# Patient Record
Sex: MALE | Race: WHITE | Employment: OTHER | URBAN - METROPOLITAN AREA
[De-identification: names, ages, dates, MRNs, and addresses within clinical notes are randomized per-mention and may not be internally consistent; named-entity substitution may affect disease eponyms.]

---

## 2017-01-03 ENCOUNTER — TELEPHONE (OUTPATIENT)
Dept: INTERNAL MEDICINE CLINIC | Facility: CLINIC | Age: 62
End: 2017-01-03

## 2017-01-03 NOTE — TELEPHONE ENCOUNTER
To nursing, please tell Lab done at [facility] lab on 12/31/16---PSA result is normal (0.7). Thanks.

## 2017-01-05 ENCOUNTER — OFFICE VISIT (OUTPATIENT)
Dept: DERMATOLOGY CLINIC | Facility: CLINIC | Age: 62
End: 2017-01-05

## 2017-01-05 DIAGNOSIS — L82.1 VERRUCOUS KERATOSIS: Primary | ICD-10-CM

## 2017-01-05 DIAGNOSIS — D23.4 BENIGN NEOPLASM OF SCALP AND SKIN OF NECK: ICD-10-CM

## 2017-01-05 DIAGNOSIS — D23.70 BENIGN NEOPLASM OF SKIN OF LOWER LIMB, INCLUDING HIP, UNSPECIFIED LATERALITY: ICD-10-CM

## 2017-01-05 DIAGNOSIS — L82.1 SEBORRHEIC KERATOSES: ICD-10-CM

## 2017-01-05 DIAGNOSIS — L30.9 DERMATITIS: ICD-10-CM

## 2017-01-05 DIAGNOSIS — D23.30 BENIGN NEOPLASM OF SKIN OF FACE: ICD-10-CM

## 2017-01-05 DIAGNOSIS — D23.60 BENIGN NEOPLASM OF SKIN OF UPPER LIMB, INCLUDING SHOULDER, UNSPECIFIED LATERALITY: ICD-10-CM

## 2017-01-05 DIAGNOSIS — D23.5 BENIGN NEOPLASM OF SKIN OF TRUNK, EXCEPT SCROTUM: ICD-10-CM

## 2017-01-05 PROCEDURE — 99203 OFFICE O/P NEW LOW 30 MIN: CPT | Performed by: DERMATOLOGY

## 2017-01-05 PROCEDURE — 99212 OFFICE O/P EST SF 10 MIN: CPT | Performed by: DERMATOLOGY

## 2017-01-29 NOTE — PROGRESS NOTES
Sunita Neighbor is a 64year old male. HPI:     CC:  Patient presents with:  Lesion: New patient presents with lesion to right lateral lower leg and lesion to forehead x couple years. Leison to left hip x 30 years. Area is getting larger x 5 years.  Denies bl Obesity Father    • Heart Attack Father    • Stroke Father      TIA   • Colon Polyp[Other] [OTHER] Father    • Brain bleed[Other] [OTHER] Father 80      in his sleep-hx brain bleed   • Diabetes Mother    • Congestive Heart Failure[Other] Joaquim Barahona Mother Disp:  Rfl:    aspirin (ASPIR-81) 81 MG Oral Tab EC Take 1 tablet by mouth daily. Disp:  Rfl:    cholecalciferol ( VITAMIN D) 1000 UNITS Oral Cap Take 1 capsule by mouth daily. Disp:  Rfl:    Cetirizine HCl 10 MG Oral Cap Take  by mouth.  take 1 tablet (1 Pebbles Rushing    COLONOSCOPY  2010    Comment Dr. Mackenzie Pi HISTORY  11/1/2010    Comment Colon Resection       Social History   Marital Status:   Spouse Name: N/A    Years of Education: N/A  Number of Children: N/A     Occupational Hi oriented in no acute distress. Exam total-body performed, including scalp, head, neck, face,nails, hair, external eyes, including conjunctival mucosa, eyelids, lips external ears, back, chest,/ breasts, axillae,  abdomen, arms, legs, palms.      Multiple l benefits discussed. Pathophysiology discussed with patient. Therapeutic options reviewed. See  Medications in grid. Instructions reviewed at length. Benign nevi, seborrheic  keratoses, cherry angiomas:  Reassurance regarding other benign skin lesions.

## 2017-03-09 ENCOUNTER — PRIOR ORIGINAL RECORDS (OUTPATIENT)
Dept: OTHER | Age: 62
End: 2017-03-09

## 2017-03-22 RX ORDER — FLUTICASONE PROPIONATE 50 MCG
SPRAY, SUSPENSION (ML) NASAL
Qty: 1 BOTTLE | Refills: 11 | Status: SHIPPED | OUTPATIENT
Start: 2017-03-22 | End: 2018-08-16

## 2017-03-24 RX ORDER — HYDROCHLOROTHIAZIDE 25 MG/1
TABLET ORAL
Qty: 45 TABLET | Refills: 3 | Status: SHIPPED | OUTPATIENT
Start: 2017-03-24 | End: 2018-05-01

## 2017-04-18 ENCOUNTER — OFFICE VISIT (OUTPATIENT)
Dept: INTERNAL MEDICINE CLINIC | Facility: CLINIC | Age: 62
End: 2017-04-18

## 2017-04-18 VITALS
DIASTOLIC BLOOD PRESSURE: 70 MMHG | OXYGEN SATURATION: 98 % | BODY MASS INDEX: 39.66 KG/M2 | HEIGHT: 72 IN | TEMPERATURE: 99 F | SYSTOLIC BLOOD PRESSURE: 144 MMHG | WEIGHT: 292.81 LBS | HEART RATE: 78 BPM

## 2017-04-18 DIAGNOSIS — E66.9 OBESITY (BMI 30-39.9): ICD-10-CM

## 2017-04-18 DIAGNOSIS — I10 ESSENTIAL HYPERTENSION: Primary | ICD-10-CM

## 2017-04-18 DIAGNOSIS — N18.30 TYPE 2 DIABETES MELLITUS WITH STAGE 3 CHRONIC KIDNEY DISEASE, WITH LONG-TERM CURRENT USE OF INSULIN (HCC): ICD-10-CM

## 2017-04-18 DIAGNOSIS — Z79.4 TYPE 2 DIABETES MELLITUS WITH STAGE 3 CHRONIC KIDNEY DISEASE, WITH LONG-TERM CURRENT USE OF INSULIN (HCC): ICD-10-CM

## 2017-04-18 DIAGNOSIS — E11.22 TYPE 2 DIABETES MELLITUS WITH STAGE 3 CHRONIC KIDNEY DISEASE, WITH LONG-TERM CURRENT USE OF INSULIN (HCC): ICD-10-CM

## 2017-04-18 DIAGNOSIS — E78.1 HYPERTRIGLYCERIDEMIA: ICD-10-CM

## 2017-04-18 PROCEDURE — 99212 OFFICE O/P EST SF 10 MIN: CPT | Performed by: INTERNAL MEDICINE

## 2017-04-18 PROCEDURE — 99214 OFFICE O/P EST MOD 30 MIN: CPT | Performed by: INTERNAL MEDICINE

## 2017-04-18 RX ORDER — AMLODIPINE BESYLATE 5 MG/1
5 TABLET ORAL DAILY
Qty: 90 TABLET | Refills: 3 | Status: SHIPPED | OUTPATIENT
Start: 2017-04-18 | End: 2018-04-13

## 2017-04-18 NOTE — PROGRESS NOTES
Laureen Romano is a 58year old male who presents for     Check at 4 mo. Feels good. Twisted L knee 2 days ago -tripped on a floor mat. Is getting better.      Hyperlipidemia:    tolerating lopid 600 mg bid, niacin and fish oil.  diet and exercise not c Comment Dr. Carrillo [place]  11/1/2010    Comment Colon Resection      Family History   Problem Relation Age of Onset   • Hypertension Father    • Obesity Father    • Heart Attack Father    • Stroke Father      TIA   • Other[other] William Skaggs Diabetes-type 2 with CKD stage 3 and retinopathy. sees Dr. Zeeshan Garcia. On insulin pump- novalog and takes tradjenta.    Ophtho is Dr Chris Ramirez at [facility].  Dr. Hallie Godwin for retinopathy left eye.    A1c 8.1 on 12/5/16--was done per Dr. Zeeshan Garcia -- Erica Mcfarland Creat 1.94, GFR 36. [facility] on 12/31/16---PSA result is normal (0.7).    Lab -has orders for May (next mo) per Dr. Mohini Santos. Sees Dr. Mohini Santos next mo. Ret in 3 mo. Goal to lose wt. Has lab order from Dr. Mohini Santos for May.     Patient expresses understanding UNITS Oral Cap Take 1 capsule by mouth daily. Disp:  Rfl:    Cetirizine HCl 10 MG Oral Cap Take  by mouth.  take 1 tablet (10MG)  by oral route  every day Disp:  Rfl:    omega-3 fatty acids (FISH OIL) 1000 MG Oral Cap Take 4 capsules daily Disp:  Rfl:

## 2017-04-24 RX ORDER — SYRINGE WITH NEEDLE, 1 ML 25GX5/8"
SYRINGE, EMPTY DISPOSABLE MISCELLANEOUS
Qty: 12 EACH | Refills: 0 | Status: SHIPPED | OUTPATIENT
Start: 2017-04-24 | End: 2018-05-29

## 2017-04-24 RX ORDER — CYANOCOBALAMIN 1000 UG/ML
INJECTION INTRAMUSCULAR; SUBCUTANEOUS
Qty: 12 ML | Refills: 0 | Status: SHIPPED | OUTPATIENT
Start: 2017-04-24 | End: 2018-04-06

## 2017-07-20 RX ORDER — METOPROLOL TARTRATE 50 MG/1
TABLET, FILM COATED ORAL
Qty: 270 TABLET | Refills: 3 | Status: SHIPPED | OUTPATIENT
Start: 2017-07-20 | End: 2018-07-29

## 2017-08-22 ENCOUNTER — OFFICE VISIT (OUTPATIENT)
Dept: INTERNAL MEDICINE CLINIC | Facility: CLINIC | Age: 62
End: 2017-08-22

## 2017-08-22 VITALS
SYSTOLIC BLOOD PRESSURE: 130 MMHG | HEART RATE: 81 BPM | WEIGHT: 288 LBS | TEMPERATURE: 98 F | BODY MASS INDEX: 39.01 KG/M2 | OXYGEN SATURATION: 98 % | DIASTOLIC BLOOD PRESSURE: 70 MMHG | HEIGHT: 72 IN

## 2017-08-22 DIAGNOSIS — E11.22 TYPE 2 DIABETES MELLITUS WITH STAGE 4 CHRONIC KIDNEY DISEASE, WITH LONG-TERM CURRENT USE OF INSULIN (HCC): ICD-10-CM

## 2017-08-22 DIAGNOSIS — E78.1 HYPERTRIGLYCERIDEMIA: ICD-10-CM

## 2017-08-22 DIAGNOSIS — L98.9 SKIN LESION OF FACE: ICD-10-CM

## 2017-08-22 DIAGNOSIS — Z79.4 TYPE 2 DIABETES MELLITUS WITH STAGE 4 CHRONIC KIDNEY DISEASE, WITH LONG-TERM CURRENT USE OF INSULIN (HCC): ICD-10-CM

## 2017-08-22 DIAGNOSIS — N18.4 TYPE 2 DIABETES MELLITUS WITH STAGE 4 CHRONIC KIDNEY DISEASE, WITH LONG-TERM CURRENT USE OF INSULIN (HCC): ICD-10-CM

## 2017-08-22 DIAGNOSIS — I10 ESSENTIAL HYPERTENSION: Primary | ICD-10-CM

## 2017-08-22 PROCEDURE — 99212 OFFICE O/P EST SF 10 MIN: CPT | Performed by: INTERNAL MEDICINE

## 2017-08-22 PROCEDURE — 99214 OFFICE O/P EST MOD 30 MIN: CPT | Performed by: INTERNAL MEDICINE

## 2017-08-22 RX ORDER — ZOSTER VACCINE LIVE 19400 [PFU]/.65ML
INJECTION, POWDER, LYOPHILIZED, FOR SUSPENSION SUBCUTANEOUS
COMMUNITY
Start: 2017-06-25 | End: 2018-01-08 | Stop reason: ALTCHOICE

## 2017-08-22 NOTE — PROGRESS NOTES
Dean Dobson is a 58year old male who presents for     Check at 4 mo.      Feels good.      Hyperlipidemia: tolerating lopid 600 mg bid, niacin and fish oil.  not exercising. Gets 5K steps at work/d.     DM:  sugars averaging 100s.  has insulin pump with n HERNIA REPAIR      Comment: Dr. Nancy Park at time of R hemicolectomy   Family History   Problem Relation Age of Onset   • Hypertension Father    • Obesity Father    • Heart Attack Father    • Stroke Father      TIA   • Other [OTHER] Father    • Brain blee facial cheek. ASSESSMENT AND PLAN:     Diabetes-type 2 with CKD stage 3 and retinopathy--sees Dr. Traci Styles. On insulin pump- novalog and takes tradjenta.    Ophtho is Dr Caren Beltre at [facility]. Dr. Isa Mei for retinopathy eyes.   (was c-i-s of colon.    Podiatrist Dr Eitan Espitia him to trim toenails and for callus L 3rd toe.     Vaccines: Pmvx 11/12--PCV13 on 10/28/14 and PPSV23 in 5 yrs. Tdap 12/1/15.  Shingles shot avail at a pharmacy.  flu shot was in Oct--get flu shot this fall.   2.5 mg by mouth daily. Disp: 30 tablet Rfl: 0   Probiotic Oral Cap Take 1 capsule by mouth daily. Disp:  Rfl:    Coenzyme Q10 (COQ-10) 100 MG Oral Cap Take 1 capsule by mouth daily.  Disp:  Rfl:    Ascorbic Acid (VITAMIN C) 1000 MG Oral Tab Take 1 tablet

## 2017-08-22 NOTE — PATIENT INSTRUCTIONS
Flu shot this fall. See Dr. Zarina Hall as your kidney function tests in May show worsened function. GFR 27, creatinine 2.49. See Dr Judith Oliva to check the skin growth on your cheek.

## 2017-09-08 ENCOUNTER — PRIOR ORIGINAL RECORDS (OUTPATIENT)
Dept: OTHER | Age: 62
End: 2017-09-08

## 2017-09-19 ENCOUNTER — PRIOR ORIGINAL RECORDS (OUTPATIENT)
Dept: OTHER | Age: 62
End: 2017-09-19

## 2017-09-19 LAB
CHOLESTEROL, TOTAL: 180 MG/DL
HDL CHOLESTEROL: 27 MG/DL
HEMOGLOBIN A1C: 8.2 %
TRIGLYCERIDES: 619 MG/DL

## 2017-10-11 RX ORDER — GEMFIBROZIL 600 MG/1
TABLET, FILM COATED ORAL
Qty: 180 TABLET | Refills: 3 | Status: SHIPPED | OUTPATIENT
Start: 2017-10-11 | End: 2018-09-30

## 2017-12-19 ENCOUNTER — OFFICE VISIT (OUTPATIENT)
Dept: INTERNAL MEDICINE CLINIC | Facility: CLINIC | Age: 62
End: 2017-12-19

## 2017-12-19 VITALS
HEIGHT: 72 IN | TEMPERATURE: 99 F | WEIGHT: 281 LBS | BODY MASS INDEX: 38.06 KG/M2 | SYSTOLIC BLOOD PRESSURE: 134 MMHG | DIASTOLIC BLOOD PRESSURE: 70 MMHG | HEART RATE: 68 BPM

## 2017-12-19 DIAGNOSIS — E78.1 HYPERTRIGLYCERIDEMIA: ICD-10-CM

## 2017-12-19 DIAGNOSIS — N18.4 TYPE 2 DIABETES MELLITUS WITH STAGE 4 CHRONIC KIDNEY DISEASE, WITH LONG-TERM CURRENT USE OF INSULIN (HCC): ICD-10-CM

## 2017-12-19 DIAGNOSIS — I10 ESSENTIAL HYPERTENSION: Primary | ICD-10-CM

## 2017-12-19 DIAGNOSIS — Z12.5 SCREENING FOR PROSTATE CANCER: ICD-10-CM

## 2017-12-19 DIAGNOSIS — N40.0 ENLARGED PROSTATE: ICD-10-CM

## 2017-12-19 DIAGNOSIS — E11.22 TYPE 2 DIABETES MELLITUS WITH STAGE 4 CHRONIC KIDNEY DISEASE, WITH LONG-TERM CURRENT USE OF INSULIN (HCC): ICD-10-CM

## 2017-12-19 DIAGNOSIS — Z79.4 TYPE 2 DIABETES MELLITUS WITH STAGE 4 CHRONIC KIDNEY DISEASE, WITH LONG-TERM CURRENT USE OF INSULIN (HCC): ICD-10-CM

## 2017-12-19 PROCEDURE — 99213 OFFICE O/P EST LOW 20 MIN: CPT | Performed by: INTERNAL MEDICINE

## 2017-12-19 PROCEDURE — 99214 OFFICE O/P EST MOD 30 MIN: CPT | Performed by: INTERNAL MEDICINE

## 2017-12-19 RX ORDER — INSULIN HUMAN 500 [IU]/ML
INJECTION, SOLUTION SUBCUTANEOUS
COMMUNITY
Start: 2017-12-08

## 2017-12-19 NOTE — PROGRESS NOTES
Bret Kellogg is a 58year old male who presents for     Check at 4 mo.      Feels good.      Hyperlipidemia: tolerating lopid 600 mg bid, niacin and fish oil.       DM:  sugars 100s to 200s.  has insulin pump with humalog U500 (changed from Novalog in 9/201 R hemicolectomy   Family History   Problem Relation Age of Onset   • Hypertension Father    • Obesity Father    • Heart Attack Father    • Stroke Father      TIA   • Other [OTHER] Father    • Brain bleed [OTHER] Father 80      in his sleep-hx brain ble and oriented  Skin lesion slt scaley on R cheek and on R lower leg a raised growth pointed out by pt. See dermatologist.                   ASSESSMENT AND PLAN:     Diabetes-type 2 with CKD stage 3 and retinopathy--sees Dr. Elizabeth Odell.  On insulin pump- humalog U score 5/12/16-is 312 in circumflex.  echo 7/2/16-- mild LVH. Dr Wadas 9/2016--nuc stress test nl.      Healthcare maintenance:   C-scopy 10/17/14-Dr Shellie Larsen -polyp tubular adenoma and diverticulosis-- next at 5 yrs.  Had R hemicolectomy 11/10 for c-i-s of col EVERY OTHER DAY Disp: 45 tablet Rfl: 3   FLUTICASONE PROPIONATE 50 MCG/ACT Nasal Suspension SPRAY 2 SPRAYS IN EACH NOSTRIL BY INTRANASAL ROUTE EVERY DAY (Patient taking differently: SPRAY 2 SPRAYS IN EACH NOSTRIL BY INTRANASAL ROUTE EVERY DAY AS NEEDED.) D

## 2017-12-20 ENCOUNTER — PATIENT MESSAGE (OUTPATIENT)
Dept: INTERNAL MEDICINE CLINIC | Facility: CLINIC | Age: 62
End: 2017-12-20

## 2017-12-21 NOTE — TELEPHONE ENCOUNTER
[facility] lab orders were entered 12/19/17. Waiting on pt response to either have them mailed or picked up.

## 2017-12-21 NOTE — TELEPHONE ENCOUNTER
[facility]  To: Marylene Ode, MD  Sent: 12/20/2017 5:33 PM CST  Subject: Other    I was just there yesterday and Dr. Abby Mcgowan told me to get some blood work done at [facility] but I got no orders for them. Did I forget to  something.

## 2018-01-08 ENCOUNTER — TELEPHONE (OUTPATIENT)
Dept: INTERNAL MEDICINE CLINIC | Facility: CLINIC | Age: 63
End: 2018-01-08

## 2018-01-08 ENCOUNTER — HOSPITAL ENCOUNTER (OUTPATIENT)
Dept: GENERAL RADIOLOGY | Facility: HOSPITAL | Age: 63
Discharge: HOME | End: 2018-01-08
Attending: INTERNAL MEDICINE
Payer: COMMERCIAL

## 2018-01-08 ENCOUNTER — OFFICE VISIT (OUTPATIENT)
Dept: INTERNAL MEDICINE CLINIC | Facility: CLINIC | Age: 63
End: 2018-01-08

## 2018-01-08 VITALS
SYSTOLIC BLOOD PRESSURE: 140 MMHG | HEIGHT: 72 IN | OXYGEN SATURATION: 98 % | DIASTOLIC BLOOD PRESSURE: 78 MMHG | HEART RATE: 66 BPM | BODY MASS INDEX: 38.06 KG/M2 | WEIGHT: 281 LBS | TEMPERATURE: 98 F

## 2018-01-08 DIAGNOSIS — J06.9 URI, ACUTE: ICD-10-CM

## 2018-01-08 DIAGNOSIS — H66.91 RIGHT OTITIS MEDIA, UNSPECIFIED OTITIS MEDIA TYPE: ICD-10-CM

## 2018-01-08 DIAGNOSIS — J98.01 BRONCHOSPASM: ICD-10-CM

## 2018-01-08 DIAGNOSIS — J06.9 URI, ACUTE: Primary | ICD-10-CM

## 2018-01-08 PROCEDURE — 71046 X-RAY EXAM CHEST 2 VIEWS: CPT | Performed by: INTERNAL MEDICINE

## 2018-01-08 PROCEDURE — 99212 OFFICE O/P EST SF 10 MIN: CPT | Performed by: INTERNAL MEDICINE

## 2018-01-08 PROCEDURE — 99213 OFFICE O/P EST LOW 20 MIN: CPT | Performed by: INTERNAL MEDICINE

## 2018-01-08 RX ORDER — ALBUTEROL SULFATE 90 UG/1
AEROSOL, METERED RESPIRATORY (INHALATION)
Qty: 1 INHALER | Refills: 1 | Status: SHIPPED | OUTPATIENT
Start: 2018-01-08 | End: 2018-06-14 | Stop reason: ALTCHOICE

## 2018-01-08 RX ORDER — CODEINE PHOSPHATE AND GUAIFENESIN 10; 100 MG/5ML; MG/5ML
10 SOLUTION ORAL EVERY 6 HOURS PRN
Qty: 240 ML | Refills: 0 | Status: SHIPPED
Start: 2018-01-08 | End: 2018-03-08

## 2018-01-08 RX ORDER — AMOXICILLIN AND CLAVULANATE POTASSIUM 500; 125 MG/1; MG/1
1 TABLET, FILM COATED ORAL 2 TIMES DAILY
Qty: 20 TABLET | Refills: 0 | Status: SHIPPED | OUTPATIENT
Start: 2018-01-08 | End: 2018-03-08 | Stop reason: ALTCHOICE

## 2018-01-08 NOTE — PROGRESS NOTES
Desire Webster is a 58year old male who presents with     cold symptoms. Onset: 9 days ago  Started with: cough and \"a little bit of a runny nose. \"  No appetite. Then diarrhea 6 days ago that went away.   Not much nasal drainage, R ear is \"clogged up\" Dr. Anthony Saunders at time of R hemicolectomy   Family History   Problem Relation Age of Onset   • Hypertension Father    • Obesity Father    • Heart Attack Father    • Stroke Father      TIA   • Other [OTHER] Father    • Brain bleed [OTHER] Father 80      500-125 MG Oral Tab Take 1 tablet by mouth 2 (two) times daily. Disp: 20 tablet Rfl: 0   guaiFENesin-codeine (CHERATUSSIN AC) 100-10 MG/5ML Oral Solution Take 10 mL by mouth every 6 (six) hours as needed.  Disp: 240 mL Rfl: 0   Albuterol Sulfate  (90 capsule by mouth daily. Disp:  Rfl:    Cetirizine HCl 10 MG Oral Cap Take  by mouth.  take 1 tablet (10MG)  by oral route  every day Disp:  Rfl:    omega-3 fatty acids (FISH OIL) 1000 MG Oral Cap Take 4 capsules daily Disp:  Rfl:          MARIANO Flores

## 2018-01-09 NOTE — TELEPHONE ENCOUNTER
To nursing, please tell patient chest x-ray done today 1/8/18–results are okay–no pneumonia. Thanks.

## 2018-01-12 ENCOUNTER — PRIOR ORIGINAL RECORDS (OUTPATIENT)
Dept: OTHER | Age: 63
End: 2018-01-12

## 2018-01-13 LAB
ABSOLUTE BASOPHILS: 22 CELLS/UL (ref 0–200)
ABSOLUTE EOSINOPHILS: 28 CELLS/UL (ref 15–500)
ABSOLUTE LYMPHOCYTES: 482 CELLS/UL (ref 850–3900)
ABSOLUTE MONOCYTES: 498 CELLS/UL (ref 200–950)
ABSOLUTE NEUTROPHILS: 4570 CELLS/UL (ref 1500–7800)
ALBUMIN/GLOBULIN RATIO: 1.3 (CALC) (ref 1–2.5)
ALBUMIN: 3.5 G/DL (ref 3.6–5.1)
ALKALINE PHOSPHATASE: 59 U/L (ref 40–115)
ALT: 19 U/L (ref 9–46)
APPEARANCE: CLEAR
AST: 16 U/L (ref 10–35)
BASOPHILS: 0.4 %
BILIRUBIN, TOTAL: 0.2 MG/DL (ref 0.2–1.2)
BILIRUBIN: NEGATIVE
BUN/CREATININE RATIO: 13 (CALC) (ref 6–22)
BUN: 68 MG/DL (ref 7–25)
CALCIUM: 8.6 MG/DL (ref 8.6–10.3)
CARBON DIOXIDE: 20 MMOL/L (ref 20–31)
CHLORIDE: 111 MMOL/L (ref 98–110)
COLOR: YELLOW
CREATININE, RANDOM URINE: 86 MG/DL (ref 20–370)
CREATININE: 5.17 MG/DL (ref 0.7–1.25)
EGFR IF AFRICN AM: 13 ML/MIN/1.73M2
EGFR IF NONAFRICN AM: 11 ML/MIN/1.73M2
EOSINOPHILS: 0.5 %
GLOBULIN: 2.8 G/DL (CALC) (ref 1.9–3.7)
GLUCOSE: 76 MG/DL (ref 65–99)
GLUCOSE: NEGATIVE
HEMATOCRIT: 26.4 % (ref 38.5–50)
HEMOGLOBIN: 8.8 G/DL (ref 13.2–17.1)
KETONES: NEGATIVE
LEUKOCYTE ESTERASE: NEGATIVE
LYMPHOCYTES: 8.6 %
MCH: 30 PG (ref 27–33)
MCHC: 33.3 G/DL (ref 32–36)
MCV: 90.1 FL (ref 80–100)
MICROALBUMIN/CREATININE RATIO, RANDOM URINE: 2680 MCG/MG CREAT
MICROALBUMIN: 230.5 MG/DL
MONOCYTES: 8.9 %
MPV: 10.8 FL (ref 7.5–12.5)
NEUTROPHILS: 81.6 %
NITRITE: NEGATIVE
PLATELET COUNT: 197 THOUSAND/UL (ref 140–400)
PLATELET ESTIMATE: ADEQUATE
POTASSIUM: 4.9 MMOL/L (ref 3.5–5.3)
PROTEIN, TOTAL: 6.3 G/DL (ref 6.1–8.1)
PSA, TOTAL: 1.6 NG/ML
RDW: 13.2 % (ref 11–15)
RED BLOOD CELL COUNT: 2.93 MILLION/UL (ref 4.2–5.8)
SODIUM: 141 MMOL/L (ref 135–146)
SPECIFIC GRAVITY: 1.01 (ref 1–1.03)
TSH: 1 MIU/L (ref 0.4–4.5)
WHITE BLOOD CELL COUNT: 5.6 THOUSAND/UL (ref 3.8–10.8)

## 2018-01-14 ENCOUNTER — TELEPHONE (OUTPATIENT)
Dept: INTERNAL MEDICINE CLINIC | Facility: CLINIC | Age: 63
End: 2018-01-14

## 2018-01-14 DIAGNOSIS — D64.9 ANEMIA, UNSPECIFIED TYPE: ICD-10-CM

## 2018-01-14 DIAGNOSIS — N17.9 ACUTE RENAL FAILURE, UNSPECIFIED ACUTE RENAL FAILURE TYPE (HCC): Primary | ICD-10-CM

## 2018-01-15 NOTE — TELEPHONE ENCOUNTER
Called patient. He did not answer. Left voicemail relaying Dr Penny Mohr message and asked him to call back. Called Dr Maria D Sexton office #[phone]. Spoke to Anushka and notified her that lab results were being faxed. She verbalized understanding.   Lab re

## 2018-01-15 NOTE — TELEPHONE ENCOUNTER
To nursing,   please call pt -tell him lab done 1/12/18 at [facility] lab shows his kidney function has worsened. Also, he is more anemic--Hgb down to 8.8. Is he still giving himself the vitamin B12 shots? When is his next visit with Dr. Ellen Henao?   Needs to go f

## 2018-01-15 NOTE — TELEPHONE ENCOUNTER
I left pt message on voice mail to call back--in message I relayed kidney fcn is worse. Call office tomorrow Monday--if not feeling well, call and speak to the doctor on call.      Note to self--Lab of 1/12/18-[facility]--cbc cmp tsh ua/ma, psa--Hgb 8.8, cre

## 2018-01-15 NOTE — TELEPHONE ENCOUNTER
As FYI to DR. SIM ramos dpatient and relayed DR. MONTEMAYOR message - he verbalized understanding.  H ai giving himself B 12 injections monthly, he sees Erin CAMACHO today at 1:30 pm , order for labs mailed to patients home and number for scheduling kidney US given to velasquez

## 2018-01-17 ENCOUNTER — TELEPHONE (OUTPATIENT)
Dept: INTERNAL MEDICINE CLINIC | Facility: CLINIC | Age: 63
End: 2018-01-17

## 2018-01-17 NOTE — TELEPHONE ENCOUNTER
Call from Dr. Lin Sy saw pt 1/15/18--for worsening renal fcn.  1/12/18–creatinine 5.17, GFR 11, BUN 68. Dr. Franklin Wright notes his renal function worsening may be related to him being dehydrated from recent URI.     She recommended rehydration and will rech

## 2018-01-24 ENCOUNTER — PRIOR ORIGINAL RECORDS (OUTPATIENT)
Dept: OTHER | Age: 63
End: 2018-01-24

## 2018-01-25 ENCOUNTER — TELEPHONE (OUTPATIENT)
Dept: INTERNAL MEDICINE CLINIC | Facility: CLINIC | Age: 63
End: 2018-01-25

## 2018-01-25 LAB
% SATURATION: 20 % (CALC) (ref 15–60)
ABSOLUTE BASOPHILS: 17 CELLS/UL (ref 0–200)
ABSOLUTE EOSINOPHILS: 17 CELLS/UL (ref 15–500)
ABSOLUTE LYMPHOCYTES: 302 CELLS/UL (ref 850–3900)
ABSOLUTE MONOCYTES: 261 CELLS/UL (ref 200–950)
ABSOLUTE NEUTROPHILS: 5203 CELLS/UL (ref 1500–7800)
BASOPHILS: 0.3 %
BUN/CREATININE RATIO: 16 (CALC) (ref 6–22)
BUN: 86 MG/DL (ref 7–25)
CALCIUM: 8.2 MG/DL (ref 8.6–10.3)
CARBON DIOXIDE: 11 MMOL/L (ref 20–31)
CHLORIDE: 116 MMOL/L (ref 98–110)
CREATININE: 5.27 MG/DL (ref 0.7–1.25)
EGFR IF AFRICN AM: 12 ML/MIN/1.73M2
EGFR IF NONAFRICN AM: 11 ML/MIN/1.73M2
EOSINOPHILS: 0.3 %
FERRITIN: 237 NG/ML (ref 20–380)
FOLATE, SERUM: 11.1 NG/ML
GLUCOSE: 44 MG/DL (ref 65–99)
HEMATOCRIT: 25.8 % (ref 38.5–50)
HEMOGLOBIN: 8.5 G/DL (ref 13.2–17.1)
IRON BINDING CAPACITY: 285 MCG/DL (CALC) (ref 250–425)
IRON, TOTAL: 57 MCG/DL (ref 50–180)
LYMPHOCYTES: 5.2 %
MCH: 29.9 PG (ref 27–33)
MCHC: 32.9 G/DL (ref 32–36)
MCV: 90.8 FL (ref 80–100)
MONOCYTES: 4.5 %
MPV: 11.7 FL (ref 7.5–12.5)
NEUTROPHILS: 89.7 %
PLATELET COUNT: 143 THOUSAND/UL (ref 140–400)
POTASSIUM: 4.9 MMOL/L (ref 3.5–5.3)
RDW: 13.6 % (ref 11–15)
RED BLOOD CELL COUNT: 2.84 MILLION/UL (ref 4.2–5.8)
SODIUM: 141 MMOL/L (ref 135–146)
VITAMIN B12: 365 PG/ML (ref 200–1100)
WHITE BLOOD CELL COUNT: 5.8 THOUSAND/UL (ref 3.8–10.8)

## 2018-01-25 NOTE — TELEPHONE ENCOUNTER
Spoke with patient after reviewing abnormal lab results, including FBS of 44. Patient verbalized understanding and states that his BS's have been running low, in the 50s-60s, all month since he got the flu.  He has discussed with Dr. Gloria Perez recently and has

## 2018-01-26 NOTE — TELEPHONE ENCOUNTER
To nursing, please tell pt lab done on 1/24/18-cbc fe/tibc, ferritin, B12, folate, BMP-  Results are relatively stable to 1/12/18. His anemia and kidney function are the same.  There is not deficiency of iron, B12 or folate to explain the anemia--therefore

## 2018-01-26 NOTE — TELEPHONE ENCOUNTER
Relayed MD's message to patient---verbalized understanding  Pt states he saw Dr. Juanpablo Nascimento today, Dr. Juanpablo Nascimento lowered insulin on pump, he has f/u visit with him 2/8/18 and will have f/u blood tests at that time  Faxed lab results from 1/24/18 to Dr. Juanpablo Nascimento and no

## 2018-02-28 PROBLEM — I12.9 BENIGN HYPERTENSION WITH CHRONIC KIDNEY DISEASE, STAGE IV (HCC): Status: ACTIVE | Noted: 2018-02-28

## 2018-02-28 PROBLEM — N18.4 BENIGN HYPERTENSION WITH CHRONIC KIDNEY DISEASE, STAGE IV (HCC): Status: ACTIVE | Noted: 2018-02-28

## 2018-02-28 NOTE — TELEPHONE ENCOUNTER
Refill request received for hydrochlorothiazide 25mg every other day. To Dr. Hilary Martinez - please advise regarding refill request in light of recent lab results and US.

## 2018-02-28 NOTE — TELEPHONE ENCOUNTER
To nursing,  I note HCTZ requested but may have been stopped by Dr Yamilex Balderas. Not on med list when he saw Dr. Yamilex Balderas 2/8/18. If possible, can you please check w her nurse that she stopped the hctz? Then tell pt and pharmacy to cancel the refill. Thanks.

## 2018-03-02 RX ORDER — HYDROCHLOROTHIAZIDE 25 MG/1
TABLET ORAL
Qty: 45 TABLET | Refills: 2 | OUTPATIENT
Start: 2018-03-02

## 2018-03-05 LAB
BUN: 86 MG/DL
CALCIUM: 8.2 MG/DL
CHLORIDE: 116 MEQ/L
CREATININE, SERUM: 5.27 MG/DL
GLUCOSE: 44 MG/DL
HEMATOCRIT: 25.8 %
HEMOGLOBIN: 8.5 G/DL
PLATELETS: 143 K/UL
POTASSIUM, SERUM: 4.9 MEQ/L
RED BLOOD COUNT: 2.84 X 10-6/U
SODIUM: 141 MEQ/L
THYROID STIMULATING HORMONE: 1 MLU/L
WHITE BLOOD COUNT: 5.8 X 10-3/U

## 2018-03-06 ENCOUNTER — MYAURORA ACCOUNT LINK (OUTPATIENT)
Dept: OTHER | Age: 63
End: 2018-03-06

## 2018-03-06 ENCOUNTER — PRIOR ORIGINAL RECORDS (OUTPATIENT)
Dept: OTHER | Age: 63
End: 2018-03-06

## 2018-03-12 ENCOUNTER — PRIOR ORIGINAL RECORDS (OUTPATIENT)
Dept: OTHER | Age: 63
End: 2018-03-12

## 2018-03-16 ENCOUNTER — PRIOR ORIGINAL RECORDS (OUTPATIENT)
Dept: OTHER | Age: 63
End: 2018-03-16

## 2018-03-20 ENCOUNTER — PRIOR ORIGINAL RECORDS (OUTPATIENT)
Dept: OTHER | Age: 63
End: 2018-03-20

## 2018-04-06 RX ORDER — CYANOCOBALAMIN 1000 UG/ML
INJECTION INTRAMUSCULAR; SUBCUTANEOUS
Qty: 12 ML | Refills: 0 | Status: SHIPPED | OUTPATIENT
Start: 2018-04-06 | End: 2018-05-29

## 2018-04-17 ENCOUNTER — PRIOR ORIGINAL RECORDS (OUTPATIENT)
Dept: OTHER | Age: 63
End: 2018-04-17

## 2018-04-23 ENCOUNTER — TELEPHONE (OUTPATIENT)
Dept: INTERNAL MEDICINE CLINIC | Facility: CLINIC | Age: 63
End: 2018-04-23

## 2018-04-23 RX ORDER — AMLODIPINE BESYLATE 5 MG/1
5 TABLET ORAL DAILY
Qty: 90 TABLET | Refills: 3 | Status: SHIPPED | OUTPATIENT
Start: 2018-04-23 | End: 2019-04-28

## 2018-04-23 NOTE — TELEPHONE ENCOUNTER
[facility] faxed new Rx request for Amlodipine besylate 5 mg tab  Take 1 tablet by mouth every day  QTY 90  Patient requests new rx - was previously getting at [address]

## 2018-05-01 ENCOUNTER — OFFICE VISIT (OUTPATIENT)
Dept: INTERNAL MEDICINE CLINIC | Facility: CLINIC | Age: 63
End: 2018-05-01

## 2018-05-01 VITALS
HEART RATE: 72 BPM | TEMPERATURE: 99 F | DIASTOLIC BLOOD PRESSURE: 70 MMHG | BODY MASS INDEX: 37.79 KG/M2 | SYSTOLIC BLOOD PRESSURE: 138 MMHG | WEIGHT: 279 LBS | HEIGHT: 72 IN

## 2018-05-01 DIAGNOSIS — I10 ESSENTIAL HYPERTENSION: Primary | ICD-10-CM

## 2018-05-01 DIAGNOSIS — N18.4 TYPE 2 DIABETES MELLITUS WITH STAGE 4 CHRONIC KIDNEY DISEASE, WITH LONG-TERM CURRENT USE OF INSULIN (HCC): ICD-10-CM

## 2018-05-01 DIAGNOSIS — N18.4 STAGE 4 CHRONIC KIDNEY DISEASE (HCC): ICD-10-CM

## 2018-05-01 DIAGNOSIS — E11.22 TYPE 2 DIABETES MELLITUS WITH STAGE 4 CHRONIC KIDNEY DISEASE, WITH LONG-TERM CURRENT USE OF INSULIN (HCC): ICD-10-CM

## 2018-05-01 DIAGNOSIS — E78.1 HYPERTRIGLYCERIDEMIA: ICD-10-CM

## 2018-05-01 DIAGNOSIS — Z79.4 TYPE 2 DIABETES MELLITUS WITH STAGE 4 CHRONIC KIDNEY DISEASE, WITH LONG-TERM CURRENT USE OF INSULIN (HCC): ICD-10-CM

## 2018-05-01 DIAGNOSIS — D64.9 ANEMIA, UNSPECIFIED TYPE: ICD-10-CM

## 2018-05-01 PROCEDURE — 99212 OFFICE O/P EST SF 10 MIN: CPT | Performed by: INTERNAL MEDICINE

## 2018-05-01 PROCEDURE — 99214 OFFICE O/P EST MOD 30 MIN: CPT | Performed by: INTERNAL MEDICINE

## 2018-05-01 NOTE — PROGRESS NOTES
David Vega is a 61year old male who presents for     Check at 4 mo.      Feels good.    Went to [place] for a few wks for work and decided not move there.      Hyperlipidemia: ding ok w meds lopid 600 mg bid, niacin and fish oil.       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      Anemia--CKD/ chronic pancytopenia:  12/5/16-wbc 3.2, hgb 12.4, plt 107; 6/7/16--hgb 12.6, wbc 4.2, plt 112.  Saw Dr Pool Hawkins in past for pancytopenia.  Hx splenomegaly and fatty liver.    9/22/16- fe/tibc ferritin B12 folate--resul INTRAMUSCULAR ROUTE  EVERY MONTH Disp: 12 mL Rfl: 0   Albuterol Sulfate  (90 Base) MCG/ACT Inhalation Aero Soln 2 puffs every 4-6 hours as needed Disp: 1 Inhaler Rfl: 1   HUMULIN R U-500, CONCENTRATED, 500 UNIT/ML Subcutaneous Solution Insulin pump

## 2018-05-30 ENCOUNTER — PRIOR ORIGINAL RECORDS (OUTPATIENT)
Dept: OTHER | Age: 63
End: 2018-05-30

## 2018-05-31 RX ORDER — CYANOCOBALAMIN 1000 UG/ML
INJECTION INTRAMUSCULAR; SUBCUTANEOUS
Qty: 12 ML | Refills: 0 | Status: SHIPPED
Start: 2018-05-31 | End: 2019-08-02

## 2018-05-31 NOTE — TELEPHONE ENCOUNTER
From: [facility]  Sent: 5/29/2018 9:25 PM CDT  Subject: Medication Renewal Request    Danie Abigail.  Suits would like a refill of the following medications:     BD LUER-PILY SYRINGE 23G X 1\" 3 ML Does not apply Misc [Zahra Leo MD]     cyanocobalamin

## 2018-05-31 NOTE — TELEPHONE ENCOUNTER
Refill request is for a maintenance medication and has met the criteria specified in the Ambulatory Medication Refill Standing Order for eligibility, visits, laboratory, alerts and was sent to the requested pharmacy.     Previous script for Vitamin B12 sent

## 2018-06-01 ENCOUNTER — TELEPHONE (OUTPATIENT)
Dept: INTERNAL MEDICINE CLINIC | Facility: CLINIC | Age: 63
End: 2018-06-01

## 2018-06-04 LAB
ALBUMIN: 4 G/DL
ALT (SGPT): 15 U/L
AST (SGOT): 15 U/L
BUN: 62 MG/DL
CALCIUM: 8.7 MG/DL
CHLORIDE: 113 MEQ/L
CHOLESTEROL, TOTAL: 132 MG/DL
CREATININE, SERUM: 3.89 MG/DL
FREE T4: 0.7 MG/DL
GLUCOSE: 109 MG/DL
HDL CHOLESTEROL: 25 MG/DL
HEMOGLOBIN A1C: 5.9 %
LDL CHOLESTEROL: 69 MG/DL
NON-HDL CHOLESTEROL: 107 MG/DL
POTASSIUM, SERUM: 4.3 MEQ/L
SGOT (AST): 15 IU/L
SGPT (ALT): 15 IU/L
SODIUM: 139 MEQ/L
THYROID STIMULATING HORMONE: 1.58 MLU/L
TRIGLYCERIDES: 291 MG/DL

## 2018-06-05 ENCOUNTER — PRIOR ORIGINAL RECORDS (OUTPATIENT)
Dept: OTHER | Age: 63
End: 2018-06-05

## 2018-06-06 LAB
ALBUMIN: 4 G/DL
ALBUMIN: 4.1 G/DL
BUN: 62 MG/DL
BUN: 68 MG/DL
CALCIUM: 8.5 MG/DL
CALCIUM: 8.7 MG/DL
CHLORIDE: 113 MEQ/L
CHLORIDE: 115 MEQ/L
CHOLESTEROL, TOTAL: 132 MG/DL
CREATININE, SERUM: 3.89 MG/DL
CREATININE, SERUM: 3.96 MG/DL
FREE T4: 0.7 MG/DL
GLUCOSE: 109 MG/DL
GLUCOSE: 120 MG/DL
HDL CHOLESTEROL: 25 MG/DL
HEMATOCRIT: 25.5 %
HEMOGLOBIN A1C: 5.9 %
HEMOGLOBIN: 8.8 G/DL
LDL CHOLESTEROL: 69 MG/DL
NON-HDL CHOLESTEROL: 107 MG/DL
POTASSIUM, SERUM: 4.3 MEQ/L
POTASSIUM, SERUM: 4.6 MEQ/L
SGOT (AST): 15 IU/L
SGPT (ALT): 15 IU/L
SODIUM: 139 MEQ/L
SODIUM: 140 MEQ/L
THYROID STIMULATING HORMONE: 1.58 MLU/L
TRIGLYCERIDES: 291 MG/DL

## 2018-06-14 RX ORDER — SODIUM CHLORIDE, SODIUM LACTATE, POTASSIUM CHLORIDE, CALCIUM CHLORIDE 600; 310; 30; 20 MG/100ML; MG/100ML; MG/100ML; MG/100ML
INJECTION, SOLUTION INTRAVENOUS CONTINUOUS
Status: CANCELLED | OUTPATIENT
Start: 2018-06-14

## 2018-06-21 ENCOUNTER — SURGERY (OUTPATIENT)
Age: 63
End: 2018-06-21

## 2018-06-21 ENCOUNTER — ANESTHESIA EVENT (OUTPATIENT)
Dept: SURGERY | Facility: HOSPITAL | Age: 63
End: 2018-06-21
Payer: COMMERCIAL

## 2018-06-21 ENCOUNTER — HOSPITAL ENCOUNTER (OUTPATIENT)
Facility: HOSPITAL | Age: 63
Setting detail: HOSPITAL OUTPATIENT SURGERY
Discharge: HOME | End: 2018-06-21
Attending: SURGERY | Admitting: SURGERY
Payer: COMMERCIAL

## 2018-06-21 ENCOUNTER — ANESTHESIA (OUTPATIENT)
Dept: SURGERY | Facility: HOSPITAL | Age: 63
End: 2018-06-21
Payer: COMMERCIAL

## 2018-06-21 VITALS
WEIGHT: 271.81 LBS | DIASTOLIC BLOOD PRESSURE: 67 MMHG | TEMPERATURE: 97 F | RESPIRATION RATE: 16 BRPM | BODY MASS INDEX: 36.82 KG/M2 | HEIGHT: 72 IN | OXYGEN SATURATION: 94 % | SYSTOLIC BLOOD PRESSURE: 114 MMHG | HEART RATE: 59 BPM

## 2018-06-21 PROCEDURE — 82962 GLUCOSE BLOOD TEST: CPT

## 2018-06-21 PROCEDURE — 80048 BASIC METABOLIC PNL TOTAL CA: CPT | Performed by: SURGERY

## 2018-06-21 PROCEDURE — 36415 COLL VENOUS BLD VENIPUNCTURE: CPT | Performed by: SURGERY

## 2018-06-21 PROCEDURE — 85025 COMPLETE CBC W/AUTO DIFF WBC: CPT | Performed by: SURGERY

## 2018-06-21 PROCEDURE — 0WHG43Z INSERTION OF INFUSION DEVICE INTO PERITONEAL CAVITY, PERCUTANEOUS ENDOSCOPIC APPROACH: ICD-10-PCS | Performed by: SURGERY

## 2018-06-21 DEVICE — CATH DLYS 62.5CM 2 CUF: Type: IMPLANTABLE DEVICE | Status: FUNCTIONAL

## 2018-06-21 RX ORDER — HYDROMORPHONE HYDROCHLORIDE 1 MG/ML
0.4 INJECTION, SOLUTION INTRAMUSCULAR; INTRAVENOUS; SUBCUTANEOUS EVERY 2 HOUR PRN
Status: DISCONTINUED | OUTPATIENT
Start: 2018-06-21 | End: 2018-06-21

## 2018-06-21 RX ORDER — NEOSTIGMINE METHYLSULFATE 0.5 MG/ML
INJECTION INTRAVENOUS AS NEEDED
Status: DISCONTINUED | OUTPATIENT
Start: 2018-06-21 | End: 2018-06-21 | Stop reason: SURG

## 2018-06-21 RX ORDER — SODIUM CHLORIDE 9 MG/ML
INJECTION, SOLUTION INTRAVENOUS CONTINUOUS
Status: DISCONTINUED | OUTPATIENT
Start: 2018-06-21 | End: 2018-06-21

## 2018-06-21 RX ORDER — ONDANSETRON 2 MG/ML
INJECTION INTRAMUSCULAR; INTRAVENOUS AS NEEDED
Status: DISCONTINUED | OUTPATIENT
Start: 2018-06-21 | End: 2018-06-21 | Stop reason: SURG

## 2018-06-21 RX ORDER — ACETAMINOPHEN 500 MG
1000 TABLET ORAL ONCE
Status: COMPLETED | OUTPATIENT
Start: 2018-06-21 | End: 2018-06-21

## 2018-06-21 RX ORDER — FAMOTIDINE 20 MG/1
20 TABLET ORAL ONCE
Status: COMPLETED | OUTPATIENT
Start: 2018-06-21 | End: 2018-06-21

## 2018-06-21 RX ORDER — HYDROCODONE BITARTRATE AND ACETAMINOPHEN 7.5; 325 MG/1; MG/1
1-2 TABLET ORAL EVERY 6 HOURS PRN
Qty: 30 TABLET | Refills: 0 | Status: SHIPPED | OUTPATIENT
Start: 2018-06-21 | End: 2018-08-20

## 2018-06-21 RX ORDER — LIDOCAINE HYDROCHLORIDE 10 MG/ML
INJECTION, SOLUTION EPIDURAL; INFILTRATION; INTRACAUDAL; PERINEURAL AS NEEDED
Status: DISCONTINUED | OUTPATIENT
Start: 2018-06-21 | End: 2018-06-21 | Stop reason: SURG

## 2018-06-21 RX ORDER — HYDROMORPHONE HYDROCHLORIDE 1 MG/ML
0.6 INJECTION, SOLUTION INTRAMUSCULAR; INTRAVENOUS; SUBCUTANEOUS EVERY 5 MIN PRN
Status: DISCONTINUED | OUTPATIENT
Start: 2018-06-21 | End: 2018-06-21

## 2018-06-21 RX ORDER — DOCUSATE SODIUM 100 MG/1
100 CAPSULE, LIQUID FILLED ORAL 2 TIMES DAILY
Qty: 14 CAPSULE | Refills: 1 | Status: SHIPPED | OUTPATIENT
Start: 2018-06-21 | End: 2018-06-28

## 2018-06-21 RX ORDER — BUPIVACAINE HYDROCHLORIDE AND EPINEPHRINE 2.5; 5 MG/ML; UG/ML
INJECTION, SOLUTION INFILTRATION; PERINEURAL AS NEEDED
Status: DISCONTINUED | OUTPATIENT
Start: 2018-06-21 | End: 2018-06-21 | Stop reason: HOSPADM

## 2018-06-21 RX ORDER — HALOPERIDOL 5 MG/ML
0.25 INJECTION INTRAMUSCULAR ONCE AS NEEDED
Status: DISCONTINUED | OUTPATIENT
Start: 2018-06-21 | End: 2018-06-21

## 2018-06-21 RX ORDER — HYDROMORPHONE HYDROCHLORIDE 1 MG/ML
0.4 INJECTION, SOLUTION INTRAMUSCULAR; INTRAVENOUS; SUBCUTANEOUS EVERY 5 MIN PRN
Status: DISCONTINUED | OUTPATIENT
Start: 2018-06-21 | End: 2018-06-21

## 2018-06-21 RX ORDER — NALOXONE HYDROCHLORIDE 0.4 MG/ML
80 INJECTION, SOLUTION INTRAMUSCULAR; INTRAVENOUS; SUBCUTANEOUS AS NEEDED
Status: DISCONTINUED | OUTPATIENT
Start: 2018-06-21 | End: 2018-06-21

## 2018-06-21 RX ORDER — HYDROCODONE BITARTRATE AND ACETAMINOPHEN 5; 325 MG/1; MG/1
2 TABLET ORAL AS NEEDED
Status: DISCONTINUED | OUTPATIENT
Start: 2018-06-21 | End: 2018-06-21

## 2018-06-21 RX ORDER — HYDROCODONE BITARTRATE AND ACETAMINOPHEN 5; 325 MG/1; MG/1
1 TABLET ORAL AS NEEDED
Status: DISCONTINUED | OUTPATIENT
Start: 2018-06-21 | End: 2018-06-21

## 2018-06-21 RX ORDER — TRAMADOL HYDROCHLORIDE 50 MG/1
100 TABLET ORAL EVERY 6 HOURS PRN
Status: DISCONTINUED | OUTPATIENT
Start: 2018-06-21 | End: 2018-06-21

## 2018-06-21 RX ORDER — DEXTROSE MONOHYDRATE 25 G/50ML
50 INJECTION, SOLUTION INTRAVENOUS
Status: DISCONTINUED | OUTPATIENT
Start: 2018-06-21 | End: 2018-06-21

## 2018-06-21 RX ORDER — ONDANSETRON 2 MG/ML
4 INJECTION INTRAMUSCULAR; INTRAVENOUS EVERY 6 HOURS PRN
Status: DISCONTINUED | OUTPATIENT
Start: 2018-06-21 | End: 2018-06-21

## 2018-06-21 RX ORDER — METOCLOPRAMIDE 10 MG/1
10 TABLET ORAL ONCE
Status: COMPLETED | OUTPATIENT
Start: 2018-06-21 | End: 2018-06-21

## 2018-06-21 RX ORDER — ROCURONIUM BROMIDE 10 MG/ML
INJECTION, SOLUTION INTRAVENOUS AS NEEDED
Status: DISCONTINUED | OUTPATIENT
Start: 2018-06-21 | End: 2018-06-21 | Stop reason: SURG

## 2018-06-21 RX ORDER — HYDROMORPHONE HYDROCHLORIDE 1 MG/ML
1.2 INJECTION, SOLUTION INTRAMUSCULAR; INTRAVENOUS; SUBCUTANEOUS EVERY 2 HOUR PRN
Status: DISCONTINUED | OUTPATIENT
Start: 2018-06-21 | End: 2018-06-21

## 2018-06-21 RX ORDER — ONDANSETRON 2 MG/ML
4 INJECTION INTRAMUSCULAR; INTRAVENOUS ONCE AS NEEDED
Status: DISCONTINUED | OUTPATIENT
Start: 2018-06-21 | End: 2018-06-21

## 2018-06-21 RX ORDER — GLYCOPYRROLATE 0.2 MG/ML
INJECTION INTRAMUSCULAR; INTRAVENOUS AS NEEDED
Status: DISCONTINUED | OUTPATIENT
Start: 2018-06-21 | End: 2018-06-21 | Stop reason: SURG

## 2018-06-21 RX ORDER — HYDROMORPHONE HYDROCHLORIDE 1 MG/ML
0.2 INJECTION, SOLUTION INTRAMUSCULAR; INTRAVENOUS; SUBCUTANEOUS EVERY 5 MIN PRN
Status: DISCONTINUED | OUTPATIENT
Start: 2018-06-21 | End: 2018-06-21

## 2018-06-21 RX ORDER — METOCLOPRAMIDE HYDROCHLORIDE 5 MG/ML
10 INJECTION INTRAMUSCULAR; INTRAVENOUS EVERY 6 HOURS PRN
Status: DISCONTINUED | OUTPATIENT
Start: 2018-06-21 | End: 2018-06-21

## 2018-06-21 RX ORDER — HYDROMORPHONE HYDROCHLORIDE 1 MG/ML
0.8 INJECTION, SOLUTION INTRAMUSCULAR; INTRAVENOUS; SUBCUTANEOUS EVERY 2 HOUR PRN
Status: DISCONTINUED | OUTPATIENT
Start: 2018-06-21 | End: 2018-06-21

## 2018-06-21 RX ADMIN — SODIUM CHLORIDE: 9 INJECTION, SOLUTION INTRAVENOUS at 11:31:00

## 2018-06-21 RX ADMIN — GLYCOPYRROLATE 0.8 MG: 0.2 INJECTION INTRAMUSCULAR; INTRAVENOUS at 11:15:00

## 2018-06-21 RX ADMIN — ROCURONIUM BROMIDE 50 MG: 10 INJECTION, SOLUTION INTRAVENOUS at 09:54:00

## 2018-06-21 RX ADMIN — ROCURONIUM BROMIDE 10 MG: 10 INJECTION, SOLUTION INTRAVENOUS at 10:32:00

## 2018-06-21 RX ADMIN — NEOSTIGMINE METHYLSULFATE 5 MG: 0.5 INJECTION INTRAVENOUS at 11:15:00

## 2018-06-21 RX ADMIN — ONDANSETRON 4 MG: 2 INJECTION INTRAMUSCULAR; INTRAVENOUS at 11:10:00

## 2018-06-21 RX ADMIN — SODIUM CHLORIDE: 9 INJECTION, SOLUTION INTRAVENOUS at 09:44:00

## 2018-06-21 RX ADMIN — LIDOCAINE HYDROCHLORIDE 50 MG: 10 INJECTION, SOLUTION EPIDURAL; INFILTRATION; INTRACAUDAL; PERINEURAL at 09:54:00

## 2018-06-21 NOTE — BRIEF OP NOTE
Pre-Operative Diagnosis: chronic kidney disease, stage four     Post-Operative Diagnosis: chronic kidney disease, stage four      Procedure Performed:   Procedure(s):  laparoscopic insertion of peritoneal dialysis catheter    Surgeon(s) and Role:     * Mar

## 2018-06-21 NOTE — OPERATIVE REPORT
[facility]    PATIENT'S NAME: Brian Jimenezюлия   ATTENDING PHYSICIAN: Gianni Velasquez MD   OPERATING PHYSICIAN: Gianni Velasquez MD   PATIENT ACCOUNT#:   [de-identified]    LOCATION:  [place] [address]  MEDICAL RECORD #:   [id number] endotracheal tube by Anesthesia. The patient's somewhat decompressed orogastric tube was compressed with a Mcdonnell catheter. The abdomen was prepped and draped in sterile fashion.   A small incision was made in the left upper quadrant, and a Veress needle w placed. Pneumoperitoneum was removed, and the catheter was irrigated with dialysate solution with good aspiration and return back. There was no impingement present.   The catheter was tunneled in the subcutaneous tissue and brought out through a separate

## 2018-06-21 NOTE — CONSULTS
Luna Spence is a 61year old male. No chief complaint on file.     HPI:    The etiology of the patient's renal failure is dm.   They have no had prior hemodialysis in the past.  The patient now presents for insertion of permanent hemodialysis cathet NOSTRIL BY INTRANASAL ROUTE EVERY DAY AS NEEDED.) Disp: 1 Bottle Rfl: 11   PORFIRIO CONTOUR NEXT TEST In Vitro Strip   Disp:  Rfl:    niacin 500 MG Oral Tab Take 500 mg by mouth.  2 tab daily Disp:  Rfl:    Linagliptin (TRADJENTA) 5 MG Oral Tab Take 2.5 mg by 2009     mild splenomegaly on US liver 2009   • Transient paralysis 1964     Paralysis L side-transient-age 9 viral   • Type 2 diabetes mellitus (HCC)     • Viral disease       Hx spinal virus   • Visual impairment       glasses       Past Surgical History depression or anxiety  HEMATOLOGY: denies hx anemia; denies bruising or excessive bleeding  ENDOCRINE: denies excessive thirst or urination; denies unexpected wt gain or wt loss     PHYSICAL EXAM:   GENERAL: well developed, well nourished male, in no appar paraesthesia at the excision site,  non functional catheter, infected  catheter, need for revision of the  catheter, hemothorax, pneumothorax, subclavian or jugular vein thrombosis; the need for lifelong hemodialysis; as well as the risks with anesthesia i

## 2018-06-21 NOTE — H&P (VIEW-ONLY)
Donneta Osgood is a 61year old male. No chief complaint on file.     HPI:    The etiology of the patient's renal failure is dm.   They have no had prior hemodialysis in the past.  The patient now presents for insertion of permanent hemodialysis cathet NOSTRIL BY INTRANASAL ROUTE EVERY DAY AS NEEDED.) Disp: 1 Bottle Rfl: 11   PORFIRIO CONTOUR NEXT TEST In Vitro Strip   Disp:  Rfl:    niacin 500 MG Oral Tab Take 500 mg by mouth.  2 tab daily Disp:  Rfl:    Linagliptin (TRADJENTA) 5 MG Oral Tab Take 2.5 mg by 2009     mild splenomegaly on US liver 2009   • Transient paralysis 1964     Paralysis L side-transient-age 9 viral   • Type 2 diabetes mellitus (HCC)     • Viral disease       Hx spinal virus   • Visual impairment       glasses       Past Surgical History depression or anxiety  HEMATOLOGY: denies hx anemia; denies bruising or excessive bleeding  ENDOCRINE: denies excessive thirst or urination; denies unexpected wt gain or wt loss     PHYSICAL EXAM:   GENERAL: well developed, well nourished male, in no appar paraesthesia at the excision site,  non functional catheter, infected  catheter, need for revision of the  catheter, hemothorax, pneumothorax, subclavian or jugular vein thrombosis; the need for lifelong hemodialysis; as well as the risks with anesthesia i

## 2018-06-21 NOTE — ANESTHESIA POSTPROCEDURE EVALUATION
[facility]    Procedure Summary     Date:  06/21/18 Room / Location:  [address] [place] [address] MAIN OR 05 / [facility] MAIN OR    Anesthesia Start:  8465 Anesthesia Stop:  4518    Procedure:  LAPAROSCOPIC PERITONEAL DIALYSIS CATH INSERTION (N/A ) Diagnosis:  (chronic

## 2018-06-21 NOTE — INTERVAL H&P NOTE
Pre-op Diagnosis: chronic kidney disease, stage four    The above referenced H&P was reviewed by Elsie Mckeon MD on 6/21/2018, the patient was examined and no significant changes have occurred in the patient's condition since the H&P was performed.

## 2018-06-21 NOTE — ANESTHESIA PROCEDURE NOTES
Airway  Urgency: elective      General Information and Staff    Patient location during procedure: OR  Anesthesiologist: DANIAL JAEGER  Resident/CRNA: BENY NICHOLS  Performed: CRNA     Indications and Patient Condition  Indications for airway management: an

## 2018-06-21 NOTE — ANESTHESIA PREPROCEDURE EVALUATION
Anesthesia PreOp Note    HPI:     Og Garcia is a 61year old male who presents for preoperative consultation requested by: César Yarbrough MD    Date of Surgery: 6/21/2018    Procedure(s):  LAPAROSCOPIC PERITONEAL DIALYSIS CATH INSERTION  INSER • Platelets decreased ([facility] [address]    • Psoriasis-eczema overlap condition    • Renal disorder     CKD with proteinuria--Dr. Chhaya Jackson   • Retinopathy 2015    L eye--[facility] Aletha [facility]   • Sleep apnea 11/1987    uses bipap   • Splenomegaly 2009    mild s FLUTICASONE PROPIONATE 50 MCG/ACT Nasal Suspension SPRAY 2 SPRAYS IN EACH NOSTRIL BY INTRANASAL ROUTE EVERY DAY (Patient taking differently: SPRAY 2 SPRAYS IN EACH NOSTRIL BY INTRANASAL ROUTE EVERY DAY AS NEEDED.) Disp: 1 Bottle Rfl: 11 6/17/2018   PORFIRIO ESPINOSA • Stroke Father      TIA   • Cancer Father    • Heart Disorder Father    • Other Eryn Alves Father    • Brain bleed [OTHER] Father 80      in his sleep-hx brain bleed   • Diabetes Mother    • Obesity Mother    • Renal Disease Mother      ESRD   • Other [O Mallampati: III  TM distance: >3 FB  Neck ROM: full  Dental          Pulmonary    (+) sleep apnea on CPAP, rhonchi,   Cardiovascular   Exercise tolerance: good  (+) hypertension well controlled, CAD,     NYHA Classification: II  ECG reviewed  Rhythm: regul

## 2018-06-27 ENCOUNTER — HOSPITAL ENCOUNTER (OUTPATIENT)
Dept: GENERAL RADIOLOGY | Facility: HOSPITAL | Age: 63
Discharge: HOME | End: 2018-06-27
Attending: INTERNAL MEDICINE
Payer: COMMERCIAL

## 2018-06-27 DIAGNOSIS — N18.6 STAGE 5 CHRONIC KIDNEY DISEASE ON CHRONIC DIALYSIS (HCC): ICD-10-CM

## 2018-06-27 DIAGNOSIS — Z99.2 STAGE 5 CHRONIC KIDNEY DISEASE ON CHRONIC DIALYSIS (HCC): ICD-10-CM

## 2018-06-27 PROCEDURE — 74018 RADEX ABDOMEN 1 VIEW: CPT | Performed by: INTERNAL MEDICINE

## 2018-06-28 ENCOUNTER — HOSPITAL ENCOUNTER (OUTPATIENT)
Facility: HOSPITAL | Age: 63
Setting detail: HOSPITAL OUTPATIENT SURGERY
Discharge: HOME | End: 2018-06-28
Attending: SURGERY | Admitting: SURGERY
Payer: COMMERCIAL

## 2018-06-28 ENCOUNTER — ANESTHESIA (OUTPATIENT)
Dept: SURGERY | Facility: HOSPITAL | Age: 63
End: 2018-06-28
Payer: COMMERCIAL

## 2018-06-28 ENCOUNTER — SURGERY (OUTPATIENT)
Age: 63
End: 2018-06-28

## 2018-06-28 ENCOUNTER — ANESTHESIA EVENT (OUTPATIENT)
Dept: SURGERY | Facility: HOSPITAL | Age: 63
End: 2018-06-28
Payer: COMMERCIAL

## 2018-06-28 VITALS
WEIGHT: 275 LBS | HEART RATE: 55 BPM | RESPIRATION RATE: 18 BRPM | DIASTOLIC BLOOD PRESSURE: 62 MMHG | TEMPERATURE: 98 F | OXYGEN SATURATION: 95 % | BODY MASS INDEX: 37.25 KG/M2 | HEIGHT: 72 IN | SYSTOLIC BLOOD PRESSURE: 109 MMHG

## 2018-06-28 LAB
ANION GAP SERPL CALC-SCNC: 8 MMOL/L (ref 0–18)
BUN SERPL-MCNC: 50 MG/DL (ref 8–20)
BUN/CREAT SERPL: 14.5 (ref 10–20)
CALCIUM SERPL-MCNC: 8.7 MG/DL (ref 8.5–10.5)
CHLORIDE SERPL-SCNC: 114 MMOL/L (ref 95–110)
CO2 SERPL-SCNC: 18 MMOL/L (ref 22–32)
CREAT SERPL-MCNC: 3.44 MG/DL (ref 0.5–1.5)
GLUCOSE BLDC GLUCOMTR-MCNC: 162 MG/DL (ref 70–99)
GLUCOSE SERPL-MCNC: 181 MG/DL (ref 70–99)
OSMOLALITY UR CALC.SUM OF ELEC: 308 MOSM/KG (ref 275–295)
POTASSIUM SERPL-SCNC: 4.2 MMOL/L (ref 3.3–5.1)
SODIUM SERPL-SCNC: 140 MMOL/L (ref 136–144)

## 2018-06-28 PROCEDURE — 0JWT33Z REVISION OF INFUSION DEVICE IN TRUNK SUBCUTANEOUS TISSUE AND FASCIA, PERCUTANEOUS APPROACH: ICD-10-PCS | Performed by: SURGERY

## 2018-06-28 PROCEDURE — 82962 GLUCOSE BLOOD TEST: CPT

## 2018-06-28 PROCEDURE — 36415 COLL VENOUS BLD VENIPUNCTURE: CPT | Performed by: SURGERY

## 2018-06-28 PROCEDURE — 80048 BASIC METABOLIC PNL TOTAL CA: CPT | Performed by: SURGERY

## 2018-06-28 RX ORDER — ROCURONIUM BROMIDE 10 MG/ML
INJECTION, SOLUTION INTRAVENOUS AS NEEDED
Status: DISCONTINUED | OUTPATIENT
Start: 2018-06-28 | End: 2018-06-28 | Stop reason: SURG

## 2018-06-28 RX ORDER — LIDOCAINE HYDROCHLORIDE 10 MG/ML
INJECTION, SOLUTION EPIDURAL; INFILTRATION; INTRACAUDAL; PERINEURAL AS NEEDED
Status: DISCONTINUED | OUTPATIENT
Start: 2018-06-28 | End: 2018-06-28 | Stop reason: SURG

## 2018-06-28 RX ORDER — HYDROCODONE BITARTRATE AND ACETAMINOPHEN 5; 325 MG/1; MG/1
1 TABLET ORAL AS NEEDED
Status: DISCONTINUED | OUTPATIENT
Start: 2018-06-28 | End: 2018-06-28

## 2018-06-28 RX ORDER — ONDANSETRON 2 MG/ML
4 INJECTION INTRAMUSCULAR; INTRAVENOUS EVERY 6 HOURS PRN
Status: CANCELLED | OUTPATIENT
Start: 2018-06-28

## 2018-06-28 RX ORDER — BUPIVACAINE HYDROCHLORIDE AND EPINEPHRINE 2.5; 5 MG/ML; UG/ML
INJECTION, SOLUTION INFILTRATION; PERINEURAL AS NEEDED
Status: DISCONTINUED | OUTPATIENT
Start: 2018-06-28 | End: 2018-06-28 | Stop reason: HOSPADM

## 2018-06-28 RX ORDER — HYDROMORPHONE HYDROCHLORIDE 1 MG/ML
0.4 INJECTION, SOLUTION INTRAMUSCULAR; INTRAVENOUS; SUBCUTANEOUS EVERY 5 MIN PRN
Status: DISCONTINUED | OUTPATIENT
Start: 2018-06-28 | End: 2018-06-28

## 2018-06-28 RX ORDER — FAMOTIDINE 20 MG/1
20 TABLET ORAL ONCE
Status: COMPLETED | OUTPATIENT
Start: 2018-06-28 | End: 2018-06-28

## 2018-06-28 RX ORDER — HALOPERIDOL 5 MG/ML
0.25 INJECTION INTRAMUSCULAR ONCE AS NEEDED
Status: DISCONTINUED | OUTPATIENT
Start: 2018-06-28 | End: 2018-06-28

## 2018-06-28 RX ORDER — SODIUM CHLORIDE 9 MG/ML
INJECTION, SOLUTION INTRAVENOUS CONTINUOUS
Status: DISCONTINUED | OUTPATIENT
Start: 2018-06-28 | End: 2018-06-28

## 2018-06-28 RX ORDER — NALOXONE HYDROCHLORIDE 0.4 MG/ML
80 INJECTION, SOLUTION INTRAMUSCULAR; INTRAVENOUS; SUBCUTANEOUS AS NEEDED
Status: DISCONTINUED | OUTPATIENT
Start: 2018-06-28 | End: 2018-06-28

## 2018-06-28 RX ORDER — ACETAMINOPHEN 500 MG
1000 TABLET ORAL ONCE
Status: COMPLETED | OUTPATIENT
Start: 2018-06-28 | End: 2018-06-28

## 2018-06-28 RX ORDER — ONDANSETRON 2 MG/ML
INJECTION INTRAMUSCULAR; INTRAVENOUS AS NEEDED
Status: DISCONTINUED | OUTPATIENT
Start: 2018-06-28 | End: 2018-06-28 | Stop reason: SURG

## 2018-06-28 RX ORDER — HYDROMORPHONE HYDROCHLORIDE 1 MG/ML
0.6 INJECTION, SOLUTION INTRAMUSCULAR; INTRAVENOUS; SUBCUTANEOUS EVERY 5 MIN PRN
Status: DISCONTINUED | OUTPATIENT
Start: 2018-06-28 | End: 2018-06-28

## 2018-06-28 RX ORDER — HYDROCODONE BITARTRATE AND ACETAMINOPHEN 5; 325 MG/1; MG/1
2 TABLET ORAL AS NEEDED
Status: DISCONTINUED | OUTPATIENT
Start: 2018-06-28 | End: 2018-06-28

## 2018-06-28 RX ORDER — HYDROMORPHONE HYDROCHLORIDE 1 MG/ML
0.2 INJECTION, SOLUTION INTRAMUSCULAR; INTRAVENOUS; SUBCUTANEOUS EVERY 5 MIN PRN
Status: DISCONTINUED | OUTPATIENT
Start: 2018-06-28 | End: 2018-06-28

## 2018-06-28 RX ORDER — HYDROMORPHONE HYDROCHLORIDE 1 MG/ML
0.8 INJECTION, SOLUTION INTRAMUSCULAR; INTRAVENOUS; SUBCUTANEOUS EVERY 2 HOUR PRN
Status: CANCELLED | OUTPATIENT
Start: 2018-06-28

## 2018-06-28 RX ORDER — HYDROMORPHONE HYDROCHLORIDE 1 MG/ML
1.2 INJECTION, SOLUTION INTRAMUSCULAR; INTRAVENOUS; SUBCUTANEOUS EVERY 2 HOUR PRN
Status: CANCELLED | OUTPATIENT
Start: 2018-06-28

## 2018-06-28 RX ORDER — METOCLOPRAMIDE 10 MG/1
10 TABLET ORAL ONCE
Status: COMPLETED | OUTPATIENT
Start: 2018-06-28 | End: 2018-06-28

## 2018-06-28 RX ORDER — GLYCOPYRROLATE 0.2 MG/ML
INJECTION INTRAMUSCULAR; INTRAVENOUS AS NEEDED
Status: DISCONTINUED | OUTPATIENT
Start: 2018-06-28 | End: 2018-06-28 | Stop reason: SURG

## 2018-06-28 RX ORDER — DEXTROSE MONOHYDRATE 25 G/50ML
50 INJECTION, SOLUTION INTRAVENOUS
Status: DISCONTINUED | OUTPATIENT
Start: 2018-06-28 | End: 2018-06-28

## 2018-06-28 RX ORDER — SODIUM CHLORIDE, SODIUM LACTATE, POTASSIUM CHLORIDE, CALCIUM CHLORIDE 600; 310; 30; 20 MG/100ML; MG/100ML; MG/100ML; MG/100ML
INJECTION, SOLUTION INTRAVENOUS CONTINUOUS
Status: DISCONTINUED | OUTPATIENT
Start: 2018-06-28 | End: 2018-06-28

## 2018-06-28 RX ORDER — ONDANSETRON 2 MG/ML
4 INJECTION INTRAMUSCULAR; INTRAVENOUS ONCE AS NEEDED
Status: DISCONTINUED | OUTPATIENT
Start: 2018-06-28 | End: 2018-06-28

## 2018-06-28 RX ORDER — TRAMADOL HYDROCHLORIDE 50 MG/1
100 TABLET ORAL EVERY 6 HOURS PRN
Status: CANCELLED | OUTPATIENT
Start: 2018-06-28

## 2018-06-28 RX ORDER — METOPROLOL TARTRATE 5 MG/5ML
2.5 INJECTION INTRAVENOUS ONCE
Status: DISCONTINUED | OUTPATIENT
Start: 2018-06-28 | End: 2018-06-28

## 2018-06-28 RX ORDER — HYDROMORPHONE HYDROCHLORIDE 1 MG/ML
0.4 INJECTION, SOLUTION INTRAMUSCULAR; INTRAVENOUS; SUBCUTANEOUS EVERY 2 HOUR PRN
Status: CANCELLED | OUTPATIENT
Start: 2018-06-28

## 2018-06-28 RX ORDER — CEFAZOLIN SODIUM/WATER 2 G/20 ML
2 SYRINGE (ML) INTRAVENOUS ONCE
Status: COMPLETED | OUTPATIENT
Start: 2018-06-28 | End: 2018-06-28

## 2018-06-28 RX ORDER — NEOSTIGMINE METHYLSULFATE 0.5 MG/ML
INJECTION INTRAVENOUS AS NEEDED
Status: DISCONTINUED | OUTPATIENT
Start: 2018-06-28 | End: 2018-06-28 | Stop reason: SURG

## 2018-06-28 RX ORDER — METOCLOPRAMIDE HYDROCHLORIDE 5 MG/ML
10 INJECTION INTRAMUSCULAR; INTRAVENOUS EVERY 6 HOURS PRN
Status: CANCELLED | OUTPATIENT
Start: 2018-06-28

## 2018-06-28 RX ADMIN — GLYCOPYRROLATE 0.2 MG: 0.2 INJECTION INTRAMUSCULAR; INTRAVENOUS at 10:20:00

## 2018-06-28 RX ADMIN — SODIUM CHLORIDE: 9 INJECTION, SOLUTION INTRAVENOUS at 10:16:00

## 2018-06-28 RX ADMIN — ROCURONIUM BROMIDE 10 MG: 10 INJECTION, SOLUTION INTRAVENOUS at 10:42:00

## 2018-06-28 RX ADMIN — CEFAZOLIN SODIUM/WATER 3 G: 2 G/20 ML SYRINGE (ML) INTRAVENOUS at 10:35:00

## 2018-06-28 RX ADMIN — GLYCOPYRROLATE 0.8 MG: 0.2 INJECTION INTRAMUSCULAR; INTRAVENOUS at 11:35:00

## 2018-06-28 RX ADMIN — LIDOCAINE HYDROCHLORIDE 50 MG: 10 INJECTION, SOLUTION EPIDURAL; INFILTRATION; INTRACAUDAL; PERINEURAL at 10:20:00

## 2018-06-28 RX ADMIN — NEOSTIGMINE METHYLSULFATE 5 MG: 0.5 INJECTION INTRAVENOUS at 11:35:00

## 2018-06-28 RX ADMIN — ROCURONIUM BROMIDE 20 MG: 10 INJECTION, SOLUTION INTRAVENOUS at 10:45:00

## 2018-06-28 RX ADMIN — SODIUM CHLORIDE: 9 INJECTION, SOLUTION INTRAVENOUS at 11:20:00

## 2018-06-28 RX ADMIN — ROCURONIUM BROMIDE 10 MG: 10 INJECTION, SOLUTION INTRAVENOUS at 10:20:00

## 2018-06-28 RX ADMIN — ONDANSETRON 4 MG: 2 INJECTION INTRAMUSCULAR; INTRAVENOUS at 10:20:00

## 2018-06-28 NOTE — H&P (VIEW-ONLY)
Og Garcia is a 61year old male. No chief complaint on file.     HPI:    The etiology of the patient's renal failure is dm.   They have no had prior hemodialysis in the past.  The patient now presents for insertion of permanent hemodialysis cathet Disp:  Rfl:    Magnesium 500 MG Oral Tab Take 400 mg by mouth.  Disp:  Rfl:    Syringe/Needle, Disp, (BD LUER-PILY SYRINGE) 23G X 1\" 3 ML Does not apply Misc USE FOR B12 INJECTIONS AS DIRECTED Disp: 12 each Rfl: 0   cyanocobalamin 1000 MCG/ML Injection Solu 2010-misael.  polyp, divertic, int hemrds   • Allergic rhinitis     • Carcinoma in situ of colon 2010     cecum-R hemicolectomy Dr. Abbie Weiss   • Diabetes [facility]) 1/1980   • Diverticulosis     • Gallstone 2009     Gallstone seen on US liver 11/09   • Hepatic st use: Yes           0.6 oz/week     Glasses of wine: 1 per week         ROS:      GENERAL HEALTH: otherwise feels well; denies weight loss  SKIN: denies any unusual skin lesions or rashes  EYES: no visual complaints or deficits  HEENT: denies nasal congesti    ASSESSMENT/ PLAN:   This  is a 61year old male presents with chronic renal failure requiring hemodialysis. I had a length discussion with the patient as to the need for peritoneal dialysis catheter insertion. Patient's catheter is nonfunctioning.   [facility] on Thursday, June 28, 2018. I also discussed in detail with Dr. Melita Mora . Total time spent in direct patient contact and decision-making  And coordinating the patient's care including greater than 50% face-to-face was  25  minutes.    Russell Rodas

## 2018-06-28 NOTE — INTERVAL H&P NOTE
Pre-op Diagnosis: Mechanical complication of peritoneal dialysis catheter [facility]) [T14.891I]    The above referenced H&P was reviewed by Marybel Beatty MD on 6/28/2018, the patient was examined and no significant changes have occurred in the patient's co

## 2018-06-28 NOTE — OPERATIVE REPORT
[facility]    PATIENT'S NAME: Derian Foy   ATTENDING PHYSICIAN: Aaron Champion MD   OPERATING PHYSICIAN: Aaron Champion MD   PATIENT ACCOUNT#:   [id number]    LOCATION:  [address]  MEDICAL RECORD #:   [id number] anesthetic was administered via endotracheal tube anesthesia. The patient's abdomen was prepped and draped in the usual sterile fashion. Stomach was decompressed with orogastric tube. Bladder was decompressed with Mcdonnell catheter.   Pneumoperitoneum was a adherent to the anterior abdominal wall from multiple prior operations. The remaining pneumoperitoneum was removed. The 5 mm trocar sites were removed under direct visualization without impingement of bowel or bleeding present.   The wounds were irrigated

## 2018-06-28 NOTE — CONSULTS
Merary Fu is a 61year old male. No chief complaint on file.     HPI:    The etiology of the patient's renal failure is dm.   They have no had prior hemodialysis in the past.  The patient now presents for insertion of permanent hemodialysis cathet Disp:  Rfl:    Magnesium 500 MG Oral Tab Take 400 mg by mouth.  Disp:  Rfl:    Syringe/Needle, Disp, (BD LUER-PILY SYRINGE) 23G X 1\" 3 ML Does not apply Misc USE FOR B12 INJECTIONS AS DIRECTED Disp: 12 each Rfl: 0   cyanocobalamin 1000 MCG/ML Injection Solu 2010-misael.  polyp, divertic, int hemrds   • Allergic rhinitis     • Carcinoma in situ of colon 2010     cecum-R hemicolectomy Dr. Rafita Tinoco   • Diabetes [facility]) 1/1980   • Diverticulosis     • Gallstone 2009     Gallstone seen on US liver 11/09   • Hepatic st use: Yes           0.6 oz/week     Glasses of wine: 1 per week         ROS:      GENERAL HEALTH: otherwise feels well; denies weight loss  SKIN: denies any unusual skin lesions or rashes  EYES: no visual complaints or deficits  HEENT: denies nasal congesti    ASSESSMENT/ PLAN:   This  is a 61year old male presents with chronic renal failure requiring hemodialysis. I had a length discussion with the patient as to the need for peritoneal dialysis catheter insertion. Patient's catheter is nonfunctioning.   [facility] on Thursday, June 28, 2018. I also discussed in detail with Dr. Toro Licea . Total time spent in direct patient contact and decision-making  And coordinating the patient's care including greater than 50% face-to-face was  25  minutes.    Gardenia Almendarez

## 2018-06-28 NOTE — ANESTHESIA POSTPROCEDURE EVALUATION
Patient: Ana M Mckeon    Procedure Summary     Date:  06/28/18 Room / Location:  [facility] MAIN OR  / [facility] MAIN OR    Anesthesia Start:  1707 Anesthesia Stop:  8136    Procedure:  LAPAROSCOPIC PERITONEAL DIALYSIS CATH INSERTION (N/A ) Diagnosis:       Mechanica

## 2018-06-28 NOTE — BRIEF OP NOTE
Pre-Operative Diagnosis: Mechanical complication of peritoneal dialysis catheter (HCC) [Z39.970G]     Post-Operative Diagnosis: non-functioning peritoneal dialysis catheter      Procedure Performed:   Procedure(s):  LAPAROSCOPIC REVISION OF PERITONEAL DIAL

## 2018-06-28 NOTE — ANESTHESIA PREPROCEDURE EVALUATION
Anesthesia PreOp Note    HPI:     Griselda Stringer is a 61year old male who presents for preoperative consultation requested by: Ander Cortez MD    Date of Surgery: 6/28/2018    Procedure(s):  LAPAROSCOPIC PERITONEAL DIALYSIS CATH INSERTION  Indication Psoriasis-eczema overlap condition    • Renal disorder     CKD with proteinuria--Dr. Shad Jesus   • Retinopathy 2015    L eye--Dr Sindhu Loera at [facility]   • Sleep apnea 11/1987    uses bipap   • Splenomegaly 2009    mild splenomegaly on US liver 2009   • 270 tablet Rfl: 3 6/28/2018 at 0700   FLUTICASONE PROPIONATE 50 MCG/ACT Nasal Suspension SPRAY 2 SPRAYS IN EACH NOSTRIL BY INTRANASAL ROUTE EVERY DAY (Patient taking differently: SPRAY 2 SPRAYS IN EACH NOSTRIL BY INTRANASAL ROUTE EVERY DAY AS NEEDED.) Disp 500 mL IVPB 15 mg/kg (Adjusted) Intravenous Once Raleigh Westfall MD   ceFAZolin sodium (ANCEF/KEFZOL) 2 GM/20ML premix IV syringe 2 g 2 g Intravenous Once Raleigh Westfall MD     No current [facility]-ordered outpatient prescriptions on file.       Maria D Olivares 152 (H) 06/21/2018   PGLU 180 (H) 06/21/2018   CA 8.9 06/21/2018          Vital Signs: Body mass index is 37.3 kg/m². height is 1.829 m (6') and weight is 124.7 kg (275 lb). His oral temperature is 97.8 °F (36.6 °C).  His blood pressure is 151/75 and his

## 2018-07-29 RX ORDER — METOPROLOL TARTRATE 50 MG/1
TABLET, FILM COATED ORAL
Qty: 270 TABLET | Refills: 3 | Status: SHIPPED | OUTPATIENT
Start: 2018-07-29 | End: 2019-07-25

## 2018-07-29 NOTE — TELEPHONE ENCOUNTER
Dr Iain Grier as pt was just in the hospital I thought it best that I run this refill request by you, thank you

## 2018-07-30 NOTE — TELEPHONE ENCOUNTER
Refill for metoprolol 50 mg take 1 1/2 tabs bid #270 w 3 refills sent to [facility] pharmacy.

## 2018-08-01 ENCOUNTER — PRIOR ORIGINAL RECORDS (OUTPATIENT)
Dept: OTHER | Age: 63
End: 2018-08-01

## 2018-08-16 ENCOUNTER — TELEPHONE (OUTPATIENT)
Dept: INTERNAL MEDICINE CLINIC | Facility: CLINIC | Age: 63
End: 2018-08-16

## 2018-08-16 LAB
ALBUMIN: 3.8 G/DL
BUN: 51 MG/DL
CALCIUM: 21 MG/DL
CHLORIDE: 4.7 MEQ/L
CREATININE, SERUM: 3.12 MG/DL
GLOBULIN: 2.2 G/DL
GLUCOSE: 172 MG/DL
HEMATOCRIT: 34.4 %
HEMOGLOBIN: 11.4 G/DL
MCH: 31.7 PG
MCHC: 33.2 G/DL
MCV: 96 FL
PLATELETS: 115 K/UL
POTASSIUM, SERUM: 144 MEQ/L
PROTEIN, TOTAL: 6 G/DL
RED BLOOD COUNT: 3.6 X 10-6/U
SODIUM: 16.3 MEQ/L
WHITE BLOOD COUNT: 3.29 X 10-3/U

## 2018-08-16 RX ORDER — FLUTICASONE PROPIONATE 50 MCG
2 SPRAY, SUSPENSION (ML) NASAL DAILY
Qty: 1 BOTTLE | Refills: 11 | Status: SHIPPED
Start: 2018-08-16

## 2018-08-16 NOTE — TELEPHONE ENCOUNTER
From: Bret Kellogg  Sent: 8/16/2018 11:46 AM CDT  Subject: Medication Renewal Request    Jim Carbone.  Suits would like a refill of the following medications:     FLUTICASONE PROPIONATE 50 MCG/ACT Nasal Suspension Edward Alba MD]    Preferred pharmacy: [facility]

## 2018-08-17 NOTE — TELEPHONE ENCOUNTER
Refill sent to [facility] pharmacy in [place] for Fluticasone nasal spray 2 sprays each nostril daily #1 with 11 refills.

## 2018-08-17 NOTE — TELEPHONE ENCOUNTER
[facility] requesting alternative medication not covered   Suggested Flunisolide 0.025% spray, Mometasone Furoate 50 mcg spray   Tasked to rx

## 2018-08-20 ENCOUNTER — OFFICE VISIT (OUTPATIENT)
Dept: INTERNAL MEDICINE CLINIC | Facility: CLINIC | Age: 63
End: 2018-08-20
Payer: COMMERCIAL

## 2018-08-20 ENCOUNTER — LAB ENCOUNTER (OUTPATIENT)
Dept: LAB | Age: 63
End: 2018-08-20
Attending: INTERNAL MEDICINE
Payer: COMMERCIAL

## 2018-08-20 VITALS
TEMPERATURE: 98 F | HEIGHT: 72 IN | DIASTOLIC BLOOD PRESSURE: 70 MMHG | HEART RATE: 78 BPM | SYSTOLIC BLOOD PRESSURE: 122 MMHG | OXYGEN SATURATION: 99 % | WEIGHT: 271 LBS | BODY MASS INDEX: 36.7 KG/M2

## 2018-08-20 DIAGNOSIS — R35.0 URINARY FREQUENCY: Primary | ICD-10-CM

## 2018-08-20 DIAGNOSIS — N40.0 ENLARGED PROSTATE: ICD-10-CM

## 2018-08-20 DIAGNOSIS — Z99.2 STAGE 5 CHRONIC KIDNEY DISEASE ON DIALYSIS (HCC): ICD-10-CM

## 2018-08-20 DIAGNOSIS — N18.6 STAGE 5 CHRONIC KIDNEY DISEASE ON DIALYSIS (HCC): ICD-10-CM

## 2018-08-20 LAB
BILIRUB UR QL: NEGATIVE
COLOR UR: YELLOW
GLUCOSE UR-MCNC: >=500 MG/DL
KETONES UR-MCNC: NEGATIVE MG/DL
NITRITE UR QL STRIP.AUTO: NEGATIVE
PH UR: 5 [PH] (ref 5–8)
PROT UR-MCNC: >=500 MG/DL
RBC #/AREA URNS AUTO: 2 /HPF
SP GR UR STRIP: 1.02 (ref 1–1.03)
UROBILINOGEN UR STRIP-ACNC: <2
VIT C UR-MCNC: 40 MG/DL
WBC #/AREA URNS AUTO: 1135 /HPF

## 2018-08-20 PROCEDURE — 99212 OFFICE O/P EST SF 10 MIN: CPT | Performed by: INTERNAL MEDICINE

## 2018-08-20 PROCEDURE — 87086 URINE CULTURE/COLONY COUNT: CPT

## 2018-08-20 PROCEDURE — 87186 SC STD MICRODIL/AGAR DIL: CPT

## 2018-08-20 PROCEDURE — 81001 URINALYSIS AUTO W/SCOPE: CPT

## 2018-08-20 PROCEDURE — 99214 OFFICE O/P EST MOD 30 MIN: CPT | Performed by: INTERNAL MEDICINE

## 2018-08-20 PROCEDURE — 87088 URINE BACTERIA CULTURE: CPT

## 2018-08-20 RX ORDER — LEVOFLOXACIN 250 MG/1
250 TABLET ORAL
Qty: 7 TABLET | Refills: 0 | Status: SHIPPED | OUTPATIENT
Start: 2018-08-20 | End: 2018-09-05

## 2018-08-20 NOTE — PROGRESS NOTES
Roe Aj is a 61year old malewho presents with     urinary symptoms. Onset: urinary frequency started 3 days ago---was working on his car and began voiding every 15 min.    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  PD catheter and insulin Diet and ex discussed.      Anemia--CKD/ chronic pancytopenia-Saw Dr Guadalupe Marx in past for pancytopenia. Hx splenomegaly and fatty liver.    9/22/16- fe/tibc ferritin B12 folate--results normal. ([facility]). (takes B12 injections).    Dr Sergio Lang felt hgb 8.8 in [place] Rfl: 11   METOPROLOL TARTRATE 50 MG Oral Tab TAKE 1 AND A 1/2 TABLETS BY MOUTH TWICE DAILY Disp: 270 tablet Rfl: 3   Taurine 500 MG Oral Cap Take by mouth.  Disp:  Rfl:    Syringe/Needle, Disp, (BD LUER-PILY SYRINGE) 23G X 1\" 3 ML Does not apply Misc

## 2018-08-21 ENCOUNTER — PATIENT MESSAGE (OUTPATIENT)
Dept: INTERNAL MEDICINE CLINIC | Facility: CLINIC | Age: 63
End: 2018-08-21

## 2018-08-21 RX ORDER — SYRINGE WITH NEEDLE, 1 ML 25GX5/8"
SYRINGE, EMPTY DISPOSABLE MISCELLANEOUS
Refills: 0 | OUTPATIENT
Start: 2018-08-21

## 2018-08-22 ENCOUNTER — TELEPHONE (OUTPATIENT)
Dept: INTERNAL MEDICINE CLINIC | Facility: CLINIC | Age: 63
End: 2018-08-22

## 2018-08-22 NOTE — TELEPHONE ENCOUNTER
To nursing, please call [facility] pharmacy. We sent in refill for needles/syringes for a yr's supply on 5/31/18. #12 was sent in. Please tell them to verify they got the authorization --if not re-give the authorization for them to fill for a yr.  Then call pt a

## 2018-08-22 NOTE — TELEPHONE ENCOUNTER
Called pharmacy who stated they gave patient 12 syringes in May - patient got confused when pharmacy called him for refill. Patient states he is sure he has them at home .  If any further questions or problems he will call our office

## 2018-08-22 NOTE — TELEPHONE ENCOUNTER
From: Lorena Wilde Suits  To: Kathi Hernandez MD  Sent: 8/21/2018 4:28 PM CDT  Subject: Prescription Question    I think  wants me to continue using cyanocobalamin 1000 MCG/ML Soln but they have denied to refill the needles. How do I inject the B12?  Or did I

## 2018-08-22 NOTE — TELEPHONE ENCOUNTER
To nursing, please tell patient urinalysis and urine culture done 8/20/18 confirms a urinary tract infection. The bacteria is sensitive to the levofloxacin prescribed. If not getting better, please let me know.   In view of the UTI, I recommend he go ahea

## 2018-08-23 NOTE — TELEPHONE ENCOUNTER
Patient called back. Relayed Dr Kalie Pastor message to him. He verbalized understanding. He is starting to feel better. He will complete the full course of antibiotics he was prescribed.

## 2018-08-27 ENCOUNTER — TELEPHONE (OUTPATIENT)
Dept: INTERNAL MEDICINE CLINIC | Facility: CLINIC | Age: 63
End: 2018-08-27

## 2018-08-28 ENCOUNTER — MYAURORA ACCOUNT LINK (OUTPATIENT)
Dept: OTHER | Age: 63
End: 2018-08-28

## 2018-08-28 ENCOUNTER — PRIOR ORIGINAL RECORDS (OUTPATIENT)
Dept: OTHER | Age: 63
End: 2018-08-28

## 2018-09-04 ENCOUNTER — PRIOR ORIGINAL RECORDS (OUTPATIENT)
Dept: OTHER | Age: 63
End: 2018-09-04

## 2018-09-05 ENCOUNTER — OFFICE VISIT (OUTPATIENT)
Dept: SURGERY | Facility: CLINIC | Age: 63
End: 2018-09-05
Payer: COMMERCIAL

## 2018-09-05 ENCOUNTER — APPOINTMENT (OUTPATIENT)
Dept: LAB | Facility: HOSPITAL | Age: 63
End: 2018-09-05
Attending: NURSE PRACTITIONER
Payer: COMMERCIAL

## 2018-09-05 VITALS
HEART RATE: 69 BPM | WEIGHT: 217 LBS | DIASTOLIC BLOOD PRESSURE: 86 MMHG | TEMPERATURE: 98 F | SYSTOLIC BLOOD PRESSURE: 133 MMHG | HEIGHT: 72 IN | BODY MASS INDEX: 29.39 KG/M2

## 2018-09-05 DIAGNOSIS — R39.15 URGENCY OF URINATION: Primary | ICD-10-CM

## 2018-09-05 DIAGNOSIS — R30.0 DYSURIA: ICD-10-CM

## 2018-09-05 LAB
BILIRUB UR QL: NEGATIVE
CLARITY UR: CLEAR
COLOR UR: YELLOW
GLUCOSE UR-MCNC: 150 MG/DL
HGB UR QL STRIP.AUTO: NEGATIVE
KETONES UR-MCNC: NEGATIVE MG/DL
LEUKOCYTE ESTERASE UR QL STRIP.AUTO: NEGATIVE
NITRITE UR QL STRIP.AUTO: NEGATIVE
PH UR: 6 [PH] (ref 5–8)
PROT UR-MCNC: >=500 MG/DL
RBC #/AREA URNS AUTO: <1 /HPF
SP GR UR STRIP: 1.01 (ref 1–1.03)
UROBILINOGEN UR STRIP-ACNC: <2
VIT C UR-MCNC: 40 MG/DL
WBC #/AREA URNS AUTO: 13 /HPF

## 2018-09-05 PROCEDURE — 99212 OFFICE O/P EST SF 10 MIN: CPT | Performed by: NURSE PRACTITIONER

## 2018-09-05 PROCEDURE — 99204 OFFICE O/P NEW MOD 45 MIN: CPT | Performed by: NURSE PRACTITIONER

## 2018-09-05 PROCEDURE — 87086 URINE CULTURE/COLONY COUNT: CPT | Performed by: NURSE PRACTITIONER

## 2018-09-05 PROCEDURE — 81001 URINALYSIS AUTO W/SCOPE: CPT | Performed by: NURSE PRACTITIONER

## 2018-09-05 RX ORDER — LEVOFLOXACIN 250 MG/1
TABLET ORAL
Qty: 11 TABLET | Refills: 0 | Status: SHIPPED | OUTPATIENT
Start: 2018-09-05 | End: 2018-10-12

## 2018-09-05 NOTE — PATIENT INSTRUCTIONS
Obtain urinalysis and culture today from downstairs  Begin Levshi.   You will take 3 tablets (750 mg) on day 1, then take 2 tablets (500 mg) every other day (on days 3,5,7,9)  Call to make an appointment for an ultrasound of the kidney and bladder  Follow

## 2018-09-05 NOTE — PROGRESS NOTES
HPI:    Patient ID: Hguh Rosales is a 61year old male. Patient is a 61year old male who presents to the clinic at the request of Dr. Micky Stanley, a copy of this consult will be sent to her, with a chief complaint of dysuria.   Patient states a coup her 52's of renal failure, and his dad also  in his 52's from a heart attack. He has 1 brother and 1 sister. He denies a family history of urological cancer. He currently works as an .           Review of Systems   Constitutional: Negative 180 tablet Rfl: 3   PORFIRIO CONTOUR NEXT TEST In Vitro Strip  Disp:  Rfl:    niacin 500 MG Oral Tab Take 500 mg by mouth daily with breakfast. 2 tab daily  Disp:  Rfl:    Linagliptin (TRADJENTA) 5 MG Oral Tab Take 2.5 mg by mouth daily.  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 Genitourinary: Penis normal. No penile tenderness. Genitourinary Comments: Uncircumcised penis. No penile discharge or irritation. No scrotal swelling,  Bilateral testes palpable and non tender. Prostate 3+ and firm, non tender.    No CVA te

## 2018-09-06 LAB
ABSOLUTE BASOPHILS: 32 CELLS/UL (ref 0–200)
ABSOLUTE EOSINOPHILS: 97 CELLS/UL (ref 15–500)
ABSOLUTE LYMPHOCYTES: 697 CELLS/UL (ref 850–3900)
ABSOLUTE MONOCYTES: 589 CELLS/UL (ref 200–950)
ABSOLUTE NEUTROPHILS: 3985 CELLS/UL (ref 1500–7800)
BASOPHILS: 0.6 %
EOSINOPHILS: 1.8 %
HEMATOCRIT: 31 % (ref 38.5–50)
HEMOGLOBIN: 10.4 G/DL (ref 13.2–17.1)
LYMPHOCYTES: 12.9 %
MCH: 30.5 PG (ref 27–33)
MCHC: 33.5 G/DL (ref 32–36)
MCV: 90.9 FL (ref 80–100)
MONOCYTES: 10.9 %
MPV: 11.9 FL (ref 7.5–12.5)
NEUTROPHILS: 73.8 %
PLATELET COUNT: 109 THOUSAND/UL (ref 140–400)
RDW: 13.2 % (ref 11–15)
RED BLOOD CELL COUNT: 3.41 MILLION/UL (ref 4.2–5.8)
WHITE BLOOD CELL COUNT: 5.4 THOUSAND/UL (ref 3.8–10.8)

## 2018-09-07 ENCOUNTER — HOSPITAL ENCOUNTER (OUTPATIENT)
Dept: ULTRASOUND IMAGING | Facility: HOSPITAL | Age: 63
Discharge: HOME | End: 2018-09-07
Attending: NURSE PRACTITIONER
Payer: COMMERCIAL

## 2018-09-07 DIAGNOSIS — R30.0 DYSURIA: ICD-10-CM

## 2018-09-07 DIAGNOSIS — R39.15 URGENCY OF URINATION: ICD-10-CM

## 2018-09-07 PROCEDURE — 76770 US EXAM ABDO BACK WALL COMP: CPT | Performed by: NURSE PRACTITIONER

## 2018-09-11 ENCOUNTER — APPOINTMENT (OUTPATIENT)
Dept: LAB | Facility: HOSPITAL | Age: 63
End: 2018-09-11
Attending: INTERNAL MEDICINE
Payer: COMMERCIAL

## 2018-09-11 ENCOUNTER — HOSPITAL ENCOUNTER (OUTPATIENT)
Dept: GENERAL RADIOLOGY | Facility: HOSPITAL | Age: 63
Discharge: HOME | End: 2018-09-11
Attending: INTERNAL MEDICINE
Payer: COMMERCIAL

## 2018-09-11 ENCOUNTER — OFFICE VISIT (OUTPATIENT)
Dept: INTERNAL MEDICINE CLINIC | Facility: CLINIC | Age: 63
End: 2018-09-11
Payer: COMMERCIAL

## 2018-09-11 VITALS
OXYGEN SATURATION: 98 % | SYSTOLIC BLOOD PRESSURE: 130 MMHG | HEIGHT: 72 IN | BODY MASS INDEX: 36.7 KG/M2 | HEART RATE: 88 BPM | DIASTOLIC BLOOD PRESSURE: 80 MMHG | TEMPERATURE: 100 F | WEIGHT: 271 LBS

## 2018-09-11 DIAGNOSIS — N41.0 ACUTE PROSTATITIS: Primary | ICD-10-CM

## 2018-09-11 DIAGNOSIS — Z99.2 STAGE 5 CHRONIC KIDNEY DISEASE ON DIALYSIS (HCC): ICD-10-CM

## 2018-09-11 DIAGNOSIS — N18.4 TYPE 2 DIABETES MELLITUS WITH STAGE 4 CHRONIC KIDNEY DISEASE, WITH LONG-TERM CURRENT USE OF INSULIN (HCC): ICD-10-CM

## 2018-09-11 DIAGNOSIS — E11.22 TYPE 2 DIABETES MELLITUS WITH STAGE 4 CHRONIC KIDNEY DISEASE, WITH LONG-TERM CURRENT USE OF INSULIN (HCC): ICD-10-CM

## 2018-09-11 DIAGNOSIS — D64.9 ANEMIA, UNSPECIFIED TYPE: ICD-10-CM

## 2018-09-11 DIAGNOSIS — I10 ESSENTIAL HYPERTENSION: ICD-10-CM

## 2018-09-11 DIAGNOSIS — Z79.4 TYPE 2 DIABETES MELLITUS WITH STAGE 4 CHRONIC KIDNEY DISEASE, WITH LONG-TERM CURRENT USE OF INSULIN (HCC): ICD-10-CM

## 2018-09-11 DIAGNOSIS — R00.0 SINUS TACHYCARDIA: ICD-10-CM

## 2018-09-11 DIAGNOSIS — N18.6 STAGE 5 CHRONIC KIDNEY DISEASE ON DIALYSIS (HCC): ICD-10-CM

## 2018-09-11 LAB
ALBUMIN: 3.6 G/DL
BUN: 37 MG/DL
CALCIUM: 8.9 MG/DL
CHLORIDE: 108 MEQ/L
CREATININE, SERUM: 3.56 MG/DL
D DIMER PPP FEU-MCNC: 0.49 MCG/ML (ref ?–0.63)
GLOBULIN: 2.3 G/DL
GLUCOSE: 281 MG/DL
HEMATOCRIT: 34.1 %
HEMOGLOBIN: 10.9 G/DL
MCH: 30.4 PG
MCHC: 31.8 G/DL
MCV: 95 FL
PLATELETS: 108 K/UL
POTASSIUM, SERUM: 4.3 MEQ/L
PROTEIN, TOTAL: 5.9 G/DL
RED BLOOD COUNT: 3.58 X 10-6/U
SODIUM: 140 MEQ/L
TSH SERPL-ACNC: 1.51 UIU/ML (ref 0.45–5.33)
WHITE BLOOD COUNT: 6.18 X 10-3/U

## 2018-09-11 PROCEDURE — 84443 ASSAY THYROID STIM HORMONE: CPT | Performed by: INTERNAL MEDICINE

## 2018-09-11 PROCEDURE — 99214 OFFICE O/P EST MOD 30 MIN: CPT | Performed by: INTERNAL MEDICINE

## 2018-09-11 PROCEDURE — 36415 COLL VENOUS BLD VENIPUNCTURE: CPT | Performed by: INTERNAL MEDICINE

## 2018-09-11 PROCEDURE — 71046 X-RAY EXAM CHEST 2 VIEWS: CPT | Performed by: INTERNAL MEDICINE

## 2018-09-11 PROCEDURE — 99212 OFFICE O/P EST SF 10 MIN: CPT | Performed by: INTERNAL MEDICINE

## 2018-09-11 PROCEDURE — 85379 FIBRIN DEGRADATION QUANT: CPT | Performed by: INTERNAL MEDICINE

## 2018-09-11 NOTE — PROGRESS NOTES
Bret Kellogg is a 61year old male who presents for     4 mo check    Still urinary frequency up to every hr. Now seeing APN at Dr. Chacho Henry office. She rx more levaquin. ?prostatitis. E coli on urine cx. Started peritoneal dialysis in early July 2018. 7/2018 Dr Ellen Henao  No date: TONSILLECTOMY      Comment:  T & A   Family History   Problem Relation Age of Onset   • Hypertension Father    • Obesity Father    • Heart Attack Father    • Stroke Father         TIA   • Cancer Father    • Heart Disorder F edema, callus w blister on ball of r foot--no drg or redness (has appt this wk w Dr. Marcus Davis. ). Neurologic: alert and oriented                  ASSESSMENT AND PLAN:     Prostatitis--8/20/18-UA-1135, mod bacteria.  Urine ddqksag-27-22 K e coli sens to levaq Lorena Galan in past for pancytopenia. Hx splenomegaly and fatty liver.    9/22/16- fe/tibc ferritin B12 folate--results normal. ([facility]). (takes B12 injections). Dr Ashleigh Tam felt hgb 8.8 in Jan 2018 c/w CKD. 1/12/18- fe/tibc ferritin B12 folate-ok.  6/21/18 per  MCG/ACT Nasal Suspension 2 sprays by Nasal route daily.  Disp: 1 Bottle Rfl: 11   METOPROLOL TARTRATE 50 MG Oral Tab TAKE 1 AND A 1/2 TABLETS BY MOUTH TWICE DAILY Disp: 270 tablet Rfl: 3   Syringe/Needle, Disp, (BD LUER-PILY SYRINGE) 23G X 1\" 3 ML Does not

## 2018-09-12 ENCOUNTER — TELEPHONE (OUTPATIENT)
Dept: INTERNAL MEDICINE CLINIC | Facility: CLINIC | Age: 63
End: 2018-09-12

## 2018-09-12 ENCOUNTER — OFFICE VISIT (OUTPATIENT)
Dept: SURGERY | Facility: CLINIC | Age: 63
End: 2018-09-12
Payer: COMMERCIAL

## 2018-09-12 VITALS
SYSTOLIC BLOOD PRESSURE: 138 MMHG | HEART RATE: 105 BPM | DIASTOLIC BLOOD PRESSURE: 81 MMHG | BODY MASS INDEX: 37 KG/M2 | TEMPERATURE: 99 F | WEIGHT: 271 LBS

## 2018-09-12 DIAGNOSIS — Z87.440 HX: UTI (URINARY TRACT INFECTION): ICD-10-CM

## 2018-09-12 DIAGNOSIS — R35.0 FREQUENCY OF URINATION: Primary | ICD-10-CM

## 2018-09-12 DIAGNOSIS — R39.15 URGENCY OF URINATION: ICD-10-CM

## 2018-09-12 DIAGNOSIS — N20.0 KIDNEY STONE ON LEFT SIDE: ICD-10-CM

## 2018-09-12 DIAGNOSIS — N40.1 BPH ASSOCIATED WITH NOCTURIA: ICD-10-CM

## 2018-09-12 DIAGNOSIS — R35.1 BPH ASSOCIATED WITH NOCTURIA: ICD-10-CM

## 2018-09-12 PROCEDURE — 99213 OFFICE O/P EST LOW 20 MIN: CPT | Performed by: NURSE PRACTITIONER

## 2018-09-12 PROCEDURE — 99212 OFFICE O/P EST SF 10 MIN: CPT | Performed by: NURSE PRACTITIONER

## 2018-09-12 RX ORDER — TAMSULOSIN HYDROCHLORIDE 0.4 MG/1
0.4 CAPSULE ORAL DAILY
Qty: 90 CAPSULE | Refills: 3 | Status: SHIPPED | OUTPATIENT
Start: 2018-09-12 | End: 2018-10-12

## 2018-09-12 NOTE — PROGRESS NOTES
HPI:    Patient ID: Diogenes Mcconnell is a 61year old male. Patient is a 61year old male who presents for a follow up regarding dysuria. Patient has been taking levaquin for possible urinary tract infection.   Last culture was negative however patient was dizziness, light-headedness and headaches. Current Outpatient Medications:  tamsulosin HCl 0.4 MG Oral Cap Take 1 capsule (0.4 mg total) by mouth daily.  Take 1/2 hour following the same meal each day Disp: 90 capsule Rfl: 3   levofloxacin 250 MG fatty acids (FISH OIL) 1000 MG Oral Cap Take 4 capsules daily Disp:  Rfl:      Allergies:  Merbromin               RASH, SWELLING  Merthilate [Thimero*    RASH, SWELLING   PHYSICAL EXAM:   Physical Exam   Constitutional: He appears well-nourished.  No distr hour following the same meal each day       Imaging & Referrals:  None       VY#[id number]

## 2018-09-12 NOTE — TELEPHONE ENCOUNTER
Spoke with pt to inform him of MD message below. Pt voiced understanding and will f/u with Dr. Queta Abraham.

## 2018-09-12 NOTE — TELEPHONE ENCOUNTER
To nursing, please tell patient results from 9/11/18--screening test for blood clot called d-dimer result is normal, thyroid level (TSH) result is normal.  Chest x-ray results okay. Go ahead and follow-up with Dr. Coral Morales as we discussed. Thanks.

## 2018-09-13 ENCOUNTER — OFFICE VISIT (OUTPATIENT)
Dept: PODIATRY CLINIC | Facility: CLINIC | Age: 63
End: 2018-09-13
Payer: COMMERCIAL

## 2018-09-13 DIAGNOSIS — E11.9 DIABETES MELLITUS TYPE 2, INSULIN DEPENDENT (HCC): Primary | ICD-10-CM

## 2018-09-13 DIAGNOSIS — L08.9 PUNCTURE WOUND OF PLANTAR ASPECT OF RIGHT FOOT WITH INFECTION, INITIAL ENCOUNTER: ICD-10-CM

## 2018-09-13 DIAGNOSIS — Z79.4 DIABETES MELLITUS TYPE 2, INSULIN DEPENDENT (HCC): Primary | ICD-10-CM

## 2018-09-13 DIAGNOSIS — S91.331A PUNCTURE WOUND OF PLANTAR ASPECT OF RIGHT FOOT WITH INFECTION, INITIAL ENCOUNTER: ICD-10-CM

## 2018-09-13 PROCEDURE — 97597 DBRDMT OPN WND 1ST 20 CM/<: CPT | Performed by: PODIATRIST

## 2018-09-13 PROCEDURE — 99203 OFFICE O/P NEW LOW 30 MIN: CPT | Performed by: PODIATRIST

## 2018-09-13 PROCEDURE — 99212 OFFICE O/P EST SF 10 MIN: CPT | Performed by: PODIATRIST

## 2018-09-13 NOTE — PROGRESS NOTES
HPI:    Patient ID: Pilo Patino is a 61year old male. HPI  77-year-old insulin-dependent diabetic presents with a concern associated with the bottom of his right foot.   He is aware of having a callus in the region over an extended period of time and i TEST In Vitro Strip  Disp:  Rfl:    niacin 500 MG Oral Tab Take 500 mg by mouth daily with breakfast. 2 tab daily  Disp:  Rfl:    Linagliptin (TRADJENTA) 5 MG Oral Tab Take 2.5 mg by mouth daily.  Disp: 30 tablet Rfl: 0   Probiotic Oral Cap Take 1 capsule b ASSESSMENT/PLAN:   Diabetes mellitus type 2, insulin dependent (hcc)  (primary encounter diagnosis)  Puncture wound of plantar aspect of right foot with infection, initial encounter    No orders of the defined types were placed in this encounter.       Me

## 2018-09-14 ENCOUNTER — PRIOR ORIGINAL RECORDS (OUTPATIENT)
Dept: OTHER | Age: 63
End: 2018-09-14

## 2018-09-18 ENCOUNTER — PRIOR ORIGINAL RECORDS (OUTPATIENT)
Dept: OTHER | Age: 63
End: 2018-09-18

## 2018-09-20 ENCOUNTER — PRIOR ORIGINAL RECORDS (OUTPATIENT)
Dept: OTHER | Age: 63
End: 2018-09-20

## 2018-09-24 ENCOUNTER — OFFICE VISIT (OUTPATIENT)
Dept: PODIATRY CLINIC | Facility: CLINIC | Age: 63
End: 2018-09-24
Payer: COMMERCIAL

## 2018-09-24 DIAGNOSIS — E11.9 DIABETES MELLITUS TYPE 2, INSULIN DEPENDENT (HCC): Primary | ICD-10-CM

## 2018-09-24 DIAGNOSIS — S91.331A PUNCTURE WOUND OF PLANTAR ASPECT OF RIGHT FOOT WITH INFECTION, INITIAL ENCOUNTER: ICD-10-CM

## 2018-09-24 DIAGNOSIS — Z79.4 DIABETES MELLITUS TYPE 2, INSULIN DEPENDENT (HCC): Primary | ICD-10-CM

## 2018-09-24 DIAGNOSIS — L08.9 PUNCTURE WOUND OF PLANTAR ASPECT OF RIGHT FOOT WITH INFECTION, INITIAL ENCOUNTER: ICD-10-CM

## 2018-09-24 PROCEDURE — 97597 DBRDMT OPN WND 1ST 20 CM/<: CPT | Performed by: PODIATRIST

## 2018-09-24 NOTE — PROGRESS NOTES
HPI:    Patient ID: Hugh Rosales is a 61year old male. HPI  12-year-old diabetic presents for follow-up in reference to the wound sub-first metatarsal right foot. Patient states that he is not aware of any draining going on.   He has been consistent wi daily. Disp:  Rfl:    aspirin (ASPIR-81) 81 MG Oral Tab EC Take 1 tablet by mouth daily. Disp:  Rfl:    cholecalciferol ( VITAMIN D) 1000 UNITS Oral Cap Take 1 capsule by mouth daily. Disp:  Rfl:    Cetirizine HCl 10 MG Oral Cap Take  by mouth.  take 1 ta

## 2018-09-30 RX ORDER — GEMFIBROZIL 600 MG/1
TABLET, FILM COATED ORAL
Qty: 180 TABLET | Refills: 1 | Status: SHIPPED | OUTPATIENT
Start: 2018-09-30 | End: 2019-03-11

## 2018-10-03 ENCOUNTER — PRIOR ORIGINAL RECORDS (OUTPATIENT)
Dept: OTHER | Age: 63
End: 2018-10-03

## 2018-10-09 LAB
BUN: 47 MG/DL
CALCIUM: 8.7 MG/DL
CHLORIDE: 111 MEQ/L
CREATININE, SERUM: 3.55 MG/DL
HEMATOCRIT: 28.8 %
HEMOGLOBIN A1C: 8.1 %
HEMOGLOBIN: 9.6 G/DL
MCH: 29.8 PG
MCHC: 31 G/DL
MCV: 96 FL
POTASSIUM, SERUM: 4.6 MEQ/L
RED BLOOD COUNT: 3.21 X 10-6/U
SODIUM: 142 MEQ/L
WHITE BLOOD COUNT: 3.58 X 10-3/U

## 2018-10-12 ENCOUNTER — OFFICE VISIT (OUTPATIENT)
Dept: SURGERY | Facility: CLINIC | Age: 63
End: 2018-10-12
Payer: COMMERCIAL

## 2018-10-12 VITALS
RESPIRATION RATE: 16 BRPM | SYSTOLIC BLOOD PRESSURE: 126 MMHG | HEIGHT: 72 IN | WEIGHT: 260 LBS | DIASTOLIC BLOOD PRESSURE: 60 MMHG | BODY MASS INDEX: 35.21 KG/M2 | TEMPERATURE: 98 F | HEART RATE: 60 BPM

## 2018-10-12 DIAGNOSIS — R35.1 BPH ASSOCIATED WITH NOCTURIA: Primary | ICD-10-CM

## 2018-10-12 DIAGNOSIS — R35.0 FREQUENCY OF URINATION: ICD-10-CM

## 2018-10-12 DIAGNOSIS — N40.1 BPH ASSOCIATED WITH NOCTURIA: Primary | ICD-10-CM

## 2018-10-12 DIAGNOSIS — R39.15 URINARY URGENCY: ICD-10-CM

## 2018-10-12 PROCEDURE — 99213 OFFICE O/P EST LOW 20 MIN: CPT | Performed by: NURSE PRACTITIONER

## 2018-10-12 PROCEDURE — 99212 OFFICE O/P EST SF 10 MIN: CPT | Performed by: NURSE PRACTITIONER

## 2018-10-12 NOTE — PROGRESS NOTES
HPI:    Patient ID: Bret Kellogg is a 61year old male. Patient is a pleasant 61year old male who presents today for a follow up regarding urinary frequency. Patient was recently treated for suspected bacterial prostatitis.   At follow up he continued LUER-PILY SYRINGE) 23G X 1\" 3 ML Does not apply Misc USE FOR B12 INJECTIONS AS DIRECTED Disp: 12 each Rfl: 0   cyanocobalamin 1000 MCG/ML Injection Solution INJECT 1 MILLILITER   BY INTRAMUSCULAR ROUTE  EVERY MONTH Disp: 12 mL Rfl: 0   AmLODIPine Besylate warm and dry. No rash noted. Psychiatric: He has a normal mood and affect. COMPLEX UROFLOW TEST    Maximum flow 4.0 ml/s  Average flow 2.9 ml/s  Voiding time 47.5 mm:ss. S  Flow time 27.7 mm:ss. S  Time to max flow 10.1 mm:ss. S  Voided volume 82.8 ml

## 2018-10-16 ENCOUNTER — OFFICE VISIT (OUTPATIENT)
Dept: INTERNAL MEDICINE CLINIC | Facility: CLINIC | Age: 63
End: 2018-10-16
Payer: COMMERCIAL

## 2018-10-16 VITALS
TEMPERATURE: 98 F | DIASTOLIC BLOOD PRESSURE: 70 MMHG | OXYGEN SATURATION: 99 % | WEIGHT: 273 LBS | SYSTOLIC BLOOD PRESSURE: 120 MMHG | HEART RATE: 70 BPM | HEIGHT: 72 IN | BODY MASS INDEX: 36.98 KG/M2

## 2018-10-16 DIAGNOSIS — E11.22 TYPE 2 DIABETES MELLITUS WITH STAGE 4 CHRONIC KIDNEY DISEASE, WITH LONG-TERM CURRENT USE OF INSULIN (HCC): ICD-10-CM

## 2018-10-16 DIAGNOSIS — N18.4 TYPE 2 DIABETES MELLITUS WITH STAGE 4 CHRONIC KIDNEY DISEASE, WITH LONG-TERM CURRENT USE OF INSULIN (HCC): ICD-10-CM

## 2018-10-16 DIAGNOSIS — I10 ESSENTIAL HYPERTENSION: Primary | ICD-10-CM

## 2018-10-16 DIAGNOSIS — N18.6 STAGE 5 CHRONIC KIDNEY DISEASE ON DIALYSIS (HCC): ICD-10-CM

## 2018-10-16 DIAGNOSIS — Z79.4 TYPE 2 DIABETES MELLITUS WITH STAGE 4 CHRONIC KIDNEY DISEASE, WITH LONG-TERM CURRENT USE OF INSULIN (HCC): ICD-10-CM

## 2018-10-16 DIAGNOSIS — Z99.2 STAGE 5 CHRONIC KIDNEY DISEASE ON DIALYSIS (HCC): ICD-10-CM

## 2018-10-16 PROCEDURE — 99212 OFFICE O/P EST SF 10 MIN: CPT | Performed by: INTERNAL MEDICINE

## 2018-10-16 PROCEDURE — 99214 OFFICE O/P EST MOD 30 MIN: CPT | Performed by: INTERNAL MEDICINE

## 2018-10-16 RX ORDER — TAMSULOSIN HYDROCHLORIDE 0.4 MG/1
0.4 CAPSULE ORAL DAILY
COMMUNITY
End: 2019-09-12

## 2018-10-16 NOTE — PROGRESS NOTES
Griselda Stringer is a 61year old male who presents for     1 mo check  Feels good. \"I feel the best I have in a long time. \" more energy. Started peritoneal dialysis in early July 2018--doing well on it. Dr Juanpablo Nascimento.  Pt is possibly going on kidney pancreas tr History:   Procedure Laterality Date   • APPENDECTOMY     • COLECTOMY Right 2010    hemicolectomy-Dr Resendez   • COLONOSCOPY  2010    Dr Meredith Price   • [address] [facility]  2010    ventral hernia-at time of colon surgery   • LAPAROSCOPIC PERITONEAL DIALYSIS CATH m)   Wt 273 lb (123.8 kg)   SpO2 99%   BMI 37.03 kg/m²      Wt Readings from Last 6 Encounters:  10/16/18 : 273 lb (123.8 kg)  10/12/18 : 260 lb (117.9 kg)  09/12/18 : 271 lb (122.9 kg)  09/11/18 : 271 lb (122.9 kg)  09/05/18 : 217 lb (98.4 kg)  08/20/18 : visit.. (R cheek lesion resolved).      Obesity--Because of insurance, didn't go to [facility] Dr Arminda Bright for help for wt loss. Diet and ex discussed.      Anemia--CKD/ chronic pancytopenia-Saw Dr Riddhi Streeter in past for pancytopenia.  Hx splenomegaly and route daily.  Disp: 1 Bottle Rfl: 11   METOPROLOL TARTRATE 50 MG Oral Tab TAKE 1 AND A 1/2 TABLETS BY MOUTH TWICE DAILY Disp: 270 tablet Rfl: 3   Syringe/Needle, Disp, (BD LUER-PILY SYRINGE) 23G X 1\" 3 ML Does not apply Misc USE FOR B12 INJECTIONS AS DIRECT

## 2018-11-01 ENCOUNTER — PRIOR ORIGINAL RECORDS (OUTPATIENT)
Dept: OTHER | Age: 63
End: 2018-11-01

## 2018-11-19 LAB
ALBUMIN: 4 G/DL
BUN: 70 MG/DL
CALCIUM: 9.1 MG/DL
CHLORIDE: 109 MEQ/L
CREATININE, SERUM: 4.2 MG/DL
GLOBULIN: 2.1 G/DL
GLUCOSE: 272 MG/DL
HEMATOCRIT: 31.4 %
HEMOGLOBIN: 10.2 G/DL
IRON, TOTAL: 112 MCG/DL
PLATELETS: 108 K/UL
POTASSIUM, SERUM: 4.7 MEQ/L
PROTEIN, TOTAL: 6.1 G/DL
RED BLOOD COUNT: 3.28 X 10-6/U
SODIUM: 139 MEQ/L
WHITE BLOOD COUNT: 3.63 X 10-3/U

## 2018-11-21 ENCOUNTER — LAB ENCOUNTER (OUTPATIENT)
Dept: LAB | Facility: HOSPITAL | Age: 63
End: 2018-11-21
Attending: PODIATRIST
Payer: COMMERCIAL

## 2018-11-21 DIAGNOSIS — L97.512 FOOT ULCERATION, RIGHT, WITH FAT LAYER EXPOSED (HCC): ICD-10-CM

## 2018-11-21 PROCEDURE — 85025 COMPLETE CBC W/AUTO DIFF WBC: CPT

## 2018-11-21 PROCEDURE — 36415 COLL VENOUS BLD VENIPUNCTURE: CPT

## 2018-11-21 PROCEDURE — 86140 C-REACTIVE PROTEIN: CPT

## 2018-11-21 PROCEDURE — 85652 RBC SED RATE AUTOMATED: CPT

## 2018-12-03 ENCOUNTER — PRIOR ORIGINAL RECORDS (OUTPATIENT)
Dept: OTHER | Age: 63
End: 2018-12-03

## 2018-12-12 LAB
ALBUMIN: 4.1 G/DL
BUN: 63 MG/DL
CALCIUM: 9.1 MG/DL
CHLORIDE: 108 MEQ/L
CREATININE, SERUM: 3.72 MG/DL
GLOBULIN: 2 G/DL
GLUCOSE: 203 MG/DL
HEMATOCRIT: 33.1 %
HEMOGLOBIN: 32.7 G/DL
MCH: 31.4 PG
MCHC: 33 G/DL
MCV: 95 FL
PLATELETS: 130 K/UL
POTASSIUM, SERUM: 4.3 MEQ/L
PROTEIN, TOTAL: 6.1 G/DL
RED BLOOD COUNT: 3.48 X 10-6/U
SODIUM: 139 MEQ/L
WHITE BLOOD COUNT: 3.48 X 10-3/U

## 2018-12-14 ENCOUNTER — PRIOR ORIGINAL RECORDS (OUTPATIENT)
Dept: OTHER | Age: 63
End: 2018-12-14

## 2019-01-03 ENCOUNTER — PRIOR ORIGINAL RECORDS (OUTPATIENT)
Dept: OTHER | Age: 64
End: 2019-01-03

## 2019-01-15 LAB
ALBUMIN: 3.9 G/DL
BUN: 73 MG/DL
CALCIUM: 8.8 MG/DL
CHLORIDE: 105 MEQ/L
CREATININE, SERUM: 4.39 MG/DL
GLOBULIN: 2.4 G/DL
GLUCOSE: 250 MG/DL
GLUCOSE: 250 MG/DL
HEMATOCRIT: 10.4 %
HEMOGLOBIN A1C: 7.9 %
HEMOGLOBIN: 31.2 G/DL
MAGNESIUM: 340 MG/DL
MCH: 33.4 PG
MCHC: 15.1 G/DL
MCV: 31.2 FL
POTASSIUM, SERUM: 4.8 MEQ/L
PROTEIN, TOTAL: 6.3 G/DL
RED BLOOD COUNT: 3.34 X 10-6/U
SODIUM: 140 MEQ/L
WHITE BLOOD COUNT: 3.08 X 10-3/U

## 2019-01-16 ENCOUNTER — TELEPHONE (OUTPATIENT)
Dept: INTERNAL MEDICINE CLINIC | Facility: CLINIC | Age: 64
End: 2019-01-16

## 2019-01-17 NOTE — TELEPHONE ENCOUNTER
Completed form and 10/16/18 office note faxed to [facility]: [phone], fax confirmation received.

## 2019-01-17 NOTE — TELEPHONE ENCOUNTER
To nursing,   please fax back the form that is my out pile–statement of certifying physician for therapeutic shoes from [facility] prosthetic orthotic services. As they request, please include 10/16/18 visit note with the fax. Thanks.

## 2019-01-23 ENCOUNTER — HOSPITAL ENCOUNTER (OUTPATIENT)
Dept: GENERAL RADIOLOGY | Facility: HOSPITAL | Age: 64
Discharge: HOME | End: 2019-01-23
Attending: NURSE PRACTITIONER
Payer: COMMERCIAL

## 2019-01-23 ENCOUNTER — LAB ENCOUNTER (OUTPATIENT)
Dept: LAB | Facility: HOSPITAL | Age: 64
End: 2019-01-23
Attending: NURSE PRACTITIONER
Payer: COMMERCIAL

## 2019-01-23 ENCOUNTER — OFFICE VISIT (OUTPATIENT)
Dept: WOUND CARE | Facility: HOSPITAL | Age: 64
End: 2019-01-23
Attending: NURSE PRACTITIONER
Payer: COMMERCIAL

## 2019-01-23 DIAGNOSIS — L97.509 TYPE 2 DIABETES MELLITUS WITH FOOT ULCER (HCC): ICD-10-CM

## 2019-01-23 DIAGNOSIS — L97.512 RIGHT FOOT ULCER, WITH FAT LAYER EXPOSED (HCC): ICD-10-CM

## 2019-01-23 DIAGNOSIS — L97.512 RIGHT FOOT ULCER, WITH FAT LAYER EXPOSED (HCC): Primary | ICD-10-CM

## 2019-01-23 DIAGNOSIS — E11.621 TYPE 2 DIABETES MELLITUS WITH FOOT ULCER (HCC): ICD-10-CM

## 2019-01-23 LAB
ALBUMIN SERPL BCP-MCNC: 3.5 G/DL (ref 3.5–4.8)
ALBUMIN/GLOB SERPL: 1.2 {RATIO} (ref 1–2)
ALP SERPL-CCNC: 41 U/L (ref 32–100)
ALT SERPL-CCNC: 16 U/L (ref 17–63)
ANION GAP SERPL CALC-SCNC: 12 MMOL/L (ref 0–18)
AST SERPL-CCNC: 18 U/L (ref 15–41)
BASOPHILS # BLD: 0 K/UL (ref 0–0.2)
BASOPHILS NFR BLD: 1 %
BILIRUB SERPL-MCNC: 0.6 MG/DL (ref 0.3–1.2)
BUN SERPL-MCNC: 61 MG/DL (ref 8–20)
BUN/CREAT SERPL: 13.8 (ref 10–20)
CALCIUM SERPL-MCNC: 8.9 MG/DL (ref 8.5–10.5)
CHLORIDE SERPL-SCNC: 108 MMOL/L (ref 95–110)
CO2 SERPL-SCNC: 19 MMOL/L (ref 22–32)
CREAT SERPL-MCNC: 4.41 MG/DL (ref 0.5–1.5)
CRP SERPL-MCNC: 1.2 MG/DL (ref 0–0.9)
EOSINOPHIL # BLD: 0.1 K/UL (ref 0–0.7)
EOSINOPHIL NFR BLD: 3 %
ERYTHROCYTE [DISTWIDTH] IN BLOOD BY AUTOMATED COUNT: 14.3 % (ref 11–15)
ERYTHROCYTE [SEDIMENTATION RATE] IN BLOOD: 44 MM/HR (ref 0–20)
GLOBULIN PLAS-MCNC: 3 G/DL (ref 2.5–3.7)
GLUCOSE SERPL-MCNC: 170 MG/DL (ref 70–99)
HCT VFR BLD AUTO: 31.9 % (ref 41–52)
HGB BLD-MCNC: 10.8 G/DL (ref 13.5–17.5)
LYMPHOCYTES # BLD: 0.8 K/UL (ref 1–4)
LYMPHOCYTES NFR BLD: 18 %
MCH RBC QN AUTO: 31.3 PG (ref 27–32)
MCHC RBC AUTO-ENTMCNC: 34 G/DL (ref 32–37)
MCV RBC AUTO: 92.1 FL (ref 80–100)
MONOCYTES # BLD: 0.5 K/UL (ref 0–1)
MONOCYTES NFR BLD: 11 %
NEUTROPHILS # BLD AUTO: 3.2 K/UL (ref 1.8–7.7)
NEUTROPHILS NFR BLD: 69 %
OSMOLALITY UR CALC.SUM OF ELEC: 309 MOSM/KG (ref 275–295)
PATIENT FASTING: NO
PLATELET # BLD AUTO: 112 K/UL (ref 140–400)
PMV BLD AUTO: 9.3 FL (ref 7.4–10.3)
POTASSIUM SERPL-SCNC: 4.7 MMOL/L (ref 3.3–5.1)
PROT SERPL-MCNC: 6.5 G/DL (ref 5.9–8.4)
RBC # BLD AUTO: 3.47 M/UL (ref 4.5–5.9)
SODIUM SERPL-SCNC: 139 MMOL/L (ref 136–144)
WBC # BLD AUTO: 4.7 K/UL (ref 4–11)

## 2019-01-23 PROCEDURE — 86140 C-REACTIVE PROTEIN: CPT

## 2019-01-23 PROCEDURE — 85652 RBC SED RATE AUTOMATED: CPT

## 2019-01-23 PROCEDURE — 73630 X-RAY EXAM OF FOOT: CPT | Performed by: NURSE PRACTITIONER

## 2019-01-23 PROCEDURE — 80053 COMPREHEN METABOLIC PANEL: CPT

## 2019-01-23 PROCEDURE — 85025 COMPLETE CBC W/AUTO DIFF WBC: CPT

## 2019-01-23 PROCEDURE — 97162 PT EVAL MOD COMPLEX 30 MIN: CPT

## 2019-01-23 PROCEDURE — 36415 COLL VENOUS BLD VENIPUNCTURE: CPT

## 2019-01-23 NOTE — PROGRESS NOTES
Subjective    Chief Complaint  This information was obtained from the patient  The patient is new to the [address] [facility] here for an initial visit for the evaluation and management of non-healing wound(s).  R 1st MTP diabetic ulcer    Allergies  Brianna lyons This information was obtained from the patient  Patient has a surgical history of:  Peritoneal dialysis   Hernia surgery  Appendectomy  Tonsillectomy  Colectomy (Right)    Medications  cetirizine - oral 10 mg capsule once daily  Tradjenta - oral 5 mg table Lower Extremity Assessment  Edema Assessment:  Left Extremity: Edema is not present  Right Extremity: Edema is not present  Vascular Assessment:  Left Extremity Pulses:  Dorsalis Pedis: Palpable  Right Extremity Pulses:  Dorsalis Pedis: Palpable  Left Extr E11.621 - Type 2 diabetes mellitus with foot ulcer        Plan  See pt 1x/week for 6-8 weeks to progress towards goals above.   Treatment may include nonselective and selective debridement, total contact casting, offloading, advanced wound dressings, and pt Certification From:  1/23/2019  To: 4/23/2019  Kojo Collado Suits [id number]

## 2019-01-30 ENCOUNTER — APPOINTMENT (OUTPATIENT)
Dept: WOUND CARE | Facility: HOSPITAL | Age: 64
End: 2019-01-30
Attending: NURSE PRACTITIONER
Payer: COMMERCIAL

## 2019-01-31 ENCOUNTER — APPOINTMENT (OUTPATIENT)
Dept: WOUND CARE | Facility: HOSPITAL | Age: 64
End: 2019-01-31
Attending: NURSE PRACTITIONER
Payer: COMMERCIAL

## 2019-02-04 ENCOUNTER — OFFICE VISIT (OUTPATIENT)
Dept: WOUND CARE | Facility: HOSPITAL | Age: 64
End: 2019-02-04
Attending: NURSE PRACTITIONER
Payer: COMMERCIAL

## 2019-02-04 DIAGNOSIS — L97.509 TYPE 2 DIABETES MELLITUS WITH FOOT ULCER, UNSPECIFIED WHETHER LONG TERM INSULIN USE (HCC): ICD-10-CM

## 2019-02-04 DIAGNOSIS — L97.512 RIGHT FOOT ULCER, WITH FAT LAYER EXPOSED (HCC): Primary | ICD-10-CM

## 2019-02-04 DIAGNOSIS — E11.621 TYPE 2 DIABETES MELLITUS WITH FOOT ULCER, UNSPECIFIED WHETHER LONG TERM INSULIN USE (HCC): ICD-10-CM

## 2019-02-04 PROCEDURE — 97597 DBRDMT OPN WND 1ST 20 CM/<: CPT

## 2019-02-04 NOTE — PROGRESS NOTES
Subjective    Chief Complaint  This information was obtained from the patient  R 1st MTP diabetic ulcer  2/04/19: No new complaints.  \"The padding stayed in place until yesterday when it shifted a bit\"    Allergies  merbromin, Merthiolate (thimerosal) The periwound skin exhibited: Callus, Dry/Scaly.  The periwound skin did not exhibit: Brawny Induration, Edema, Excoriation, Induration, Crepitus, Fluctuance, Friable, Rash, Moist, Maceration, Atrophie Alanis, Cyanosis, Ecchymosis, Erythema, Hemosiderosis, Entered By: Felicia Toure on 02/04/2019 2:56:40 PM        Treatment Notes Summary  Wound #1 (Right, Plantar Metatarsal head first)  . Wound Treatment Note  Cleansed wound and periwound with non-cytotoxic agent.  using Wound Cleanser [place] (1)  Applied [facility] Post Procedural Pain: Insensate  Response to Treatment: Procedure was tolerated well    Electronic Signature(s)  Signed By: Date:  HU Mcknight DPT 02/04/2019 2:57:03 PM       Entered By: HU Mcknight DPT on 02/04/2019 2:22:15 PM

## 2019-02-05 ENCOUNTER — PRIOR ORIGINAL RECORDS (OUTPATIENT)
Dept: OTHER | Age: 64
End: 2019-02-05

## 2019-02-06 ENCOUNTER — APPOINTMENT (OUTPATIENT)
Dept: WOUND CARE | Facility: HOSPITAL | Age: 64
End: 2019-02-06
Attending: NURSE PRACTITIONER
Payer: COMMERCIAL

## 2019-02-07 ENCOUNTER — OFFICE VISIT (OUTPATIENT)
Dept: WOUND CARE | Facility: HOSPITAL | Age: 64
End: 2019-02-07
Attending: NURSE PRACTITIONER
Payer: COMMERCIAL

## 2019-02-07 DIAGNOSIS — E11.621 TYPE 2 DIABETES MELLITUS WITH FOOT ULCER, UNSPECIFIED WHETHER LONG TERM INSULIN USE (HCC): ICD-10-CM

## 2019-02-07 DIAGNOSIS — L97.509 TYPE 2 DIABETES MELLITUS WITH FOOT ULCER, UNSPECIFIED WHETHER LONG TERM INSULIN USE (HCC): ICD-10-CM

## 2019-02-07 DIAGNOSIS — L97.512 RIGHT FOOT ULCER, WITH FAT LAYER EXPOSED (HCC): Primary | ICD-10-CM

## 2019-02-07 PROCEDURE — 29445 APPL RIGID TOT CNTC LEG CAST: CPT

## 2019-02-07 NOTE — PROGRESS NOTES
Subjective    Chief Complaint  This information was obtained from the patient  R 1st MTP diabetic ulcer  2/07/19: No new complaints.      Allergies  merbromin, Merthiolate (thimerosal)    HPI  This information was obtained from the patient  HPI 1/23/19: Pt [facility] on 02/07/2019 11:22:03 AM   Treatment Notes Summary  Wound #1 (Right, Plantar Metatarsal head first)  . Wound Treatment Note  Cleansed wound and periwound with non-cytotoxic agent. using Wound Cleanser Spray (1)  Applied Primary Wound Dressing.  using

## 2019-02-11 ENCOUNTER — APPOINTMENT (OUTPATIENT)
Dept: WOUND CARE | Facility: HOSPITAL | Age: 64
End: 2019-02-11
Attending: NURSE PRACTITIONER
Payer: COMMERCIAL

## 2019-02-12 ENCOUNTER — OFFICE VISIT (OUTPATIENT)
Dept: INTERNAL MEDICINE CLINIC | Facility: CLINIC | Age: 64
End: 2019-02-12
Payer: COMMERCIAL

## 2019-02-12 VITALS
BODY MASS INDEX: 38.87 KG/M2 | HEART RATE: 70 BPM | OXYGEN SATURATION: 98 % | WEIGHT: 287 LBS | SYSTOLIC BLOOD PRESSURE: 130 MMHG | DIASTOLIC BLOOD PRESSURE: 72 MMHG | HEIGHT: 72 IN | TEMPERATURE: 98 F

## 2019-02-12 DIAGNOSIS — Z99.2 STAGE 5 CHRONIC KIDNEY DISEASE ON DIALYSIS (HCC): ICD-10-CM

## 2019-02-12 DIAGNOSIS — N18.4 TYPE 2 DIABETES MELLITUS WITH STAGE 4 CHRONIC KIDNEY DISEASE, WITH LONG-TERM CURRENT USE OF INSULIN (HCC): ICD-10-CM

## 2019-02-12 DIAGNOSIS — I10 ESSENTIAL HYPERTENSION: Primary | ICD-10-CM

## 2019-02-12 DIAGNOSIS — N18.6 STAGE 5 CHRONIC KIDNEY DISEASE ON DIALYSIS (HCC): ICD-10-CM

## 2019-02-12 DIAGNOSIS — Z79.4 TYPE 2 DIABETES MELLITUS WITH STAGE 4 CHRONIC KIDNEY DISEASE, WITH LONG-TERM CURRENT USE OF INSULIN (HCC): ICD-10-CM

## 2019-02-12 DIAGNOSIS — E11.22 TYPE 2 DIABETES MELLITUS WITH STAGE 4 CHRONIC KIDNEY DISEASE, WITH LONG-TERM CURRENT USE OF INSULIN (HCC): ICD-10-CM

## 2019-02-12 PROCEDURE — 99212 OFFICE O/P EST SF 10 MIN: CPT | Performed by: INTERNAL MEDICINE

## 2019-02-12 PROCEDURE — 99214 OFFICE O/P EST MOD 30 MIN: CPT | Performed by: INTERNAL MEDICINE

## 2019-02-12 RX ORDER — SUCROFERRIC OXYHYDROXIDE 500 MG/1
TABLET, CHEWABLE ORAL
COMMUNITY
Start: 2019-02-11 | End: 2020-04-07

## 2019-02-12 NOTE — PROGRESS NOTES
Sobeida Loaiza is a 59year old male who presents for     3 mo check    Feels good. Started peritoneal dialysis in early July 2018--doing well. Dr Sergio Lang.     DM:  sugars 80s to 100s.   has insulin pump with humalog U500  and takes Omer Hutch Dr. Delia Newberry Catalina Delarosa   • HERNIA SURGERY  2010    ventral hernia-at time of colon surgery   • LAPAROSCOPIC PERITONEAL DIALYSIS CATH INSERTION N/A 6/28/2018    Performed by Sallye Najjar, MD at [address] [place] [address]   • [facility] CATH INSERTION N/A 6/21/2 kg)  10/12/18 : 260 lb (117.9 kg)  09/12/18 : 271 lb (122.9 kg)  09/11/18 : 271 lb (122.9 kg)  09/05/18 : 217 lb (98.4 kg)      General: appears well nourished and in no acute distress  Lungs: clear to auscultation  Heart: regular rhythm, S1S2 normal witho administers monthly.    Elev calcium heart score: Calcium score 5/12/16-is 312 in circumflex.  echo 7/2/16-- mild LVH. Dr Wadas 9/2016--nuc stress test nl. 3/2018-small rev area in apex for which Dr. Dagoberto Davison recom avoid strenuous activity.  Dr Dagoberto Davison recom str FOR B12 INJECTIONS AS DIRECTED Disp: 12 each Rfl: 0   cyanocobalamin 1000 MCG/ML Injection Solution INJECT 1 MILLILITER   BY INTRAMUSCULAR ROUTE  EVERY MONTH Disp: 12 mL Rfl: 0   AmLODIPine Besylate 5 MG Oral Tab Take 1 tablet (5 mg total) by mouth daily.

## 2019-02-13 ENCOUNTER — APPOINTMENT (OUTPATIENT)
Dept: WOUND CARE | Facility: HOSPITAL | Age: 64
End: 2019-02-13
Attending: NURSE PRACTITIONER
Payer: COMMERCIAL

## 2019-02-14 ENCOUNTER — OFFICE VISIT (OUTPATIENT)
Dept: WOUND CARE | Facility: HOSPITAL | Age: 64
End: 2019-02-14
Attending: NURSE PRACTITIONER
Payer: COMMERCIAL

## 2019-02-14 DIAGNOSIS — L97.509 TYPE 2 DIABETES MELLITUS WITH FOOT ULCER, UNSPECIFIED WHETHER LONG TERM INSULIN USE (HCC): Primary | ICD-10-CM

## 2019-02-14 DIAGNOSIS — E11.621 TYPE 2 DIABETES MELLITUS WITH FOOT ULCER, UNSPECIFIED WHETHER LONG TERM INSULIN USE (HCC): Primary | ICD-10-CM

## 2019-02-14 PROCEDURE — 29445 APPL RIGID TOT CNTC LEG CAST: CPT

## 2019-02-14 NOTE — PROGRESS NOTES
Subjective    Chief Complaint  This information was obtained from the patient  R 1st MTP diabetic ulcer  2/14/19: \"I really don't like the cast\"    Allergies  merbromin, Merthiolate (thimerosal)    HPI  This information was obtained from the patient  HPI The periwound skin exhibited: Callus, Dry/Scaly.  The periwound skin did not exhibit: Brawny Induration, Edema, Excoriation, Induration, Crepitus, Fluctuance, Friable, Rash, Moist, Maceration, Atrophie Alanis, Cyanosis, Ecchymosis, Erythema, Hemosiderosis, TCC removed via cast saw without incident  Total contact cast type: using TCC EZ 3\" (1)  Adhesive felt padding applied to periwound: using 1 layer thick (1)  Cotton padding applied between toes  TCC EZ applied as per instructions without incident  TCC EZ

## 2019-02-20 ENCOUNTER — APPOINTMENT (OUTPATIENT)
Dept: WOUND CARE | Facility: HOSPITAL | Age: 64
End: 2019-02-20
Attending: NURSE PRACTITIONER
Payer: COMMERCIAL

## 2019-02-20 LAB
ALBUMIN: 4.2 G/DL
BUN: 70 MG/DL
CALCIUM: 9 MG/DL
CHLORIDE: 108 MEQ/L
CREATININE, SERUM: 4.04 MG/DL
GLOBULIN: 1.9 G/DL
GLUCOSE: 190 MG/DL
HEMATOCRIT: 31.5 %
HEMOGLOBIN: 10.7 G/DL
PLATELETS: 132 K/UL
POTASSIUM, SERUM: 5.1 MEQ/L
RED BLOOD COUNT: 3.52 X 10-6/U
SODIUM: 140 MEQ/L
WHITE BLOOD COUNT: 3.37 X 10-3/U

## 2019-02-21 ENCOUNTER — OFFICE VISIT (OUTPATIENT)
Dept: WOUND CARE | Facility: HOSPITAL | Age: 64
End: 2019-02-21
Attending: NURSE PRACTITIONER
Payer: COMMERCIAL

## 2019-02-21 DIAGNOSIS — E11.621 TYPE 2 DIABETES MELLITUS WITH FOOT ULCER, UNSPECIFIED WHETHER LONG TERM INSULIN USE (HCC): Primary | ICD-10-CM

## 2019-02-21 DIAGNOSIS — L97.509 TYPE 2 DIABETES MELLITUS WITH FOOT ULCER, UNSPECIFIED WHETHER LONG TERM INSULIN USE (HCC): Primary | ICD-10-CM

## 2019-02-21 PROCEDURE — 99212 OFFICE O/P EST SF 10 MIN: CPT

## 2019-02-22 ENCOUNTER — PRIOR ORIGINAL RECORDS (OUTPATIENT)
Dept: OTHER | Age: 64
End: 2019-02-22

## 2019-02-27 NOTE — PROGRESS NOTES
Subjective    Chief Complaint  This information was obtained from the patient  R 1st MTP diabetic ulcer  2/21/19: No new wound complaints.  Pt eager to transition off cast.    Allergies  merbromin, Merthiolate (thimerosal)    HPI  This information was Olena Fernández Hemosiderosis, Pallor, Rubor. Local Pulse is Normal.        Assessment  Pt's wound remains healed. Discontinued use of cast today and applied offloading padding to allow new skin to mature more before transitioning to shoe alone.   Will slowly wean padding

## 2019-02-28 ENCOUNTER — OFFICE VISIT (OUTPATIENT)
Dept: WOUND CARE | Facility: HOSPITAL | Age: 64
End: 2019-02-28
Attending: NURSE PRACTITIONER
Payer: COMMERCIAL

## 2019-02-28 VITALS
RESPIRATION RATE: 18 BRPM | WEIGHT: 273 LBS | HEIGHT: 72 IN | BODY MASS INDEX: 36.98 KG/M2 | HEART RATE: 62 BPM | SYSTOLIC BLOOD PRESSURE: 116 MMHG | DIASTOLIC BLOOD PRESSURE: 64 MMHG

## 2019-02-28 VITALS
HEART RATE: 66 BPM | HEIGHT: 72 IN | RESPIRATION RATE: 18 BRPM | DIASTOLIC BLOOD PRESSURE: 72 MMHG | SYSTOLIC BLOOD PRESSURE: 132 MMHG | BODY MASS INDEX: 39.01 KG/M2 | WEIGHT: 288 LBS

## 2019-02-28 VITALS
RESPIRATION RATE: 18 BRPM | BODY MASS INDEX: 36.84 KG/M2 | SYSTOLIC BLOOD PRESSURE: 120 MMHG | DIASTOLIC BLOOD PRESSURE: 64 MMHG | HEART RATE: 76 BPM | HEIGHT: 72 IN | WEIGHT: 272 LBS

## 2019-02-28 VITALS
WEIGHT: 275 LBS | SYSTOLIC BLOOD PRESSURE: 122 MMHG | BODY MASS INDEX: 37.25 KG/M2 | RESPIRATION RATE: 18 BRPM | HEART RATE: 67 BPM | HEIGHT: 72 IN | DIASTOLIC BLOOD PRESSURE: 59 MMHG

## 2019-02-28 VITALS
OXYGEN SATURATION: 97 % | HEIGHT: 72 IN | DIASTOLIC BLOOD PRESSURE: 74 MMHG | WEIGHT: 269 LBS | SYSTOLIC BLOOD PRESSURE: 136 MMHG | HEART RATE: 69 BPM | BODY MASS INDEX: 36.44 KG/M2

## 2019-02-28 DIAGNOSIS — E11.621 TYPE 2 DIABETES MELLITUS WITH FOOT ULCER, UNSPECIFIED WHETHER LONG TERM INSULIN USE (HCC): Primary | ICD-10-CM

## 2019-02-28 DIAGNOSIS — L97.509 TYPE 2 DIABETES MELLITUS WITH FOOT ULCER, UNSPECIFIED WHETHER LONG TERM INSULIN USE (HCC): Primary | ICD-10-CM

## 2019-02-28 PROCEDURE — 99212 OFFICE O/P EST SF 10 MIN: CPT

## 2019-02-28 NOTE — PROGRESS NOTES
Subjective    Chief Complaint  This information was obtained from the patient  R 1st MTP diabetic ulcer  2/28/19: \"I saw Dr. Jessy Dunlap yesterday and she was happy\"    Allergies  merbromin, Merthiolate (thimerosal)    HPI  This information was obtained from t Type 2 diabetes mellitus with foot ulcer        Plan  F/u in several weeks if needed. Recommend use of moleskin for offloading or silicone foot products for longterm use as needed to prevent reulceration in this prominent bony area.   Also recommend improv

## 2019-03-01 VITALS
WEIGHT: 293 LBS | BODY MASS INDEX: 39.68 KG/M2 | HEART RATE: 75 BPM | SYSTOLIC BLOOD PRESSURE: 130 MMHG | HEIGHT: 72 IN | DIASTOLIC BLOOD PRESSURE: 70 MMHG | RESPIRATION RATE: 18 BRPM

## 2019-03-07 ENCOUNTER — APPOINTMENT (OUTPATIENT)
Dept: WOUND CARE | Facility: HOSPITAL | Age: 64
End: 2019-03-07
Attending: NURSE PRACTITIONER
Payer: COMMERCIAL

## 2019-03-12 RX ORDER — GEMFIBROZIL 600 MG/1
TABLET, FILM COATED ORAL
Qty: 180 TABLET | Refills: 0 | Status: SHIPPED | OUTPATIENT
Start: 2019-03-12 | End: 2019-07-07

## 2019-03-27 ENCOUNTER — APPOINTMENT (OUTPATIENT)
Dept: WOUND CARE | Facility: HOSPITAL | Age: 64
End: 2019-03-27
Attending: NURSE PRACTITIONER
Payer: COMMERCIAL

## 2019-04-15 LAB
ABSOLUTE IMMATURE GRANULOCYTES (OFFPRE24): NORMAL
ALBUMIN SERPL-MCNC: 4 G/DL
ALBUMIN/GLOB SERPL: NORMAL {RATIO}
ALP SERPL-CCNC: 33 U/L
ALT SERPL-CCNC: NORMAL U/L
ANION GAP SERPL CALC-SCNC: NORMAL MMOL/L
ANION GAP SERPL CALC-SCNC: NORMAL MMOL/L
AST SERPL-CCNC: NORMAL U/L
BASO+EOS+MONOS # BLD: NORMAL 10*3/UL
BASO+EOS+MONOS NFR BLD: NORMAL %
BASOPHILS # BLD: NORMAL 10*3/UL
BASOPHILS NFR BLD: NORMAL %
BILIRUB SERPL-MCNC: NORMAL MG/DL
BUN SERPL-MCNC: 69 MG/DL
BUN SERPL-MCNC: NORMAL MG/DL
BUN/CREAT SERPL: NORMAL
BUN/CREAT SERPL: NORMAL
CALCIUM SERPL-MCNC: 9.3 MG/DL
CALCIUM SERPL-MCNC: NORMAL MG/DL
CHLORIDE SERPL-SCNC: 109 MMOL/L
CHLORIDE SERPL-SCNC: NORMAL MMOL/L
CO2 SERPL-SCNC: NORMAL MMOL/L
CO2 SERPL-SCNC: NORMAL MMOL/L
CREAT SERPL-MCNC: 4.02 MG/DL
CREAT SERPL-MCNC: NORMAL MG/DL
DIFFERENTIAL METHOD BLD: NORMAL
EOSINOPHIL # BLD: NORMAL 10*3/UL
EOSINOPHIL NFR BLD: NORMAL %
ERYTHROCYTE [DISTWIDTH] IN BLOOD: NORMAL %
GLOBULIN SER-MCNC: 2 G/DL
GLUCOSE SERPL-MCNC: 246 MG/DL
GLUCOSE SERPL-MCNC: NORMAL MG/DL
HCT VFR BLD CALC: 33 %
HEMOGLOBIN (HGB) A1C  (001453): 6.8
HGB BLD-MCNC: 11 G/DL
IMMATURE GRANULOCYTES (OFFPRE25): NORMAL
LENGTH OF FAST TIME PATIENT: NORMAL H
LENGTH OF FAST TIME PATIENT: NORMAL H
LYMPHOCYTES # BLD: NORMAL 10*3/UL
LYMPHOCYTES NFR BLD: NORMAL %
MAGNESIUM SERPL-MCNC: 2.2 MG/DL
MCH RBC QN AUTO: NORMAL PG
MCHC RBC AUTO-ENTMCNC: NORMAL G/DL
MCV RBC AUTO: NORMAL FL
MONOCYTES # BLD: NORMAL 10*3/UL
MONOCYTES NFR BLD: NORMAL %
MPV (OFFPRE2): NORMAL
NEUTROPHILS # BLD: NORMAL 10*3/UL
NEUTROPHILS NFR BLD: NORMAL %
NRBC BLD MANUAL-RTO: NORMAL %
PLAT MORPH BLD: NORMAL
PLATELET # BLD: 126 10*3/UL
POTASSIUM SERPL-SCNC: 4.8 MMOL/L
POTASSIUM SERPL-SCNC: NORMAL MMOL/L
PROT SERPL-MCNC: 6 G/DL
RBC # BLD: 356 10*6/UL
RBC MORPH BLD: NORMAL
SODIUM SERPL-SCNC: 141 MMOL/L
SODIUM SERPL-SCNC: NORMAL MMOL/L
WBC # BLD: 3.66 10*3/UL
WBC MORPH BLD: NORMAL

## 2019-04-17 ENCOUNTER — CLINICAL ABSTRACT (OUTPATIENT)
Dept: CARDIOLOGY | Age: 64
End: 2019-04-17

## 2019-04-18 ENCOUNTER — TELEPHONE (OUTPATIENT)
Dept: CARDIOLOGY | Age: 64
End: 2019-04-18

## 2019-04-28 RX ORDER — AMLODIPINE BESYLATE 5 MG/1
5 TABLET ORAL DAILY
Qty: 90 TABLET | Refills: 3 | Status: SHIPPED | OUTPATIENT
Start: 2019-04-28 | End: 2019-07-26 | Stop reason: DRUGHIGH

## 2019-05-23 ENCOUNTER — LAB ENCOUNTER (OUTPATIENT)
Dept: LAB | Facility: HOSPITAL | Age: 64
End: 2019-05-23
Attending: INTERNAL MEDICINE
Payer: COMMERCIAL

## 2019-05-23 DIAGNOSIS — I10 HTN (HYPERTENSION): Primary | ICD-10-CM

## 2019-05-23 PROCEDURE — 36415 COLL VENOUS BLD VENIPUNCTURE: CPT

## 2019-05-23 PROCEDURE — 80053 COMPREHEN METABOLIC PANEL: CPT

## 2019-05-23 PROCEDURE — 80061 LIPID PANEL: CPT

## 2019-05-23 PROCEDURE — 84443 ASSAY THYROID STIM HORMONE: CPT

## 2019-05-23 PROCEDURE — 84439 ASSAY OF FREE THYROXINE: CPT

## 2019-05-23 PROCEDURE — 83036 HEMOGLOBIN GLYCOSYLATED A1C: CPT

## 2019-06-05 LAB
ABSOLUTE IMMATURE GRANULOCYTES (OFFPRE24): NORMAL
ALBUMIN SERPL-MCNC: 3.8 G/DL
ALBUMIN/GLOB SERPL: 1.9 {RATIO}
ALP SERPL-CCNC: NORMAL U/L
ALT SERPL-CCNC: NORMAL U/L
ANION GAP SERPL CALC-SCNC: NORMAL MMOL/L
AST SERPL-CCNC: NORMAL U/L
BASO+EOS+MONOS # BLD: NORMAL 10*3/UL
BASO+EOS+MONOS NFR BLD: NORMAL %
BASOPHILS # BLD: NORMAL 10*3/UL
BASOPHILS NFR BLD: NORMAL %
BILIRUB SERPL-MCNC: NORMAL MG/DL
BUN SERPL-MCNC: 67 MG/DL
BUN/CREAT SERPL: 16.3
CALCIUM SERPL-MCNC: 9.2 MG/DL
CHLORIDE SERPL-SCNC: 112 MMOL/L
CO2 SERPL-SCNC: NORMAL MMOL/L
CREAT SERPL-MCNC: 4.12 MG/DL
DIFFERENTIAL METHOD BLD: NORMAL
EOSINOPHIL # BLD: NORMAL 10*3/UL
EOSINOPHIL NFR BLD: NORMAL %
ERYTHROCYTE [DISTWIDTH] IN BLOOD: NORMAL %
GLOBULIN SER-MCNC: 2 G/DL
GLUCOSE SERPL-MCNC: 194 MG/DL
HCT VFR BLD CALC: 32.7 %
HGB BLD-MCNC: 10.9 G/DL
IMMATURE GRANULOCYTES (OFFPRE25): NORMAL
LENGTH OF FAST TIME PATIENT: NORMAL H
LYMPHOCYTES # BLD: NORMAL 10*3/UL
LYMPHOCYTES NFR BLD: NORMAL %
MCH RBC QN AUTO: NORMAL PG
MCHC RBC AUTO-ENTMCNC: NORMAL G/DL
MCV RBC AUTO: 32.6 FL
MONOCYTES # BLD: NORMAL 10*3/UL
MONOCYTES NFR BLD: NORMAL %
MPV (OFFPRE2): NORMAL
NEUTROPHILS # BLD: NORMAL 10*3/UL
NEUTROPHILS NFR BLD: NORMAL %
NRBC BLD MANUAL-RTO: NORMAL %
PLAT MORPH BLD: NORMAL
PLATELET # BLD: 104 10*3/UL
POTASSIUM SERPL-SCNC: 4.5 MMOL/L
PROT SERPL-MCNC: 5.8 G/DL
RBC # BLD: 3.43 10*6/UL
RBC MORPH BLD: NORMAL
SODIUM SERPL-SCNC: 139 MMOL/L
WBC # BLD: 3.45 10*3/UL
WBC MORPH BLD: NORMAL

## 2019-06-07 RX ORDER — GEMFIBROZIL 600 MG/1
TABLET, FILM COATED ORAL
Qty: 180 TABLET | Refills: 0 | OUTPATIENT
Start: 2019-06-07

## 2019-06-07 NOTE — TELEPHONE ENCOUNTER
Per LOV 2/12/19, pt to return in 3 months for follow up. Please call patient to schedule f/u then back to clinical for refill.

## 2019-06-18 ENCOUNTER — CLINICAL ABSTRACT (OUTPATIENT)
Dept: CARDIOLOGY | Age: 64
End: 2019-06-18

## 2019-07-01 LAB
25(OH)D3+25(OH)D2 SERPL-MCNC: 38.6 NG/ML
ABSOLUTE IMMATURE GRANULOCYTES (OFFPRE24): NORMAL
ALBUMIN SERPL-MCNC: 3.8 G/DL
ALBUMIN/GLOB SERPL: NORMAL {RATIO}
ALP SERPL-CCNC: NORMAL U/L
ALT SERPL-CCNC: NORMAL U/L
ANION GAP SERPL CALC-SCNC: NORMAL MMOL/L
AST SERPL-CCNC: NORMAL U/L
BASO+EOS+MONOS # BLD: NORMAL 10*3/UL
BASO+EOS+MONOS NFR BLD: NORMAL %
BASOPHILS # BLD: NORMAL 10*3/UL
BASOPHILS NFR BLD: NORMAL %
BILIRUB SERPL-MCNC: NORMAL MG/DL
BUN SERPL-MCNC: 68 MG/DL
BUN/CREAT SERPL: NORMAL
CALCIUM SERPL-MCNC: 8.8 MG/DL
CHLORIDE SERPL-SCNC: 110 MMOL/L
CO2 SERPL-SCNC: NORMAL MMOL/L
CREAT SERPL-MCNC: 4.39 MG/DL
DIFFERENTIAL METHOD BLD: NORMAL
EOSINOPHIL # BLD: NORMAL 10*3/UL
EOSINOPHIL NFR BLD: NORMAL %
ERYTHROCYTE [DISTWIDTH] IN BLOOD: NORMAL %
FOLATE SERPL-MCNC: 3.9 NG/ML
GLOBULIN SER-MCNC: 2.8 G/DL
GLUCOSE SERPL-MCNC: NORMAL MG/DL
HCT VFR BLD CALC: 32.1 %
HEMOGLOBIN (HGB) A1C  (001453): 6.5
HGB BLD-MCNC: 10.7 G/DL
IMMATURE GRANULOCYTES (OFFPRE25): NORMAL
IRON SERPL-MCNC: 67 UG/DL
LENGTH OF FAST TIME PATIENT: NORMAL H
LYMPHOCYTES # BLD: NORMAL 10*3/UL
LYMPHOCYTES NFR BLD: NORMAL %
MAGNESIUM SERPL-MCNC: 2.2 MG/DL
MCH RBC QN AUTO: NORMAL PG
MCHC RBC AUTO-ENTMCNC: NORMAL G/DL
MCV RBC AUTO: NORMAL FL
MONOCYTES # BLD: NORMAL 10*3/UL
MONOCYTES NFR BLD: NORMAL %
MPV (OFFPRE2): NORMAL
NEUTROPHILS # BLD: NORMAL 10*3/UL
NEUTROPHILS NFR BLD: NORMAL %
NRBC BLD MANUAL-RTO: NORMAL %
PLAT MORPH BLD: NORMAL
PLATELET # BLD: 99 10*3/UL
POTASSIUM SERPL-SCNC: 4.8 MMOL/L
PROT SERPL-MCNC: 5.8 G/DL
RBC # BLD: 3.29 10*6/UL
RBC MORPH BLD: NORMAL
SODIUM SERPL-SCNC: 142 MMOL/L
TIBC SERPL-MCNC: 246 UG/DL
TRANSFERRIN: 21
VIT B12 SERPL-MCNC: 431 PG/ML
WBC # BLD: 4.28 10*3/UL
WBC MORPH BLD: NORMAL

## 2019-07-07 ENCOUNTER — TELEPHONE (OUTPATIENT)
Dept: INTERNAL MEDICINE CLINIC | Facility: CLINIC | Age: 64
End: 2019-07-07

## 2019-07-08 RX ORDER — GEMFIBROZIL 600 MG/1
600 TABLET, FILM COATED ORAL
Qty: 180 TABLET | Refills: 0 | Status: SHIPPED | OUTPATIENT
Start: 2019-07-08 | End: 2019-10-02

## 2019-07-08 NOTE — TELEPHONE ENCOUNTER
Per LOV 2/12/19 pt to return in 3 months for f/u     Please call patient to schedule f/u then back to clinical for refill.

## 2019-07-18 ENCOUNTER — CLINICAL ABSTRACT (OUTPATIENT)
Dept: CARDIOLOGY | Age: 64
End: 2019-07-18

## 2019-07-23 ENCOUNTER — MED REC SCAN ONLY (OUTPATIENT)
Dept: INTERNAL MEDICINE CLINIC | Facility: CLINIC | Age: 64
End: 2019-07-23

## 2019-07-25 RX ORDER — METOPROLOL TARTRATE 50 MG/1
TABLET, FILM COATED ORAL
Qty: 270 TABLET | Refills: 3 | Status: SHIPPED | OUTPATIENT
Start: 2019-07-25 | End: 2020-08-11

## 2019-07-26 ENCOUNTER — OFFICE VISIT (OUTPATIENT)
Dept: INTERNAL MEDICINE CLINIC | Facility: CLINIC | Age: 64
End: 2019-07-26
Payer: COMMERCIAL

## 2019-07-26 VITALS
TEMPERATURE: 98 F | HEIGHT: 72 IN | SYSTOLIC BLOOD PRESSURE: 120 MMHG | WEIGHT: 278 LBS | BODY MASS INDEX: 37.65 KG/M2 | DIASTOLIC BLOOD PRESSURE: 60 MMHG

## 2019-07-26 DIAGNOSIS — Z99.2 STAGE 5 CHRONIC KIDNEY DISEASE ON DIALYSIS (HCC): ICD-10-CM

## 2019-07-26 DIAGNOSIS — Z79.4 TYPE 2 DIABETES MELLITUS WITH CHRONIC KIDNEY DISEASE ON CHRONIC DIALYSIS, WITH LONG-TERM CURRENT USE OF INSULIN (HCC): ICD-10-CM

## 2019-07-26 DIAGNOSIS — N18.6 TYPE 2 DIABETES MELLITUS WITH CHRONIC KIDNEY DISEASE ON CHRONIC DIALYSIS, WITH LONG-TERM CURRENT USE OF INSULIN (HCC): ICD-10-CM

## 2019-07-26 DIAGNOSIS — E11.22 TYPE 2 DIABETES MELLITUS WITH CHRONIC KIDNEY DISEASE ON CHRONIC DIALYSIS, WITH LONG-TERM CURRENT USE OF INSULIN (HCC): ICD-10-CM

## 2019-07-26 DIAGNOSIS — Z99.2 TYPE 2 DIABETES MELLITUS WITH CHRONIC KIDNEY DISEASE ON CHRONIC DIALYSIS, WITH LONG-TERM CURRENT USE OF INSULIN (HCC): ICD-10-CM

## 2019-07-26 DIAGNOSIS — N18.6 STAGE 5 CHRONIC KIDNEY DISEASE ON DIALYSIS (HCC): ICD-10-CM

## 2019-07-26 DIAGNOSIS — J06.9 URI, ACUTE: Primary | ICD-10-CM

## 2019-07-26 DIAGNOSIS — I10 ESSENTIAL HYPERTENSION: ICD-10-CM

## 2019-07-26 PROCEDURE — 99214 OFFICE O/P EST MOD 30 MIN: CPT | Performed by: INTERNAL MEDICINE

## 2019-07-26 PROCEDURE — 99212 OFFICE O/P EST SF 10 MIN: CPT | Performed by: INTERNAL MEDICINE

## 2019-07-26 RX ORDER — AMLODIPINE BESYLATE 10 MG/1
10 TABLET ORAL DAILY
Qty: 1 TABLET | Refills: 0 | COMMUNITY
Start: 2019-07-26

## 2019-07-26 RX ORDER — AZITHROMYCIN 250 MG/1
TABLET, FILM COATED ORAL
Qty: 1 PACKAGE | Refills: 0 | Status: SHIPPED | OUTPATIENT
Start: 2019-07-26 | End: 2019-11-19

## 2019-07-26 NOTE — PROGRESS NOTES
Robert Kamara is a 59year old male who presents for     Check at 5 mo. Feels good exc had a cold. 3 wks of sinus congestion, nasal congestion, cough nonproductive. No fever--highest temp 97.6. His wife had a cold and took her 3 wks to get over.     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 Hx splenomegaly, fatty liver. takes B12 injections. 1/12/18- fe/tibc ferritin B12 folate-ok. 1/23/19 WBC 4.7 Hgb 10.8 platelet 767.     Pernicious anemia -Vitamin B12 shots pt self administ (two) times daily before meals. Disp: 180 tablet Rfl: 0   tamsulosin HCl 0.4 MG Oral Cap Take 0.4 mg by mouth daily. Disp:  Rfl:    Furosemide (LASIX OR) Take 80 mg by mouth daily.    Disp:  Rfl:    Fluticasone Propionate 50 MCG/ACT Nasal Suspension 2 spray

## 2019-08-02 RX ORDER — CYANOCOBALAMIN 1000 UG/ML
INJECTION INTRAMUSCULAR; SUBCUTANEOUS
Qty: 3 ML | Refills: 3 | Status: SHIPPED | OUTPATIENT
Start: 2019-08-02 | End: 2020-12-03

## 2019-08-07 LAB
ABSOLUTE IMMATURE GRANULOCYTES (OFFPRE24): NORMAL
ALBUMIN SERPL-MCNC: 4 G/DL
ALBUMIN/GLOB SERPL: NORMAL {RATIO}
ALP SERPL-CCNC: NORMAL U/L
ALT SERPL-CCNC: NORMAL U/L
ANION GAP SERPL CALC-SCNC: NORMAL MMOL/L
AST SERPL-CCNC: NORMAL U/L
BASO+EOS+MONOS # BLD: NORMAL 10*3/UL
BASO+EOS+MONOS NFR BLD: NORMAL %
BASOPHILS # BLD: NORMAL 10*3/UL
BASOPHILS NFR BLD: NORMAL %
BILIRUB SERPL-MCNC: NORMAL MG/DL
BUN SERPL-MCNC: 62 MG/DL
BUN/CREAT SERPL: NORMAL
CALCIUM SERPL-MCNC: 8.8 MG/DL
CHLORIDE SERPL-SCNC: 107 MMOL/L
CO2 SERPL-SCNC: NORMAL MMOL/L
CREAT SERPL-MCNC: 4.33 MG/DL
DIFFERENTIAL METHOD BLD: NORMAL
EOSINOPHIL # BLD: NORMAL 10*3/UL
EOSINOPHIL NFR BLD: NORMAL %
ERYTHROCYTE [DISTWIDTH] IN BLOOD: NORMAL %
GLOBULIN SER-MCNC: 2.1 G/DL
GLUCOSE SERPL-MCNC: NORMAL MG/DL
HCT VFR BLD CALC: 32.4 %
HGB BLD-MCNC: 10.8 G/DL
IMMATURE GRANULOCYTES (OFFPRE25): NORMAL
LENGTH OF FAST TIME PATIENT: NORMAL H
LYMPHOCYTES # BLD: NORMAL 10*3/UL
LYMPHOCYTES NFR BLD: NORMAL %
MCH RBC QN AUTO: NORMAL PG
MCHC RBC AUTO-ENTMCNC: NORMAL G/DL
MCV RBC AUTO: NORMAL FL
MONOCYTES # BLD: NORMAL 10*3/UL
MONOCYTES NFR BLD: NORMAL %
MPV (OFFPRE2): NORMAL
NEUTROPHILS # BLD: NORMAL 10*3/UL
NEUTROPHILS NFR BLD: NORMAL %
NRBC BLD MANUAL-RTO: NORMAL %
PLAT MORPH BLD: NORMAL
PLATELET # BLD: 121 10*3/UL
POTASSIUM SERPL-SCNC: 4.7 MMOL/L
PROT SERPL-MCNC: 6.1 G/DL
RBC # BLD: 3.37 10*6/UL
RBC MORPH BLD: NORMAL
SODIUM SERPL-SCNC: 140 MMOL/L
WBC # BLD: 3.56 10*3/UL
WBC MORPH BLD: NORMAL

## 2019-08-26 ENCOUNTER — CLINICAL ABSTRACT (OUTPATIENT)
Dept: CARDIOLOGY | Age: 64
End: 2019-08-26

## 2019-08-27 ENCOUNTER — TELEPHONE (OUTPATIENT)
Dept: CARDIOLOGY | Age: 64
End: 2019-08-27

## 2019-09-04 LAB
ABSOLUTE IMMATURE GRANULOCYTES (OFFPRE24): NORMAL
ALBUMIN SERPL-MCNC: 4 G/DL
ALBUMIN/GLOB SERPL: NORMAL {RATIO}
ALP SERPL-CCNC: NORMAL U/L
ALT SERPL-CCNC: NORMAL U/L
ANION GAP SERPL CALC-SCNC: NORMAL MMOL/L
AST SERPL-CCNC: NORMAL U/L
BASO+EOS+MONOS # BLD: NORMAL 10*3/UL
BASO+EOS+MONOS NFR BLD: NORMAL %
BASOPHILS # BLD: NORMAL 10*3/UL
BASOPHILS NFR BLD: NORMAL %
BILIRUB SERPL-MCNC: NORMAL MG/DL
BUN SERPL-MCNC: 68 MG/DL
BUN/CREAT SERPL: 16.5
CALCIUM SERPL-MCNC: 8.7 MG/DL
CHLORIDE SERPL-SCNC: 108 MMOL/L
CO2 SERPL-SCNC: NORMAL MMOL/L
CREAT SERPL-MCNC: 4.13 MG/DL
DIFFERENTIAL METHOD BLD: NORMAL
EOSINOPHIL # BLD: NORMAL 10*3/UL
EOSINOPHIL NFR BLD: NORMAL %
ERYTHROCYTE [DISTWIDTH] IN BLOOD: NORMAL %
GLOBULIN SER-MCNC: 1.8 G/DL
GLUCOSE SERPL-MCNC: NORMAL MG/DL
HCT VFR BLD CALC: 30.2 %
HGB BLD-MCNC: 10.1 G/DL
IMMATURE GRANULOCYTES (OFFPRE25): NORMAL
IRON SERPL-MCNC: 98 UG/DL
LENGTH OF FAST TIME PATIENT: NORMAL H
LYMPHOCYTES # BLD: NORMAL 10*3/UL
LYMPHOCYTES NFR BLD: NORMAL %
MCH RBC QN AUTO: 32.1 PG
MCHC RBC AUTO-ENTMCNC: NORMAL G/DL
MCV RBC AUTO: 96 FL
MONOCYTES # BLD: NORMAL 10*3/UL
MONOCYTES NFR BLD: NORMAL %
MPV (OFFPRE2): NORMAL
NEUTROPHILS # BLD: NORMAL 10*3/UL
NEUTROPHILS NFR BLD: NORMAL %
NRBC BLD MANUAL-RTO: NORMAL %
PLAT MORPH BLD: NORMAL
PLATELET # BLD: 107 10*3/UL
POTASSIUM SERPL-SCNC: 4.4 MMOL/L
PROT SERPL-MCNC: 5.8 G/DL
RBC # BLD: 3.14 10*6/UL
RBC MORPH BLD: NORMAL
SODIUM SERPL-SCNC: 140 MMOL/L
WBC # BLD: 3.54 10*3/UL
WBC MORPH BLD: NORMAL

## 2019-09-12 RX ORDER — TAMSULOSIN HYDROCHLORIDE 0.4 MG/1
0.4 CAPSULE ORAL DAILY
Qty: 30 CAPSULE | Refills: 6 | Status: SHIPPED | OUTPATIENT
Start: 2019-09-12 | End: 2020-04-16

## 2019-09-12 NOTE — TELEPHONE ENCOUNTER
Pt is calling for refill on tamsulosin pt LOV with Aure BLANCO 10/12/18. I copied and pasted part of Aure BLANCO note below. He has been started on Tamsulosin daily. Renal/bladder ultrasound normal with stable PVR.   His PSA level in January 2018 is norm

## 2019-09-17 ENCOUNTER — CLINICAL ABSTRACT (OUTPATIENT)
Dept: CARDIOLOGY | Age: 64
End: 2019-09-17

## 2019-09-20 ENCOUNTER — TELEPHONE (OUTPATIENT)
Dept: CARDIOLOGY | Age: 64
End: 2019-09-20

## 2019-09-28 ENCOUNTER — LAB ENCOUNTER (OUTPATIENT)
Dept: LAB | Facility: HOSPITAL | Age: 64
End: 2019-09-28
Attending: INTERNAL MEDICINE
Payer: COMMERCIAL

## 2019-09-28 DIAGNOSIS — E11.9 DM TYPE 2 (DIABETES MELLITUS, TYPE 2) (HCC): Primary | ICD-10-CM

## 2019-09-28 LAB
ALBUMIN SERPL-MCNC: 3.7 G/DL (ref 3.4–5)
ALBUMIN/GLOB SERPL: 1.3 {RATIO} (ref 1–2)
ALP LIVER SERPL-CCNC: 40 U/L (ref 45–117)
ALT SERPL-CCNC: 22 U/L (ref 16–61)
ANION GAP SERPL CALC-SCNC: 12 MMOL/L (ref 0–18)
AST SERPL-CCNC: 20 U/L (ref 15–37)
BILIRUB SERPL-MCNC: 0.3 MG/DL (ref 0.1–2)
BUN BLD-MCNC: 71 MG/DL (ref 7–18)
BUN/CREAT SERPL: 16 (ref 10–20)
CALCIUM BLD-MCNC: 9.1 MG/DL (ref 8.5–10.1)
CHLORIDE SERPL-SCNC: 107 MMOL/L (ref 98–112)
CHOLEST SMN-MCNC: 143 MG/DL (ref ?–200)
CO2 SERPL-SCNC: 20 MMOL/L (ref 21–32)
CREAT BLD-MCNC: 4.45 MG/DL (ref 0.7–1.3)
EST. AVERAGE GLUCOSE BLD GHB EST-MCNC: 157 MG/DL (ref 68–126)
GLOBULIN PLAS-MCNC: 2.9 G/DL (ref 2.8–4.4)
GLUCOSE BLD-MCNC: 166 MG/DL (ref 70–99)
HBA1C MFR BLD HPLC: 7.1 % (ref ?–5.7)
HDLC SERPL-MCNC: 29 MG/DL (ref 40–59)
LDLC SERPL CALC-MCNC: 57 MG/DL (ref ?–100)
M PROTEIN MFR SERPL ELPH: 6.6 G/DL (ref 6.4–8.2)
NONHDLC SERPL-MCNC: 114 MG/DL (ref ?–130)
OSMOLALITY SERPL CALC.SUM OF ELEC: 313 MOSM/KG (ref 275–295)
PATIENT FASTING: YES
PATIENT FASTING: YES
POTASSIUM SERPL-SCNC: 4.6 MMOL/L (ref 3.5–5.1)
SODIUM SERPL-SCNC: 139 MMOL/L (ref 136–145)
TRIGL SERPL-MCNC: 285 MG/DL (ref 30–149)
VLDLC SERPL CALC-MCNC: 57 MG/DL (ref 0–30)

## 2019-09-28 PROCEDURE — 83036 HEMOGLOBIN GLYCOSYLATED A1C: CPT

## 2019-09-28 PROCEDURE — 36415 COLL VENOUS BLD VENIPUNCTURE: CPT

## 2019-09-28 PROCEDURE — 80061 LIPID PANEL: CPT

## 2019-09-28 PROCEDURE — 80053 COMPREHEN METABOLIC PANEL: CPT

## 2019-09-28 RX ORDER — METOPROLOL TARTRATE 50 MG/1
TABLET, FILM COATED ORAL
Qty: 270 TABLET | Refills: 3 | Status: CANCELLED
Start: 2019-09-28

## 2019-10-02 RX ORDER — GEMFIBROZIL 600 MG/1
600 TABLET, FILM COATED ORAL
Qty: 180 TABLET | Refills: 3 | Status: SHIPPED | OUTPATIENT
Start: 2019-10-02 | End: 2021-01-10

## 2019-10-04 LAB
ABSOLUTE IMMATURE GRANULOCYTES (OFFPRE24): NORMAL
ALBUMIN SERPL-MCNC: 4 G/DL
ALBUMIN/GLOB SERPL: 1.8 {RATIO}
ALP SERPL-CCNC: 38 U/L
ALT SERPL-CCNC: NORMAL U/L
ANION GAP SERPL CALC-SCNC: NORMAL MMOL/L
AST SERPL-CCNC: NORMAL U/L
BASO+EOS+MONOS # BLD: NORMAL 10*3/UL
BASO+EOS+MONOS NFR BLD: NORMAL %
BASOPHILS # BLD: NORMAL 10*3/UL
BASOPHILS NFR BLD: NORMAL %
BILIRUB SERPL-MCNC: NORMAL MG/DL
BUN SERPL-MCNC: 72 MG/DL
BUN/CREAT SERPL: 15.4
CALCIUM SERPL-MCNC: 8.8 MG/DL
CHLORIDE SERPL-SCNC: 107 MMOL/L
CO2 SERPL-SCNC: NORMAL MMOL/L
CREAT SERPL-MCNC: 4.67 MG/DL
DIFFERENTIAL METHOD BLD: NORMAL
EOSINOPHIL # BLD: NORMAL 10*3/UL
EOSINOPHIL NFR BLD: NORMAL %
ERYTHROCYTE [DISTWIDTH] IN BLOOD: NORMAL %
GLOBULIN SER-MCNC: 2.2 G/DL
GLUCOSE SERPL-MCNC: NORMAL MG/DL
HBA1C MFR BLD: 6.7 %
HCT VFR BLD CALC: 31.9 %
HGB BLD-MCNC: 10.8 G/DL
IMMATURE GRANULOCYTES (OFFPRE25): NORMAL
LENGTH OF FAST TIME PATIENT: NORMAL H
LYMPHOCYTES # BLD: NORMAL 10*3/UL
LYMPHOCYTES NFR BLD: NORMAL %
MCH RBC QN AUTO: NORMAL PG
MCHC RBC AUTO-ENTMCNC: NORMAL G/DL
MCV RBC AUTO: NORMAL FL
MONOCYTES # BLD: NORMAL 10*3/UL
MONOCYTES NFR BLD: NORMAL %
MPV (OFFPRE2): NORMAL
NEUTROPHILS # BLD: NORMAL 10*3/UL
NEUTROPHILS NFR BLD: NORMAL %
NRBC BLD MANUAL-RTO: NORMAL %
PLAT MORPH BLD: NORMAL
PLATELET # BLD: NORMAL 10*3/UL
POTASSIUM SERPL-SCNC: 4.4 MMOL/L
PROT SERPL-MCNC: 6.2 G/DL
RBC # BLD: 3.45 10*6/UL
RBC MORPH BLD: NORMAL
SODIUM SERPL-SCNC: 141 MMOL/L
WBC # BLD: 3.63 10*3/UL
WBC MORPH BLD: NORMAL

## 2019-10-16 ENCOUNTER — TELEPHONE (OUTPATIENT)
Dept: CARDIOLOGY | Age: 64
End: 2019-10-16

## 2019-10-16 ENCOUNTER — CLINICAL ABSTRACT (OUTPATIENT)
Dept: CARDIOLOGY | Age: 64
End: 2019-10-16

## 2019-10-19 ENCOUNTER — LAB ENCOUNTER (OUTPATIENT)
Dept: LAB | Facility: HOSPITAL | Age: 64
End: 2019-10-19
Attending: INTERNAL MEDICINE
Payer: MEDICARE

## 2019-10-19 DIAGNOSIS — E11.9 TYPE II DIABETES MELLITUS (HCC): Primary | ICD-10-CM

## 2019-10-19 PROCEDURE — 84681 ASSAY OF C-PEPTIDE: CPT

## 2019-10-19 PROCEDURE — 36415 COLL VENOUS BLD VENIPUNCTURE: CPT

## 2019-10-19 PROCEDURE — 83516 IMMUNOASSAY NONANTIBODY: CPT

## 2019-10-19 PROCEDURE — 80048 BASIC METABOLIC PNL TOTAL CA: CPT

## 2019-11-04 LAB
ABSOLUTE IMMATURE GRANULOCYTES (OFFPRE24): NORMAL
ALBUMIN SERPL-MCNC: 4.1 G/DL
ALBUMIN/GLOB SERPL: 2 {RATIO}
ALP SERPL-CCNC: NORMAL U/L
ALT SERPL-CCNC: NORMAL U/L
ANION GAP SERPL CALC-SCNC: NORMAL MMOL/L
AST SERPL-CCNC: NORMAL U/L
BASO+EOS+MONOS # BLD: NORMAL 10*3/UL
BASO+EOS+MONOS NFR BLD: NORMAL %
BASOPHILS # BLD: NORMAL 10*3/UL
BASOPHILS NFR BLD: NORMAL %
BILIRUB SERPL-MCNC: NORMAL MG/DL
BUN SERPL-MCNC: 70 MG/DL
BUN/CREAT SERPL: 15
CALCIUM SERPL-MCNC: 8.4 MG/DL
CHLORIDE SERPL-SCNC: 108 MMOL/L
CO2 SERPL-SCNC: NORMAL MMOL/L
CREAT SERPL-MCNC: 4.67 MG/DL
DIFFERENTIAL METHOD BLD: NORMAL
EOSINOPHIL # BLD: NORMAL 10*3/UL
EOSINOPHIL NFR BLD: NORMAL %
ERYTHROCYTE [DISTWIDTH] IN BLOOD: 14.4 %
GLOBULIN SER-MCNC: 2.1 G/DL
GLUCOSE SERPL-MCNC: NORMAL MG/DL
HCT VFR BLD CALC: 31.6 %
HGB BLD-MCNC: 10.7 G/DL
IMMATURE GRANULOCYTES (OFFPRE25): NORMAL
IRON SERPL-MCNC: 102 UG/DL
LENGTH OF FAST TIME PATIENT: NORMAL H
LYMPHOCYTES # BLD: NORMAL 10*3/UL
LYMPHOCYTES NFR BLD: NORMAL %
MCH RBC QN AUTO: 32.3 PG
MCHC RBC AUTO-ENTMCNC: 33.8 G/DL
MCV RBC AUTO: 96 FL
MONOCYTES # BLD: NORMAL 10*3/UL
MONOCYTES NFR BLD: NORMAL %
MPV (OFFPRE2): NORMAL
NEUTROPHILS # BLD: NORMAL 10*3/UL
NEUTROPHILS NFR BLD: NORMAL %
NRBC BLD MANUAL-RTO: NORMAL %
PLAT MORPH BLD: NORMAL
PLATELET # BLD: 116 10*3/UL
POTASSIUM SERPL-SCNC: 142 MMOL/L
PROT SERPL-MCNC: 6.2 G/DL
RBC # BLD: 3.3 10*6/UL
RBC MORPH BLD: NORMAL
SODIUM SERPL-SCNC: 15 MMOL/L
WBC # BLD: 3.34 10*3/UL
WBC MORPH BLD: NORMAL

## 2019-11-18 ENCOUNTER — CLINICAL ABSTRACT (OUTPATIENT)
Dept: CARDIOLOGY | Age: 64
End: 2019-11-18

## 2019-11-19 ENCOUNTER — TELEPHONE (OUTPATIENT)
Dept: CARDIOLOGY | Age: 64
End: 2019-11-19

## 2019-11-19 ENCOUNTER — OFFICE VISIT (OUTPATIENT)
Dept: INTERNAL MEDICINE CLINIC | Facility: CLINIC | Age: 64
End: 2019-11-19
Payer: MEDICARE

## 2019-11-19 VITALS
HEART RATE: 68 BPM | WEIGHT: 282 LBS | SYSTOLIC BLOOD PRESSURE: 124 MMHG | BODY MASS INDEX: 38.19 KG/M2 | TEMPERATURE: 98 F | HEIGHT: 72 IN | DIASTOLIC BLOOD PRESSURE: 70 MMHG

## 2019-11-19 DIAGNOSIS — Z99.2 STAGE 5 CHRONIC KIDNEY DISEASE ON DIALYSIS (HCC): ICD-10-CM

## 2019-11-19 DIAGNOSIS — N18.6 TYPE 2 DIABETES MELLITUS WITH CHRONIC KIDNEY DISEASE ON CHRONIC DIALYSIS, WITH LONG-TERM CURRENT USE OF INSULIN (HCC): ICD-10-CM

## 2019-11-19 DIAGNOSIS — Z99.2 TYPE 2 DIABETES MELLITUS WITH CHRONIC KIDNEY DISEASE ON CHRONIC DIALYSIS, WITH LONG-TERM CURRENT USE OF INSULIN (HCC): ICD-10-CM

## 2019-11-19 DIAGNOSIS — E78.1 HYPERTRIGLYCERIDEMIA: ICD-10-CM

## 2019-11-19 DIAGNOSIS — E11.22 TYPE 2 DIABETES MELLITUS WITH CHRONIC KIDNEY DISEASE ON CHRONIC DIALYSIS, WITH LONG-TERM CURRENT USE OF INSULIN (HCC): ICD-10-CM

## 2019-11-19 DIAGNOSIS — I10 ESSENTIAL HYPERTENSION: Primary | ICD-10-CM

## 2019-11-19 DIAGNOSIS — Z79.4 TYPE 2 DIABETES MELLITUS WITH CHRONIC KIDNEY DISEASE ON CHRONIC DIALYSIS, WITH LONG-TERM CURRENT USE OF INSULIN (HCC): ICD-10-CM

## 2019-11-19 DIAGNOSIS — N18.6 STAGE 5 CHRONIC KIDNEY DISEASE ON DIALYSIS (HCC): ICD-10-CM

## 2019-11-19 PROCEDURE — G0463 HOSPITAL OUTPT CLINIC VISIT: HCPCS | Performed by: INTERNAL MEDICINE

## 2019-11-19 PROCEDURE — 99214 OFFICE O/P EST MOD 30 MIN: CPT | Performed by: INTERNAL MEDICINE

## 2019-11-19 NOTE — PROGRESS NOTES
Tatiana Kirk is a 59year old male who presents for     Check at 4 mo. Feels he's doing well. Peritoneal dialysis started July 2018--doing well. \"It's going smooth. \" Dr Zarina Hall. /72 at home yest.      DM:  sugars 100s--sometimes 200s.   has i impairment     glasses      Past Surgical History:   Procedure Laterality Date   • APPENDECTOMY     • COLECTOMY Right 2010    hemicolectomy-Dr Resendez   • COLONOSCOPY  2010    Dr Meredith Price   • [address] [facility]  2010    ventral hernia-at time of colon surgery 38.25 kg/m²      Wt Readings from Last 6 Encounters:  11/19/19 : 282 lb (127.9 kg)  11/07/19 : 272 lb (123.4 kg)  07/26/19 : 278 lb (126.1 kg)  02/12/19 : 287 lb (130.2 kg)  10/16/18 : 273 lb (123.8 kg)  10/12/18 : 260 lb (117.9 kg)    /70 on med ass 9/2016--nuc stress test nl. 3/2018-small rev area in apex - Dr. Beatrice Durbin recom avoid strenuous activity. Dr Beatrice parish stress test 2018--not yet done.   (Reminder with written instructions at 11/19/19 visit to f/u w cardiology)    BPH-Prostatitis 8/2018 resol mouth daily. • Fluticasone Propionate 50 MCG/ACT Nasal Suspension 2 sprays by Nasal route daily.  1 Bottle 11   • Syringe/Needle, Disp, (BD LUER-PILY SYRINGE) 23G X 1\" 3 ML Does not apply Misc USE FOR B12 INJECTIONS AS DIRECTED 12 each 0   • HUMULIN R

## 2019-11-25 ENCOUNTER — MED REC SCAN ONLY (OUTPATIENT)
Dept: INTERNAL MEDICINE CLINIC | Facility: CLINIC | Age: 64
End: 2019-11-25

## 2019-12-03 LAB
ABSOLUTE IMMATURE GRANULOCYTES (OFFPRE24): NORMAL
BASO+EOS+MONOS # BLD: NORMAL 10*3/UL
BASO+EOS+MONOS NFR BLD: NORMAL %
BASOPHILS # BLD: NORMAL 10*3/UL
BASOPHILS NFR BLD: NORMAL %
DIFFERENTIAL METHOD BLD: NORMAL
EOSINOPHIL # BLD: NORMAL 10*3/UL
EOSINOPHIL NFR BLD: NORMAL %
ERYTHROCYTE [DISTWIDTH] IN BLOOD: NORMAL %
HCT VFR BLD CALC: 31.8 %
HGB BLD-MCNC: 10.7 G/DL
IMMATURE GRANULOCYTES (OFFPRE25): NORMAL
LYMPHOCYTES # BLD: NORMAL 10*3/UL
LYMPHOCYTES NFR BLD: NORMAL %
MCH RBC QN AUTO: NORMAL PG
MCHC RBC AUTO-ENTMCNC: NORMAL G/DL
MCV RBC AUTO: NORMAL FL
MONOCYTES # BLD: NORMAL 10*3/UL
MONOCYTES NFR BLD: NORMAL %
MPV (OFFPRE2): NORMAL
NEUTROPHILS # BLD: NORMAL 10*3/UL
NEUTROPHILS NFR BLD: NORMAL %
NRBC BLD MANUAL-RTO: NORMAL %
PLAT MORPH BLD: NORMAL
PLATELET # BLD: 105 10*3/UL
RBC # BLD: 3.38 10*6/UL
RBC MORPH BLD: NORMAL
WBC # BLD: 3.2 10*3/UL
WBC MORPH BLD: NORMAL

## 2019-12-04 LAB
ALBUMIN SERPL-MCNC: 4.1 G/DL
ALBUMIN/GLOB SERPL: 2.3 {RATIO}
ALP SERPL-CCNC: NORMAL U/L
ALT SERPL-CCNC: NORMAL U/L
ANION GAP SERPL CALC-SCNC: NORMAL MMOL/L
AST SERPL-CCNC: NORMAL U/L
BILIRUB SERPL-MCNC: NORMAL MG/DL
BUN SERPL-MCNC: 52 MG/DL
BUN/CREAT SERPL: 12.6
CALCIUM SERPL-MCNC: 8.7 MG/DL
CHLORIDE SERPL-SCNC: 106 MMOL/L
CO2 SERPL-SCNC: NORMAL MMOL/L
CREAT SERPL-MCNC: 4.12 MG/DL
GLOBULIN SER-MCNC: 1.8 G/DL
GLUCOSE SERPL-MCNC: NORMAL MG/DL
LENGTH OF FAST TIME PATIENT: NORMAL H
POTASSIUM SERPL-SCNC: 4.2 MMOL/L
PROT SERPL-MCNC: 5.9 G/DL
SODIUM SERPL-SCNC: 140 MMOL/L

## 2019-12-09 ENCOUNTER — APPOINTMENT (OUTPATIENT)
Dept: LAB | Facility: HOSPITAL | Age: 64
End: 2019-12-09
Attending: NURSE PRACTITIONER
Payer: MEDICARE

## 2019-12-09 ENCOUNTER — OFFICE VISIT (OUTPATIENT)
Dept: SURGERY | Facility: CLINIC | Age: 64
End: 2019-12-09
Payer: MEDICARE

## 2019-12-09 VITALS
BODY MASS INDEX: 38.19 KG/M2 | WEIGHT: 282 LBS | RESPIRATION RATE: 18 BRPM | DIASTOLIC BLOOD PRESSURE: 75 MMHG | SYSTOLIC BLOOD PRESSURE: 125 MMHG | HEIGHT: 72 IN

## 2019-12-09 DIAGNOSIS — N40.1 BENIGN PROSTATIC HYPERPLASIA WITH LOWER URINARY TRACT SYMPTOMS, SYMPTOM DETAILS UNSPECIFIED: ICD-10-CM

## 2019-12-09 DIAGNOSIS — N40.1 BENIGN PROSTATIC HYPERPLASIA WITH LOWER URINARY TRACT SYMPTOMS, SYMPTOM DETAILS UNSPECIFIED: Primary | ICD-10-CM

## 2019-12-09 DIAGNOSIS — Z12.5 SCREENING PSA (PROSTATE SPECIFIC ANTIGEN): ICD-10-CM

## 2019-12-09 PROCEDURE — 99213 OFFICE O/P EST LOW 20 MIN: CPT | Performed by: NURSE PRACTITIONER

## 2019-12-09 PROCEDURE — 36415 COLL VENOUS BLD VENIPUNCTURE: CPT

## 2019-12-09 PROCEDURE — G0463 HOSPITAL OUTPT CLINIC VISIT: HCPCS | Performed by: NURSE PRACTITIONER

## 2019-12-09 RX ORDER — POLYETHYLENE GLYCOL 3350, SODIUM SULFATE ANHYDROUS, SODIUM BICARBONATE, SODIUM CHLORIDE, POTASSIUM CHLORIDE 236; 22.74; 6.74; 5.86; 2.97 G/4L; G/4L; G/4L; G/4L; G/4L
POWDER, FOR SOLUTION ORAL
Refills: 0 | COMMUNITY
Start: 2019-11-25 | End: 2020-04-07 | Stop reason: ALTCHOICE

## 2019-12-09 NOTE — PROGRESS NOTES
HPI:    Patient ID: Tatiana Kirk is a 59year old male. HPI     Patient is a 59year old male who presents to the clinic for a follow up. Last seen in the clinic 10/12/18.   He had complaints of urinary frequency, urgency, hesitancy, intermittent stream Oral Tab TAKE 1 AND A 1/2 TABLETS BY MOUTH TWICE DAILY 270 tablet 3   • VELPHORO 500 MG Oral Chew Tab      • Furosemide (LASIX OR) Take 80 mg by mouth daily. • Fluticasone Propionate 50 MCG/ACT Nasal Suspension 2 sprays by Nasal route daily.  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   Neck: Normal range of motion. Cardiovascular: Normal rate. Pulmonary/Chest: Effort normal. No respiratory distress. Abdominal: Soft.    Genitourinary:    Genitourinary Comments: Prostate smooth, symmetrical.  No palpable nod

## 2019-12-13 ENCOUNTER — CLINICAL ABSTRACT (OUTPATIENT)
Dept: CARDIOLOGY | Age: 64
End: 2019-12-13

## 2019-12-13 ENCOUNTER — TELEPHONE (OUTPATIENT)
Dept: CARDIOLOGY | Age: 64
End: 2019-12-13

## 2019-12-19 ENCOUNTER — OFFICE VISIT (OUTPATIENT)
Dept: PULMONOLOGY | Facility: CLINIC | Age: 64
End: 2019-12-19
Payer: COMMERCIAL

## 2019-12-19 VITALS
DIASTOLIC BLOOD PRESSURE: 71 MMHG | BODY MASS INDEX: 37.11 KG/M2 | WEIGHT: 274 LBS | HEIGHT: 72 IN | SYSTOLIC BLOOD PRESSURE: 124 MMHG | OXYGEN SATURATION: 98 % | HEART RATE: 58 BPM

## 2019-12-19 DIAGNOSIS — G47.33 OBSTRUCTIVE SLEEP APNEA: Primary | ICD-10-CM

## 2019-12-19 PROCEDURE — 99212 OFFICE O/P EST SF 10 MIN: CPT | Performed by: INTERNAL MEDICINE

## 2019-12-19 PROCEDURE — 99213 OFFICE O/P EST LOW 20 MIN: CPT | Performed by: INTERNAL MEDICINE

## 2019-12-19 NOTE — PROGRESS NOTES
The patient is a 27-year-old male who I know well from prior evaluation comes in now for follow-up. He notes that his BiPAP device is malfunctioning and that the heating apparatus component is not working.   He otherwise has been vigilant with his usage an

## 2019-12-24 ENCOUNTER — HOSPITAL ENCOUNTER (OUTPATIENT)
Facility: HOSPITAL | Age: 64
Setting detail: HOSPITAL OUTPATIENT SURGERY
Discharge: HOME | End: 2019-12-24
Attending: INTERNAL MEDICINE | Admitting: INTERNAL MEDICINE
Payer: COMMERCIAL

## 2019-12-24 VITALS
OXYGEN SATURATION: 95 % | RESPIRATION RATE: 14 BRPM | BODY MASS INDEX: 37.11 KG/M2 | DIASTOLIC BLOOD PRESSURE: 63 MMHG | HEART RATE: 53 BPM | HEIGHT: 72 IN | WEIGHT: 274 LBS | SYSTOLIC BLOOD PRESSURE: 135 MMHG

## 2019-12-24 DIAGNOSIS — D01.0 CARCINOMA IN SITU OF COLON: ICD-10-CM

## 2019-12-24 PROCEDURE — 84132 ASSAY OF SERUM POTASSIUM: CPT | Performed by: ANESTHESIOLOGY

## 2019-12-24 PROCEDURE — 99152 MOD SED SAME PHYS/QHP 5/>YRS: CPT | Performed by: INTERNAL MEDICINE

## 2019-12-24 PROCEDURE — 0DJD8ZZ INSPECTION OF LOWER INTESTINAL TRACT, VIA NATURAL OR ARTIFICIAL OPENING ENDOSCOPIC: ICD-10-PCS | Performed by: INTERNAL MEDICINE

## 2019-12-24 PROCEDURE — 82962 GLUCOSE BLOOD TEST: CPT

## 2019-12-24 RX ORDER — AMPICILLIN 1 G/1
1 INJECTION, POWDER, FOR SOLUTION INTRAMUSCULAR; INTRAVENOUS ONCE
Status: DISCONTINUED | OUTPATIENT
Start: 2019-12-24 | End: 2019-12-24

## 2019-12-24 RX ORDER — SODIUM CHLORIDE, SODIUM LACTATE, POTASSIUM CHLORIDE, CALCIUM CHLORIDE 600; 310; 30; 20 MG/100ML; MG/100ML; MG/100ML; MG/100ML
INJECTION, SOLUTION INTRAVENOUS CONTINUOUS
Status: DISCONTINUED | OUTPATIENT
Start: 2019-12-24 | End: 2019-12-24

## 2019-12-24 RX ORDER — MIDAZOLAM HYDROCHLORIDE 1 MG/ML
INJECTION INTRAMUSCULAR; INTRAVENOUS
Status: DISCONTINUED | OUTPATIENT
Start: 2019-12-24 | End: 2019-12-24

## 2019-12-24 RX ORDER — SODIUM CHLORIDE 0.9 % (FLUSH) 0.9 %
10 SYRINGE (ML) INJECTION AS NEEDED
Status: DISCONTINUED | OUTPATIENT
Start: 2019-12-24 | End: 2019-12-24

## 2019-12-24 RX ORDER — SODIUM CHLORIDE 9 MG/ML
INJECTION, SOLUTION INTRAVENOUS ONCE
Status: COMPLETED | OUTPATIENT
Start: 2019-12-24 | End: 2019-12-24

## 2019-12-24 RX ORDER — MIDAZOLAM HYDROCHLORIDE 1 MG/ML
1 INJECTION INTRAMUSCULAR; INTRAVENOUS EVERY 5 MIN PRN
Status: DISCONTINUED | OUTPATIENT
Start: 2019-12-24 | End: 2019-12-24

## 2019-12-24 RX ORDER — GENTAMICIN SULFATE 80 MG/50ML
5 INJECTION, SOLUTION INTRAVENOUS ONCE
Status: DISCONTINUED | OUTPATIENT
Start: 2019-12-24 | End: 2019-12-24

## 2019-12-24 NOTE — PROGRESS NOTES
[facility] Pharmacy Note: Antimicrobial Weight Based Dose Adjustment for: gentamicin (GARAMYCIN)    Dean Dobson is a 59year old male who has been prescribed gentamicin (GARAMYCIN) IVPB  x 1 pre-op.       Wt Readings from Last 6 Encounters:  12/24/19 : 124.3 kg (

## 2019-12-24 NOTE — OPERATIVE REPORT
COLONOSCOPY REPORT    Patient Name:  Jeffery Workman Record #: [id number]  YOB: 1955  Date of Procedure: 12/24/2019    Referring physician: Omid Berumen MD    Surgeon:  Yesenia Kennedy.  Jason Harden MD    Pre-op diagnosis: History of polyp wit mucosa seen)  1 Excellent (>95% of mucosa seen)

## 2019-12-24 NOTE — H&P
RE-PROCEDURE UPDATE    HPI: Pilo Patino is a 59year old male. 1/31/1955. Patient presents for a colonoscopy. ALLERGIES:   Merbromin               RASH, SWELLING  Merthilate [Thimero*    RASH, SWELLING    No current outpatient medications on file. DIALYSIS  07/2018    Dr Kirill Blum placed PD catheter 6/2018. started PD 7/2018 Dr Sergio Lang   • TONSILLECTOMY      T & A       PHYSICAL EXAM:  /74 (BP Location: Left arm)   Pulse 59   Resp 17   Ht 6' (1.829 m)   Wt 274 lb (124.3 kg)   SpO2 98%   BMI 37. 1

## 2019-12-30 ENCOUNTER — MED REC SCAN ONLY (OUTPATIENT)
Dept: INTERNAL MEDICINE CLINIC | Facility: CLINIC | Age: 64
End: 2019-12-30

## 2019-12-31 ENCOUNTER — LAB ENCOUNTER (OUTPATIENT)
Dept: LAB | Facility: HOSPITAL | Age: 64
End: 2019-12-31
Attending: INTERNAL MEDICINE
Payer: MEDICARE

## 2019-12-31 DIAGNOSIS — I10 HYPERTENSION: Primary | ICD-10-CM

## 2019-12-31 DIAGNOSIS — E11.9 DIABETES MELLITUS (HCC): ICD-10-CM

## 2019-12-31 LAB
ALBUMIN SERPL-MCNC: 3.3 G/DL (ref 3.4–5)
ALBUMIN/GLOB SERPL: 1 {RATIO} (ref 1–2)
ALP LIVER SERPL-CCNC: 49 U/L (ref 45–117)
ALT SERPL-CCNC: 23 U/L (ref 16–61)
ANION GAP SERPL CALC-SCNC: 9 MMOL/L (ref 0–18)
AST SERPL-CCNC: 21 U/L (ref 15–37)
BILIRUB SERPL-MCNC: 0.3 MG/DL (ref 0.1–2)
BUN BLD-MCNC: 50 MG/DL (ref 7–18)
BUN/CREAT SERPL: 11.5 (ref 10–20)
CALCIUM BLD-MCNC: 8.4 MG/DL (ref 8.5–10.1)
CHLORIDE SERPL-SCNC: 110 MMOL/L (ref 98–112)
CHOLEST SMN-MCNC: 133 MG/DL (ref ?–200)
CO2 SERPL-SCNC: 23 MMOL/L (ref 21–32)
CREAT BLD-MCNC: 4.35 MG/DL (ref 0.7–1.3)
EST. AVERAGE GLUCOSE BLD GHB EST-MCNC: 163 MG/DL (ref 68–126)
GLOBULIN PLAS-MCNC: 3.3 G/DL (ref 2.8–4.4)
GLUCOSE BLD-MCNC: 161 MG/DL (ref 70–99)
HBA1C MFR BLD HPLC: 7.3 % (ref ?–5.7)
HDLC SERPL-MCNC: 28 MG/DL (ref 40–59)
LDLC SERPL CALC-MCNC: 65 MG/DL (ref ?–100)
M PROTEIN MFR SERPL ELPH: 6.6 G/DL (ref 6.4–8.2)
NONHDLC SERPL-MCNC: 105 MG/DL (ref ?–130)
OSMOLALITY SERPL CALC.SUM OF ELEC: 311 MOSM/KG (ref 275–295)
PATIENT FASTING Y/N/NP: YES
PATIENT FASTING Y/N/NP: YES
POTASSIUM SERPL-SCNC: 4.1 MMOL/L (ref 3.5–5.1)
SODIUM SERPL-SCNC: 142 MMOL/L (ref 136–145)
T4 FREE SERPL-MCNC: 0.8 NG/DL (ref 0.8–1.7)
TRIGL SERPL-MCNC: 200 MG/DL (ref 30–149)
TSI SER-ACNC: 3.53 MIU/ML (ref 0.36–3.74)
VLDLC SERPL CALC-MCNC: 40 MG/DL (ref 0–30)

## 2019-12-31 PROCEDURE — 80061 LIPID PANEL: CPT

## 2019-12-31 PROCEDURE — 83036 HEMOGLOBIN GLYCOSYLATED A1C: CPT

## 2019-12-31 PROCEDURE — 84443 ASSAY THYROID STIM HORMONE: CPT

## 2019-12-31 PROCEDURE — 80053 COMPREHEN METABOLIC PANEL: CPT

## 2019-12-31 PROCEDURE — 36415 COLL VENOUS BLD VENIPUNCTURE: CPT

## 2019-12-31 PROCEDURE — 84439 ASSAY OF FREE THYROXINE: CPT

## 2020-01-01 ENCOUNTER — EXTERNAL RECORD (OUTPATIENT)
Dept: HEALTH INFORMATION MANAGEMENT | Facility: OTHER | Age: 65
End: 2020-01-01

## 2020-01-06 ENCOUNTER — TELEPHONE (OUTPATIENT)
Dept: PULMONOLOGY | Facility: CLINIC | Age: 65
End: 2020-01-06

## 2020-01-06 DIAGNOSIS — G47.33 OSA (OBSTRUCTIVE SLEEP APNEA): Primary | ICD-10-CM

## 2020-01-09 NOTE — TELEPHONE ENCOUNTER
Naomi/HONG calling for mutual patient, patient needs to redo titration study, must show that cpap was tried and failed. Any questions please call at:[phone],extention [phone],thanks.
Pt informed of E and Dr. Ru Dudley message/orders below and given [facility] [phone] to schedule. Pt transferred to  [facility] to update his insurance information.
Pt returned call. Please call.
RN, please facilitate CPAP titration with transition to BiPAP. Please stipulate that the patient has to have failed CPAP therefore initiating the BiPAP during the titration.
Detail Level: Zone

## 2020-01-15 ENCOUNTER — LAB ENCOUNTER (OUTPATIENT)
Dept: LAB | Facility: HOSPITAL | Age: 65
End: 2020-01-15
Attending: INTERNAL MEDICINE
Payer: MEDICARE

## 2020-01-15 DIAGNOSIS — E11.9 DIABETES MELLITUS (HCC): Primary | ICD-10-CM

## 2020-01-15 LAB
ALBUMIN SERPL-MCNC: 3.5 G/DL (ref 3.4–5)
ALBUMIN/GLOB SERPL: 1 {RATIO} (ref 1–2)
ALP LIVER SERPL-CCNC: 44 U/L (ref 45–117)
ALT SERPL-CCNC: 21 U/L (ref 16–61)
ANION GAP SERPL CALC-SCNC: 8 MMOL/L (ref 0–18)
AST SERPL-CCNC: 18 U/L (ref 15–37)
BILIRUB SERPL-MCNC: 0.4 MG/DL (ref 0.1–2)
BUN BLD-MCNC: 67 MG/DL (ref 7–18)
BUN/CREAT SERPL: 14.6 (ref 10–20)
CALCIUM BLD-MCNC: 9.2 MG/DL (ref 8.5–10.1)
CHLORIDE SERPL-SCNC: 111 MMOL/L (ref 98–112)
CO2 SERPL-SCNC: 22 MMOL/L (ref 21–32)
CREAT BLD-MCNC: 4.58 MG/DL (ref 0.7–1.3)
GLOBULIN PLAS-MCNC: 3.4 G/DL (ref 2.8–4.4)
GLUCOSE BLD-MCNC: 84 MG/DL (ref 70–99)
M PROTEIN MFR SERPL ELPH: 6.9 G/DL (ref 6.4–8.2)
OSMOLALITY SERPL CALC.SUM OF ELEC: 311 MOSM/KG (ref 275–295)
PATIENT FASTING Y/N/NP: YES
POTASSIUM SERPL-SCNC: 4.1 MMOL/L (ref 3.5–5.1)
SODIUM SERPL-SCNC: 141 MMOL/L (ref 136–145)

## 2020-01-15 PROCEDURE — 84681 ASSAY OF C-PEPTIDE: CPT

## 2020-01-15 PROCEDURE — 36415 COLL VENOUS BLD VENIPUNCTURE: CPT

## 2020-01-15 PROCEDURE — 80053 COMPREHEN METABOLIC PANEL: CPT

## 2020-01-17 LAB — C-PEPTIDE, SERUM OR PLASMA: 2.2 NG/ML

## 2020-02-05 ENCOUNTER — OFFICE VISIT (OUTPATIENT)
Dept: SLEEP CENTER | Age: 65
End: 2020-02-05
Attending: INTERNAL MEDICINE
Payer: MEDICARE

## 2020-02-05 DIAGNOSIS — Z76.89 SLEEP CONCERN: Primary | ICD-10-CM

## 2020-02-05 DIAGNOSIS — G47.33 OSA (OBSTRUCTIVE SLEEP APNEA): ICD-10-CM

## 2020-02-05 PROCEDURE — 95811 POLYSOM 6/>YRS CPAP 4/> PARM: CPT

## 2020-02-07 ENCOUNTER — TELEPHONE (OUTPATIENT)
Dept: PULMONOLOGY | Facility: CLINIC | Age: 65
End: 2020-02-07

## 2020-02-07 DIAGNOSIS — G47.33 OSA (OBSTRUCTIVE SLEEP APNEA): Primary | ICD-10-CM

## 2020-02-07 NOTE — TELEPHONE ENCOUNTER
Pt states he completed sleep study on 2/5/20 and inquiring next steps regarding new CPAP machine needed.  Please call [phone]

## 2020-02-08 NOTE — PROCEDURES
[address] [facility]  Accredited by the [facility] ([facility])    PATIENT'S NAME: Joshua Giovanny   ATTENDING PHYSICIAN: Shey Forbes MD   REFERRING PHYSICIAN: Shey Forbes MD   PATIENT ACCOUNT #: [de-identified] LOCATION: Sleep movements for a periodic limb movement index of 87 events per hour of which 6 per hour were associated with arousal.  The lowest desaturation was to 86%.   The average heart rate was 63 beats per minute, and there was no significant bradycardia, asystole, o

## 2020-02-09 NOTE — TELEPHONE ENCOUNTER
RN, I spoke with the patient. Please facilitate set up of CPAP 13 cm water pressure with appropriate follow-up.   He needs new equipment with machine and headgear etc.

## 2020-02-10 NOTE — TELEPHONE ENCOUNTER
DME rx, office visit enctr 12/19/19, titration 2/5/20, PSG 3/17/14, & pt reg facesheet faxed to [facility] at #[phone]. Conf rcvd.

## 2020-02-11 NOTE — TELEPHONE ENCOUNTER
Notified pt of Dr. Domenico Pitts orders. Explained he will need f/u appt w/in 31-90 days s/p start of tx, rx was sent to [facility], to contact [facility] if he doesn't hear from them shortly, & make sure there is nothing he has to do for data download.  Sched pt for 4/13/20 2

## 2020-02-12 LAB
ABSOLUTE IMMATURE GRANULOCYTES (OFFPRE24): NORMAL
ABSOLUTE NRBC (AUTO): NORMAL
ALBUMIN SERPL-MCNC: 3.9 G/DL
ALBUMIN/GLOB SERPL: 2.1 {RATIO}
ALP SERPL-CCNC: NORMAL U/L
ALT SERPL-CCNC: NORMAL U/L
ANION GAP SERPL CALC-SCNC: 2.1 MMOL/L
AST SERPL-CCNC: NORMAL U/L
BASO+EOS+MONOS # BLD: NORMAL 10*3/UL
BASO+EOS+MONOS NFR BLD: NORMAL %
BASOPHILS # BLD: NORMAL 10*3/UL
BASOPHILS NFR BLD: NORMAL %
BILIRUB SERPL-MCNC: NORMAL MG/DL
BUN SERPL-MCNC: 58 MG/DL
BUN/CREAT SERPL: 13.9
CALCIUM SERPL-MCNC: 8.7 MG/DL
CHLORIDE SERPL-SCNC: 106 MMOL/L
CO2 SERPL-SCNC: NORMAL MMOL/L
CREAT SERPL-MCNC: 4.17 MG/DL
DIFFERENTIAL METHOD BLD: NORMAL
EOSINOPHIL # BLD: NORMAL 10*3/UL
EOSINOPHIL NFR BLD: NORMAL %
ERYTHROCYTE [DISTWIDTH] IN BLOOD BY AUTOMATED COUNT: NORMAL %
ERYTHROCYTE [DISTWIDTH] IN BLOOD: NORMAL %
GLOBULIN SER-MCNC: 1.9 G/DL
GLUCOSE SERPL-MCNC: NORMAL MG/DL
HCT CALC (HGB X3) (OFFPRE23): 33
HCT VFR BLD CALC: 31.7 %
HGB BLD-MCNC: 11 G/DL
IMMATURE GRANULOCYTES (OFFPRE25): NORMAL
IRON BINDING, UNBOUND (OFFPRE1): 190
IRON SATN MFR SERPL: NORMAL %
IRON SERPL-MCNC: 111 UG/DL
LENGTH OF FAST TIME PATIENT: NORMAL H
LYMPHOCYTES # BLD: NORMAL 10*3/UL
LYMPHOCYTES NFR BLD: NORMAL %
MCH RBC QN AUTO: 31.9 PG
MCHC RBC AUTO-ENTMCNC: 34.5 G/DL
MCV RBC AUTO: 92 FL
MONOCYTES # BLD: NORMAL 10*3/UL
MONOCYTES NFR BLD: NORMAL %
MPV (OFFPRE2): NORMAL
NEUTROPHILS # BLD: NORMAL 10*3/UL
NEUTROPHILS NFR BLD: NORMAL %
NRBC BLD MANUAL-RTO: NORMAL %
PLAT MORPH BLD: NORMAL
PLATELET # BLD: 131 10*3/UL
POTASSIUM SERPL-SCNC: 4.2 MMOL/L
PROT SERPL-MCNC: 5.8 G/DL
RBC # BLD: 3.43 10*6/UL
RBC MORPH BLD: NORMAL
SODIUM SERPL-SCNC: 140 MMOL/L
TIBC SERPL-MCNC: 301 UG/DL
WBC # BLD: 3.73 10*3/UL
WBC MORPH BLD: NORMAL

## 2020-02-17 ENCOUNTER — TELEPHONE (OUTPATIENT)
Dept: PULMONOLOGY | Facility: CLINIC | Age: 65
End: 2020-02-17

## 2020-02-17 NOTE — TELEPHONE ENCOUNTER
Baseline sleep study done in 2014. Not done more than 10 years ago. Baseline study faxed to [facility] [phone].

## 2020-02-17 NOTE — TELEPHONE ENCOUNTER
Asking for baseline sleep study to be faxed over - if its older than 10 years - pls note that in face to face sheet - pls fax to [phone]

## 2020-02-18 ENCOUNTER — CLINICAL ABSTRACT (OUTPATIENT)
Dept: CARDIOLOGY | Age: 65
End: 2020-02-18

## 2020-02-18 ENCOUNTER — TELEPHONE (OUTPATIENT)
Dept: INTERNAL MEDICINE CLINIC | Facility: CLINIC | Age: 65
End: 2020-02-18

## 2020-02-18 NOTE — TELEPHONE ENCOUNTER
Faxed form for diabetic shoes to [employer] [phone]. Clled patient to let him k nw this was done. Form then sent to scanning.

## 2020-02-18 NOTE — TELEPHONE ENCOUNTER
To nursing, okay to fax back the form I completed  Diabetic shoe, custom insert, custom mold (Dr. Byron Mark.   Diagnosis --history of previous foot ulceration. Please include copy of 11/19/19 office visit.    Please tel

## 2020-02-18 NOTE — TELEPHONE ENCOUNTER
Naomi/HONG states per pt baseline sleep study was completed in 1988 or 1989.  Please call .[phone]      [phone]  Fax:[phone]

## 2020-02-18 NOTE — TELEPHONE ENCOUNTER
Pt. Is calling to check status of forms for his orthotics from [facility]. Ph. # [phone]   Routed to [facility]

## 2020-02-19 NOTE — TELEPHONE ENCOUNTER
Spoke with Naomi/HONG who states pt sleep study is greater than 8years old. Pt will not have to get a new sleep study done if  addends his note stating   \" Baseline done in 1988 or 1989 for AMELIE. Not able to retrieve copies of documents. \"

## 2020-02-25 ENCOUNTER — TELEPHONE (OUTPATIENT)
Dept: CARDIOLOGY | Age: 65
End: 2020-02-25

## 2020-04-02 LAB
ABSOLUTE IMMATURE GRANULOCYTES (OFFPRE24): NORMAL
ALBUMIN SERPL-MCNC: 4 G/DL
ALBUMIN/GLOB SERPL: 2 {RATIO}
ALP SERPL-CCNC: 37 U/L
ALT SERPL-CCNC: NORMAL U/L
ANION GAP SERPL CALC-SCNC: NORMAL MMOL/L
AST SERPL-CCNC: NORMAL U/L
BASO+EOS+MONOS # BLD: NORMAL 10*3/UL
BASO+EOS+MONOS NFR BLD: NORMAL %
BASOPHILS # BLD: NORMAL 10*3/UL
BASOPHILS NFR BLD: NORMAL %
BILIRUB SERPL-MCNC: NORMAL MG/DL
BUN SERPL-MCNC: 59 MG/DL
BUN/CREAT SERPL: 12.9
CALCIUM SERPL-MCNC: 8.5 MG/DL
CHLORIDE SERPL-SCNC: 110 MMOL/L
CO2 SERPL-SCNC: NORMAL MMOL/L
CREAT SERPL-MCNC: NORMAL MG/DL
DIFFERENTIAL METHOD BLD: NORMAL
EOSINOPHIL # BLD: NORMAL 10*3/UL
EOSINOPHIL NFR BLD: NORMAL %
ERYTHROCYTE [DISTWIDTH] IN BLOOD: NORMAL %
FERRITIN SERPL-MCNC: 435 NG/ML
GLOBULIN SER-MCNC: 2 G/DL
GLUCOSE SERPL-MCNC: NORMAL MG/DL
HBA1C MFR BLD: 6.1 %
HCT VFR BLD CALC: 32.4 %
HGB BLD-MCNC: 10.6 G/DL
IMMATURE GRANULOCYTES (OFFPRE25): NORMAL
IRON BINDING, UNBOUND (OFFPRE1): 196
IRON SATN MFR SERPL: 38 %
IRON SERPL-MCNC: 121 UG/DL
LENGTH OF FAST TIME PATIENT: NORMAL H
LYMPHOCYTES # BLD: NORMAL 10*3/UL
LYMPHOCYTES NFR BLD: NORMAL %
MAGNESIUM (90419039): 2.1
MCH RBC QN AUTO: 30.5 PG
MCHC RBC AUTO-ENTMCNC: 32.5 G/DL
MCV RBC AUTO: 94 FL
MONOCYTES # BLD: NORMAL 10*3/UL
MONOCYTES NFR BLD: NORMAL %
MPV (OFFPRE2): NORMAL
NEUTROPHILS # BLD: NORMAL 10*3/UL
NEUTROPHILS NFR BLD: NORMAL %
NRBC BLD MANUAL-RTO: NORMAL %
PHOSPHORUS: 4.8
PLAT MORPH BLD: NORMAL
PLATELET # BLD: 101 10*3/UL
POTASSIUM SERPL-SCNC: 4.6 MMOL/L
PROT SERPL-MCNC: 6 G/DL
RBC # BLD: 3.46 10*6/UL
RBC MORPH BLD: NORMAL
SODIUM SERPL-SCNC: 143 MMOL/L
TIBC SERPL-MCNC: 317 UG/DL
TRANSFERRIN SERPL-MCNC: 38 MG/DL
WBC # BLD: 3.7 10*3/UL
WBC MORPH BLD: NORMAL

## 2020-04-07 ENCOUNTER — OFFICE VISIT (OUTPATIENT)
Dept: INTERNAL MEDICINE CLINIC | Facility: CLINIC | Age: 65
End: 2020-04-07
Payer: MEDICARE

## 2020-04-07 VITALS
TEMPERATURE: 98 F | BODY MASS INDEX: 38.49 KG/M2 | HEIGHT: 72 IN | SYSTOLIC BLOOD PRESSURE: 136 MMHG | DIASTOLIC BLOOD PRESSURE: 72 MMHG | HEART RATE: 80 BPM | WEIGHT: 284.19 LBS | OXYGEN SATURATION: 96 %

## 2020-04-07 DIAGNOSIS — E78.1 HYPERTRIGLYCERIDEMIA: ICD-10-CM

## 2020-04-07 DIAGNOSIS — Z99.2 STAGE 5 CHRONIC KIDNEY DISEASE ON DIALYSIS (HCC): ICD-10-CM

## 2020-04-07 DIAGNOSIS — E11.22 TYPE 2 DIABETES MELLITUS WITH CHRONIC KIDNEY DISEASE ON CHRONIC DIALYSIS, WITH LONG-TERM CURRENT USE OF INSULIN (HCC): ICD-10-CM

## 2020-04-07 DIAGNOSIS — I10 ESSENTIAL HYPERTENSION: Primary | ICD-10-CM

## 2020-04-07 DIAGNOSIS — N18.6 STAGE 5 CHRONIC KIDNEY DISEASE ON DIALYSIS (HCC): ICD-10-CM

## 2020-04-07 DIAGNOSIS — Z79.4 TYPE 2 DIABETES MELLITUS WITH CHRONIC KIDNEY DISEASE ON CHRONIC DIALYSIS, WITH LONG-TERM CURRENT USE OF INSULIN (HCC): ICD-10-CM

## 2020-04-07 DIAGNOSIS — N18.6 TYPE 2 DIABETES MELLITUS WITH CHRONIC KIDNEY DISEASE ON CHRONIC DIALYSIS, WITH LONG-TERM CURRENT USE OF INSULIN (HCC): ICD-10-CM

## 2020-04-07 DIAGNOSIS — Z99.2 TYPE 2 DIABETES MELLITUS WITH CHRONIC KIDNEY DISEASE ON CHRONIC DIALYSIS, WITH LONG-TERM CURRENT USE OF INSULIN (HCC): ICD-10-CM

## 2020-04-07 PROCEDURE — G0463 HOSPITAL OUTPT CLINIC VISIT: HCPCS | Performed by: INTERNAL MEDICINE

## 2020-04-07 PROCEDURE — 90732 PPSV23 VACC 2 YRS+ SUBQ/IM: CPT | Performed by: INTERNAL MEDICINE

## 2020-04-07 PROCEDURE — G0009 ADMIN PNEUMOCOCCAL VACCINE: HCPCS | Performed by: INTERNAL MEDICINE

## 2020-04-07 PROCEDURE — 99214 OFFICE O/P EST MOD 30 MIN: CPT | Performed by: INTERNAL MEDICINE

## 2020-04-07 RX ORDER — FERRIC CITRATE 210 MG/1
2 TABLET, COATED ORAL 3 TIMES DAILY
COMMUNITY
Start: 2020-01-31

## 2020-04-07 NOTE — PROGRESS NOTES
Karon Weaver is a 72year old male who presents for     Check at 4 mo. Feels good. Peritoneal dialysis started July 2018--doing well. No problems. Dr Marco A Fontana. /70 at home.      DM:  sugars 70s to 190s.   has insulin pump with humalog U500 and carroll ([facility].) 07/2018    Dr Allayne Pallas   • Pernicious anemia 2008   • [facility]    • Psoriasis-eczema overlap condition    • Renal disorder     CKD with proteinuria--Dr. Allayne Pallas   • Retinopathy 2015    L eye--[facility] Kristen [facility]roncos[facility] at [facility]   • Sleep apne comment: smokes cigars    Alcohol use:  Yes      Alcohol/week: 1.0 standard drinks      Types: 1 Glasses of wine per week    Drug use: No         Allergies:    Merbromin               RASH, SWELLING  Merthilate [Thimero*    RASH, SWELLING      Review of sys -retinopathy-goes every 3 mo. R foot ulcer 2/2019--resolved per tx wound center. Sees Dr Cinthia Caldera q 3 mo. Has therapeutic shoes from [email or web address].     Hypertriglyceridemia -gemfibrozil 600 mg bid, fish oil 4000 u/day, niacin 500 mg bid. Diet.    Guerita--CBC–WBC 3.73 Hgb 11 platelet 591, CMP BUN 58 creat 4.17 transferin saturation normal.    Discussed importance of social distancing during current coronavirus outbreak.    Ret in 4 mo. welcome to medicare visit then.      Patient expresses understandi Hilary Martinez MD  4/7/20

## 2020-04-09 ENCOUNTER — PATIENT MESSAGE (OUTPATIENT)
Dept: PULMONOLOGY | Facility: CLINIC | Age: 65
End: 2020-04-09

## 2020-04-10 NOTE — TELEPHONE ENCOUNTER
Spoke with pt who is waiting for upcoming appt with  this Monday at 2:30. Informed pt that his download is printed and is ready to be checked by . Pt voiced understanding.

## 2020-04-13 ENCOUNTER — OFFICE VISIT (OUTPATIENT)
Dept: PULMONOLOGY | Facility: CLINIC | Age: 65
End: 2020-04-13
Payer: MEDICARE

## 2020-04-13 VITALS
WEIGHT: 282 LBS | HEART RATE: 66 BPM | HEIGHT: 72 IN | SYSTOLIC BLOOD PRESSURE: 136 MMHG | OXYGEN SATURATION: 99 % | DIASTOLIC BLOOD PRESSURE: 70 MMHG | RESPIRATION RATE: 18 BRPM | BODY MASS INDEX: 38.19 KG/M2

## 2020-04-13 DIAGNOSIS — G47.33 OBSTRUCTIVE SLEEP APNEA: Primary | ICD-10-CM

## 2020-04-13 PROCEDURE — G0463 HOSPITAL OUTPT CLINIC VISIT: HCPCS | Performed by: INTERNAL MEDICINE

## 2020-04-13 PROCEDURE — 99213 OFFICE O/P EST LOW 20 MIN: CPT | Performed by: INTERNAL MEDICINE

## 2020-04-13 NOTE — PROGRESS NOTES
The patient is a 42-year-old male who I know well from prior evaluation and comes in now for follow-up.   He notes he is doing well on his new CPAP device set at 13 cm water pressure and his downloaded data is excellent with average daily usage of 8 hours a

## 2020-04-14 ENCOUNTER — CLINICAL ABSTRACT (OUTPATIENT)
Dept: CARDIOLOGY | Age: 65
End: 2020-04-14

## 2020-04-15 ENCOUNTER — LAB ENCOUNTER (OUTPATIENT)
Dept: NEPHROLOGY | Facility: HOSPITAL | Age: 65
End: 2020-04-15

## 2020-04-15 DIAGNOSIS — D69.6 THROMBOCYTOPENIA (HCC): Primary | ICD-10-CM

## 2020-04-16 ENCOUNTER — APPOINTMENT (OUTPATIENT)
Dept: LAB | Facility: HOSPITAL | Age: 65
End: 2020-04-16
Attending: INTERNAL MEDICINE
Payer: MEDICARE

## 2020-04-16 DIAGNOSIS — D69.6 THROMBOCYTOPENIA (HCC): ICD-10-CM

## 2020-04-16 PROCEDURE — 86022 PLATELET ANTIBODIES: CPT

## 2020-04-16 PROCEDURE — 36415 COLL VENOUS BLD VENIPUNCTURE: CPT

## 2020-04-16 PROCEDURE — 85390 FIBRINOLYSINS SCREEN I&R: CPT

## 2020-04-16 RX ORDER — TAMSULOSIN HYDROCHLORIDE 0.4 MG/1
0.4 CAPSULE ORAL DAILY
Qty: 90 CAPSULE | Refills: 3 | Status: SHIPPED | OUTPATIENT
Start: 2020-04-16 | End: 2021-04-12

## 2020-04-17 ENCOUNTER — TELEPHONE (OUTPATIENT)
Dept: CARDIOLOGY | Age: 65
End: 2020-04-17

## 2020-06-01 ENCOUNTER — TELEPHONE (OUTPATIENT)
Dept: INTERNAL MEDICINE CLINIC | Facility: CLINIC | Age: 65
End: 2020-06-01

## 2020-08-11 RX ORDER — METOPROLOL TARTRATE 50 MG/1
TABLET, FILM COATED ORAL
Qty: 270 TABLET | Refills: 3 | Status: SHIPPED | OUTPATIENT
Start: 2020-08-11 | End: 2021-08-05

## 2020-08-17 ENCOUNTER — TELEPHONE (OUTPATIENT)
Dept: CARDIOLOGY | Age: 65
End: 2020-08-17

## 2020-08-18 ENCOUNTER — OFFICE VISIT (OUTPATIENT)
Dept: INTERNAL MEDICINE CLINIC | Facility: CLINIC | Age: 65
End: 2020-08-18
Payer: MEDICARE

## 2020-08-18 VITALS
DIASTOLIC BLOOD PRESSURE: 68 MMHG | BODY MASS INDEX: 37.11 KG/M2 | WEIGHT: 274 LBS | HEIGHT: 72 IN | TEMPERATURE: 98 F | OXYGEN SATURATION: 98 % | SYSTOLIC BLOOD PRESSURE: 130 MMHG | HEART RATE: 71 BPM

## 2020-08-18 DIAGNOSIS — Z99.2 TYPE 2 DIABETES MELLITUS WITH CHRONIC KIDNEY DISEASE ON CHRONIC DIALYSIS, WITH LONG-TERM CURRENT USE OF INSULIN (HCC): ICD-10-CM

## 2020-08-18 DIAGNOSIS — N18.6 TYPE 2 DIABETES MELLITUS WITH CHRONIC KIDNEY DISEASE ON CHRONIC DIALYSIS, WITH LONG-TERM CURRENT USE OF INSULIN (HCC): ICD-10-CM

## 2020-08-18 DIAGNOSIS — E66.9 OBESITY (BMI 30-39.9): ICD-10-CM

## 2020-08-18 DIAGNOSIS — Z00.00 MEDICARE ANNUAL WELLNESS VISIT, INITIAL: Primary | ICD-10-CM

## 2020-08-18 DIAGNOSIS — I10 ESSENTIAL HYPERTENSION: ICD-10-CM

## 2020-08-18 DIAGNOSIS — E53.8 VITAMIN B12 DEFICIENCY: ICD-10-CM

## 2020-08-18 DIAGNOSIS — E78.1 HYPERTRIGLYCERIDEMIA: ICD-10-CM

## 2020-08-18 DIAGNOSIS — N18.6 STAGE 5 CHRONIC KIDNEY DISEASE ON DIALYSIS (HCC): ICD-10-CM

## 2020-08-18 DIAGNOSIS — Z99.2 STAGE 5 CHRONIC KIDNEY DISEASE ON DIALYSIS (HCC): ICD-10-CM

## 2020-08-18 DIAGNOSIS — E11.22 TYPE 2 DIABETES MELLITUS WITH CHRONIC KIDNEY DISEASE ON CHRONIC DIALYSIS, WITH LONG-TERM CURRENT USE OF INSULIN (HCC): ICD-10-CM

## 2020-08-18 DIAGNOSIS — R93.1 ELEVATED CORONARY ARTERY CALCIUM SCORE: ICD-10-CM

## 2020-08-18 DIAGNOSIS — D61.818 PANCYTOPENIA (HCC): ICD-10-CM

## 2020-08-18 DIAGNOSIS — Z79.4 TYPE 2 DIABETES MELLITUS WITH CHRONIC KIDNEY DISEASE ON CHRONIC DIALYSIS, WITH LONG-TERM CURRENT USE OF INSULIN (HCC): ICD-10-CM

## 2020-08-18 PROCEDURE — G0438 PPPS, INITIAL VISIT: HCPCS | Performed by: INTERNAL MEDICINE

## 2020-08-18 NOTE — PROGRESS NOTES
Zahra Hart is a 72year old male who presents for     Check at 4 mo and Medicare annual    Feels good. Peritoneal dialysis since July 2018--doing well. Dr Darcy Lopez. He talked to [facility] 8/14/20 about poss transplant.  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 Has therapeutic shoes from [employer].     Hypertriglyceridemia -gemfibrozil 600 mg bid, fish oil 4000 u/day, niacin 500 mg bid. Diet.  LDL 65 on 12/31/19.      Obesity--didn't go to [facility] Dr Kristen Chou for help for wt loss --decl dialysis– CBC Chem magnesium ferritin iron sat A1c– WBC 3.6 Hgb 10.5 platelet 95 creatinine 4.26  (16–80) A1c 5.9. Discussed importance of social distancing during current coronavirus outbreak.    Ret in 4 mo. Dr Lainez does lab.      Patient expre have you lost more than 10 pounds without trying?: 2 - No    Has your appetite been poor?: No    Type of Diet: Diabetic    How does the patient maintain a good energy level?: Other    How would you describe your daily physical activity?: Light    How would of the week is this?: Correct    What month is it?: Correct    What year is it?: Correct    Recall \"Ball\": Correct    Recall \"Flag\": Correct    Recall \"Tree\": Correct

## 2020-08-27 ENCOUNTER — LAB ENCOUNTER (OUTPATIENT)
Dept: LAB | Facility: HOSPITAL | Age: 65
End: 2020-08-27
Attending: INTERNAL MEDICINE
Payer: MEDICARE

## 2020-08-27 DIAGNOSIS — E11.9 DIABETES MELLITUS (HCC): Primary | ICD-10-CM

## 2020-08-27 DIAGNOSIS — I10 HYPERTENSION: ICD-10-CM

## 2020-08-27 LAB
ALBUMIN SERPL-MCNC: 3.4 G/DL
ALBUMIN SERPL-MCNC: 3.4 G/DL (ref 3.4–5)
ALBUMIN/GLOB SERPL: 1 {RATIO}
ALBUMIN/GLOB SERPL: 1 {RATIO} (ref 1–2)
ALP LIVER SERPL-CCNC: 52 U/L (ref 45–117)
ALP SERPL-CCNC: 52 U/L
ALT SERPL-CCNC: 26 U/L (ref 16–61)
ALT SERPL-CCNC: 26 UNITS/L
ANION GAP SERPL CALC-SCNC: 8 MMOL/L
ANION GAP SERPL CALC-SCNC: 8 MMOL/L (ref 0–18)
AST SERPL-CCNC: 18 U/L (ref 15–37)
AST SERPL-CCNC: 18 UNITS/L
BILIRUB SERPL-MCNC: 0.4 MG/DL
BILIRUB SERPL-MCNC: 0.4 MG/DL (ref 0.1–2)
BUN BLD-MCNC: 60 MG/DL (ref 7–18)
BUN SERPL-MCNC: 60 MG/DL
BUN/CREAT SERPL: 14.6
BUN/CREAT SERPL: 14.6 (ref 10–20)
CALCIUM BLD-MCNC: 8.8 MG/DL (ref 8.5–10.1)
CALCIUM SERPL-MCNC: 8.8 MG/DL
CHLORIDE SERPL-SCNC: 112 MMOL/L
CHLORIDE SERPL-SCNC: 112 MMOL/L (ref 98–112)
CHOLEST SERPL-MCNC: 124 MG/DL
CHOLEST SMN-MCNC: 124 MG/DL (ref ?–200)
CO2 SERPL-SCNC: 23 MMOL/L
CO2 SERPL-SCNC: 23 MMOL/L (ref 21–32)
CREAT BLD-MCNC: 4.12 MG/DL (ref 0.7–1.3)
CREAT SERPL-MCNC: 4.12 MG/DL
EST. AVERAGE GLUCOSE BLD GHB EST-MCNC: 131 MG/DL
EST. AVERAGE GLUCOSE BLD GHB EST-MCNC: 131 MG/DL (ref 68–126)
GLOBULIN PLAS-MCNC: 3.3 G/DL (ref 2.8–4.4)
GLOBULIN SER-MCNC: 3.3 G/DL
GLUCOSE BLD-MCNC: 132 MG/DL (ref 70–99)
GLUCOSE SERPL-MCNC: 132 MG/DL
HBA1C MFR BLD HPLC: 6.2 % (ref ?–5.7)
HBA1C MFR BLD: 6.2 %
HDLC SERPL-MCNC: 30 MG/DL
HDLC SERPL-MCNC: 30 MG/DL (ref 40–59)
LDLC SERPL CALC-MCNC: 61 MG/DL
LDLC SERPL CALC-MCNC: 61 MG/DL (ref ?–100)
M PROTEIN MFR SERPL ELPH: 6.7 G/DL (ref 6.4–8.2)
NONHDLC SERPL-MCNC: 94 MG/DL
NONHDLC SERPL-MCNC: 94 MG/DL (ref ?–130)
OSMOLALITY SERPL CALC.SUM OF ELEC: 315 MOSM/KG (ref 275–295)
PATIENT FASTING Y/N/NP: YES
PATIENT FASTING Y/N/NP: YES
POTASSIUM SERPL-SCNC: 4.6 MMOL/L
POTASSIUM SERPL-SCNC: 4.6 MMOL/L (ref 3.5–5.1)
PROT SERPL-MCNC: 6.7 G/DL
SODIUM SERPL-SCNC: 143 MMOL/L
SODIUM SERPL-SCNC: 143 MMOL/L (ref 136–145)
T4 FREE SERPL-MCNC: 0.9 NG/DL (ref 0.8–1.7)
TRIGL SERPL-MCNC: 165 MG/DL
TRIGL SERPL-MCNC: 165 MG/DL (ref 30–149)
TSH SERPL-ACNC: 1.65 MCUNITS/ML
TSI SER-ACNC: 1.65 MIU/ML (ref 0.36–3.74)
VLDLC SERPL CALC-MCNC: 33 MG/DL
VLDLC SERPL CALC-MCNC: 33 MG/DL (ref 0–30)

## 2020-08-27 PROCEDURE — 36415 COLL VENOUS BLD VENIPUNCTURE: CPT

## 2020-08-27 PROCEDURE — 80053 COMPREHEN METABOLIC PANEL: CPT

## 2020-08-27 PROCEDURE — 80061 LIPID PANEL: CPT

## 2020-08-27 PROCEDURE — 84443 ASSAY THYROID STIM HORMONE: CPT

## 2020-08-27 PROCEDURE — 83036 HEMOGLOBIN GLYCOSYLATED A1C: CPT

## 2020-08-27 PROCEDURE — 84439 ASSAY OF FREE THYROXINE: CPT

## 2020-09-16 ENCOUNTER — TELEPHONE (OUTPATIENT)
Dept: INTERNAL MEDICINE CLINIC | Facility: CLINIC | Age: 65
End: 2020-09-16

## 2020-09-16 NOTE — TELEPHONE ENCOUNTER
To nursing, please tell patient Dr Fletcher Humphrey sent me a message about his low platelet count on the lab in the dialysis center. She asked about him seeing a hematologist.  In the past he saw Dr. Irma Szymanski who has moved to [place].   He can see hematologis

## 2020-10-08 RX ORDER — FUROSEMIDE 80 MG
TABLET ORAL DAILY
COMMUNITY
Start: 2020-07-31 | End: 2023-03-08 | Stop reason: ALTCHOICE

## 2020-10-08 RX ORDER — METOPROLOL TARTRATE 50 MG/1
TABLET, FILM COATED ORAL
COMMUNITY
Start: 2016-07-21

## 2020-10-08 RX ORDER — GEMFIBROZIL 600 MG/1
TABLET, FILM COATED ORAL 2 TIMES DAILY
COMMUNITY
Start: 2020-07-07 | End: 2023-03-08 | Stop reason: ALTCHOICE

## 2020-10-08 RX ORDER — NIACIN 500 MG
TABLET ORAL
COMMUNITY
End: 2023-03-08 | Stop reason: ALTCHOICE

## 2020-10-08 RX ORDER — TAMSULOSIN HYDROCHLORIDE 0.4 MG/1
0.4 CAPSULE ORAL DAILY
COMMUNITY
Start: 2020-04-16

## 2020-10-12 ENCOUNTER — OFFICE VISIT (OUTPATIENT)
Dept: CARDIOLOGY | Age: 65
End: 2020-10-12

## 2020-10-12 VITALS
OXYGEN SATURATION: 98 % | HEART RATE: 75 BPM | HEIGHT: 72 IN | BODY MASS INDEX: 37.11 KG/M2 | DIASTOLIC BLOOD PRESSURE: 70 MMHG | WEIGHT: 274 LBS | SYSTOLIC BLOOD PRESSURE: 118 MMHG

## 2020-10-12 DIAGNOSIS — R06.02 SOB (SHORTNESS OF BREATH): ICD-10-CM

## 2020-10-12 DIAGNOSIS — I10 ESSENTIAL HYPERTENSION: Primary | ICD-10-CM

## 2020-10-12 DIAGNOSIS — E78.5 HYPERLIPIDEMIA, UNSPECIFIED HYPERLIPIDEMIA TYPE: ICD-10-CM

## 2020-10-12 PROCEDURE — 99204 OFFICE O/P NEW MOD 45 MIN: CPT | Performed by: INTERNAL MEDICINE

## 2020-10-12 RX ORDER — MULTIVIT-MIN/IRON/FOLIC ACID/K 18-600-40
CAPSULE ORAL
COMMUNITY
End: 2023-03-08 | Stop reason: ALTCHOICE

## 2020-10-12 RX ORDER — FLUTICASONE PROPIONATE 50 MCG
SPRAY, SUSPENSION (ML) NASAL
COMMUNITY

## 2020-10-12 RX ORDER — AMLODIPINE BESYLATE 10 MG/1
10 TABLET ORAL DAILY
COMMUNITY

## 2020-10-12 ASSESSMENT — PATIENT HEALTH QUESTIONNAIRE - PHQ9
CLINICAL INTERPRETATION OF PHQ9 SCORE: NO FURTHER SCREENING NEEDED
2. FEELING DOWN, DEPRESSED OR HOPELESS: NOT AT ALL
1. LITTLE INTEREST OR PLEASURE IN DOING THINGS: NOT AT ALL
SUM OF ALL RESPONSES TO PHQ9 QUESTIONS 1 AND 2: 0
1. LITTLE INTEREST OR PLEASURE IN DOING THINGS: NOT AT ALL
CLINICAL INTERPRETATION OF PHQ2 SCORE: NO FURTHER SCREENING NEEDED
SUM OF ALL RESPONSES TO PHQ9 QUESTIONS 1 AND 2: 0
2. FEELING DOWN, DEPRESSED OR HOPELESS: NOT AT ALL
CLINICAL INTERPRETATION OF PHQ2 SCORE: NO FURTHER SCREENING NEEDED
SUM OF ALL RESPONSES TO PHQ9 QUESTIONS 1 AND 2: 0

## 2020-12-03 ENCOUNTER — OFFICE VISIT (OUTPATIENT)
Dept: SURGERY | Facility: CLINIC | Age: 65
End: 2020-12-03
Payer: MEDICARE

## 2020-12-03 ENCOUNTER — OFFICE VISIT (OUTPATIENT)
Dept: PULMONOLOGY | Facility: CLINIC | Age: 65
End: 2020-12-03
Payer: MEDICARE

## 2020-12-03 VITALS
BODY MASS INDEX: 37.38 KG/M2 | TEMPERATURE: 97 F | DIASTOLIC BLOOD PRESSURE: 71 MMHG | HEART RATE: 64 BPM | RESPIRATION RATE: 18 BRPM | HEIGHT: 72 IN | SYSTOLIC BLOOD PRESSURE: 126 MMHG | OXYGEN SATURATION: 98 % | WEIGHT: 276 LBS

## 2020-12-03 VITALS
SYSTOLIC BLOOD PRESSURE: 145 MMHG | HEART RATE: 61 BPM | BODY MASS INDEX: 37.38 KG/M2 | HEIGHT: 72 IN | WEIGHT: 276 LBS | DIASTOLIC BLOOD PRESSURE: 81 MMHG

## 2020-12-03 DIAGNOSIS — Z12.5 SCREENING PSA (PROSTATE SPECIFIC ANTIGEN): ICD-10-CM

## 2020-12-03 DIAGNOSIS — N40.1 BENIGN PROSTATIC HYPERPLASIA WITH LOWER URINARY TRACT SYMPTOMS, SYMPTOM DETAILS UNSPECIFIED: Primary | ICD-10-CM

## 2020-12-03 DIAGNOSIS — G47.33 OBSTRUCTIVE SLEEP APNEA: Primary | ICD-10-CM

## 2020-12-03 PROCEDURE — G0463 HOSPITAL OUTPT CLINIC VISIT: HCPCS | Performed by: INTERNAL MEDICINE

## 2020-12-03 PROCEDURE — 99213 OFFICE O/P EST LOW 20 MIN: CPT | Performed by: NURSE PRACTITIONER

## 2020-12-03 PROCEDURE — G0463 HOSPITAL OUTPT CLINIC VISIT: HCPCS | Performed by: NURSE PRACTITIONER

## 2020-12-03 PROCEDURE — 99213 OFFICE O/P EST LOW 20 MIN: CPT | Performed by: INTERNAL MEDICINE

## 2020-12-03 RX ORDER — CYANOCOBALAMIN 1000 UG/ML
INJECTION INTRAMUSCULAR; SUBCUTANEOUS
Qty: 3 ML | Refills: 3 | Status: SHIPPED | OUTPATIENT
Start: 2020-12-03 | End: 2022-02-01

## 2020-12-03 NOTE — PROGRESS NOTES
The patient is a 59-year-old male who I know well from prior evaluation comes in now for follow-up. He notes that he is doing really well with the current setting of CPAP at 15 cm water pressure with an excellent download.   His average daily usage is 8 ho

## 2020-12-03 NOTE — PROGRESS NOTES
HPI:    Patient ID: Sukhdev Rider is a 72year old male. HPI     Patient is a 72year old male who presents to the clinic for a follow up. Patient previously seen with severe voiding dysfunction and BPH related symptoms.   He had complaints of urinar tablet 3   • amLODIPine Besylate (NORVASC) 10 MG Oral Tab Take 1 tablet (10 mg total) by mouth daily. 1 tablet 0   • Furosemide (LASIX OR) Take 80 mg by mouth daily. • Fluticasone Propionate 50 MCG/ACT Nasal Suspension 2 sprays by Nasal route daily. 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   Neck: Normal range of motion. Cardiovascular: Normal rate. Pulmonary/Chest: Effort normal. No respiratory distress. Abdominal: Soft. Genitourinary:    Genitourinary Comments: Prostate smooth, symmetrical.  No palpable nodules.       Mu

## 2020-12-04 ENCOUNTER — LAB ENCOUNTER (OUTPATIENT)
Dept: LAB | Facility: HOSPITAL | Age: 65
End: 2020-12-04
Attending: INTERNAL MEDICINE
Payer: MEDICARE

## 2020-12-04 DIAGNOSIS — E11.9 DIABETES MELLITUS, TYPE II (HCC): Primary | ICD-10-CM

## 2020-12-04 DIAGNOSIS — I10 HYPERTENSION: ICD-10-CM

## 2020-12-04 LAB
ALT SERPL-CCNC: 24 UNITS/L
AST SERPL-CCNC: 18 UNITS/L
BUN SERPL-MCNC: 62 MG/DL
BUN/CREAT SERPL: 14.8
CALCIUM SERPL-MCNC: 8.4 MG/DL
CHLORIDE SERPL-SCNC: 114 MMOL/L
CHOLEST SERPL-MCNC: 105 MG/DL
CHOLEST/HDLC SERPL: 34 {RATIO}
CO2 SERPL-SCNC: 21 MMOL/L
CREAT SERPL-MCNC: 4.18 MG/DL
GLUCOSE SERPL-MCNC: 67 MG/DL
POTASSIUM SERPL-SCNC: 4.4 MMOL/L
SODIUM SERPL-SCNC: 145 MMOL/L
TRIGL SERPL-MCNC: 124 MG/DL
TSH SERPL-ACNC: 1.7 MCUNITS/ML
VLDLC SERPL CALC-MCNC: 25 MG/DL

## 2020-12-04 PROCEDURE — 84443 ASSAY THYROID STIM HORMONE: CPT

## 2020-12-04 PROCEDURE — 80061 LIPID PANEL: CPT

## 2020-12-04 PROCEDURE — 80053 COMPREHEN METABOLIC PANEL: CPT

## 2020-12-04 PROCEDURE — 84439 ASSAY OF FREE THYROXINE: CPT

## 2020-12-04 PROCEDURE — 82570 ASSAY OF URINE CREATININE: CPT

## 2020-12-04 PROCEDURE — 82043 UR ALBUMIN QUANTITATIVE: CPT

## 2020-12-04 PROCEDURE — 36415 COLL VENOUS BLD VENIPUNCTURE: CPT

## 2020-12-04 PROCEDURE — 83036 HEMOGLOBIN GLYCOSYLATED A1C: CPT

## 2020-12-09 ENCOUNTER — OFFICE VISIT (OUTPATIENT)
Dept: INTERNAL MEDICINE CLINIC | Facility: CLINIC | Age: 65
End: 2020-12-09
Payer: MEDICARE

## 2020-12-09 VITALS
TEMPERATURE: 98 F | DIASTOLIC BLOOD PRESSURE: 60 MMHG | WEIGHT: 276 LBS | HEART RATE: 68 BPM | HEIGHT: 72 IN | BODY MASS INDEX: 37.38 KG/M2 | SYSTOLIC BLOOD PRESSURE: 114 MMHG

## 2020-12-09 DIAGNOSIS — E66.9 OBESITY (BMI 30-39.9): ICD-10-CM

## 2020-12-09 DIAGNOSIS — N18.6 TYPE 2 DIABETES MELLITUS WITH CHRONIC KIDNEY DISEASE ON CHRONIC DIALYSIS, WITH LONG-TERM CURRENT USE OF INSULIN (HCC): ICD-10-CM

## 2020-12-09 DIAGNOSIS — E78.1 HYPERTRIGLYCERIDEMIA: ICD-10-CM

## 2020-12-09 DIAGNOSIS — N18.6 STAGE 5 CHRONIC KIDNEY DISEASE ON DIALYSIS (HCC): ICD-10-CM

## 2020-12-09 DIAGNOSIS — I10 ESSENTIAL HYPERTENSION: Primary | ICD-10-CM

## 2020-12-09 DIAGNOSIS — D61.818 PANCYTOPENIA (HCC): ICD-10-CM

## 2020-12-09 DIAGNOSIS — E11.22 TYPE 2 DIABETES MELLITUS WITH CHRONIC KIDNEY DISEASE ON CHRONIC DIALYSIS, WITH LONG-TERM CURRENT USE OF INSULIN (HCC): ICD-10-CM

## 2020-12-09 DIAGNOSIS — Z99.2 TYPE 2 DIABETES MELLITUS WITH CHRONIC KIDNEY DISEASE ON CHRONIC DIALYSIS, WITH LONG-TERM CURRENT USE OF INSULIN (HCC): ICD-10-CM

## 2020-12-09 DIAGNOSIS — E53.8 VITAMIN B12 DEFICIENCY: ICD-10-CM

## 2020-12-09 DIAGNOSIS — Z99.2 STAGE 5 CHRONIC KIDNEY DISEASE ON DIALYSIS (HCC): ICD-10-CM

## 2020-12-09 DIAGNOSIS — Z79.4 TYPE 2 DIABETES MELLITUS WITH CHRONIC KIDNEY DISEASE ON CHRONIC DIALYSIS, WITH LONG-TERM CURRENT USE OF INSULIN (HCC): ICD-10-CM

## 2020-12-09 DIAGNOSIS — R93.1 ELEVATED CORONARY ARTERY CALCIUM SCORE: ICD-10-CM

## 2020-12-09 PROCEDURE — G0463 HOSPITAL OUTPT CLINIC VISIT: HCPCS | Performed by: INTERNAL MEDICINE

## 2020-12-09 PROCEDURE — 99214 OFFICE O/P EST MOD 30 MIN: CPT | Performed by: INTERNAL MEDICINE

## 2020-12-09 NOTE — PROGRESS NOTES
Caroline Urena is a 72year old male who presents for     Check at 4 mo     Feels good. Peritoneal dialysis since July 2018--doing well. Dr Bertram Haynes. Is working with [facility] on transplant eval.  Home BPs 120s to 130s/60-82.     Message here form Dr Fletcher Humphrey 9/16 hands and feet   • Obesity 10/1984   • Pancytopenia [facility]) 2009    Dr. Margarita Vanegas   • Peritoneal dialysis status [facility]) 07/2018    Dr Erin Wilson   • Pernicious anemia 2008   • Platelets decreased ([facility]    • Psoriasis-eczema overlap condition    • Renal years: 4.5        Quit date: 1982        Years since quittin.8      Smokeless tobacco: Never Used      Tobacco comment: smokes cigars    Alcohol use:  Yes      Alcohol/week: 1.0 standard drinks      Types: 1 Glasses of wine per week    Drug use: N dialysis--started PD 7/2018.  is checking into transplant.      Diabetes-type 2 with chronic dialysis and retinopathy--Dr. Petar Huff rx insulin pump- humalog U500.  A1c 6.6 on 12/4/20 per Dr. Petar Huff. Matheus Cooper   [facility] Dr. Aletha Nova -retinopathy-goes ever colon.    Vaccines: Pmvx 11/12 & 4/7/20 visit. Prevnar 10/28/14. Tdap 12/1/15. zostavax 9/2017. shingrx x1 2019 so far. flu shot 9/2020 at [facility].      5/23/19 CMP lipid A1c TSH FTI–creat 4.29, GFR 14,. A1c 7.1, LDL 34, .  9/28/19--cmp lipid H4c--M3 Take 80 mg by mouth daily. • Fluticasone Propionate 50 MCG/ACT Nasal Suspension 2 sprays by Nasal route daily.  1 Bottle 11   • HUMULIN R U-500, CONCENTRATED, 500 UNIT/ML Subcutaneous Solution Insulin pump      • Trudev CONTOUR NEXT TEST In Vitro Strip

## 2021-01-01 ENCOUNTER — EXTERNAL RECORD (OUTPATIENT)
Dept: HEALTH INFORMATION MANAGEMENT | Facility: OTHER | Age: 66
End: 2021-01-01

## 2021-01-10 RX ORDER — GEMFIBROZIL 600 MG/1
600 TABLET, FILM COATED ORAL
Qty: 180 TABLET | Refills: 3 | Status: SHIPPED | OUTPATIENT
Start: 2021-01-10 | End: 2021-04-09 | Stop reason: ALTCHOICE

## 2021-01-14 ENCOUNTER — APPOINTMENT (OUTPATIENT)
Dept: CARDIOLOGY | Age: 66
End: 2021-01-14

## 2021-01-15 ENCOUNTER — PATIENT MESSAGE (OUTPATIENT)
Dept: INTERNAL MEDICINE CLINIC | Facility: CLINIC | Age: 66
End: 2021-01-15

## 2021-01-15 NOTE — TELEPHONE ENCOUNTER
From: [facility]  To: Danna Guy MD  Sent: 1/15/2021 12:38 PM CST  Subject: Referral Request    After talking to Dr Burley Cowden my platelets are still low and I will need to see a hematologist. I know you recommended one to me before but I forgot who it

## 2021-01-21 ENCOUNTER — OFFICE VISIT (OUTPATIENT)
Dept: HEMATOLOGY/ONCOLOGY | Facility: HOSPITAL | Age: 66
End: 2021-01-21
Attending: INTERNAL MEDICINE
Payer: MEDICARE

## 2021-01-21 VITALS
WEIGHT: 268 LBS | RESPIRATION RATE: 18 BRPM | HEIGHT: 72 IN | SYSTOLIC BLOOD PRESSURE: 135 MMHG | DIASTOLIC BLOOD PRESSURE: 61 MMHG | OXYGEN SATURATION: 100 % | TEMPERATURE: 98 F | HEART RATE: 62 BPM | BODY MASS INDEX: 36.3 KG/M2

## 2021-01-21 DIAGNOSIS — N18.5 ANEMIA OF CHRONIC KIDNEY FAILURE, STAGE 5 (HCC): ICD-10-CM

## 2021-01-21 DIAGNOSIS — D69.3 CHRONIC IDIOPATHIC THROMBOCYTOPENIA (HCC): Primary | ICD-10-CM

## 2021-01-21 DIAGNOSIS — D63.1 ANEMIA OF CHRONIC KIDNEY FAILURE, STAGE 5 (HCC): ICD-10-CM

## 2021-01-21 PROCEDURE — 99204 OFFICE O/P NEW MOD 45 MIN: CPT | Performed by: INTERNAL MEDICINE

## 2021-01-21 NOTE — CONSULTS
Cancer Center History and Physical    Patient Name: Griselda Stringer   YOB: 1955   Medical Record Number: [id number]   CSN: [id number]   Attending Physician:  Dominick Rodas MD       Date of Visit: 1/21/2021     Chief Complaint:  No chief complaint • High blood pressure    • Hyperlipemia    • Microalbuminuria 2005   • Neuropathy     diabetic, jorge hands and feet   • Obesity 10/1984   • Pancytopenia [facility]) 2009    Dr. Mary Shankar   • Peritoneal dialysis status [facility]) 07/2018    Dr Allayne Pallas   • Pe Social History:  Marital status: , kids-live far away  Smoking: quit 20 years ago  ETOH: a drink a month  Work: , currently unemployed    Current Medications:    Current Outpatient Medications:   •  gemfibrozil 600 MG Oral Tab, Take Blisters,itching,swelling  Merthilate [Thimero*    RASH, SWELLING     Review of Systems:  Comprehensive 12-point system reviewed and negative except as listed per HPI    Vital Signs:  /61 (BP Location: Left arm, Patient Position: Sitting, Cuff Size: reports prior EDTA reaction. Labs otherwise are completely benign looking. Would continue to monitor at this point.   He is asked to come back and see me if his platelet counts fall below 100,000, will get platelet eval with citrate with and without    Co

## 2021-01-25 DIAGNOSIS — Z23 NEED FOR VACCINATION: ICD-10-CM

## 2021-01-27 ENCOUNTER — IMMUNIZATION (OUTPATIENT)
Dept: LAB | Age: 66
End: 2021-01-27
Attending: HOSPITALIST
Payer: MEDICARE

## 2021-01-27 DIAGNOSIS — Z23 NEED FOR VACCINATION: Primary | ICD-10-CM

## 2021-01-27 PROCEDURE — 0001A SARSCOV2 VAC 30MCG/0.3ML IM: CPT

## 2021-01-28 RX ORDER — CETIRIZINE HYDROCHLORIDE 10 MG/1
10 TABLET ORAL
COMMUNITY
End: 2023-03-08 | Stop reason: ALTCHOICE

## 2021-01-28 RX ORDER — VIT B COMPLX C/FOLIC ACID/ZINC 0.8MG-50MG
1 TABLET ORAL DAILY
COMMUNITY
Start: 2020-12-02 | End: 2023-03-08 | Stop reason: ALTCHOICE

## 2021-02-08 ENCOUNTER — MED REC SCAN ONLY (OUTPATIENT)
Dept: INTERNAL MEDICINE CLINIC | Facility: CLINIC | Age: 66
End: 2021-02-08

## 2021-02-11 ENCOUNTER — OFFICE VISIT (OUTPATIENT)
Dept: CARDIOLOGY | Age: 66
End: 2021-02-11

## 2021-02-11 VITALS
HEART RATE: 72 BPM | DIASTOLIC BLOOD PRESSURE: 70 MMHG | SYSTOLIC BLOOD PRESSURE: 122 MMHG | OXYGEN SATURATION: 97 % | WEIGHT: 265 LBS | HEIGHT: 72 IN | BODY MASS INDEX: 35.89 KG/M2

## 2021-02-11 DIAGNOSIS — E78.5 HYPERLIPIDEMIA, UNSPECIFIED HYPERLIPIDEMIA TYPE: ICD-10-CM

## 2021-02-11 DIAGNOSIS — I10 ESSENTIAL HYPERTENSION: Primary | ICD-10-CM

## 2021-02-11 PROCEDURE — 99214 OFFICE O/P EST MOD 30 MIN: CPT | Performed by: INTERNAL MEDICINE

## 2021-02-11 ASSESSMENT — PATIENT HEALTH QUESTIONNAIRE - PHQ9
SUM OF ALL RESPONSES TO PHQ9 QUESTIONS 1 AND 2: 0
CLINICAL INTERPRETATION OF PHQ9 SCORE: NO FURTHER SCREENING NEEDED
CLINICAL INTERPRETATION OF PHQ2 SCORE: NO FURTHER SCREENING NEEDED
2. FEELING DOWN, DEPRESSED OR HOPELESS: NOT AT ALL
SUM OF ALL RESPONSES TO PHQ9 QUESTIONS 1 AND 2: 0
1. LITTLE INTEREST OR PLEASURE IN DOING THINGS: NOT AT ALL

## 2021-02-17 ENCOUNTER — IMMUNIZATION (OUTPATIENT)
Dept: LAB | Age: 66
End: 2021-02-17
Attending: HOSPITALIST
Payer: MEDICARE

## 2021-02-17 DIAGNOSIS — Z23 NEED FOR VACCINATION: Primary | ICD-10-CM

## 2021-02-17 PROCEDURE — 0002A SARSCOV2 VAC 30MCG/0.3ML IM: CPT

## 2021-03-01 ENCOUNTER — LAB ENCOUNTER (OUTPATIENT)
Dept: LAB | Facility: HOSPITAL | Age: 66
End: 2021-03-01
Attending: INTERNAL MEDICINE
Payer: MEDICARE

## 2021-03-01 DIAGNOSIS — E11.9 DIABETES MELLITUS (HCC): Primary | ICD-10-CM

## 2021-03-01 LAB
ALBUMIN SERPL-MCNC: 3.6 G/DL (ref 3.4–5)
ALBUMIN/GLOB SERPL: 1.2 {RATIO} (ref 1–2)
ALP LIVER SERPL-CCNC: 57 U/L
ALT SERPL-CCNC: 25 U/L
ANION GAP SERPL CALC-SCNC: 4 MMOL/L (ref 0–18)
AST SERPL-CCNC: 17 U/L (ref 15–37)
BILIRUB SERPL-MCNC: 0.3 MG/DL (ref 0.1–2)
BUN BLD-MCNC: 68 MG/DL (ref 7–18)
BUN/CREAT SERPL: 16.9 (ref 10–20)
CALCIUM BLD-MCNC: 9.4 MG/DL (ref 8.5–10.1)
CHLORIDE SERPL-SCNC: 114 MMOL/L (ref 98–112)
CHOLEST SMN-MCNC: 125 MG/DL (ref ?–200)
CO2 SERPL-SCNC: 24 MMOL/L (ref 21–32)
CREAT BLD-MCNC: 4.03 MG/DL
EST. AVERAGE GLUCOSE BLD GHB EST-MCNC: 123 MG/DL (ref 68–126)
GLOBULIN PLAS-MCNC: 2.9 G/DL (ref 2.8–4.4)
GLUCOSE BLD-MCNC: 129 MG/DL (ref 70–99)
HBA1C MFR BLD HPLC: 5.9 % (ref ?–5.7)
HDLC SERPL-MCNC: 34 MG/DL (ref 40–59)
LDLC SERPL CALC-MCNC: 67 MG/DL (ref ?–100)
M PROTEIN MFR SERPL ELPH: 6.5 G/DL (ref 6.4–8.2)
NONHDLC SERPL-MCNC: 91 MG/DL (ref ?–130)
OSMOLALITY SERPL CALC.SUM OF ELEC: 315 MOSM/KG (ref 275–295)
PATIENT FASTING Y/N/NP: YES
PATIENT FASTING Y/N/NP: YES
POTASSIUM SERPL-SCNC: 5.2 MMOL/L (ref 3.5–5.1)
SODIUM SERPL-SCNC: 142 MMOL/L (ref 136–145)
TRIGL SERPL-MCNC: 119 MG/DL (ref 30–149)
VLDLC SERPL CALC-MCNC: 24 MG/DL (ref 0–30)

## 2021-03-01 PROCEDURE — 83036 HEMOGLOBIN GLYCOSYLATED A1C: CPT

## 2021-03-01 PROCEDURE — 80053 COMPREHEN METABOLIC PANEL: CPT

## 2021-03-01 PROCEDURE — 80061 LIPID PANEL: CPT

## 2021-03-01 PROCEDURE — 36415 COLL VENOUS BLD VENIPUNCTURE: CPT

## 2021-03-29 ENCOUNTER — PATIENT MESSAGE (OUTPATIENT)
Dept: INTERNAL MEDICINE CLINIC | Facility: CLINIC | Age: 66
End: 2021-03-29

## 2021-03-30 ENCOUNTER — TELEPHONE (OUTPATIENT)
Dept: INTERNAL MEDICINE CLINIC | Facility: CLINIC | Age: 66
End: 2021-03-30

## 2021-03-30 NOTE — TELEPHONE ENCOUNTER
Copied Mychart into TE for Nursing to Triage     Non-Urgent Medical Question    Suits, Silas Cameron MD 15 hours ago (11:16 PM)     I have a spot on my leg that appears to be infected causes some pain and bleeds sometimes.  I been fighting it si

## 2021-03-30 NOTE — TELEPHONE ENCOUNTER
I spoke with patient. He has a lesion on the left leg in his groin. He says the area is not draining but he thinks it has bled a little in the past because there would be spots of blood in his underwear.  He says he has been washing the area and putting on

## 2021-03-30 NOTE — TELEPHONE ENCOUNTER
From: [facility]  To: Gonsalo Khan MD  Sent: 3/29/2021 11:16 PM CDT  Subject: Non-Urgent Medical Question    I have a spot on my leg that appears to be infected causes some pain and bleeds sometimes.  I been fighting it since February but has become m

## 2021-03-31 ENCOUNTER — OFFICE VISIT (OUTPATIENT)
Dept: INTERNAL MEDICINE CLINIC | Facility: CLINIC | Age: 66
End: 2021-03-31
Payer: MEDICARE

## 2021-03-31 VITALS
BODY MASS INDEX: 36.57 KG/M2 | TEMPERATURE: 99 F | HEART RATE: 72 BPM | SYSTOLIC BLOOD PRESSURE: 130 MMHG | WEIGHT: 270 LBS | HEIGHT: 72 IN | DIASTOLIC BLOOD PRESSURE: 68 MMHG

## 2021-03-31 DIAGNOSIS — L72.0 EPIDERMOID CYST OF SKIN: Primary | ICD-10-CM

## 2021-03-31 PROBLEM — L02.214 ABSCESS OF GROIN, LEFT: Status: ACTIVE | Noted: 2021-03-31

## 2021-03-31 PROCEDURE — 99212 OFFICE O/P EST SF 10 MIN: CPT | Performed by: INTERNAL MEDICINE

## 2021-03-31 RX ORDER — ATORVASTATIN CALCIUM 40 MG/1
40 TABLET, FILM COATED ORAL NIGHTLY
COMMUNITY
Start: 2021-03-09

## 2021-03-31 NOTE — PROGRESS NOTES
Belem Herrera is a 77year old male who presents for     L groin bump  \"It started back in February, I don't know exactly when. It was a bump under the skin. It may have been a hair. I tried neosporin and preparation H.\"  It went away.  Then \"it flared up Performed by Alberto Cano MD at [address] [place] [address] ENDOSCOPY   • HERNIA SURGERY  2010    ventral hernia-at time of colon surgery   • LAPAROSCOPIC PERITONEAL DIALYSIS CATH INSERTION N/A 6/28/2018    Performed by Robert Teran MD at [place] [address] see pt today at 4 pm.    Patient expresses understanding of above issues and plan. Spent 15 min reviewing chart, history, exam, reviewing assessment/ plan, discussing w surgeon and completing documentation.       Current Outpatient Medications   Medicatio

## 2021-04-09 ENCOUNTER — OFFICE VISIT (OUTPATIENT)
Dept: INTERNAL MEDICINE CLINIC | Facility: CLINIC | Age: 66
End: 2021-04-09
Payer: MEDICARE

## 2021-04-09 VITALS
DIASTOLIC BLOOD PRESSURE: 70 MMHG | SYSTOLIC BLOOD PRESSURE: 138 MMHG | HEART RATE: 72 BPM | HEIGHT: 72 IN | BODY MASS INDEX: 36.16 KG/M2 | WEIGHT: 267 LBS | TEMPERATURE: 98 F

## 2021-04-09 DIAGNOSIS — I10 ESSENTIAL HYPERTENSION: Primary | ICD-10-CM

## 2021-04-09 DIAGNOSIS — Z79.4 TYPE 2 DIABETES MELLITUS WITH CHRONIC KIDNEY DISEASE ON CHRONIC DIALYSIS, WITH LONG-TERM CURRENT USE OF INSULIN (HCC): ICD-10-CM

## 2021-04-09 DIAGNOSIS — E78.1 HYPERTRIGLYCERIDEMIA: ICD-10-CM

## 2021-04-09 DIAGNOSIS — N18.6 STAGE 5 CHRONIC KIDNEY DISEASE ON DIALYSIS (HCC): ICD-10-CM

## 2021-04-09 DIAGNOSIS — D61.818 PANCYTOPENIA (HCC): ICD-10-CM

## 2021-04-09 DIAGNOSIS — E66.9 OBESITY (BMI 30-39.9): ICD-10-CM

## 2021-04-09 DIAGNOSIS — Z99.2 TYPE 2 DIABETES MELLITUS WITH CHRONIC KIDNEY DISEASE ON CHRONIC DIALYSIS, WITH LONG-TERM CURRENT USE OF INSULIN (HCC): ICD-10-CM

## 2021-04-09 DIAGNOSIS — E53.8 VITAMIN B12 DEFICIENCY: ICD-10-CM

## 2021-04-09 DIAGNOSIS — Z99.2 STAGE 5 CHRONIC KIDNEY DISEASE ON DIALYSIS (HCC): ICD-10-CM

## 2021-04-09 DIAGNOSIS — E11.22 TYPE 2 DIABETES MELLITUS WITH CHRONIC KIDNEY DISEASE ON CHRONIC DIALYSIS, WITH LONG-TERM CURRENT USE OF INSULIN (HCC): ICD-10-CM

## 2021-04-09 DIAGNOSIS — N18.6 TYPE 2 DIABETES MELLITUS WITH CHRONIC KIDNEY DISEASE ON CHRONIC DIALYSIS, WITH LONG-TERM CURRENT USE OF INSULIN (HCC): ICD-10-CM

## 2021-04-09 PROCEDURE — 99214 OFFICE O/P EST MOD 30 MIN: CPT | Performed by: INTERNAL MEDICINE

## 2021-04-09 NOTE — PROGRESS NOTES
Robert Kamara is a 77year old male who presents for     Check at 4 mo     Feels good. Dr Daisy Ruiz did I & D of L groin abscess on 3/31/21. Peritoneal dialysis since July 2018--doing well. Dr Karla Bajwa.    Is working with [facility] on transplant eval.  Home B • Microalbuminuria 2005   • Neuropathy     diabetic, jorge hands and feet   • Obesity 10/1984   • Pancytopenia [facility]) 2009    Dr. Lexi Santos   • Peritoneal dialysis status [facility]) 07/2018    Dr Saleem Coles   • Pernicious anemia 2008   • Platelets decrease Smoker        Packs/day: 0.50        Years: 9.00        Pack years: 4.5        Quit date: 1982        Years since quittin.1      Smokeless tobacco: Never Used      Tobacco comment: smokes cigars    Vaping Use      Vaping Use: Never used    Alcoho Guerita monitors peritoneal dialysis--started PD 7/2018.  is checking into transplant.      Diabetes-type 2 with chronic dialysis and retinopathy--Dr. Bayron Smith rx insulin pump- humalog U500.  A1c 6.6 on 12/4/20 per Dr. Bayron Smith. Ross Stuart   [facility] Dr. Berenice Cowan 11/2010 for c-i-s of colon.    Vaccines: Pmvx 11/12 & 4/7/20 visit. Prevnar 10/28/14. Tdap 12/1/15. zostavax 9/2017. shingrx x1 2019 so far. flu shot 9/2020 at [facility].      5/23/19 CMP lipid A1c TSH FTI–creat 4.29, GFR 14,. A1c 7.1, LDL 34, .  9/28/ daily. 90 capsule 3   • AURYXIA 1  MG(Fe) Oral Tab Take 2 tablets by mouth 3 (three) times daily. With meals     • amLODIPine Besylate (NORVASC) 10 MG Oral Tab Take 1 tablet (10 mg total) by mouth daily.  1 tablet 0   • Furosemide (LASIX OR) Take 80 m

## 2021-04-09 NOTE — PATIENT INSTRUCTIONS
Fasting lab in the middle of May 2021 to check cholesterol and triglycerides lab. Stop gemfibrozil and niacin.

## 2021-04-09 NOTE — PROGRESS NOTES
Sunita Mckenzie is a 77year old male who presents for     Check at 4 mo     Feels good. Dr Ibrahima Tirado did I & D of L groin abscess on 3/31/21. Is doing better--pt is packing wound. Saw Dr Trang Nick 2 days ago for recheck. Will see him again 4/28/21.   cx-Proteus s 07/2018    Dr Erin Mehta   • Pernicious anemia 2008   • Platelets decreased ([address]    • Psoriasis-eczema overlap condition    • Renal disorder     CKD with proteinuria--Dr. Erin Mehta   • Retinopathy 2015    L eye--Dr Sheffield Ormond at [facility]   • S/P cardiac cath Quit date: 1982        Years since quittin.1      Smokeless tobacco: Never Used      Tobacco comment: smokes cigars    Vaping Use      Vaping Use: Never used    Alcohol use:  Yes      Alcohol/week: 1.0 standard drinks      Types: 1 Glasses of w Guerita monitors peritoneal dialysis--started PD 7/2018.  Is on renal transplant list at [facility] as of March 2021.      Diabetes-type 2 with chronic dialysis and retinopathy--Dr. Verma Shallow rx insulin pump- humalog U500.  A1c 5.7 on 4/6/21 per Dr. Allyn Sandifer. Ross Patel 8/2018---US kidneys bladder 9/7/18-no hydroneph, 7 mm kidney stone non obst.  ml    Waleska Forward APN last saw 12/3/20- she continued tamsulosin 0.4 mg daily. She ord PSA.     AMELIE-CPAP rx per Dr. Rhys Lange maintenance:   colonoscopy Tab Take 2 tablets by mouth 3 (three) times daily. With meals     • amLODIPine Besylate (NORVASC) 10 MG Oral Tab Take 1 tablet (10 mg total) by mouth daily. 1 tablet 0   • Furosemide (LASIX OR) Take 80 mg by mouth daily.        • Fluticasone Propionate 50 M

## 2021-04-12 RX ORDER — TAMSULOSIN HYDROCHLORIDE 0.4 MG/1
CAPSULE ORAL
Qty: 90 CAPSULE | Refills: 3 | Status: SHIPPED | OUTPATIENT
Start: 2021-04-12

## 2021-04-12 NOTE — TELEPHONE ENCOUNTER
Received refill request for tamsulosin 0.4 mg capsules  Patient's LOV within 12 months  Last PSA WNL  Medication meets refill criteria protocol, med approved    Benign Prostatic Hypertrophy Medications      Protocol Criteria:  • Appointment scheduled in th

## 2021-05-05 ENCOUNTER — PATIENT MESSAGE (OUTPATIENT)
Dept: INTERNAL MEDICINE CLINIC | Facility: CLINIC | Age: 66
End: 2021-05-05

## 2021-05-05 NOTE — TELEPHONE ENCOUNTER
From: [facility]  To: [employer] MD  Sent: 5/5/2021 11:54 AM CDT  Subject: Non-Urgent Medical Question    [facility] about [facility] medical condition and questions related to my disa

## 2021-05-06 NOTE — TELEPHONE ENCOUNTER
From: [facility]  To: Christiane Lozano MD  Sent: 5/5/2021 9:48 PM CDT  Subject: Non-Urgent Medical Question    Thanks for your help, medical records has sent the info a day or two before the date of the letter.    [employer]

## 2021-06-07 ENCOUNTER — LAB ENCOUNTER (OUTPATIENT)
Dept: LAB | Facility: HOSPITAL | Age: 66
End: 2021-06-07
Attending: INTERNAL MEDICINE
Payer: MEDICARE

## 2021-06-07 DIAGNOSIS — E11.9 DM TYPE 2 (DIABETES MELLITUS, TYPE 2) (HCC): Primary | ICD-10-CM

## 2021-06-07 DIAGNOSIS — I10 ESSENTIAL HYPERTENSION, MALIGNANT: ICD-10-CM

## 2021-06-07 DIAGNOSIS — E78.1 HYPERTRIGLYCERIDEMIA: ICD-10-CM

## 2021-06-07 PROCEDURE — 83036 HEMOGLOBIN GLYCOSYLATED A1C: CPT

## 2021-06-07 PROCEDURE — 80061 LIPID PANEL: CPT

## 2021-06-07 PROCEDURE — 84443 ASSAY THYROID STIM HORMONE: CPT

## 2021-06-07 PROCEDURE — 36415 COLL VENOUS BLD VENIPUNCTURE: CPT

## 2021-06-07 PROCEDURE — 80053 COMPREHEN METABOLIC PANEL: CPT

## 2021-06-07 PROCEDURE — 84439 ASSAY OF FREE THYROXINE: CPT

## 2021-06-11 ENCOUNTER — TELEPHONE (OUTPATIENT)
Dept: INTERNAL MEDICINE CLINIC | Facility: CLINIC | Age: 66
End: 2021-06-11

## 2021-06-11 NOTE — TELEPHONE ENCOUNTER
I sent patient results note Reina Giles, your LDL cholesterol (16) and triglyceride levels (82) are normal on 6/7/21.  Therefore you can continue to be off gemfibrozil and niacin and just continue the a atorvastatin 40 mg daily prescribed by the cardiol

## 2021-07-12 ENCOUNTER — TELEPHONE (OUTPATIENT)
Dept: INTERNAL MEDICINE CLINIC | Facility: CLINIC | Age: 66
End: 2021-07-12

## 2021-07-12 NOTE — TELEPHONE ENCOUNTER
Dylan Mcneil requesting Prior Authorization for:  Cyanocobalamin  Please call [phone], pt #[phone]  Fax placed in purple folder  Tasked to RX

## 2021-07-15 ENCOUNTER — MED REC SCAN ONLY (OUTPATIENT)
Dept: INTERNAL MEDICINE CLINIC | Facility: CLINIC | Age: 66
End: 2021-07-15

## 2021-07-21 ENCOUNTER — PATIENT MESSAGE (OUTPATIENT)
Dept: INTERNAL MEDICINE CLINIC | Facility: CLINIC | Age: 66
End: 2021-07-21

## 2021-07-21 NOTE — TELEPHONE ENCOUNTER
Called patient who states he gets test results that are not his - he called [employer] help line twice they have no record of sending anything to him.  Instructed patient to call [facility] SERVICES line again and speak with their supervisor - he will do that     [email or web address] aleksey

## 2021-07-21 NOTE — TELEPHONE ENCOUNTER
From: [facility]  To: Micky Stanley MD  Sent: 7/21/2021 12:08 PM CDT  Subject: Other    Not my test, no record of this in the system per customer service at my chart

## 2021-07-22 NOTE — TELEPHONE ENCOUNTER
Reveiwed chart. I see that the patient is on peritoneal dialysis and has monthly blood draws at home. There are interfaced lab result documents from [address] [place] [address] Dialysis in the labs section in [employer] and the results are for him.  I called the patient and spok

## 2021-08-05 RX ORDER — METOPROLOL TARTRATE 50 MG/1
TABLET, FILM COATED ORAL
Qty: 270 TABLET | Refills: 3 | Status: SHIPPED | OUTPATIENT
Start: 2021-08-05

## 2021-08-09 ENCOUNTER — OFFICE VISIT (OUTPATIENT)
Dept: INTERNAL MEDICINE CLINIC | Facility: CLINIC | Age: 66
End: 2021-08-09
Payer: MEDICARE

## 2021-08-09 VITALS
TEMPERATURE: 98 F | SYSTOLIC BLOOD PRESSURE: 134 MMHG | HEIGHT: 72 IN | BODY MASS INDEX: 37.88 KG/M2 | DIASTOLIC BLOOD PRESSURE: 64 MMHG | HEART RATE: 66 BPM | WEIGHT: 279.63 LBS | OXYGEN SATURATION: 98 %

## 2021-08-09 DIAGNOSIS — I10 ESSENTIAL HYPERTENSION: ICD-10-CM

## 2021-08-09 DIAGNOSIS — E66.9 OBESITY (BMI 30-39.9): ICD-10-CM

## 2021-08-09 DIAGNOSIS — D61.818 PANCYTOPENIA (HCC): ICD-10-CM

## 2021-08-09 DIAGNOSIS — Z99.2 STAGE 5 CHRONIC KIDNEY DISEASE ON DIALYSIS (HCC): ICD-10-CM

## 2021-08-09 DIAGNOSIS — E53.8 VITAMIN B12 DEFICIENCY: ICD-10-CM

## 2021-08-09 DIAGNOSIS — N18.6 STAGE 5 CHRONIC KIDNEY DISEASE ON DIALYSIS (HCC): ICD-10-CM

## 2021-08-09 DIAGNOSIS — L72.0 EPIDERMOID CYST OF SKIN: Primary | ICD-10-CM

## 2021-08-09 DIAGNOSIS — E11.22 TYPE 2 DIABETES MELLITUS WITH CHRONIC KIDNEY DISEASE ON CHRONIC DIALYSIS, WITH LONG-TERM CURRENT USE OF INSULIN (HCC): ICD-10-CM

## 2021-08-09 DIAGNOSIS — Z79.4 TYPE 2 DIABETES MELLITUS WITH CHRONIC KIDNEY DISEASE ON CHRONIC DIALYSIS, WITH LONG-TERM CURRENT USE OF INSULIN (HCC): ICD-10-CM

## 2021-08-09 DIAGNOSIS — E78.1 HYPERTRIGLYCERIDEMIA: ICD-10-CM

## 2021-08-09 DIAGNOSIS — Z99.2 TYPE 2 DIABETES MELLITUS WITH CHRONIC KIDNEY DISEASE ON CHRONIC DIALYSIS, WITH LONG-TERM CURRENT USE OF INSULIN (HCC): ICD-10-CM

## 2021-08-09 DIAGNOSIS — N18.6 TYPE 2 DIABETES MELLITUS WITH CHRONIC KIDNEY DISEASE ON CHRONIC DIALYSIS, WITH LONG-TERM CURRENT USE OF INSULIN (HCC): ICD-10-CM

## 2021-08-09 PROCEDURE — 99214 OFFICE O/P EST MOD 30 MIN: CPT | Performed by: INTERNAL MEDICINE

## 2021-08-09 RX ORDER — SULFAMETHOXAZOLE AND TRIMETHOPRIM 400; 80 MG/1; MG/1
1 TABLET ORAL 2 TIMES DAILY
Qty: 14 TABLET | Refills: 0 | Status: SHIPPED | OUTPATIENT
Start: 2021-08-09 | End: 2021-10-11

## 2021-08-09 NOTE — PATIENT INSTRUCTIONS
Bactrim (antibiotic) for groin cyst.   Warm compresses. Follow up with Dr Claudine Matos for the groin cyst.    Do the PSA that [address] ordered (urologist).

## 2021-08-09 NOTE — PROGRESS NOTES
Sobeida Loaiza is a 77year old male who presents for     Check at 4 mo     Feels good. Pt feels the \"cyst may have come back. \" Looked crusted 2 days ago and now \"seeping\". Had some bloody drge and is tender.    Dr Kirill Blum did I & D of L groin absce Hyperlipemia    • Microalbuminuria 2005   • Neuropathy     diabetic, jorge hands and feet   • Obesity 10/1984   • Pancytopenia [facility]) 2009    Dr. Tremayne Contreras   • Peritoneal dialysis status [facility]) 07/2018    Dr Asael Conde   • Pernicious anemia 2008   • Plat date: 1982        Years since quittin.4      Smokeless tobacco: Never Used      Tobacco comment: smokes cigars    Vaping Use      Vaping Use: Never used    Alcohol use:  Yes      Alcohol/week: 1.0 standard drinks      Types: 1 Glasses of wine per (Dr Karla Bajwa stopped on hctz 25 mg qod in 1/2018).      Chronic kidney disease--stage 5-Dr. Dexter monitors peritoneal dialysis--started PD 7/2018.  Is on renal transplant list at [facility] as of March 2021.      Diabetes-type 2 with chronic dialysis and retinopathy- Sea Ever BLANCO last saw 12/3/20- she continued tamsulosin 0.4 mg daily. She ord PSA--reminder at 8/9/21 visit.     AMELIE-CPAP rx per Dr. Brissa Loo maintenance:   colonoscopy 12/24/19 Dr. Berenice Cordova, unremarkable ileocolonic an MCG/ACT Nasal Suspension 2 sprays by Nasal route daily. 1 Bottle 11   • HUMULIN R U-500, CONCENTRATED, 500 UNIT/ML Subcutaneous Solution Insulin pump      • PORFIRIO CONTOUR NEXT TEST In Vitro Strip      • Probiotic Oral Cap Take 1 capsule by mouth daily.

## 2021-09-11 ENCOUNTER — LAB ENCOUNTER (OUTPATIENT)
Dept: LAB | Facility: HOSPITAL | Age: 66
End: 2021-09-11
Attending: INTERNAL MEDICINE
Payer: MEDICARE

## 2021-09-11 DIAGNOSIS — Z12.5 SCREENING PSA (PROSTATE SPECIFIC ANTIGEN): ICD-10-CM

## 2021-09-11 DIAGNOSIS — E11.9 DIABETES MELLITUS (HCC): Primary | ICD-10-CM

## 2021-09-11 DIAGNOSIS — N40.1 BENIGN PROSTATIC HYPERPLASIA WITH LOWER URINARY TRACT SYMPTOMS, SYMPTOM DETAILS UNSPECIFIED: ICD-10-CM

## 2021-09-11 LAB
ALBUMIN SERPL-MCNC: 3.3 G/DL (ref 3.4–5)
ALBUMIN/GLOB SERPL: 1.1 {RATIO} (ref 1–2)
ALP LIVER SERPL-CCNC: 76 U/L
ALT SERPL-CCNC: 53 U/L
ANION GAP SERPL CALC-SCNC: 8 MMOL/L (ref 0–18)
AST SERPL-CCNC: 24 U/L (ref 15–37)
BILIRUB SERPL-MCNC: 0.4 MG/DL (ref 0.1–2)
BUN BLD-MCNC: 69 MG/DL (ref 7–18)
BUN/CREAT SERPL: 16.3 (ref 10–20)
CALCIUM BLD-MCNC: 8.5 MG/DL (ref 8.5–10.1)
CHLORIDE SERPL-SCNC: 115 MMOL/L (ref 98–112)
CHOLEST SMN-MCNC: 96 MG/DL (ref ?–200)
CO2 SERPL-SCNC: 21 MMOL/L (ref 21–32)
COMPLEXED PSA SERPL-MCNC: 1.8 NG/ML (ref ?–4)
CREAT BLD-MCNC: 4.23 MG/DL
EST. AVERAGE GLUCOSE BLD GHB EST-MCNC: 137 MG/DL (ref 68–126)
GLOBULIN PLAS-MCNC: 3.1 G/DL (ref 2.8–4.4)
GLUCOSE BLD-MCNC: 91 MG/DL (ref 70–99)
HBA1C MFR BLD HPLC: 6.4 % (ref ?–5.7)
HDLC SERPL-MCNC: 31 MG/DL (ref 40–59)
LDLC SERPL CALC-MCNC: 38 MG/DL (ref ?–100)
M PROTEIN MFR SERPL ELPH: 6.4 G/DL (ref 6.4–8.2)
NONHDLC SERPL-MCNC: 65 MG/DL (ref ?–130)
OSMOLALITY SERPL CALC.SUM OF ELEC: 318 MOSM/KG (ref 275–295)
PATIENT FASTING Y/N/NP: YES
PATIENT FASTING Y/N/NP: YES
POTASSIUM SERPL-SCNC: 4.6 MMOL/L (ref 3.5–5.1)
SODIUM SERPL-SCNC: 144 MMOL/L (ref 136–145)
TRIGL SERPL-MCNC: 160 MG/DL (ref 30–149)
VLDLC SERPL CALC-MCNC: 22 MG/DL (ref 0–30)

## 2021-09-11 PROCEDURE — 80053 COMPREHEN METABOLIC PANEL: CPT

## 2021-09-11 PROCEDURE — 83036 HEMOGLOBIN GLYCOSYLATED A1C: CPT

## 2021-09-11 PROCEDURE — 80061 LIPID PANEL: CPT

## 2021-09-11 PROCEDURE — 36415 COLL VENOUS BLD VENIPUNCTURE: CPT

## 2021-10-11 RX ORDER — SYRINGE WITH NEEDLE, 1 ML 25GX5/8"
SYRINGE, EMPTY DISPOSABLE MISCELLANEOUS
Qty: 12 EACH | Refills: 0 | Status: SHIPPED | OUTPATIENT
Start: 2021-10-11

## 2021-10-13 ENCOUNTER — APPOINTMENT (OUTPATIENT)
Dept: GENERAL RADIOLOGY | Facility: HOSPITAL | Age: 66
DRG: 682 | End: 2021-10-13
Attending: NURSE PRACTITIONER
Payer: MEDICARE

## 2021-10-13 ENCOUNTER — HOSPITAL ENCOUNTER (INPATIENT)
Facility: HOSPITAL | Age: 66
LOS: 2 days | Discharge: HOME | DRG: 682 | End: 2021-10-15
Attending: HOSPITALIST | Admitting: HOSPITALIST
Payer: MEDICARE

## 2021-10-13 ENCOUNTER — PATIENT MESSAGE (OUTPATIENT)
Dept: INTERNAL MEDICINE CLINIC | Facility: CLINIC | Age: 66
End: 2021-10-13

## 2021-10-13 ENCOUNTER — TELEPHONE (OUTPATIENT)
Dept: INTERNAL MEDICINE CLINIC | Facility: CLINIC | Age: 66
End: 2021-10-13

## 2021-10-13 DIAGNOSIS — E87.70 HYPERVOLEMIA, UNSPECIFIED HYPERVOLEMIA TYPE: ICD-10-CM

## 2021-10-13 DIAGNOSIS — N19 UREMIA: Primary | ICD-10-CM

## 2021-10-13 DIAGNOSIS — Z99.2 STAGE 5 CHRONIC KIDNEY DISEASE ON DIALYSIS (HCC): ICD-10-CM

## 2021-10-13 DIAGNOSIS — N18.6 STAGE 5 CHRONIC KIDNEY DISEASE ON DIALYSIS (HCC): ICD-10-CM

## 2021-10-13 PROCEDURE — 99223 1ST HOSP IP/OBS HIGH 75: CPT | Performed by: HOSPITALIST

## 2021-10-13 PROCEDURE — 3E1M39Z IRRIGATION OF PERITONEAL CAVITY USING DIALYSATE, PERCUTANEOUS APPROACH: ICD-10-PCS | Performed by: HOSPITALIST

## 2021-10-13 PROCEDURE — 71045 X-RAY EXAM CHEST 1 VIEW: CPT | Performed by: NURSE PRACTITIONER

## 2021-10-13 RX ORDER — HEPARIN SODIUM 5000 [USP'U]/ML
5000 INJECTION, SOLUTION INTRAVENOUS; SUBCUTANEOUS EVERY 12 HOURS SCHEDULED
Status: DISCONTINUED | OUTPATIENT
Start: 2021-10-14 | End: 2021-10-15

## 2021-10-13 RX ORDER — VANCOMYCIN HYDROCHLORIDE
1500 ONCE
Status: DISCONTINUED | OUTPATIENT
Start: 2021-10-13 | End: 2021-10-13 | Stop reason: DRUGHIGH

## 2021-10-13 RX ORDER — DEXTROSE MONOHYDRATE, SODIUM CHLORIDE, SODIUM LACTATE, CALCIUM CHLORIDE, MAGNESIUM CHLORIDE 2.5; 538; 448; 18.4; 5.08 G/100ML; MG/100ML; MG/100ML; MG/100ML; MG/100ML
5000 SOLUTION INTRAPERITONEAL
Status: DISCONTINUED | OUTPATIENT
Start: 2021-10-14 | End: 2021-10-15

## 2021-10-13 RX ORDER — DEXTROSE MONOHYDRATE 25 G/50ML
50 INJECTION, SOLUTION INTRAVENOUS
Status: DISCONTINUED | OUTPATIENT
Start: 2021-10-13 | End: 2021-10-15

## 2021-10-13 RX ORDER — VANCOMYCIN 2 GRAM/500 ML IN 0.9 % SODIUM CHLORIDE INTRAVENOUS
25 ONCE
Status: COMPLETED | OUTPATIENT
Start: 2021-10-13 | End: 2021-10-14

## 2021-10-13 RX ORDER — ONDANSETRON 2 MG/ML
4 INJECTION INTRAMUSCULAR; INTRAVENOUS EVERY 6 HOURS PRN
Status: DISCONTINUED | OUTPATIENT
Start: 2021-10-13 | End: 2021-10-15

## 2021-10-13 RX ORDER — FUROSEMIDE 10 MG/ML
40 INJECTION INTRAMUSCULAR; INTRAVENOUS ONCE
Status: COMPLETED | OUTPATIENT
Start: 2021-10-13 | End: 2021-10-13

## 2021-10-13 NOTE — TELEPHONE ENCOUNTER
I spoke with Cristian Segovia and relayed Dr. Oxana Wayne message. He verbalized understanding. He is agreeable to go to the ER this evening. Nursing, follow up tomorrow.

## 2021-10-13 NOTE — TELEPHONE ENCOUNTER
COVID triage:     Start of symptoms: 3 weeks      Fever:  []  No fever  []  Temperature  [x]  Chills   []  Night sweats     Cough:  [] Productive cough  [] Cough with exertion  [x] Dry cough     Breathing:  [x] Wheezing  [] Pain with deep breathing  [] SOB

## 2021-10-13 NOTE — TELEPHONE ENCOUNTER
[facility] requesting PA Cyanocobalamin 1000 mcg/ml  ID [id number], [phone]  Placed in purple folder

## 2021-10-13 NOTE — ED QUICK NOTES
ASSUMED CARE TO THIS PT WHO CAME IN AMBULATORY WITH STEADY GAIT TO ROOM 51 FROM TRIAGE FOR COUGH, CONGESTION, CHILLS GOING ON FOR COUPLE OF DAYS, PER PT HE WAS SENT HERE BY HIS DOCTOR FOR COVID TEST AND POSSIBLE ANTIBODY INFUSION.   PT IS A/O X 4, BREATHING

## 2021-10-13 NOTE — TELEPHONE ENCOUNTER
----- Message from Yelitza Howard. Suits sent at 10/13/2021  4:22 PM CDT -----  Regarding: should I get a Covid test  For about 3 weeks I have had stomach problems that include constipation to intense diarrhea.  Last Saturday things seemed to improve but started to

## 2021-10-13 NOTE — TELEPHONE ENCOUNTER
To nursing, I recommend he go to the emergency room ASAP. He is a Covid suspect until proven otherwise. If he has Covid, I would favor monoclonal antibody infusion which they could give in the emergency room. Thanks.

## 2021-10-13 NOTE — ED INITIAL ASSESSMENT (HPI)
Patient with cough, nasal congestion and chills that began a few days ago. Sent by MD Raina Alejandro for Covid testing and possible antibody treatment.  Patient is vaccinated against Covid-19. + Peritoneal dialysis patient

## 2021-10-14 ENCOUNTER — APPOINTMENT (OUTPATIENT)
Dept: CV DIAGNOSTICS | Facility: HOSPITAL | Age: 66
DRG: 682 | End: 2021-10-14
Attending: HOSPITALIST
Payer: MEDICARE

## 2021-10-14 PROCEDURE — 93306 TTE W/DOPPLER COMPLETE: CPT | Performed by: HOSPITALIST

## 2021-10-14 PROCEDURE — 99233 SBSQ HOSP IP/OBS HIGH 50: CPT | Performed by: HOSPITALIST

## 2021-10-14 RX ORDER — ATORVASTATIN CALCIUM 40 MG/1
40 TABLET, FILM COATED ORAL NIGHTLY
Status: DISCONTINUED | OUTPATIENT
Start: 2021-10-14 | End: 2021-10-15

## 2021-10-14 RX ORDER — FLUTICASONE PROPIONATE 50 MCG
2 SPRAY, SUSPENSION (ML) NASAL NIGHTLY
Status: DISCONTINUED | OUTPATIENT
Start: 2021-10-14 | End: 2021-10-15

## 2021-10-14 RX ORDER — FUROSEMIDE 40 MG/1
80 TABLET ORAL DAILY
Status: DISCONTINUED | OUTPATIENT
Start: 2021-10-14 | End: 2021-10-15

## 2021-10-14 RX ORDER — MELATONIN
2000 NIGHTLY
Status: DISCONTINUED | OUTPATIENT
Start: 2021-10-14 | End: 2021-10-15

## 2021-10-14 RX ORDER — DEXTROSE MONOHYDRATE 25 G/50ML
50 INJECTION, SOLUTION INTRAVENOUS
Status: DISCONTINUED | OUTPATIENT
Start: 2021-10-14 | End: 2021-10-14

## 2021-10-14 RX ORDER — DEXTROSE MONOHYDRATE, SODIUM CHLORIDE, SODIUM LACTATE, CALCIUM CHLORIDE, MAGNESIUM CHLORIDE 2.5; 538; 448; 18.4; 5.08 G/100ML; MG/100ML; MG/100ML; MG/100ML; MG/100ML
5000 SOLUTION INTRAPERITONEAL
Status: COMPLETED | OUTPATIENT
Start: 2021-10-14 | End: 2021-10-14

## 2021-10-14 RX ORDER — GARLIC EXTRACT 500 MG
1 CAPSULE ORAL NIGHTLY
Status: DISCONTINUED | OUTPATIENT
Start: 2021-10-14 | End: 2021-10-15

## 2021-10-14 RX ORDER — ASPIRIN 81 MG/1
81 TABLET ORAL DAILY
Status: DISCONTINUED | OUTPATIENT
Start: 2021-10-14 | End: 2021-10-15

## 2021-10-14 RX ORDER — TAMSULOSIN HYDROCHLORIDE 0.4 MG/1
0.4 CAPSULE ORAL NIGHTLY
Status: DISCONTINUED | OUTPATIENT
Start: 2021-10-14 | End: 2021-10-15

## 2021-10-14 RX ORDER — POLYETHYLENE GLYCOL 3350 17 G/17G
17 POWDER, FOR SOLUTION ORAL NIGHTLY
COMMUNITY

## 2021-10-14 RX ORDER — POLYETHYLENE GLYCOL 3350 17 G/17G
17 POWDER, FOR SOLUTION ORAL NIGHTLY PRN
Status: DISCONTINUED | OUTPATIENT
Start: 2021-10-14 | End: 2021-10-15

## 2021-10-14 RX ORDER — HYDROCORTISONE 25 MG/G
CREAM TOPICAL 2 TIMES DAILY
Status: DISCONTINUED | OUTPATIENT
Start: 2021-10-14 | End: 2021-10-15

## 2021-10-14 NOTE — CONSULTS
[facility]    Report of Consultation    Dean Dobson Patient Status:  Inpatient    1955 MRN [id number]   Location 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   • Viral disease     Hx spinal virus   • Visual impairment     glasses       Past Surgical History  Past Surgical History:   Procedure Laterality Date   • APPENDECTOMY     • COLECTOMY Trang 3350 (MIRALAX) powder packet 17 g, 17 g, Oral, Nightly PRN  tamsulosin (FLOMAX) cap 0.4 mg, 0.4 mg, Oral, Nightly  hydrocortisone (ANUSOL-HC) 2.5 % rectal cream, , Rectal, BID  dextrose 2.5%, calcium 2.5meq/L peritoneal, 5,000 mL, Intraperitoneal, Once in daily  Syringe/Needle, Disp, (BD LUER-PILY SYRINGE) 23G X 1\" 3 ML Does not apply Misc, USE FOR B12 INJECTIONS AS DIRECTED  Fluticasone Propionate 50 MCG/ACT Nasal Suspension, 2 sprays by Nasal route daily.  (Patient taking differently: 2 sprays by Nasal rou Imaging:  XR CHEST AP PORTABLE  (CPT=71045)    Result Date: 10/13/2021  CONCLUSION: Pulmonary vascular congestion and cardiomegaly. Correlate for CHF.    Dictated by (CST): Marc Peterson MD on 10/13/2021 at 7:43 PM     Finalized by (CST): Marc Peterson

## 2021-10-14 NOTE — CONSULTS
[facility]    Report of Endocrinology Consultation  [facility]    Arnav Barlow Patient Status:  Inpatient    1955 MRN [id number]   Location [facility] 5SW/SE Attending Jozef Denise MD   Hosp Day # 1 PCP with proteinuria--Dr. Sugey Luong   • Retinopathy 2015    L eye-- [address] at [facility]   • S/P cardiac cath 03/10/2021    Had card cath at 3/10/21 at [facility] (transplant eval)--Dr Cueto-nonobstructive CAD   • Sleep apnea 11/1987    uses bipap   • [facility]-Administered Medications:   •  aspirin EC tab 81 mg, 81 mg, Oral, Daily  •  atorvastatin (LIPITOR) tab 40 mg, 40 mg, Oral, Nightly  •  Vitamin D3 (Cholecalciferol) (VITAMIN D3) tab 2,000 Units, 2,000 Units, Oral, Nightly  •  fluticasone propionate negative    Physical Exam:  Blood pressure 148/72, pulse 74, temperature 98.2 °F (36.8 °C), temperature source Oral, resp. rate 19, height 6' 0.01\" (1.829 m), weight 276 lb 8 oz (125.4 kg), SpO2 98 %. General: Alert, orientated x3. Cooperative.   No ap

## 2021-10-14 NOTE — PROGRESS NOTES
ADMISSION NOTE    77year old male with DM on insulin pump, ESRD on PD presents with \"URI\" symptoms and worsening renal function with signs of fluid overload. Admitted for PD to remove more fluid. . Available medical records partially reviewed.  Dictatio

## 2021-10-14 NOTE — CM/SW NOTE
MANUEL CALLED [facility] BACK TO INQUIRE ABOUT THE ETA FOR THE RN TO  HOSPITAL FOR THE DIALYSIS. MANUEL TALKED WITH FAUSTO AND SHE STATED  WOULD CONTACT THE RN TO HAVE THEM CALL AND GIVE US AN ETA. Lara Beatty

## 2021-10-14 NOTE — H&P
[facility]    PATIENT'S NAME: Rick Haywooda   ATTENDING PHYSICIAN: Channing Contreras MD   PATIENT ACCOUNT#:   [id number]    LOCATION:  [address] [place] [address] RECORD #:   [id number]       YOB: 1955  ADMISSION DATE:       10/13/2021 7.8 with anion gap of 8. White count was 4.5, with a hemoglobin of 9, and a platelet count of 265,304, there were 78% neutrophils. The patient's previous hemoglobin in January 2019 was 10.8 and platelet count was 285,979. Urinalysis is pending.   The pat nightly, atorvastatin 40 mg nightly, vitamin B12 at 1000 mcg subcutaneous every month, Auryxia 1 g 2 tablets 3 times a day with meals, amlodipine 10 mg daily, Lasix 80 mg daily, insulin pump with U500 insulin, probiotic once a day, aspirin 81 mg daily, vit tenderness. NEUROLOGIC:  The patient was awake, alert, oriented x3 with no focal motor or sensory deficit. PSYCHIATRIC:  His mood and affect were pleasant and cooperative. LABORATORY DATA:  Previously mentioned. ASSESSMENT AND PLAN:    1.    Congest he agreed with the plan of care as outlined above.      Dictated By Dee Thorpe MD  d: 10/14/2021 08:39:03  t: 10/14/2021 09:32:25  [facility] [id number]  FAT/    cc: Pretty Trimble MD

## 2021-10-14 NOTE — CM/SW NOTE
MANUEL IS STILL AWAITING A CALL FROM [facility] DIALYSIS NURSE ABOUT AN ETA   FOR THIS PATIENT

## 2021-10-14 NOTE — PLAN OF CARE
Problem: Patient Centered Care  Goal: Patient preferences are identified and integrated in the patient's plan of care  Description: Interventions:  - What would you like us to know as we care for you?  \"I live at home with my wife and I have had PD for t bleeding and/or hematoma  - Assess quality of pulses, skin color and temperature  - Assess for signs of decreased coronary artery perfusion - ex.  Angina  - Evaluate fluid balance, assess for edema, trend weights  Outcome: Progressing  Goal: Absence of card Monitor Blood Glucose as ordered  - Assess for signs and symptoms of hyperglycemia and hypoglycemia  - Administer ordered medications to maintain glucose within target range  - Assess barriers to adequate nutritional intake and initiate nutrition consult a Administer supportive blood products/factors, fluids and medications as ordered and appropriate  - Administer supportive blood products/factors as ordered and appropriate  Outcome: Progressing     Patient presents from home with wife with cough and congest

## 2021-10-14 NOTE — DIABETES ED
[facility]  Inpatient Diabetes Clinician Note    Sunita Mckenzie Patient Status:  Inpatient   1955 MRN [id number]  Location [facility] 5SW/SE Attending Nick Nolasco MD  Hosp Day # 1 PCP Danelle Fonseca MD      Patient ad

## 2021-10-14 NOTE — PROGRESS NOTES
[facility]    Progress Note    Alecia Lora Patient Status:  Inpatient    1955 MRN [id number]   Location [facility] 5SW/SE Attending Guillermo Brooks MD   Hosp Day # 1 PCP Mahin Luna MD       Subjective:   Abigail Morgan Nightly  •  hydrocortisone (ANUSOL-HC) 2.5 % rectal cream, , Rectal, BID  •  influenza vaccine (PF) (FLUZONE HD) high dose for 65 yrs & older inj 0.7ml, 0.7 mL, Intramuscular, Prior to discharge  •  INSULIN VIA INSULIN PUMP, , Subcutaneous, TID AC and HS 13 cm of water pressure. 9.       Diabetic retinopathy. Follows with Dr. Agnes Zhao at the [facility]. 10.     Non-anion gap metabolic acidosis secondary to his renal disease. Recheck in the morning.   Improved with PD    DVT PPx: Heparin sub

## 2021-10-14 NOTE — TELEPHONE ENCOUNTER
BETTY to Dr. George Troncoso----    Pt admitted to [address] [place] [address] for uremia/stage 5 chronic kidney disease.

## 2021-10-14 NOTE — ED PROVIDER NOTES
Patient Seen in: [facility] AND [facility] Emergency Department      History   Patient presents with:  Testing  Weakness  Cough/URI    Stated Complaint: covid testing    Subjective:   HPI    14-year-old male, with a history of allergic rhinitis, colon cancer wit • S/P cardiac cath 03/10/2021    Had card cath at 3/10/21 at [facility] (transplant eval)--Dr Cueto-nonobstructive CAD   • Sleep apnea 11/1987    uses bipap   • Splenomegaly 2009    mild splenomegaly on US liver 2009   • Transient paralysis 1964    Paralysis note reviewed. Constitutional:       Appearance: Normal appearance. He is well-developed. HENT:      Head: Normocephalic. Nose: Congestion present. Mouth/Throat:      Pharynx: Posterior oropharyngeal erythema present.    Eyes:      Conjunctiva With Differential With Platelet.   Procedure                               Abnormality         Status                     ---------                               -----------         ------                     CBC W/ DIFFERENTIAL[[id number]]          Abnormal List

## 2021-10-14 NOTE — TELEPHONE ENCOUNTER
Noted–  Pt admitted with CHF,  creatinine 7.65, BNP 4773. Does home peritoneal dialysis. Started on empiric antibiotics while awaiting peritoneal fluid cultures.

## 2021-10-14 NOTE — ED QUICK NOTES
Orders for admission, patient is aware of plan and ready to go upstairs. Any questions, please call ED DONNELL OKEEFE  at extension [phone].    Type of COVID test sent:  COVID Suspicion level: NOT DETECTED Low/High    Titratable drug(s) infusing:NONE  Rate:    LO

## 2021-10-14 NOTE — PLAN OF CARE
Problem: Patient Centered Care  Goal: Patient preferences are identified and integrated in the patient's plan of care  Description: Interventions:  - What would you like us to know as we care for you?  \"I live at home with my wife and I have had PD for t bleeding and/or hematoma  - Assess quality of pulses, skin color and temperature  - Assess for signs of decreased coronary artery perfusion - ex.  Angina  - Evaluate fluid balance, assess for edema, trend weights  Outcome: Progressing  Goal: Absence of card Monitor Blood Glucose as ordered  - Assess for signs and symptoms of hyperglycemia and hypoglycemia  - Administer ordered medications to maintain glucose within target range  - Assess barriers to adequate nutritional intake and initiate nutrition consult a Administer supportive blood products/factors, fluids and medications as ordered and appropriate  - Administer supportive blood products/factors as ordered and appropriate  Outcome: Progressing   Patient had PD today, per HD RN patient educated on fluid ove

## 2021-10-14 NOTE — CM/SW NOTE
MANUEL CALLED [facility] TO COME AND DO A STAT DIALYSIS ON THIS PATIENT. RN WILL CALL BACK WITH AN ETA. Problem: Venous Thromboembolic Disease, DVT and PE (Infant)  Goal: Signs and Symptoms of Listed Potential Problems Will be Absent, Minimized or Managed (Venous Thromboembolic Disease, DVT and PE)  Signs and symptoms of listed potential problems will be absent, minimized or managed by discharge/transition of care (reference Venous Thromboembolic Disease, DVT and PE (Infant) CPG).   Outcome: Ongoing (interventions implemented as appropriate)  Remains on EKOS through tomorrow per Dr. Garrison.  Pulses +. Legs warm, toes cool qing. Site wnls.

## 2021-10-15 VITALS
TEMPERATURE: 98 F | DIASTOLIC BLOOD PRESSURE: 68 MMHG | HEIGHT: 72.01 IN | RESPIRATION RATE: 21 BRPM | WEIGHT: 273.13 LBS | HEART RATE: 69 BPM | BODY MASS INDEX: 36.99 KG/M2 | SYSTOLIC BLOOD PRESSURE: 149 MMHG | OXYGEN SATURATION: 98 %

## 2021-10-15 PROCEDURE — 99239 HOSP IP/OBS DSCHRG MGMT >30: CPT | Performed by: HOSPITALIST

## 2021-10-15 NOTE — PROGRESS NOTES
[facility]    Endocrine Progress Note  [facility]    Dean Dobson Patient Status:  Inpatient    1955 MRN [id number]   Location [facility] 5SW/SE Attending Alyssa Ellis MD   Hosp Day # 2 PCP Elayne Thompson **OR** glucose-vitamin C (DEX-4) chewable tab 8 tablet, 8 tablet, Oral, Q15 Min PRN  •  Heparin Sodium (Porcine) 5000 UNIT/ML injection 5,000 Units, 5,000 Units, Subcutaneous, 2 times per day  •  ondansetron (ZOFRAN) injection 4 mg, 4 mg, Intravenous, Q6H ABNORMAL No previous ECGs available Electronically signed on 10/15/2021 at 07:03 by Mark Unger MD  10/15/2021

## 2021-10-15 NOTE — PLAN OF CARE
Problem: Patient Centered Care  Goal: Patient preferences are identified and integrated in the patient's plan of care  Description: Interventions:  - What would you like us to know as we care for you?  \"I live at home with my wife and I have had PD for t and/or hematoma  - Assess quality of pulses, skin color and temperature  - Assess for signs of decreased coronary artery perfusion - ex.  Angina  - Evaluate fluid balance, assess for edema, trend weights  Outcome: Completed  Goal: Absence of cardiac arrhyth Glucose as ordered  - Assess for signs and symptoms of hyperglycemia and hypoglycemia  - Administer ordered medications to maintain glucose within target range  - Assess barriers to adequate nutritional intake and initiate nutrition consult as needed  - In blood products/factors, fluids and medications as ordered and appropriate  - Administer supportive blood products/factors as ordered and appropriate  Outcome: Completed   Patient had PD today, per HD RN patient educated on fluid overload and electrolyte le

## 2021-10-15 NOTE — DISCHARGE SUMMARY
[facility]    Discharge Summary    Amber Henson Patient Status:  Inpatient    1955 MRN [id number]   Location [facility] 5SW/SE Attending Melvin Martell MD   Hosp Day # 2 PCP Vicky Brasher MD     Date of Admission: 1 b/l  NEUROLOGIC: Cranial nerves II-XII intact, no focal defecits      History of Present Illness:   Per Dr. Liumdila Sloan  This is a very pleasant 55-year-old gentleman with past medical history of end-stage renal disease, on peritoneal dialysis; diabetes, on i Dr. Adan Goldman was consulted, and he was admitted for urgent peritoneal dialysis.   A dose of vancomycin and ceftazidime was ordered by Dr. Adan Goldman for the possibility of acute bacterial peritonitis, and the patient was admitted for further evaluation and treatm TABLET BY MOUTH TWICE DAILY   Quantity: 270 tablet  Refills: 3     tamsulosin 0.4 MG Caps  Commonly known as: FLOMAX  What changed: when to take this      TAKE 1 CAPSULE BY MOUTH DAILY   Quantity: 90 capsule  Refills: 3        CONTINUE taking these medicat Greater than 35 minutes spent, >50% spent counseling re: treatment plan and workup    Johanna Noonan.  Melissa Durán MD  10/15/2021

## 2021-10-15 NOTE — PLAN OF CARE
Problem: Patient Centered Care  Goal: Patient preferences are identified and integrated in the patient's plan of care  Description: Interventions:  - What would you like us to know as we care for you?  \"I live at home with my wife and I have had PD for t bleeding and/or hematoma  - Assess quality of pulses, skin color and temperature  - Assess for signs of decreased coronary artery perfusion - ex.  Angina  - Evaluate fluid balance, assess for edema, trend weights  Outcome: Progressing  Goal: Absence of card Monitor Blood Glucose as ordered  - Assess for signs and symptoms of hyperglycemia and hypoglycemia  - Administer ordered medications to maintain glucose within target range  - Assess barriers to adequate nutritional intake and initiate nutrition consult a Administer supportive blood products/factors, fluids and medications as ordered and appropriate  - Administer supportive blood products/factors as ordered and appropriate  Outcome: Progressing     Patient denies chest pain and shortness of breath.  Currentl

## 2021-10-15 NOTE — PROGRESS NOTES
[facility]    Progress Note    Sobeida Loaiza Patient Status:  Inpatient    1955 MRN [id number]   Location [facility] 5SW/SE Attending Aníbal Moore MD   Hosp Day # 2 PCP Omid Berumen MD       Subjective:   Asha Beck 6.92 (H) 10/15/2021    BUN 90 (H) 10/15/2021     10/15/2021    K 4.6 10/15/2021     (H) 10/15/2021    CO2 21.0 10/15/2021     (H) 10/15/2021    CA 7.9 (L) 10/15/2021    ALB 2.4 (L) 10/15/2021    ALKPHO 76 09/11/2021    BILT 0.4 09/11/202

## 2021-10-18 ENCOUNTER — PATIENT OUTREACH (OUTPATIENT)
Dept: CASE MANAGEMENT | Age: 66
End: 2021-10-18

## 2021-10-18 NOTE — PROGRESS NOTES
NCM placed call to patient for TCM, LM requesting a call to [phone] back with a condition update.

## 2021-10-19 NOTE — PROGRESS NOTES
Attempted to reach the patient to complete a [facility]Hospital FU call. Left a message for the pt to call the NCM back at, [phone].

## 2021-11-16 ENCOUNTER — TELEPHONE (OUTPATIENT)
Dept: INTERNAL MEDICINE CLINIC | Facility: CLINIC | Age: 66
End: 2021-11-16

## 2021-11-16 NOTE — TELEPHONE ENCOUNTER
Received results via fax from [facility]. [facility]

## 2021-11-17 NOTE — TELEPHONE ENCOUNTER
Fax rec'd of [facility] (Dialysis) 11/2/21 per Dr Asa Wisdom results. Pt is on peritoneal dialysis. Cbc -wbc 5.81  Hgb 9.4  plt 140. (chronic) Chem--creat 7.34, BUN 86, K 5.4 glu 169. Results sent to scan in.

## 2021-11-23 ENCOUNTER — PATIENT MESSAGE (OUTPATIENT)
Dept: INTERNAL MEDICINE CLINIC | Facility: CLINIC | Age: 66
End: 2021-11-23

## 2021-11-23 NOTE — TELEPHONE ENCOUNTER
From: Josephine Sanchez  To: Jane Wade MD  Sent: 11/23/2021 5:04 PM CST  Subject: Shingrex and Covid booster    Tried to get my chart to update that I got my first Shingrex shot and Covid booster shot at [facility] on 11/8/2021.  They were unable to adjus

## 2021-12-06 ENCOUNTER — TELEPHONE (OUTPATIENT)
Dept: INTERNAL MEDICINE CLINIC | Facility: CLINIC | Age: 66
End: 2021-12-06

## 2021-12-06 ENCOUNTER — OFFICE VISIT (OUTPATIENT)
Dept: SURGERY | Facility: CLINIC | Age: 66
End: 2021-12-06
Payer: MEDICARE

## 2021-12-06 DIAGNOSIS — N40.1 BENIGN PROSTATIC HYPERPLASIA WITH LOWER URINARY TRACT SYMPTOMS, SYMPTOM DETAILS UNSPECIFIED: Primary | ICD-10-CM

## 2021-12-06 DIAGNOSIS — R31.29 MICROHEMATURIA: ICD-10-CM

## 2021-12-06 PROCEDURE — 99213 OFFICE O/P EST LOW 20 MIN: CPT | Performed by: NURSE PRACTITIONER

## 2021-12-06 NOTE — PROGRESS NOTES
HPI:    Patient ID: Hugh Rosales is a 77year old male. HPI     Patient is a 77year old male who presents to the clinic for a follow up. Patient previously seen with severe voiding dysfunction and BPH related symptoms.   He had complaints of urinar Tab Take 1 tablet (10 mg total) by mouth daily. 1 tablet 0   • Furosemide (LASIX OR) Take 80 mg by mouth daily. • Fluticasone Propionate 50 MCG/ACT Nasal Suspension 2 sprays by Nasal route daily.  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 Pulmonary:      Effort: Pulmonary effort is normal. No respiratory distress. Abdominal:      Palpations: Abdomen is soft. Genitourinary:     Comments: Prostate smooth, symmetrical.  No palpable nodules.    Musculoske

## 2021-12-06 NOTE — TELEPHONE ENCOUNTER
Received fax from [facility] [place] with 12/1/2021 lab results. Report placed in Dr. Leidy Fields.

## 2021-12-07 ENCOUNTER — NURSE ONLY (OUTPATIENT)
Dept: LAB | Age: 66
End: 2021-12-07
Attending: INTERNAL MEDICINE
Payer: MEDICARE

## 2021-12-07 ENCOUNTER — VIRTUAL PHONE E/M (OUTPATIENT)
Dept: INTERNAL MEDICINE CLINIC | Facility: CLINIC | Age: 66
End: 2021-12-07
Payer: MEDICARE

## 2021-12-07 VITALS
OXYGEN SATURATION: 99 % | DIASTOLIC BLOOD PRESSURE: 70 MMHG | HEIGHT: 72 IN | WEIGHT: 259.19 LBS | SYSTOLIC BLOOD PRESSURE: 118 MMHG | BODY MASS INDEX: 35.11 KG/M2 | HEART RATE: 72 BPM

## 2021-12-07 DIAGNOSIS — E66.9 OBESITY (BMI 30-39.9): ICD-10-CM

## 2021-12-07 DIAGNOSIS — E78.1 HYPERTRIGLYCERIDEMIA: ICD-10-CM

## 2021-12-07 DIAGNOSIS — Z79.4 TYPE 2 DIABETES MELLITUS WITH CHRONIC KIDNEY DISEASE ON CHRONIC DIALYSIS, WITH LONG-TERM CURRENT USE OF INSULIN (HCC): ICD-10-CM

## 2021-12-07 DIAGNOSIS — I10 ESSENTIAL HYPERTENSION: ICD-10-CM

## 2021-12-07 DIAGNOSIS — N18.6 TYPE 2 DIABETES MELLITUS WITH CHRONIC KIDNEY DISEASE ON CHRONIC DIALYSIS, WITH LONG-TERM CURRENT USE OF INSULIN (HCC): ICD-10-CM

## 2021-12-07 DIAGNOSIS — R05.9 COUGH: ICD-10-CM

## 2021-12-07 DIAGNOSIS — E11.22 TYPE 2 DIABETES MELLITUS WITH CHRONIC KIDNEY DISEASE ON CHRONIC DIALYSIS, WITH LONG-TERM CURRENT USE OF INSULIN (HCC): ICD-10-CM

## 2021-12-07 DIAGNOSIS — L72.0 EPIDERMOID CYST OF SKIN: Primary | ICD-10-CM

## 2021-12-07 DIAGNOSIS — N18.6 STAGE 5 CHRONIC KIDNEY DISEASE ON DIALYSIS (HCC): ICD-10-CM

## 2021-12-07 DIAGNOSIS — Z99.2 TYPE 2 DIABETES MELLITUS WITH CHRONIC KIDNEY DISEASE ON CHRONIC DIALYSIS, WITH LONG-TERM CURRENT USE OF INSULIN (HCC): ICD-10-CM

## 2021-12-07 DIAGNOSIS — Z99.2 STAGE 5 CHRONIC KIDNEY DISEASE ON DIALYSIS (HCC): ICD-10-CM

## 2021-12-07 PROCEDURE — 99443 PHONE E/M BY PHYS 21-30 MIN: CPT | Performed by: INTERNAL MEDICINE

## 2021-12-07 RX ORDER — CHLORAL HYDRATE 500 MG
2000 CAPSULE ORAL DAILY
Refills: 0 | COMMUNITY
Start: 2021-12-07

## 2021-12-07 RX ORDER — AZITHROMYCIN 250 MG/1
TABLET, FILM COATED ORAL
Qty: 6 TABLET | Refills: 0 | Status: SHIPPED | OUTPATIENT
Start: 2021-12-07 | End: 2021-12-12

## 2021-12-07 NOTE — PROGRESS NOTES
Sobeida Loaiza is a 77year old male who presents for     Virtual Telephone Check-In    Sobeida Loaiza verbally consents to a Virtual/Telephone Check-In visit on 12/07/21.   Patient understands and accepts financial responsibility for any deductible, co-insuran 12/6/21 urology for f/u BPH and to continue tamsulosin 0.4 mg daily. She ordered PSA 1.8 on 9/11/21. Remains off work since last lay off in July 2019.          Past Medical History:   Diagnosis Date   • Adenomatous polyp 2010    cscopy Dr. Catherine Hayes Ryali   • TONSILLECTOMY      T & A      Family History   Problem Relation Age of Onset   • Hypertension Father    • Obesity Father    • Heart Attack Father    • Stroke Father         TIA   • Cancer Father    • Heart Disorder Father    • Other (Other) Olvin Martines 8/9/21 visit. : exam per urology--prostate exam no nodules per [facility]. 12/6/21. Extremities: no edema. 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    Neurologic: alert and oriented                  ASSESSMENT AND PLAN:     phone visit    Cough dialysis clinic-->Saw Dr Sophia Cruz 1/21/21--mild splenomegaly. She recom monitor-done at dialysis.     Vitamin B12 deficiency--Vitamin B12 shots pt self administers monthly.       Elev calcium heart score 312 in circumflex 5/12/16. echo 7/2/16-[facility].  Dr Jose Padilla 9/2 total) daily for 4 days. 6 tablet 0   • polyethylene glycol, PEG 3350, 17 g Oral Powd Pack Take 17 g by mouth nightly.      • Syringe/Needle, Disp, (BD LUER-PILY SYRINGE) 23G X 1\" 3 ML Does not apply Misc USE FOR B12 INJECTIONS AS DIRECTED 12 each 0   • met

## 2021-12-07 NOTE — TELEPHONE ENCOUNTER
Dialysis labs of 12/1/21 sent by Dr. Darcy Lopez noted. CBC chemistry TIBC transferrin–results: WBC 4.24 Hgb 10.6 platelet 93 (chronic pancytopenia) creat 4.61 BUN 59 (on peritoneal dialysis). Patient has appointment to see me tomorrow 12/8/21.

## 2021-12-08 ENCOUNTER — HOSPITAL ENCOUNTER (OUTPATIENT)
Dept: GENERAL RADIOLOGY | Facility: HOSPITAL | Age: 66
Discharge: HOME | End: 2021-12-08
Attending: INTERNAL MEDICINE
Payer: MEDICARE

## 2021-12-08 ENCOUNTER — TELEPHONE (OUTPATIENT)
Dept: PULMONOLOGY | Facility: CLINIC | Age: 66
End: 2021-12-08

## 2021-12-08 ENCOUNTER — TELEPHONE (OUTPATIENT)
Dept: INTERNAL MEDICINE CLINIC | Facility: CLINIC | Age: 66
End: 2021-12-08

## 2021-12-08 DIAGNOSIS — R05.9 COUGH: ICD-10-CM

## 2021-12-08 PROCEDURE — 71046 X-RAY EXAM CHEST 2 VIEWS: CPT | Performed by: INTERNAL MEDICINE

## 2021-12-08 NOTE — TELEPHONE ENCOUNTER
Patient is scheduled with Dr. Lili Colby for a yearly follow-up. He has a pending covid-19 test from yesterday due to a cough. Should we reschedule for video or phone visit? Please follow-up. Thank you.

## 2021-12-08 NOTE — TELEPHONE ENCOUNTER
Spoke with patient. Appointment for tomorrow changed to phone visit due to pending COVID results. Verified date and time. Patient verbalized understanding. Download data placed in upcoming appointment folder.

## 2021-12-09 ENCOUNTER — VIRTUAL PHONE E/M (OUTPATIENT)
Dept: PULMONOLOGY | Facility: CLINIC | Age: 66
End: 2021-12-09
Payer: MEDICARE

## 2021-12-09 DIAGNOSIS — G47.33 OBSTRUCTIVE SLEEP APNEA: Primary | ICD-10-CM

## 2021-12-09 PROCEDURE — 99441 PHONE E/M BY PHYS 5-10 MIN: CPT | Performed by: INTERNAL MEDICINE

## 2021-12-09 NOTE — PROGRESS NOTES
Virtual Telephone Check-In    Desire Webster verbally consents to a Virtual/Telephone Check-In visit on 12/09/21. Patient has been referred to the [facility] website at [email or web address] to review the yearly Consent to Treat document.     Patient understand

## 2021-12-10 ENCOUNTER — TELEPHONE (OUTPATIENT)
Dept: INTERNAL MEDICINE CLINIC | Facility: CLINIC | Age: 66
End: 2021-12-10

## 2021-12-13 ENCOUNTER — LAB ENCOUNTER (OUTPATIENT)
Dept: LAB | Facility: HOSPITAL | Age: 66
End: 2021-12-13
Attending: INTERNAL MEDICINE
Payer: MEDICARE

## 2021-12-13 DIAGNOSIS — I10 HTN (HYPERTENSION): ICD-10-CM

## 2021-12-13 DIAGNOSIS — E11.9 TYPE 2 DIABETES MELLITUS (HCC): Primary | ICD-10-CM

## 2021-12-13 PROCEDURE — 84443 ASSAY THYROID STIM HORMONE: CPT

## 2021-12-13 PROCEDURE — 84481 FREE ASSAY (FT-3): CPT

## 2021-12-13 PROCEDURE — 80061 LIPID PANEL: CPT

## 2021-12-13 PROCEDURE — 84439 ASSAY OF FREE THYROXINE: CPT

## 2021-12-13 PROCEDURE — 83036 HEMOGLOBIN GLYCOSYLATED A1C: CPT

## 2021-12-13 PROCEDURE — 36415 COLL VENOUS BLD VENIPUNCTURE: CPT

## 2021-12-13 PROCEDURE — 80053 COMPREHEN METABOLIC PANEL: CPT

## 2021-12-21 ENCOUNTER — TELEPHONE (OUTPATIENT)
Dept: SURGERY | Facility: CLINIC | Age: 66
End: 2021-12-21

## 2021-12-21 DIAGNOSIS — R31.29 MICROHEMATURIA: Primary | ICD-10-CM

## 2021-12-21 NOTE — TELEPHONE ENCOUNTER
Called and discussed results with patient. UA showing 6-10 RBC/HPF, 11-20 WBC/HPF. Urine culture no growth. Given microhematuria would recommend evaluation.   Discussed possible etiolgoeis of microhematuria including BPH, infection, inflammation, renal s

## 2021-12-21 NOTE — TELEPHONE ENCOUNTER
Dr. Bertram Haynes, patient with microhematuria, he follows with you, on peritoneal dialysis 6 times weekly. Would it be feasible to complete a CT urogram (IV contrast) and how should we coordinate this with his dialysis? Thank you for your help.

## 2021-12-23 NOTE — TELEPHONE ENCOUNTER
Received message from owen Gomez for CT urogram, patient should have done when he will have dialysis the next day. Attempted to call patient. LMTCB.   Will place order for CT urogram.  If calls back please instruct him to have done on a day when he w

## 2021-12-23 NOTE — TELEPHONE ENCOUNTER
Called and spoke with patient. He verbalized understanding. My chart message sent at his request with number for central scheduling. He will call back to schedule cystoscopy.     Thank you,  Molina BLANCO  [facility]-Urology

## 2021-12-30 ENCOUNTER — LAB ENCOUNTER (OUTPATIENT)
Dept: LAB | Facility: HOSPITAL | Age: 66
End: 2021-12-30
Attending: NURSE PRACTITIONER
Payer: MEDICARE

## 2021-12-30 DIAGNOSIS — R31.29 MICROHEMATURIA: ICD-10-CM

## 2021-12-30 PROCEDURE — 88108 CYTOPATH CONCENTRATE TECH: CPT

## 2022-01-04 ENCOUNTER — TELEPHONE (OUTPATIENT)
Dept: SURGERY | Facility: CLINIC | Age: 67
End: 2022-01-04

## 2022-01-09 ENCOUNTER — PATIENT MESSAGE (OUTPATIENT)
Dept: SURGERY | Facility: CLINIC | Age: 67
End: 2022-01-09

## 2022-01-17 ENCOUNTER — HOSPITAL ENCOUNTER (OUTPATIENT)
Dept: CT IMAGING | Facility: HOSPITAL | Age: 67
Discharge: HOME | End: 2022-01-17
Attending: NURSE PRACTITIONER
Payer: MEDICARE

## 2022-01-17 DIAGNOSIS — R31.29 MICROHEMATURIA: ICD-10-CM

## 2022-01-17 PROCEDURE — 74178 CT ABD&PLV WO CNTR FLWD CNTR: CPT | Performed by: NURSE PRACTITIONER

## 2022-01-17 PROCEDURE — 76377 3D RENDER W/INTRP POSTPROCES: CPT | Performed by: NURSE PRACTITIONER

## 2022-01-20 ENCOUNTER — PATIENT MESSAGE (OUTPATIENT)
Dept: SURGERY | Facility: CLINIC | Age: 67
End: 2022-01-20

## 2022-01-21 NOTE — TELEPHONE ENCOUNTER
From: Mone Wallace Suits  To: Elia Alexander, SHOSHANA  Sent: 1/20/2022 1:18 PM CST  Subject: [facility] transplant coordinator    Please send info on my test to [facility] transplant coordinator Leonila Alvarez at fax #[phone] or call her at $[phone] thanks for your h

## 2022-01-27 ENCOUNTER — LAB ENCOUNTER (OUTPATIENT)
Dept: LAB | Facility: HOSPITAL | Age: 67
End: 2022-01-27
Attending: UROLOGY
Payer: MEDICARE

## 2022-01-27 ENCOUNTER — OFFICE VISIT (OUTPATIENT)
Dept: SURGERY | Facility: CLINIC | Age: 67
End: 2022-01-27
Payer: COMMERCIAL

## 2022-01-27 DIAGNOSIS — Z79.01 MONITORING FOR ANTICOAGULANT USE: ICD-10-CM

## 2022-01-27 DIAGNOSIS — R31.29 MICROSCOPIC HEMATURIA: ICD-10-CM

## 2022-01-27 DIAGNOSIS — R93.429 ABNORMAL CT SCAN, KIDNEY: Primary | ICD-10-CM

## 2022-01-27 DIAGNOSIS — Z01.818 PREOP EXAMINATION: ICD-10-CM

## 2022-01-27 DIAGNOSIS — Z51.81 MONITORING FOR ANTICOAGULANT USE: ICD-10-CM

## 2022-01-27 LAB
ANION GAP SERPL CALC-SCNC: 5 MMOL/L (ref 0–18)
APTT PPP: 28.8 SECONDS (ref 23.3–35.6)
BASOPHILS # BLD AUTO: 0.03 X10(3) UL (ref 0–0.2)
BASOPHILS NFR BLD AUTO: 0.5 %
BILIRUB UR QL: NEGATIVE
BUN BLD-MCNC: 63 MG/DL (ref 7–18)
BUN/CREAT SERPL: 15.8 (ref 10–20)
CALCIUM BLD-MCNC: 8.3 MG/DL (ref 8.5–10.1)
CHLORIDE SERPL-SCNC: 114 MMOL/L (ref 98–112)
CLARITY UR: CLEAR
CO2 SERPL-SCNC: 23 MMOL/L (ref 21–32)
COLOR UR: YELLOW
CREAT BLD-MCNC: 3.98 MG/DL
DEPRECATED RDW RBC AUTO: 47.7 FL (ref 35.1–46.3)
EOSINOPHIL # BLD AUTO: 0.17 X10(3) UL (ref 0–0.7)
EOSINOPHIL NFR BLD AUTO: 2.8 %
ERYTHROCYTE [DISTWIDTH] IN BLOOD BY AUTOMATED COUNT: 14.4 % (ref 11–15)
FASTING STATUS PATIENT QL REPORTED: NO
GLUCOSE BLD-MCNC: 242 MG/DL (ref 70–99)
GLUCOSE UR-MCNC: >=500 MG/DL
HCT VFR BLD AUTO: 37.1 %
HGB BLD-MCNC: 11.6 G/DL
IMM GRANULOCYTES # BLD AUTO: 0.06 X10(3) UL (ref 0–1)
IMM GRANULOCYTES NFR BLD: 1 %
INR BLD: 1.03 (ref 0.8–1.2)
KETONES UR-MCNC: NEGATIVE MG/DL
LEUKOCYTE ESTERASE UR QL STRIP.AUTO: NEGATIVE
LYMPHOCYTES # BLD AUTO: 0.9 X10(3) UL (ref 1–4)
LYMPHOCYTES NFR BLD AUTO: 14.8 %
MCH RBC QN AUTO: 29.4 PG (ref 26–34)
MCHC RBC AUTO-ENTMCNC: 31.3 G/DL (ref 31–37)
MCV RBC AUTO: 93.9 FL
MONOCYTES # BLD AUTO: 0.46 X10(3) UL (ref 0.1–1)
MONOCYTES NFR BLD AUTO: 7.6 %
NEUTROPHILS # BLD AUTO: 4.46 X10 (3) UL (ref 1.5–7.7)
NEUTROPHILS # BLD AUTO: 4.46 X10(3) UL (ref 1.5–7.7)
NEUTROPHILS NFR BLD AUTO: 73.3 %
NITRITE UR QL STRIP.AUTO: NEGATIVE
OSMOLALITY SERPL CALC.SUM OF ELEC: 320 MOSM/KG (ref 275–295)
PH UR: 6 [PH] (ref 5–8)
PLATELET # BLD AUTO: 105 10(3)UL (ref 150–450)
POTASSIUM SERPL-SCNC: 4.8 MMOL/L (ref 3.5–5.1)
PROT UR-MCNC: >=500 MG/DL
PROTHROMBIN TIME: 13.6 SECONDS (ref 11.6–14.8)
RBC # BLD AUTO: 3.95 X10(6)UL
SODIUM SERPL-SCNC: 142 MMOL/L (ref 136–145)
SP GR UR STRIP: 1.01 (ref 1–1.03)
UROBILINOGEN UR STRIP-ACNC: <2
WBC # BLD AUTO: 6.1 X10(3) UL (ref 4–11)

## 2022-01-27 PROCEDURE — 85730 THROMBOPLASTIN TIME PARTIAL: CPT

## 2022-01-27 PROCEDURE — 99205 OFFICE O/P NEW HI 60 MIN: CPT | Performed by: UROLOGY

## 2022-01-27 PROCEDURE — 80048 BASIC METABOLIC PNL TOTAL CA: CPT

## 2022-01-27 PROCEDURE — 87086 URINE CULTURE/COLONY COUNT: CPT | Performed by: UROLOGY

## 2022-01-27 PROCEDURE — 85025 COMPLETE CBC W/AUTO DIFF WBC: CPT

## 2022-01-27 PROCEDURE — 36415 COLL VENOUS BLD VENIPUNCTURE: CPT

## 2022-01-27 PROCEDURE — 81001 URINALYSIS AUTO W/SCOPE: CPT

## 2022-01-27 PROCEDURE — 85610 PROTHROMBIN TIME: CPT

## 2022-01-27 NOTE — H&P
[facility], [facility] Urology  Initial Office Consultation    HPI:   Will Cheryle is a 77year old male here today for consultation at the request of, and a copy of this note will be sent to, Mina Maxwell MD and FRANCIS Rojo.     1. Microscopic Thaddeus alcohol. Retired  however currently works as an [employer] .      Family History   Problem Relation Age of Onset   • Hypertension Father    • Obesity Father    • Heart Attack Father    • Stroke Father         TIA   • Cancer Father    • Heart Disor details. IMAGING:    CT-UROGRAM (1/17/2022):  1. Filling defects in the right renal pelvis, left upper pole and interpolar renal collecting system may represent blood clots or urothelial neoplasms. Urology consultation recommended.  2. Prostate enlarg ureteroscopy with possible biopsy, bilateral stent insertion. Patient needs to stop aspirin for 5 days prior to the procedure. Routine preop testing.  He was instructed to seek preoperative medical assessment, optimization, and clearance for surgery thro

## 2022-01-28 ENCOUNTER — TELEPHONE (OUTPATIENT)
Dept: SURGERY | Facility: CLINIC | Age: 67
End: 2022-01-28

## 2022-01-28 ENCOUNTER — PATIENT MESSAGE (OUTPATIENT)
Dept: INTERNAL MEDICINE CLINIC | Facility: CLINIC | Age: 67
End: 2022-01-28

## 2022-01-28 NOTE — TELEPHONE ENCOUNTER
To , please call patient tell him the urologist wants a preop clearance-we will need to see me in the office for this. Needs appointment. Thanks.

## 2022-01-28 NOTE — TELEPHONE ENCOUNTER
Dear Jerri Dawson and Mireille Vela, this is to inform that patient is scheduled for surgery, Swetha Em' requesting a medical clearance prior to scheduled surgery.

## 2022-01-28 NOTE — TELEPHONE ENCOUNTER
Patient seen in office, scheduled CYSTOSCOPY, POSSIBLE BLADDER, BILATERAL  RETROGRADE PYELOGRAM, BILATERAL URETEROSCOPY WITH POSSIBLE BIOPSY, BILATERAL URETERAL STENT INSERTION, Friday 02/11/2022, [facility]/outpatient, went over pre-op instructions, all labs done, patient verbalized understanding.

## 2022-01-28 NOTE — TELEPHONE ENCOUNTER
From: Evens Sanchez  To: Tashi Corbett MD  Sent: 1/28/2022 4:23 PM CST  Subject: 2/11 Cystoscopy    Dr Jensen Hameed has recommended a cystoscopy to find the source of the blood in my urine. The out patient procedure involves a general anesthesia.  Dr. Ricky Martino

## 2022-01-31 ENCOUNTER — TELEPHONE (OUTPATIENT)
Dept: INTERNAL MEDICINE CLINIC | Facility: CLINIC | Age: 67
End: 2022-01-31

## 2022-01-31 ENCOUNTER — OFFICE VISIT (OUTPATIENT)
Dept: INTERNAL MEDICINE CLINIC | Facility: CLINIC | Age: 67
End: 2022-01-31
Payer: COMMERCIAL

## 2022-01-31 VITALS
TEMPERATURE: 98 F | BODY MASS INDEX: 37.52 KG/M2 | HEIGHT: 72 IN | SYSTOLIC BLOOD PRESSURE: 124 MMHG | WEIGHT: 277 LBS | HEART RATE: 64 BPM | OXYGEN SATURATION: 98 % | DIASTOLIC BLOOD PRESSURE: 66 MMHG

## 2022-01-31 DIAGNOSIS — R93.429 ABNORMAL CT SCAN, KIDNEY: ICD-10-CM

## 2022-01-31 DIAGNOSIS — Z79.4 TYPE 2 DIABETES MELLITUS WITH CHRONIC KIDNEY DISEASE ON CHRONIC DIALYSIS, WITH LONG-TERM CURRENT USE OF INSULIN (HCC): ICD-10-CM

## 2022-01-31 DIAGNOSIS — N18.6 TYPE 2 DIABETES MELLITUS WITH CHRONIC KIDNEY DISEASE ON CHRONIC DIALYSIS, WITH LONG-TERM CURRENT USE OF INSULIN (HCC): ICD-10-CM

## 2022-01-31 DIAGNOSIS — Z99.2 STAGE 5 CHRONIC KIDNEY DISEASE ON DIALYSIS (HCC): ICD-10-CM

## 2022-01-31 DIAGNOSIS — E11.22 TYPE 2 DIABETES MELLITUS WITH CHRONIC KIDNEY DISEASE ON CHRONIC DIALYSIS, WITH LONG-TERM CURRENT USE OF INSULIN (HCC): ICD-10-CM

## 2022-01-31 DIAGNOSIS — Z01.818 PRE-OP EXAM: Primary | ICD-10-CM

## 2022-01-31 DIAGNOSIS — N18.6 STAGE 5 CHRONIC KIDNEY DISEASE ON DIALYSIS (HCC): ICD-10-CM

## 2022-01-31 DIAGNOSIS — I10 ESSENTIAL HYPERTENSION: ICD-10-CM

## 2022-01-31 DIAGNOSIS — Z99.2 TYPE 2 DIABETES MELLITUS WITH CHRONIC KIDNEY DISEASE ON CHRONIC DIALYSIS, WITH LONG-TERM CURRENT USE OF INSULIN (HCC): ICD-10-CM

## 2022-01-31 PROCEDURE — 3074F SYST BP LT 130 MM HG: CPT | Performed by: INTERNAL MEDICINE

## 2022-01-31 PROCEDURE — 93000 ELECTROCARDIOGRAM COMPLETE: CPT | Performed by: INTERNAL MEDICINE

## 2022-01-31 PROCEDURE — 3078F DIAST BP <80 MM HG: CPT | Performed by: INTERNAL MEDICINE

## 2022-01-31 PROCEDURE — 99214 OFFICE O/P EST MOD 30 MIN: CPT | Performed by: INTERNAL MEDICINE

## 2022-01-31 PROCEDURE — 3008F BODY MASS INDEX DOCD: CPT | Performed by: INTERNAL MEDICINE

## 2022-01-31 NOTE — TELEPHONE ENCOUNTER
to Dr Janine Alaniz --phil HERNÁNDEZ--I saw patient today for pre-op evaluation  His EKG is normal.  He appears stable for cystoscopy, retrogrades, ureteroscopy procedure you plan on 2/11/22. Visit note is in Epic  Thanks for taking care of JESSICA. .  Charley Fleming,

## 2022-01-31 NOTE — PROGRESS NOTES
Lizzy Joiner is a 79year old male who presents for     Pre op exam   To have cystoscopy with jorge retrogrades/ureteroscopy 2/11/22 per Dr Cara Gomes under anesthesia.    Was asked to get pre-op eval.   CT urogram 1/17/22--Maty Alexander APRN- to eval micro • Platelets decreased ([facility] [address]    • Psoriasis-eczema overlap condition    • Renal disorder     CKD with proteinuria--Dr. Carlos Page   • Retinopathy 2015    L eye--[facility] Chuck Villalobos at [facility]   • S/P cardiac cath 03/10/2021    Had card cath at 3/10/21 at [facility] wine per week      Comment: occasional    Drug use: No         Allergies:    Merbromin               RASH, SWELLING  M[place]                 SWELLING, OTHER (SEE COMMENTS)    Comment:Blisters, itching             Blisters,itching,swelling  Merthilate [Thim risks for surgery due to mult med problems,    - ELECTROCARDIOGRAM, COMPLETE    Hypertension -metoprolol 50 mg 1 1/2 tabs bid and [id number] mg daily, Lasix 80 mg. (Dr Georgie Handley stopped on hctz 25 mg qod in 1/2018).      Chronic kidney disease--stage 5-Dr. Brian Galan cath 3/10/21 UI (transplant eval)--Dr Cueto-nonobstructive CAD. Follows w Dr Dax Montalvo. Echo 10/14/21--EF 65%.     BPH-hxProstatitis 8/2018---US kidneys bladder 9/7/18-no hydroneph, 7 mm kidney stone non obst.  ml  [employer] APRN rx tamsulos CAPSULE BY MOUTH DAILY (Patient taking differently: Take 0.4 mg by mouth nightly.) 90 capsule 3   • atorvastatin 40 MG Oral Tab Take 40 mg by mouth nightly.      • cyanocobalamin 1000 MCG/ML Injection Solution INJECT IML IN THE MUSCLE EVERY MONTH 3 mL 3   •

## 2022-02-01 RX ORDER — CYANOCOBALAMIN 1000 UG/ML
INJECTION INTRAMUSCULAR; SUBCUTANEOUS
Qty: 3 ML | Refills: 3 | Status: SHIPPED | OUTPATIENT
Start: 2022-02-01

## 2022-02-08 ENCOUNTER — LAB ENCOUNTER (OUTPATIENT)
Dept: LAB | Facility: HOSPITAL | Age: 67
End: 2022-02-08
Attending: UROLOGY
Payer: MEDICARE

## 2022-02-08 DIAGNOSIS — Z01.818 PREOP TESTING: ICD-10-CM

## 2022-02-08 LAB — SARS-COV-2 RNA RESP QL NAA+PROBE: NOT DETECTED

## 2022-02-11 ENCOUNTER — ANESTHESIA (OUTPATIENT)
Dept: PREOP | Facility: HOSPITAL | Age: 67
End: 2022-02-11
Payer: MEDICARE

## 2022-02-11 ENCOUNTER — TELEPHONE (OUTPATIENT)
Dept: SURGERY | Facility: CLINIC | Age: 67
End: 2022-02-11

## 2022-02-11 ENCOUNTER — ANESTHESIA EVENT (OUTPATIENT)
Dept: PREOP | Facility: HOSPITAL | Age: 67
End: 2022-02-11
Payer: MEDICARE

## 2022-02-11 ENCOUNTER — HOSPITAL ENCOUNTER (OUTPATIENT)
Facility: HOSPITAL | Age: 67
Setting detail: HOSPITAL OUTPATIENT SURGERY
Discharge: HOME | End: 2022-02-11
Attending: UROLOGY | Admitting: UROLOGY
Payer: MEDICARE

## 2022-02-11 ENCOUNTER — APPOINTMENT (OUTPATIENT)
Dept: GENERAL RADIOLOGY | Facility: HOSPITAL | Age: 67
End: 2022-02-11
Attending: UROLOGY
Payer: MEDICARE

## 2022-02-11 VITALS
HEIGHT: 72 IN | TEMPERATURE: 99 F | WEIGHT: 268 LBS | SYSTOLIC BLOOD PRESSURE: 150 MMHG | DIASTOLIC BLOOD PRESSURE: 65 MMHG | OXYGEN SATURATION: 97 % | RESPIRATION RATE: 18 BRPM | HEART RATE: 66 BPM | BODY MASS INDEX: 36.3 KG/M2

## 2022-02-11 DIAGNOSIS — R31.29 MICROHEMATURIA: ICD-10-CM

## 2022-02-11 DIAGNOSIS — Z01.818 PREOP TESTING: Primary | ICD-10-CM

## 2022-02-11 DIAGNOSIS — C65.1 RENAL PELVIS TRANSITIONAL CELL MALIGNANT NEOPLASM, RIGHT (HCC): ICD-10-CM

## 2022-02-11 DIAGNOSIS — N28.89 RENAL MASS: ICD-10-CM

## 2022-02-11 LAB
GLUCOSE BLDC GLUCOMTR-MCNC: 152 MG/DL (ref 70–99)
POTASSIUM SERPL-SCNC: 5.1 MMOL/L (ref 3.5–5.1)
SARS-COV-2 RNA RESP QL NAA+PROBE: NOT DETECTED

## 2022-02-11 PROCEDURE — 84132 ASSAY OF SERUM POTASSIUM: CPT | Performed by: UROLOGY

## 2022-02-11 PROCEDURE — 82962 GLUCOSE BLOOD TEST: CPT

## 2022-02-11 PROCEDURE — 36415 COLL VENOUS BLD VENIPUNCTURE: CPT | Performed by: UROLOGY

## 2022-02-11 RX ORDER — FAMOTIDINE 20 MG/1
20 TABLET, FILM COATED ORAL ONCE
Status: COMPLETED | OUTPATIENT
Start: 2022-02-11 | End: 2022-02-11

## 2022-02-11 RX ORDER — SODIUM CHLORIDE 9 MG/ML
INJECTION, SOLUTION INTRAVENOUS CONTINUOUS
Status: DISCONTINUED | OUTPATIENT
Start: 2022-02-11 | End: 2022-02-11

## 2022-02-11 RX ORDER — ACETAMINOPHEN 500 MG
1000 TABLET ORAL ONCE
Status: COMPLETED | OUTPATIENT
Start: 2022-02-11 | End: 2022-02-11

## 2022-02-11 RX ORDER — METOCLOPRAMIDE 10 MG/1
10 TABLET ORAL ONCE
Status: COMPLETED | OUTPATIENT
Start: 2022-02-11 | End: 2022-02-11

## 2022-02-11 NOTE — TELEPHONE ENCOUNTER
Yolie: Patient's procedure which was scheduled for today 2/11/2022 was canceled because he was having upper respiratory infection symptoms. His repeat Covid test was negative. Please contact patient and reschedule the same procedure for Friday, 3/4/2022. No need for any updated labs from my standpoint.     Thank you,    Brandon Seaman MD  2/11/2022

## 2022-02-11 NOTE — PROGRESS NOTES
Patient seen in the preoperative holding area. He is scheduled today for cystoscopy with possible bladder biopsy, bilateral retrograde pyelogram, bilateral ureteroscopy with possible biopsy, fulguration, and stent insertion for further evaluation of filling defects in the bilateral renal collecting systems noted upon evaluation of microscopic hematuria. Patient has been complaining of sinus congestion, postnasal drip, and cough for the past 2 days. His preop COVID test from 2/8/2022 was negative. He was seen by anesthesia service. Recommendation was NOT to proceed with surgery as scheduled under GA today given his symptoms and elective nature of the procedure and to reschedule for when patient has recovered from his acute illness. A rapid Covid test was sent with results being negative. This was discussed with patient and he agrees with the plan of care. We will reschedule his surgery in 2 to 3 weeks. All questions answered.     Darien Grullon MD  2/11/2022 9:24 AM

## 2022-03-01 ENCOUNTER — LAB ENCOUNTER (OUTPATIENT)
Dept: LAB | Facility: HOSPITAL | Age: 67
End: 2022-03-01
Attending: UROLOGY
Payer: MEDICARE

## 2022-03-01 DIAGNOSIS — Z01.818 PRE-OP TESTING: ICD-10-CM

## 2022-03-02 LAB — SARS-COV-2 RNA RESP QL NAA+PROBE: NOT DETECTED

## 2022-03-02 RX ORDER — ASCORBIC ACID, THIAMINE, RIBOFLAVIN, NIACINAMIDE, PYRIDOXINE, FOLIC ACID, COBALAMIN, BIOTIN, PANTOTHENIC ACID 100; 1.5; 1.7; 20; 10; 1; 6; 300; 1 MG/1; MG/1; MG/1; MG/1; MG/1; MG/1; UG/1; UG/1; MG/1
1 TABLET, COATED ORAL DAILY
COMMUNITY

## 2022-03-03 ENCOUNTER — ANESTHESIA EVENT (OUTPATIENT)
Dept: SURGERY | Facility: HOSPITAL | Age: 67
End: 2022-03-03
Payer: MEDICARE

## 2022-03-04 ENCOUNTER — APPOINTMENT (OUTPATIENT)
Dept: GENERAL RADIOLOGY | Facility: HOSPITAL | Age: 67
End: 2022-03-04
Attending: UROLOGY
Payer: MEDICARE

## 2022-03-04 ENCOUNTER — ANESTHESIA (OUTPATIENT)
Dept: SURGERY | Facility: HOSPITAL | Age: 67
End: 2022-03-04
Payer: MEDICARE

## 2022-03-04 ENCOUNTER — TELEPHONE (OUTPATIENT)
Dept: SURGERY | Facility: CLINIC | Age: 67
End: 2022-03-04

## 2022-03-04 ENCOUNTER — HOSPITAL ENCOUNTER (OUTPATIENT)
Facility: HOSPITAL | Age: 67
Setting detail: HOSPITAL OUTPATIENT SURGERY
Discharge: HOME | End: 2022-03-04
Attending: UROLOGY | Admitting: UROLOGY
Payer: MEDICARE

## 2022-03-04 VITALS
RESPIRATION RATE: 16 BRPM | OXYGEN SATURATION: 98 % | SYSTOLIC BLOOD PRESSURE: 129 MMHG | WEIGHT: 268 LBS | HEIGHT: 72 IN | DIASTOLIC BLOOD PRESSURE: 53 MMHG | BODY MASS INDEX: 36.3 KG/M2 | HEART RATE: 66 BPM | TEMPERATURE: 97 F

## 2022-03-04 DIAGNOSIS — C65.9 MALIGNANT NEOPLASM OF RENAL PELVIS, UNSPECIFIED LATERALITY (HCC): ICD-10-CM

## 2022-03-04 DIAGNOSIS — Z01.818 PRE-OP TESTING: Primary | ICD-10-CM

## 2022-03-04 DIAGNOSIS — N28.89 RENAL MASS: ICD-10-CM

## 2022-03-04 DIAGNOSIS — R31.29 MICROHEMATURIA: ICD-10-CM

## 2022-03-04 LAB
GLUCOSE BLDC GLUCOMTR-MCNC: 152 MG/DL (ref 70–99)
GLUCOSE BLDC GLUCOMTR-MCNC: 157 MG/DL (ref 70–99)
POTASSIUM SERPL-SCNC: 5 MMOL/L (ref 3.5–5.1)

## 2022-03-04 PROCEDURE — 0TB48ZX EXCISION OF LEFT KIDNEY PELVIS, VIA NATURAL OR ARTIFICIAL OPENING ENDOSCOPIC, DIAGNOSTIC: ICD-10-PCS | Performed by: UROLOGY

## 2022-03-04 PROCEDURE — 0T9880Z DRAINAGE OF BILATERAL URETERS WITH DRAINAGE DEVICE, VIA NATURAL OR ARTIFICIAL OPENING ENDOSCOPIC: ICD-10-PCS | Performed by: UROLOGY

## 2022-03-04 PROCEDURE — 52354 CYSTOURETERO W/BIOPSY: CPT | Performed by: UROLOGY

## 2022-03-04 PROCEDURE — 0T768DZ DILATION OF RIGHT URETER WITH INTRALUMINAL DEVICE, VIA NATURAL OR ARTIFICIAL OPENING ENDOSCOPIC: ICD-10-PCS | Performed by: UROLOGY

## 2022-03-04 PROCEDURE — 0T938ZX DRAINAGE OF RIGHT KIDNEY PELVIS, VIA NATURAL OR ARTIFICIAL OPENING ENDOSCOPIC, DIAGNOSTIC: ICD-10-PCS | Performed by: UROLOGY

## 2022-03-04 PROCEDURE — 52332 CYSTOSCOPY AND TREATMENT: CPT | Performed by: UROLOGY

## 2022-03-04 PROCEDURE — 0TB18ZX EXCISION OF LEFT KIDNEY, VIA NATURAL OR ARTIFICIAL OPENING ENDOSCOPIC, DIAGNOSTIC: ICD-10-PCS | Performed by: UROLOGY

## 2022-03-04 PROCEDURE — BT141ZZ FLUOROSCOPY OF KIDNEYS, URETERS AND BLADDER USING LOW OSMOLAR CONTRAST: ICD-10-PCS | Performed by: UROLOGY

## 2022-03-04 DEVICE — URETERAL STENT
Type: IMPLANTABLE DEVICE | Site: URETER | Status: FUNCTIONAL
Brand: ASCERTA™

## 2022-03-04 RX ORDER — SODIUM CHLORIDE, SODIUM LACTATE, POTASSIUM CHLORIDE, CALCIUM CHLORIDE 600; 310; 30; 20 MG/100ML; MG/100ML; MG/100ML; MG/100ML
INJECTION, SOLUTION INTRAVENOUS CONTINUOUS
Status: DISCONTINUED | OUTPATIENT
Start: 2022-03-04 | End: 2022-03-04

## 2022-03-04 RX ORDER — METOPROLOL TARTRATE 5 MG/5ML
2.5 INJECTION INTRAVENOUS ONCE
Status: DISCONTINUED | OUTPATIENT
Start: 2022-03-04 | End: 2022-03-04

## 2022-03-04 RX ORDER — FAMOTIDINE 20 MG/1
20 TABLET, FILM COATED ORAL ONCE
Status: COMPLETED | OUTPATIENT
Start: 2022-03-04 | End: 2022-03-04

## 2022-03-04 RX ORDER — PHENAZOPYRIDINE HYDROCHLORIDE 200 MG/1
200 TABLET, FILM COATED ORAL ONCE
Status: COMPLETED | OUTPATIENT
Start: 2022-03-04 | End: 2022-03-04

## 2022-03-04 RX ORDER — METOCLOPRAMIDE 10 MG/1
10 TABLET ORAL ONCE
Status: COMPLETED | OUTPATIENT
Start: 2022-03-04 | End: 2022-03-04

## 2022-03-04 RX ORDER — HYDROCODONE BITARTRATE AND ACETAMINOPHEN 5; 325 MG/1; MG/1
1 TABLET ORAL AS NEEDED
Status: COMPLETED | OUTPATIENT
Start: 2022-03-04 | End: 2022-03-04

## 2022-03-04 RX ORDER — PROCHLORPERAZINE EDISYLATE 5 MG/ML
5 INJECTION INTRAMUSCULAR; INTRAVENOUS ONCE AS NEEDED
Status: DISCONTINUED | OUTPATIENT
Start: 2022-03-04 | End: 2022-03-04

## 2022-03-04 RX ORDER — HYDROMORPHONE HYDROCHLORIDE 1 MG/ML
0.4 INJECTION, SOLUTION INTRAMUSCULAR; INTRAVENOUS; SUBCUTANEOUS EVERY 5 MIN PRN
Status: DISCONTINUED | OUTPATIENT
Start: 2022-03-04 | End: 2022-03-04

## 2022-03-04 RX ORDER — HYDROMORPHONE HYDROCHLORIDE 1 MG/ML
0.2 INJECTION, SOLUTION INTRAMUSCULAR; INTRAVENOUS; SUBCUTANEOUS EVERY 5 MIN PRN
Status: DISCONTINUED | OUTPATIENT
Start: 2022-03-04 | End: 2022-03-04

## 2022-03-04 RX ORDER — HALOPERIDOL 5 MG/ML
0.25 INJECTION INTRAMUSCULAR ONCE AS NEEDED
Status: DISCONTINUED | OUTPATIENT
Start: 2022-03-04 | End: 2022-03-04

## 2022-03-04 RX ORDER — SULFAMETHOXAZOLE AND TRIMETHOPRIM 800; 160 MG/1; MG/1
1 TABLET ORAL DAILY
Qty: 5 TABLET | Refills: 0 | Status: SHIPPED | OUTPATIENT
Start: 2022-03-04

## 2022-03-04 RX ORDER — PHENAZOPYRIDINE HYDROCHLORIDE 100 MG/1
100 TABLET, FILM COATED ORAL 2 TIMES DAILY PRN
Qty: 6 TABLET | Refills: 0 | Status: SHIPPED | OUTPATIENT
Start: 2022-03-04 | End: 2022-03-07

## 2022-03-04 RX ORDER — EPHEDRINE SULFATE 50 MG/ML
INJECTION, SOLUTION INTRAVENOUS AS NEEDED
Status: DISCONTINUED | OUTPATIENT
Start: 2022-03-04 | End: 2022-03-04 | Stop reason: SURG

## 2022-03-04 RX ORDER — ONDANSETRON 2 MG/ML
4 INJECTION INTRAMUSCULAR; INTRAVENOUS ONCE AS NEEDED
Status: COMPLETED | OUTPATIENT
Start: 2022-03-04 | End: 2022-03-04

## 2022-03-04 RX ORDER — ACETAMINOPHEN 500 MG
1000 TABLET ORAL ONCE
Status: COMPLETED | OUTPATIENT
Start: 2022-03-04 | End: 2022-03-04

## 2022-03-04 RX ORDER — LIDOCAINE HYDROCHLORIDE 10 MG/ML
INJECTION, SOLUTION EPIDURAL; INFILTRATION; INTRACAUDAL; PERINEURAL AS NEEDED
Status: DISCONTINUED | OUTPATIENT
Start: 2022-03-04 | End: 2022-03-04 | Stop reason: SURG

## 2022-03-04 RX ORDER — SODIUM CHLORIDE 9 MG/ML
INJECTION, SOLUTION INTRAVENOUS CONTINUOUS
Status: DISCONTINUED | OUTPATIENT
Start: 2022-03-04 | End: 2022-03-04

## 2022-03-04 RX ORDER — MORPHINE SULFATE 10 MG/ML
6 INJECTION, SOLUTION INTRAMUSCULAR; INTRAVENOUS EVERY 10 MIN PRN
Status: DISCONTINUED | OUTPATIENT
Start: 2022-03-04 | End: 2022-03-04

## 2022-03-04 RX ORDER — DEXTROSE MONOHYDRATE 25 G/50ML
50 INJECTION, SOLUTION INTRAVENOUS
Status: DISCONTINUED | OUTPATIENT
Start: 2022-03-04 | End: 2022-03-04

## 2022-03-04 RX ORDER — NICOTINE POLACRILEX 4 MG
15 LOZENGE BUCCAL
Status: DISCONTINUED | OUTPATIENT
Start: 2022-03-04 | End: 2022-03-04

## 2022-03-04 RX ORDER — ONDANSETRON 2 MG/ML
INJECTION INTRAMUSCULAR; INTRAVENOUS AS NEEDED
Status: DISCONTINUED | OUTPATIENT
Start: 2022-03-04 | End: 2022-03-04 | Stop reason: SURG

## 2022-03-04 RX ORDER — MORPHINE SULFATE 4 MG/ML
4 INJECTION, SOLUTION INTRAMUSCULAR; INTRAVENOUS EVERY 10 MIN PRN
Status: DISCONTINUED | OUTPATIENT
Start: 2022-03-04 | End: 2022-03-04

## 2022-03-04 RX ORDER — HYDROCODONE BITARTRATE AND ACETAMINOPHEN 5; 325 MG/1; MG/1
2 TABLET ORAL AS NEEDED
Status: COMPLETED | OUTPATIENT
Start: 2022-03-04 | End: 2022-03-04

## 2022-03-04 RX ORDER — NALOXONE HYDROCHLORIDE 0.4 MG/ML
80 INJECTION, SOLUTION INTRAMUSCULAR; INTRAVENOUS; SUBCUTANEOUS AS NEEDED
Status: DISCONTINUED | OUTPATIENT
Start: 2022-03-04 | End: 2022-03-04

## 2022-03-04 RX ORDER — HYDROMORPHONE HYDROCHLORIDE 1 MG/ML
0.6 INJECTION, SOLUTION INTRAMUSCULAR; INTRAVENOUS; SUBCUTANEOUS EVERY 5 MIN PRN
Status: DISCONTINUED | OUTPATIENT
Start: 2022-03-04 | End: 2022-03-04

## 2022-03-04 RX ORDER — PHENYLEPHRINE HCL 10 MG/ML
VIAL (ML) INJECTION AS NEEDED
Status: DISCONTINUED | OUTPATIENT
Start: 2022-03-04 | End: 2022-03-04 | Stop reason: SURG

## 2022-03-04 RX ORDER — MORPHINE SULFATE 4 MG/ML
2 INJECTION, SOLUTION INTRAMUSCULAR; INTRAVENOUS EVERY 10 MIN PRN
Status: DISCONTINUED | OUTPATIENT
Start: 2022-03-04 | End: 2022-03-04

## 2022-03-04 RX ORDER — NICOTINE POLACRILEX 4 MG
30 LOZENGE BUCCAL
Status: DISCONTINUED | OUTPATIENT
Start: 2022-03-04 | End: 2022-03-04

## 2022-03-04 RX ORDER — ROCURONIUM BROMIDE 10 MG/ML
INJECTION, SOLUTION INTRAVENOUS AS NEEDED
Status: DISCONTINUED | OUTPATIENT
Start: 2022-03-04 | End: 2022-03-04 | Stop reason: SURG

## 2022-03-04 RX ORDER — LIDOCAINE HYDROCHLORIDE 40 MG/ML
SOLUTION TOPICAL AS NEEDED
Status: DISCONTINUED | OUTPATIENT
Start: 2022-03-04 | End: 2022-03-04 | Stop reason: SURG

## 2022-03-04 RX ORDER — GLYCOPYRROLATE 0.2 MG/ML
INJECTION, SOLUTION INTRAMUSCULAR; INTRAVENOUS AS NEEDED
Status: DISCONTINUED | OUTPATIENT
Start: 2022-03-04 | End: 2022-03-04 | Stop reason: SURG

## 2022-03-04 RX ADMIN — SODIUM CHLORIDE: 9 INJECTION, SOLUTION INTRAVENOUS at 11:10:00

## 2022-03-04 RX ADMIN — PHENYLEPHRINE HCL 100 MCG: 10 MG/ML VIAL (ML) INJECTION at 09:49:00

## 2022-03-04 RX ADMIN — ROCURONIUM BROMIDE 5 MG: 10 INJECTION, SOLUTION INTRAVENOUS at 10:00:00

## 2022-03-04 RX ADMIN — GLYCOPYRROLATE 0.1 MG: 0.2 INJECTION, SOLUTION INTRAMUSCULAR; INTRAVENOUS at 09:04:00

## 2022-03-04 RX ADMIN — GLYCOPYRROLATE 0.1 MG: 0.2 INJECTION, SOLUTION INTRAMUSCULAR; INTRAVENOUS at 09:40:00

## 2022-03-04 RX ADMIN — LIDOCAINE HYDROCHLORIDE 30 MG: 10 INJECTION, SOLUTION EPIDURAL; INFILTRATION; INTRACAUDAL; PERINEURAL at 09:13:00

## 2022-03-04 RX ADMIN — EPHEDRINE SULFATE 5 MG: 50 INJECTION, SOLUTION INTRAVENOUS at 09:49:00

## 2022-03-04 RX ADMIN — ROCURONIUM BROMIDE 5 MG: 10 INJECTION, SOLUTION INTRAVENOUS at 09:57:00

## 2022-03-04 RX ADMIN — ONDANSETRON 4 MG: 2 INJECTION INTRAMUSCULAR; INTRAVENOUS at 10:57:00

## 2022-03-04 RX ADMIN — PHENYLEPHRINE HCL 100 MCG: 10 MG/ML VIAL (ML) INJECTION at 09:40:00

## 2022-03-04 RX ADMIN — LIDOCAINE HYDROCHLORIDE 4 ML: 40 SOLUTION TOPICAL at 09:16:00

## 2022-03-04 RX ADMIN — EPHEDRINE SULFATE 5 MG: 50 INJECTION, SOLUTION INTRAVENOUS at 10:43:00

## 2022-03-04 RX ADMIN — SODIUM CHLORIDE: 9 INJECTION, SOLUTION INTRAVENOUS at 09:04:00

## 2022-03-04 RX ADMIN — ROCURONIUM BROMIDE 35 MG: 10 INJECTION, SOLUTION INTRAVENOUS at 09:13:00

## 2022-03-04 NOTE — OPERATIVE REPORT
[facility]   Urology Operative Note     [place] Location: OR   CSN [id number] MRN [id number]   Admission Date 3/4/2022 Operation Date 3/4/2022   Service Urology Surgeon Bakari Treviño MD      Primary Surgeon: Bakari Treviño MD      Assistant(s): None. Indication for Procedure: Patient is a very pleasant 70-year-old male who was found to have microscopic hematuria. Evaluation included CT urogram which revealed a filling defects of the bilateral renal collecting systems, concerning for blood clots or urothelial neoplasms. Findings as well as management options were discussed with the patient and he elected to proceed with cystoscopy, possible bladder biopsy, bilateral retrograde pyelograms, bilateral ureteroscopy with possible biopsy and bilateral ureteral stent insertion for further evaluation. We discussed the rationale, approach, benefits, risks, possible complications, and reasonable alternatives of treatment. We discussed risks including but not limited to anesthetic complications, bleeding, infection, injury to surrounding organs or structures, and need for additional procedures. He verbalized understanding of the risks and wishes to proceed. Preoperative urine culture was negative. Preoperative Diagnosis:   1. Microhematuria [R31.29]  2. Abnormal CT-Urogram of bilateral kidneys. Postoperative Diagnosis:   1. Microhematuria [R31.29]  2. Abnormal CT-Urogram of bilateral kidneys. Procedure Performed: CYSTOSCOPY, BILATERAL  RETROGRADE PYELOGRAM, LEFT NEPHROURETEROSCOPY, MANIPULATION / BIOPSY OF BLOOD CLOT / FUNGUS BALL. BILATERAL URETERAL STENT INSERTION. Anesthesia: General endotracheal.      Surgical Findings:   1. Enlarged prostate with a prominent intravesical median lobe. 2.  Right retrograde pyelogram revealing a filling defect at the level of the renal pelvis. Selective barbotage cytology obtained.   Unable to perform right nephro ureteroscopy due to tight, nondilated right ureter. 3.  Left retrograde pyelogram revealing a larger filling defect within the upper pole calyx. Flexible left nephro ureteroscopy performed. The filling defect appeared to correspond to a organized blood clot or less likely a fungus ball. No grossly visible tumor or mass seen. A sample/biopsy was obtained and fluid was sent for fungus stain and culture. 4.  Successful insertion of bilateral 6 Kyrgyz by 24 cm double-J ureteral stents. Description of Procedure: The patient was brought to the operating room and identified by their wristband including their name, medical record number, and date of birth. They were transferred to the operating room table. Appropriate monitoring devices were connected to the patient. SCD's were applied to the bilateral lower extremities for DVT prophylaxis. Successful induction of general level endotracheal anesthesia was achieved. IV antibiotics were administered for surgical prophylaxis. The patient was then positioned in dorsal lithotomy with their legs in [address] [place] [address] and all pressure areas and bony prominences padded appropriately. The surgical site was prepped and draped in standard sterile fashion. A full surgical timeout was performed with agreement upon all of its components. A 22 Kyrgyz cystoscope with a 30 degree lens was inserted per urethra advanced to the bladder. The bladder was entered atraumatically. The prostate was noted to be moderately enlarged with a prominent median lobe with intravesical extension. The bladder neck was a little oozy. Pan cystoscopy revealed no evidence of bladder tumors, masses, stones, or lesions. The bilateral ureteral orifices were identified in their orthotopic locations. A 5 Kyrgyz open-ended catheter was introduced and the right ureteral orifice was cannulated. The catheter was advanced to the level of the right kidney under visual and fluoroscopic guidance.   Barbotage was performed at the level of the right renal pelvis and fluid was obtained and sent for cytology. Right retrograde pyelography was then performed revealing a filling defect at the level of the right renal pelvis consistent with CT urogram findings. Next, a 0.035 sensor wire was introduced through the open-ended catheter and advanced to the level of the right kidney under fluoroscopic guidance. The cystoscope was removed while maintaining the wire in place. Next, I inserted a 10 Trinidadian dual-lumen catheter over the wire and advanced under fluoroscopic guidance. It barely made it past the right ureteral orifice and resistance was met at that point. A second sensor wire was introduced through the dual-lumen catheter and advanced to the level of the right kidney. I then attempted to perform flexible right nephro ureteroscopy by inserting the Stortz digital flexible over one of the wires however it would not advance past the right ureteral orifice. I attempted inserting the ureteroscope in between both wires and was again unsuccessful at negotiating the right ureteral orifice. I then attempted dilation using an 8/10 Western Layne dilator set. The 8 Western Layne dilator was able to be advanced however the 10 Western Layne sheath would not advance over the 8 Western Layne dilator. At that point I decided to turn my attention to the left side and eventually insert the right ureteral stent. The guidewire on the right side was secured to the drapes as a safety wire. Next, a 5 Western Layne open-ended catheter was introduced and the left ureteral orifice was cannulated. This was then advanced under fluoroscopic guidance to the level of the left kidney. Barbotage was obtained from the left renal pelvis and sent for cytology. Next a left retrograde pyelogram was performed by introducing contrast into the collecting system. This revealed a larger filling defect within the upper pole calyx of the left kidney, again corresponding to CT urogram findings.     A 0.035 sensor wire was introduced through the open-ended catheter and advanced to the level of the left kidney under fluoroscopic guidance. Next, an 8/10 Western Layne dilator set was sequentially introduced over the wire and advanced under fluoroscopic guidance. No resistance was met on this side. A second Glidewire was introduced through the 10 Western Layne sheath and advanced to the level of the left kidney. The 10 Finnish sheath was then removed while maintaining both wires in place. The Glidewire was secured as a safety wire. Next, the [facility] digital flexible ureteroscope was introduced over the wire and advanced under fluoroscopic and visual guidance up to the left kidney without any resistance being met. Flexible nephro ureteroscopy was performed. Within the upper pole calyx of the left kidney I was able to identify what appeared to be an organized blood clot or may be less likely a fungus ball. It appeared dark brown/tan in color. It was soft in consistency and certainly did not have the appearance of a tumor or papillary lesion. It was not attached to the urothelium. I introduced a 1.9 Finnish 0 tip nitinol basket and this tissue was disintegrating when we attempted to close the basket on it. I was able to obtain fluid with some of the tissue in it and send it for fungal stains and culture. I was also able to obtain a piece of the tissue using the Nitinol basket and extract and send it as well for fungal stain. Again, I did not visualize any grossly visible tumors within the left collecting system. Exit ureteroscopy was performed revealing no evidence of ureteral masses, stones, or lesions. Next, the right guidewire was backloaded onto the cystoscope. A 6 Finnish by 24 cm double-J ureteral stent was inserted over the wire and advanced under fluoroscopic guidance to the level of the right kidney. The wire was removed. Good coils were noted. Good drainage was noted through and around the stent.     The cystoscope was removed and the left guidewire was backloaded onto the cystoscope. A 6 Gabonese by 24 cm double-J ureteral stent was inserted over the wire and advanced under fluoroscopic guidance to the level of the left kidney. The wire was removed. Good coils were noted. Good drainage was noted through and around the stent. The bladder was drained. Again there were blood clots at the level of the bladder neck from the prostate. These were irrigated using a Augustine syringe. The cystoscope was removed and a 20 Western Layne three-way Mcdonnell catheter was inserted. About 15 mL of sterile water were used to inflate the balloon. The catheter irrigated and the drainage was light pink. The inflow port was plugged and the outflow port was connected to a drainage bag. StatLock was applied. This concluded our procedure. Patient was repositioned supine, general anesthesia was reversed and he was successfully extubated and transported to the recovery room in a stable condition. He tolerated the procedure well without any immediate complications. Estimated Blood Loss: 5 mL. Complications: None. Specimens Removed:   1. Barbotage from right renal pelvis for cytology. 2.  Barbotage from left renal pelvis for cytology. 3.  Piece of tissue from suspected blood clot/fungus ball for fungal stain and culture. 4.  Washing from left upper pole calyx for fungal stain and culture. Implants:   Implant Name Type Inv. Item Serial No.  Lot No. LRB No. Used Action   Morenita Ortiz [id number] - [id number]  [facility]  [facility] [id number] Bilateral 2 Implanted      Drains: Bilateral 6 Gabonese by 24 cm double-J ureteral stents. 20 Gabonese 3-way urethral Mcdonnell catheter, inflow plug. Patient's Condition at the End of the Procedure: Extubated and stable to PACU. Disposition: Monitor in PACU until patient meets discharge criteria. Discharge home with Mcdonnell catheter. Office follow-up next week for catheter removal.  Follow-up microbiology and cytology results. Bactrim double strength once daily for 5 days. Pyridium for dysuria. I was present, scrubbed and performed the procedure in its entirety.     Evita James MD  3/4/2022

## 2022-03-04 NOTE — TELEPHONE ENCOUNTER
S/W pt and informed him of 's orders as stated below and we arranged an appt for Tues 3/8 at 8:20 am for voiding trial decath.

## 2022-03-04 NOTE — TELEPHONE ENCOUNTER
Patient needs RN visit this coming week between Monday and Wednesday for tam catheter removal. Theres about 12 mL in balloon. Thanks.

## 2022-03-04 NOTE — ANESTHESIA PROCEDURE NOTES
Airway  Date/Time: 3/4/2022 9:16 AM  Urgency: Elective      General Information and Staff    Patient location during procedure: OR  Anesthesiologist: Veronica Nam MD  Resident/CRNA: Rhys Pierre CRNA  Performed: CRNA     Indications and Patient Condition  Indications for airway management: anesthesia  Sedation level: deep  Preoxygenated: yes  Patient position: sniffing  Mask difficulty assessment: 3 - difficult mask (inadequate, unstable or two providers) +/- NMBA    Final Airway Details  Final airway type: endotracheal airway      Successful airway: ETT  Cuffed: yes   Successful intubation technique: direct laryngoscopy  Endotracheal tube insertion site: oral  Blade: GlideScope  Blade size: #4  ETT size (mm): 8.0    Cormack-Lehane Classification: grade IIA - partial view of glottis  Placement verified by: chest auscultation and capnometry   Measured from: lips  ETT to lips (cm): 23  Number of attempts at approach: 1

## 2022-03-04 NOTE — ANESTHESIA POSTPROCEDURE EVALUATION
Patient: Keegan Lee    Procedure Summary     Date: 03/04/22 Room / Location: [facility] MAIN OR 14 / [facility] MAIN OR    Anesthesia Start: 8271 Anesthesia Stop: 3208    Procedures:       CYSTOSCOPY, POSSIBLE BLADDER BIOPSY, BILATERAL  RETROGRADE PYELOGRAM, BILATERAL URETEROSCOPY WITH POSSIBLE BIOPSY, BILATERAL URETERAL STENT INSERTION (Bilateral )      CYSTOSCOPY URETEROSCOPY (Bilateral )      CYSTOSCOPY STENT INSERTION (Bilateral )      URINARY BIOPSY (N/A ) Diagnosis:       Microhematuria      Renal mass      Malignant neoplasm of renal pelvis, unspecified laterality ([address]      (Microhematuria [R31.29]Renal mass [N28.89]Malignant neoplasm of renal pelvis, unspecified laterality ([address] [C65.9])    Surgeons: Erma Colindres MD Anesthesiologist: Digna Weaver MD    Anesthesia Type: general ASA Status: 3          Anesthesia Type: general    Vitals Value Taken Time   /83 03/04/22 1138   Temp 97.6 03/04/22 1139   Pulse 66 03/04/22 1138   Resp 14 03/04/22 1138   SpO2 96 % 03/04/22 1138       [facility] AN Post Evaluation:   Patient Evaluated in PACU  Patient Participation: complete - patient participated  Level of Consciousness: awake  Pain Score: 1  Pain Management: adequate  Airway Patency:patent  Dental exam unchanged from preop  Yes    Cardiovascular Status: acceptable  Respiratory Status: acceptable  Postoperative Hydration acceptable      Mitzi Flores CRNA  3/4/2022 11:39 AM

## 2022-03-04 NOTE — TELEPHONE ENCOUNTER
-LMTCB on VM with pt's greeting. I explained we needed him to call back to schedule a NV on 3/7, 8, or 9/22 to remove his tam catheter.  -Outcome pending.

## 2022-03-07 LAB — NON GYNE INTERPRETATION: NEGATIVE

## 2022-03-08 ENCOUNTER — NURSE ONLY (OUTPATIENT)
Dept: SURGERY | Facility: CLINIC | Age: 67
End: 2022-03-08
Payer: MEDICARE

## 2022-03-08 VITALS
HEART RATE: 91 BPM | BODY MASS INDEX: 35.08 KG/M2 | HEIGHT: 72 IN | WEIGHT: 259 LBS | DIASTOLIC BLOOD PRESSURE: 78 MMHG | SYSTOLIC BLOOD PRESSURE: 128 MMHG

## 2022-03-08 NOTE — PROGRESS NOTES
-Pt presents for NV to remove tam post 3/4/22 hospital Cysto with [facility]; identity verified with name & ; procedure explained to pt. -Pt positioned self on exam table; draped for privacy to lower sweats to mid-thigh; remove stat-lock from left upper thigh & legbag from left calf; aspirate 12ml from balloon prime & slowly remove catheter (CBI); penis tip cleaned with prep pad; pt redressed self. -I messaged [facility] to confirm scheduling of post-op OV & appt for Cysto/ stent removal. Pt aware I will contact him to confirm.  -Pt is on peritoneal dialysis since 2018. With restricted fluid intake, we reviewed how to remain hydrated post surgery.  -Encounter complete.

## 2022-03-09 LAB — CALCULI MASS: 16 MG

## 2022-03-14 ENCOUNTER — TELEPHONE (OUTPATIENT)
Dept: SURGERY | Facility: CLINIC | Age: 67
End: 2022-03-14

## 2022-03-14 NOTE — TELEPHONE ENCOUNTER
Please advise, does patient need to be scheduled for a stent removal?    Do not see message sent to staff. Patient had surgery 3/4/22 and nurse visit 3/8/22.

## 2022-03-16 ENCOUNTER — PATIENT MESSAGE (OUTPATIENT)
Dept: INTERNAL MEDICINE CLINIC | Facility: CLINIC | Age: 67
End: 2022-03-16

## 2022-03-16 NOTE — TELEPHONE ENCOUNTER
From: Evens Washington Suits  To: Tashi Corbett MD  Sent: 3/16/2022 5:52 PM CDT  Subject: Pneumonia vaccine    Today (3/16/22) at the dialysis clinic I got Pneumovax 23 shot and would like to up date my chart. From Lot # [id number] that expires 10/12/2022.   Thanks  [employer]

## 2022-04-04 NOTE — TELEPHONE ENCOUNTER
I called Mr. Symone Epperson and spoke to him over the phone. Discussed further management of his bilateral ureteral stents. Surgical findings from his recent procedure on 3/4/2022 were discussed. We were able to perform left nephro ureteroscopy with findings of a free-floating blood clot within the upper pole calyx. No gross visible tumor. We were not able to perform right ureteroscopy due to tight nondilated right ureter. Bilateral stents were inserted. Management options at this point were discussed with patient. The process going on in the right kidney is likely similar to what is going on in the left kidney however the only way to confirm that would be by performing right ureteroscopy. Management options include bilateral stent removal with follow-up imaging. The other option that we discussed includes performing cystoscopy under general anesthesia with right ureteroscopy to directly visualize the right kidney collecting system and correlate with CT urogram findings in addition to performing a second look in the left kidney and ureter and likely remove the bilateral stents after that procedure. Patient verbalized understanding. He will consider management options as discussed and call us back with his final decision. It was clearly explained to the patient that the ureteral stents will need to be removed/exchanged no later than 6 months from the time of insertion because of the risk of encrustation and UTIs. Patient verbalized understanding. All questions answered.     Bill Hernandez MD  4/4/2022

## 2022-04-05 ENCOUNTER — TELEPHONE (OUTPATIENT)
Dept: SURGERY | Facility: CLINIC | Age: 67
End: 2022-04-05

## 2022-04-05 NOTE — TELEPHONE ENCOUNTER
Pt calling to schedule cysto, pt also had question blood clots and what to know what needs to be done regarding the clots please advise

## 2022-04-06 RX ORDER — TAMSULOSIN HYDROCHLORIDE 0.4 MG/1
CAPSULE ORAL
Qty: 90 CAPSULE | Refills: 3 | Status: SHIPPED | OUTPATIENT
Start: 2022-04-06

## 2022-04-06 NOTE — TELEPHONE ENCOUNTER
Spoke with pt. States he just had a question in regards to the blood clot noted in the kidney and whether he needed to do any follow up in regards to it. I let pt know that this blood clot was disintegrated and removed. No active bleeding with urination. Verbalized understanding. Otherwise, pt states he would like to proceed cysto with gen anesthesia with right uteroscopy. I let pt know I will let [facility] know about this and that office will call back to schedule this procedure.

## 2022-04-07 NOTE — TELEPHONE ENCOUNTER
I called Mrs. Sanchez and spoke to him over the phone. I informed him that the blood clot in his left kidney was disintegrated using a basket but was not entirely removed. He verbalized understanding. He wishes to proceed with cystoscopy, bilateral retrograde pyelograms, right ureteroscopy with possible biopsy, possible left ureteroscopy, possible stent removal or exchange. He was instructed to stop baby aspirin for 7 days prior to the procedure. All his questions were answered. Tommi Meckel, please call Mr. Mell Henderson and schedule as follows:      UROLOGY SURGERY SCHEDULING REQUEST    Location: [facility]    Surgeon: Angel Arnold MD    Asst. Surgeon: N/A    Diagnosis: Microhematuria, abnormal CT urogram of bilateral kidneys. Procedure: Cystoscopy, bilateral retrograde pyelography, right ureteroscopy with possible biopsy, possible left ureteroscopy, possible stent removal, possible stent exchange. Procedure CPT Code (if known): 83534, [id number], 49834    Anesthesia: General     Time Frame: Friday 4/15/22. Time needed: 1.5 hours    Special Equipment: OR 14. On Call to OR: Ceftriaxone (Rocephin) 1 gram IV. Admission: Day Surgery    Pre-op Testing: PT/INR, PTT, Urinalysis and Urine Culture. Patient has other blood work drawn every first week of the month at an outside lab. Need Pre-op Clearance: N/A    Estimated Post Op/Follow Up Appt: YOHANNES.     Darien Grullon MD  4/7/2022

## 2022-04-08 ENCOUNTER — LAB ENCOUNTER (OUTPATIENT)
Dept: LAB | Facility: HOSPITAL | Age: 67
End: 2022-04-08
Attending: UROLOGY
Payer: MEDICARE

## 2022-04-08 DIAGNOSIS — Z79.01 MONITORING FOR ANTICOAGULANT USE: ICD-10-CM

## 2022-04-08 DIAGNOSIS — R31.29 MICROHEMATURIA: ICD-10-CM

## 2022-04-08 DIAGNOSIS — Z51.81 MONITORING FOR ANTICOAGULANT USE: ICD-10-CM

## 2022-04-08 LAB
APTT PPP: 27.8 SECONDS (ref 23.3–35.6)
BILIRUB UR QL: NEGATIVE
COLOR UR: YELLOW
GLUCOSE UR-MCNC: 50 MG/DL
INR BLD: 1.03 (ref 0.8–1.2)
KETONES UR-MCNC: NEGATIVE MG/DL
NITRITE UR QL STRIP.AUTO: NEGATIVE
PH UR: 5 [PH] (ref 5–8)
PROT UR-MCNC: 100 MG/DL
PROTHROMBIN TIME: 13.6 SECONDS (ref 11.6–14.8)
RBC #/AREA URNS AUTO: >10 /HPF
SP GR UR STRIP: 1.01 (ref 1–1.03)
UROBILINOGEN UR STRIP-ACNC: <2
VIT C UR-MCNC: NEGATIVE MG/DL

## 2022-04-08 PROCEDURE — 85730 THROMBOPLASTIN TIME PARTIAL: CPT

## 2022-04-08 PROCEDURE — 87086 URINE CULTURE/COLONY COUNT: CPT | Performed by: UROLOGY

## 2022-04-08 PROCEDURE — 81001 URINALYSIS AUTO W/SCOPE: CPT

## 2022-04-08 PROCEDURE — 36415 COLL VENOUS BLD VENIPUNCTURE: CPT

## 2022-04-08 PROCEDURE — 85610 PROTHROMBIN TIME: CPT

## 2022-04-08 NOTE — TELEPHONE ENCOUNTER
Spoke with patient, scheduled Cystoscopy, bilateral retrograde pyelography, right ureteroscopy with possible biopsy, possible left ureteroscopy, possible stent removal, possible stent exchange, Friday 04/22/2022, [facility]/outpatient, patient will have labs done today, will stop by office to  pre-op instructions.

## 2022-04-15 ENCOUNTER — PATIENT MESSAGE (OUTPATIENT)
Dept: INTERNAL MEDICINE CLINIC | Facility: CLINIC | Age: 67
End: 2022-04-15

## 2022-04-17 NOTE — TELEPHONE ENCOUNTER
From: Ward Vanegas Suits  To: Dhaval Messer MD  Sent: 4/15/2022 10:43 PM CDT  Subject: Amlodipine refill    It appears that [facility] is having trouble getting my amlodipine refilled. If there anything you can do to help them out would be appreciated.    Thanks  [employer]

## 2022-04-18 RX ORDER — AMLODIPINE BESYLATE 10 MG/1
10 TABLET ORAL DAILY
Qty: 90 TABLET | Refills: 3 | Status: SHIPPED | OUTPATIENT
Start: 2022-04-18

## 2022-04-19 ENCOUNTER — LAB ENCOUNTER (OUTPATIENT)
Dept: LAB | Facility: HOSPITAL | Age: 67
End: 2022-04-19
Attending: UROLOGY
Payer: MEDICARE

## 2022-04-19 DIAGNOSIS — Z01.818 PRE-OP TESTING: ICD-10-CM

## 2022-04-19 LAB — SARS-COV-2 RNA RESP QL NAA+PROBE: NOT DETECTED

## 2022-04-20 RX ORDER — ASPIRIN 81 MG/1
81 TABLET ORAL DAILY
COMMUNITY

## 2022-04-20 RX ORDER — SODIUM CHLORIDE, SODIUM LACTATE, POTASSIUM CHLORIDE, CALCIUM CHLORIDE 600; 310; 30; 20 MG/100ML; MG/100ML; MG/100ML; MG/100ML
INJECTION, SOLUTION INTRAVENOUS CONTINUOUS
Status: DISCONTINUED | OUTPATIENT
Start: 2022-04-20 | End: 2022-04-20

## 2022-04-22 ENCOUNTER — APPOINTMENT (OUTPATIENT)
Dept: GENERAL RADIOLOGY | Facility: HOSPITAL | Age: 67
End: 2022-04-22
Attending: UROLOGY
Payer: MEDICARE

## 2022-04-22 ENCOUNTER — HOSPITAL ENCOUNTER (OUTPATIENT)
Facility: HOSPITAL | Age: 67
Setting detail: HOSPITAL OUTPATIENT SURGERY
Discharge: HOME | End: 2022-04-22
Attending: UROLOGY | Admitting: UROLOGY
Payer: MEDICARE

## 2022-04-22 ENCOUNTER — ANESTHESIA (OUTPATIENT)
Dept: SURGERY | Facility: HOSPITAL | Age: 67
End: 2022-04-22
Payer: MEDICARE

## 2022-04-22 ENCOUNTER — ANESTHESIA EVENT (OUTPATIENT)
Dept: SURGERY | Facility: HOSPITAL | Age: 67
End: 2022-04-22
Payer: MEDICARE

## 2022-04-22 VITALS
HEART RATE: 59 BPM | WEIGHT: 266 LBS | TEMPERATURE: 98 F | SYSTOLIC BLOOD PRESSURE: 109 MMHG | DIASTOLIC BLOOD PRESSURE: 57 MMHG | HEIGHT: 72 IN | RESPIRATION RATE: 16 BRPM | BODY MASS INDEX: 36.03 KG/M2 | OXYGEN SATURATION: 96 %

## 2022-04-22 DIAGNOSIS — Z01.818 PRE-OP TESTING: Primary | ICD-10-CM

## 2022-04-22 DIAGNOSIS — R93.429 ABNORMAL CT SCAN, KIDNEY: ICD-10-CM

## 2022-04-22 LAB
GLUCOSE BLDC GLUCOMTR-MCNC: 101 MG/DL (ref 70–99)
GLUCOSE BLDC GLUCOMTR-MCNC: 124 MG/DL (ref 70–99)
POTASSIUM SERPL-SCNC: 4.4 MMOL/L (ref 3.5–5.1)

## 2022-04-22 PROCEDURE — 0TC48ZZ EXTIRPATION OF MATTER FROM LEFT KIDNEY PELVIS, VIA NATURAL OR ARTIFICIAL OPENING ENDOSCOPIC: ICD-10-PCS | Performed by: UROLOGY

## 2022-04-22 PROCEDURE — 0TC38ZZ EXTIRPATION OF MATTER FROM RIGHT KIDNEY PELVIS, VIA NATURAL OR ARTIFICIAL OPENING ENDOSCOPIC: ICD-10-PCS | Performed by: UROLOGY

## 2022-04-22 PROCEDURE — 52352 CYSTOURETERO W/STONE REMOVE: CPT | Performed by: UROLOGY

## 2022-04-22 RX ORDER — MORPHINE SULFATE 4 MG/ML
2 INJECTION, SOLUTION INTRAMUSCULAR; INTRAVENOUS EVERY 10 MIN PRN
Status: DISCONTINUED | OUTPATIENT
Start: 2022-04-22 | End: 2022-04-22

## 2022-04-22 RX ORDER — NALOXONE HYDROCHLORIDE 0.4 MG/ML
80 INJECTION, SOLUTION INTRAMUSCULAR; INTRAVENOUS; SUBCUTANEOUS AS NEEDED
Status: DISCONTINUED | OUTPATIENT
Start: 2022-04-22 | End: 2022-04-22

## 2022-04-22 RX ORDER — METOCLOPRAMIDE 10 MG/1
10 TABLET ORAL ONCE
Status: COMPLETED | OUTPATIENT
Start: 2022-04-22 | End: 2022-04-22

## 2022-04-22 RX ORDER — CEFTRIAXONE 1 G/1
INJECTION, POWDER, FOR SOLUTION INTRAMUSCULAR; INTRAVENOUS AS NEEDED
Status: DISCONTINUED | OUTPATIENT
Start: 2022-04-22 | End: 2022-04-22 | Stop reason: SURG

## 2022-04-22 RX ORDER — LIDOCAINE HYDROCHLORIDE 10 MG/ML
INJECTION, SOLUTION EPIDURAL; INFILTRATION; INTRACAUDAL; PERINEURAL AS NEEDED
Status: DISCONTINUED | OUTPATIENT
Start: 2022-04-22 | End: 2022-04-22 | Stop reason: SURG

## 2022-04-22 RX ORDER — HYDROMORPHONE HYDROCHLORIDE 1 MG/ML
0.2 INJECTION, SOLUTION INTRAMUSCULAR; INTRAVENOUS; SUBCUTANEOUS EVERY 5 MIN PRN
Status: DISCONTINUED | OUTPATIENT
Start: 2022-04-22 | End: 2022-04-22

## 2022-04-22 RX ORDER — HYDROCODONE BITARTRATE AND ACETAMINOPHEN 5; 325 MG/1; MG/1
2 TABLET ORAL AS NEEDED
Status: DISCONTINUED | OUTPATIENT
Start: 2022-04-22 | End: 2022-04-22

## 2022-04-22 RX ORDER — PROCHLORPERAZINE EDISYLATE 5 MG/ML
5 INJECTION INTRAMUSCULAR; INTRAVENOUS ONCE AS NEEDED
Status: DISCONTINUED | OUTPATIENT
Start: 2022-04-22 | End: 2022-04-22

## 2022-04-22 RX ORDER — METOPROLOL TARTRATE 5 MG/5ML
2.5 INJECTION INTRAVENOUS ONCE
Status: DISCONTINUED | OUTPATIENT
Start: 2022-04-22 | End: 2022-04-22

## 2022-04-22 RX ORDER — NICOTINE POLACRILEX 4 MG
30 LOZENGE BUCCAL
Status: DISCONTINUED | OUTPATIENT
Start: 2022-04-22 | End: 2022-04-22

## 2022-04-22 RX ORDER — ONDANSETRON 2 MG/ML
4 INJECTION INTRAMUSCULAR; INTRAVENOUS ONCE AS NEEDED
Status: DISCONTINUED | OUTPATIENT
Start: 2022-04-22 | End: 2022-04-22

## 2022-04-22 RX ORDER — NICOTINE POLACRILEX 4 MG
15 LOZENGE BUCCAL
Status: DISCONTINUED | OUTPATIENT
Start: 2022-04-22 | End: 2022-04-22

## 2022-04-22 RX ORDER — PHENAZOPYRIDINE HYDROCHLORIDE 100 MG/1
100 TABLET, FILM COATED ORAL 3 TIMES DAILY PRN
Qty: 9 TABLET | Refills: 0 | Status: SHIPPED | OUTPATIENT
Start: 2022-04-22 | End: 2022-04-25

## 2022-04-22 RX ORDER — SODIUM CHLORIDE 9 MG/ML
INJECTION, SOLUTION INTRAVENOUS CONTINUOUS
Status: DISCONTINUED | OUTPATIENT
Start: 2022-04-22 | End: 2022-04-22

## 2022-04-22 RX ORDER — LIDOCAINE HYDROCHLORIDE 20 MG/ML
JELLY TOPICAL AS NEEDED
Status: DISCONTINUED | OUTPATIENT
Start: 2022-04-22 | End: 2022-04-22 | Stop reason: HOSPADM

## 2022-04-22 RX ORDER — ONDANSETRON 2 MG/ML
INJECTION INTRAMUSCULAR; INTRAVENOUS AS NEEDED
Status: DISCONTINUED | OUTPATIENT
Start: 2022-04-22 | End: 2022-04-22 | Stop reason: SURG

## 2022-04-22 RX ORDER — HYDROCODONE BITARTRATE AND ACETAMINOPHEN 5; 325 MG/1; MG/1
1 TABLET ORAL AS NEEDED
Status: DISCONTINUED | OUTPATIENT
Start: 2022-04-22 | End: 2022-04-22

## 2022-04-22 RX ORDER — HYDROMORPHONE HYDROCHLORIDE 1 MG/ML
0.4 INJECTION, SOLUTION INTRAMUSCULAR; INTRAVENOUS; SUBCUTANEOUS EVERY 5 MIN PRN
Status: DISCONTINUED | OUTPATIENT
Start: 2022-04-22 | End: 2022-04-22

## 2022-04-22 RX ORDER — HALOPERIDOL 5 MG/ML
0.25 INJECTION INTRAMUSCULAR ONCE AS NEEDED
Status: DISCONTINUED | OUTPATIENT
Start: 2022-04-22 | End: 2022-04-22

## 2022-04-22 RX ORDER — SULFAMETHOXAZOLE AND TRIMETHOPRIM 800; 160 MG/1; MG/1
1 TABLET ORAL DAILY
Qty: 5 TABLET | Refills: 0 | Status: SHIPPED | OUTPATIENT
Start: 2022-04-22 | End: 2022-04-27

## 2022-04-22 RX ORDER — ACETAMINOPHEN 500 MG
1000 TABLET ORAL ONCE
Status: COMPLETED | OUTPATIENT
Start: 2022-04-22 | End: 2022-04-22

## 2022-04-22 RX ORDER — PHENAZOPYRIDINE HYDROCHLORIDE 200 MG/1
200 TABLET, FILM COATED ORAL ONCE AS NEEDED
Status: CANCELLED | OUTPATIENT
Start: 2022-04-22 | End: 2022-04-22

## 2022-04-22 RX ORDER — MORPHINE SULFATE 4 MG/ML
4 INJECTION, SOLUTION INTRAMUSCULAR; INTRAVENOUS EVERY 10 MIN PRN
Status: DISCONTINUED | OUTPATIENT
Start: 2022-04-22 | End: 2022-04-22

## 2022-04-22 RX ORDER — HYDROMORPHONE HYDROCHLORIDE 1 MG/ML
0.6 INJECTION, SOLUTION INTRAMUSCULAR; INTRAVENOUS; SUBCUTANEOUS EVERY 5 MIN PRN
Status: DISCONTINUED | OUTPATIENT
Start: 2022-04-22 | End: 2022-04-22

## 2022-04-22 RX ORDER — EPHEDRINE SULFATE 50 MG/ML
INJECTION, SOLUTION INTRAVENOUS AS NEEDED
Status: DISCONTINUED | OUTPATIENT
Start: 2022-04-22 | End: 2022-04-22 | Stop reason: SURG

## 2022-04-22 RX ORDER — DEXTROSE MONOHYDRATE 25 G/50ML
50 INJECTION, SOLUTION INTRAVENOUS
Status: DISCONTINUED | OUTPATIENT
Start: 2022-04-22 | End: 2022-04-22

## 2022-04-22 RX ORDER — ROCURONIUM BROMIDE 10 MG/ML
INJECTION, SOLUTION INTRAVENOUS AS NEEDED
Status: DISCONTINUED | OUTPATIENT
Start: 2022-04-22 | End: 2022-04-22 | Stop reason: SURG

## 2022-04-22 RX ORDER — SODIUM CHLORIDE, SODIUM LACTATE, POTASSIUM CHLORIDE, CALCIUM CHLORIDE 600; 310; 30; 20 MG/100ML; MG/100ML; MG/100ML; MG/100ML
INJECTION, SOLUTION INTRAVENOUS CONTINUOUS
Status: DISCONTINUED | OUTPATIENT
Start: 2022-04-22 | End: 2022-04-22

## 2022-04-22 RX ORDER — MORPHINE SULFATE 10 MG/ML
6 INJECTION, SOLUTION INTRAMUSCULAR; INTRAVENOUS EVERY 10 MIN PRN
Status: DISCONTINUED | OUTPATIENT
Start: 2022-04-22 | End: 2022-04-22

## 2022-04-22 RX ORDER — FAMOTIDINE 20 MG/1
20 TABLET, FILM COATED ORAL ONCE
Status: COMPLETED | OUTPATIENT
Start: 2022-04-22 | End: 2022-04-22

## 2022-04-22 RX ADMIN — CEFTRIAXONE 1 G: 1 INJECTION, POWDER, FOR SOLUTION INTRAMUSCULAR; INTRAVENOUS at 09:27:00

## 2022-04-22 RX ADMIN — EPHEDRINE SULFATE 5 MG: 50 INJECTION, SOLUTION INTRAVENOUS at 09:38:00

## 2022-04-22 RX ADMIN — ONDANSETRON 4 MG: 2 INJECTION INTRAMUSCULAR; INTRAVENOUS at 09:27:00

## 2022-04-22 RX ADMIN — SODIUM CHLORIDE: 9 INJECTION, SOLUTION INTRAVENOUS at 10:14:00

## 2022-04-22 RX ADMIN — ROCURONIUM BROMIDE 50 MG: 10 INJECTION, SOLUTION INTRAVENOUS at 09:26:00

## 2022-04-22 RX ADMIN — LIDOCAINE HYDROCHLORIDE 50 MG: 10 INJECTION, SOLUTION EPIDURAL; INFILTRATION; INTRACAUDAL; PERINEURAL at 09:23:00

## 2022-04-22 RX ADMIN — EPHEDRINE SULFATE 5 MG: 50 INJECTION, SOLUTION INTRAVENOUS at 10:02:00

## 2022-04-22 RX ADMIN — EPHEDRINE SULFATE 5 MG: 50 INJECTION, SOLUTION INTRAVENOUS at 09:41:00

## 2022-04-22 NOTE — ANESTHESIA PROCEDURE NOTES
Airway  Date/Time: 4/22/2022 9:25 AM  Urgency: Elective      General Information and Staff    Patient location during procedure: OR  Anesthesiologist: Marcio Mitchell MD  Resident/CRNA: Rodolfo Michel CRNA  Performed: CRNA     Indications and Patient Condition  Indications for airway management: anesthesia  Sedation level: deep  Preoxygenated: yes  Patient position: sniffing  Mask difficulty assessment: 3 - difficult mask (inadequate, unstable or two providers) +/- NMBA    Final Airway Details  Final airway type: endotracheal airway      Successful airway: ETT  Cuffed: yes   Successful intubation technique: direct laryngoscopy  Facilitating devices/methods: intubating stylet  Endotracheal tube insertion site: oral  Blade: GlideScope  Blade size: #3  ETT size (mm): 7.5    Cormack-Lehane Classification: grade I - full view of glottis  Placement verified by: chest auscultation and capnometry   Measured from: teeth  Number of attempts at approach: 1  Number of other approaches attempted: 0    Additional Comments  Atraumatic. Lips, tongue and all teeth in preop condition.

## 2022-04-25 LAB
CALCULI MASS: 20 MG
CALCULI MASS: 5 MG

## 2022-05-02 ENCOUNTER — MED REC SCAN ONLY (OUTPATIENT)
Dept: INTERNAL MEDICINE CLINIC | Facility: CLINIC | Age: 67
End: 2022-05-02

## 2022-05-23 ENCOUNTER — OFFICE VISIT (OUTPATIENT)
Dept: SURGERY | Facility: CLINIC | Age: 67
End: 2022-05-23
Payer: MEDICARE

## 2022-05-23 DIAGNOSIS — R93.429 ABNORMAL CT SCAN, KIDNEY: ICD-10-CM

## 2022-05-23 DIAGNOSIS — R31.29 MICROHEMATURIA: Primary | ICD-10-CM

## 2022-05-23 PROCEDURE — 99213 OFFICE O/P EST LOW 20 MIN: CPT | Performed by: UROLOGY

## 2022-05-31 ENCOUNTER — OFFICE VISIT (OUTPATIENT)
Dept: INTERNAL MEDICINE CLINIC | Facility: CLINIC | Age: 67
End: 2022-05-31
Payer: MEDICARE

## 2022-05-31 VITALS
SYSTOLIC BLOOD PRESSURE: 114 MMHG | BODY MASS INDEX: 36.68 KG/M2 | HEIGHT: 72 IN | WEIGHT: 270.81 LBS | HEART RATE: 63 BPM | DIASTOLIC BLOOD PRESSURE: 60 MMHG | OXYGEN SATURATION: 98 % | TEMPERATURE: 98 F

## 2022-05-31 DIAGNOSIS — Z00.00 MEDICARE ANNUAL WELLNESS VISIT, SUBSEQUENT: Primary | ICD-10-CM

## 2022-05-31 DIAGNOSIS — N18.6 STAGE 5 CHRONIC KIDNEY DISEASE ON DIALYSIS (HCC): ICD-10-CM

## 2022-05-31 DIAGNOSIS — Z99.2 STAGE 5 CHRONIC KIDNEY DISEASE ON DIALYSIS (HCC): ICD-10-CM

## 2022-05-31 DIAGNOSIS — N18.6 TYPE 2 DIABETES MELLITUS WITH CHRONIC KIDNEY DISEASE ON CHRONIC DIALYSIS, WITH LONG-TERM CURRENT USE OF INSULIN (HCC): ICD-10-CM

## 2022-05-31 DIAGNOSIS — Z98.890 HISTORY OF URETEROSCOPY: ICD-10-CM

## 2022-05-31 DIAGNOSIS — I10 ESSENTIAL HYPERTENSION: ICD-10-CM

## 2022-05-31 DIAGNOSIS — E66.9 OBESITY (BMI 30-39.9): ICD-10-CM

## 2022-05-31 DIAGNOSIS — E11.22 TYPE 2 DIABETES MELLITUS WITH CHRONIC KIDNEY DISEASE ON CHRONIC DIALYSIS, WITH LONG-TERM CURRENT USE OF INSULIN (HCC): ICD-10-CM

## 2022-05-31 DIAGNOSIS — Z79.4 TYPE 2 DIABETES MELLITUS WITH CHRONIC KIDNEY DISEASE ON CHRONIC DIALYSIS, WITH LONG-TERM CURRENT USE OF INSULIN (HCC): ICD-10-CM

## 2022-05-31 DIAGNOSIS — Z99.2 TYPE 2 DIABETES MELLITUS WITH CHRONIC KIDNEY DISEASE ON CHRONIC DIALYSIS, WITH LONG-TERM CURRENT USE OF INSULIN (HCC): ICD-10-CM

## 2022-05-31 DIAGNOSIS — E78.1 HYPERTRIGLYCERIDEMIA: ICD-10-CM

## 2022-05-31 PROCEDURE — G0439 PPPS, SUBSEQ VISIT: HCPCS | Performed by: INTERNAL MEDICINE

## 2022-06-08 ENCOUNTER — LAB ENCOUNTER (OUTPATIENT)
Dept: LAB | Facility: HOSPITAL | Age: 67
End: 2022-06-08
Attending: INTERNAL MEDICINE
Payer: MEDICARE

## 2022-06-08 DIAGNOSIS — E11.9 DIABETES MELLITUS (HCC): Primary | ICD-10-CM

## 2022-06-08 LAB
ALBUMIN SERPL-MCNC: 3.1 G/DL (ref 3.4–5)
ALBUMIN/GLOB SERPL: 1 {RATIO} (ref 1–2)
ALP LIVER SERPL-CCNC: 76 U/L
ALT SERPL-CCNC: 62 U/L
ANION GAP SERPL CALC-SCNC: 10 MMOL/L (ref 0–18)
AST SERPL-CCNC: 32 U/L (ref 15–37)
BILIRUB SERPL-MCNC: 0.4 MG/DL (ref 0.1–2)
BUN BLD-MCNC: 76 MG/DL (ref 7–18)
BUN/CREAT SERPL: 16.4 (ref 10–20)
CALCIUM BLD-MCNC: 8.3 MG/DL (ref 8.5–10.1)
CHLORIDE SERPL-SCNC: 111 MMOL/L (ref 98–112)
CHOLEST SERPL-MCNC: 108 MG/DL (ref ?–200)
CO2 SERPL-SCNC: 20 MMOL/L (ref 21–32)
CREAT BLD-MCNC: 4.63 MG/DL
EST. AVERAGE GLUCOSE BLD GHB EST-MCNC: 169 MG/DL (ref 68–126)
FASTING PATIENT LIPID ANSWER: YES
FASTING STATUS PATIENT QL REPORTED: YES
GLOBULIN PLAS-MCNC: 3 G/DL (ref 2.8–4.4)
GLUCOSE BLD-MCNC: 120 MG/DL (ref 70–99)
HBA1C MFR BLD: 7.5 % (ref ?–5.7)
HDLC SERPL-MCNC: 32 MG/DL (ref 40–59)
LDLC SERPL CALC-MCNC: 41 MG/DL (ref ?–100)
NONHDLC SERPL-MCNC: 76 MG/DL (ref ?–130)
OSMOLALITY SERPL CALC.SUM OF ELEC: 316 MOSM/KG (ref 275–295)
POTASSIUM SERPL-SCNC: 4.5 MMOL/L (ref 3.5–5.1)
PROT SERPL-MCNC: 6.1 G/DL (ref 6.4–8.2)
SODIUM SERPL-SCNC: 141 MMOL/L (ref 136–145)
T4 FREE SERPL-MCNC: 0.9 NG/DL (ref 0.8–1.7)
TRIGL SERPL-MCNC: 224 MG/DL (ref 30–149)
TSI SER-ACNC: 1.67 MIU/ML (ref 0.36–3.74)
VLDLC SERPL CALC-MCNC: 31 MG/DL (ref 0–30)

## 2022-06-08 PROCEDURE — 36415 COLL VENOUS BLD VENIPUNCTURE: CPT

## 2022-06-08 PROCEDURE — 83036 HEMOGLOBIN GLYCOSYLATED A1C: CPT

## 2022-06-08 PROCEDURE — 80053 COMPREHEN METABOLIC PANEL: CPT

## 2022-06-08 PROCEDURE — 84439 ASSAY OF FREE THYROXINE: CPT

## 2022-06-08 PROCEDURE — 84443 ASSAY THYROID STIM HORMONE: CPT

## 2022-06-08 PROCEDURE — 80061 LIPID PANEL: CPT

## 2022-06-21 ENCOUNTER — TELEPHONE (OUTPATIENT)
Dept: INTERNAL MEDICINE CLINIC | Facility: CLINIC | Age: 67
End: 2022-06-21

## 2022-06-21 NOTE — TELEPHONE ENCOUNTER
[facility] calling to ask if this patient has End Stage Retinol Disease. Please karen [facility] at [phone].

## 2022-07-28 ENCOUNTER — HOSPITAL ENCOUNTER (EMERGENCY)
Facility: HOSPITAL | Age: 67
Discharge: HOME | End: 2022-07-29
Attending: EMERGENCY MEDICINE
Payer: MEDICARE

## 2022-07-28 DIAGNOSIS — U07.1 COVID-19: Primary | ICD-10-CM

## 2022-07-28 LAB
AMB EXT COVID-19 RESULT: DETECTED
AMB EXT COVID-19 RESULT: DETECTED

## 2022-07-28 PROCEDURE — 99283 EMERGENCY DEPT VISIT LOW MDM: CPT

## 2022-07-28 RX ORDER — METOPROLOL TARTRATE 50 MG/1
TABLET, FILM COATED ORAL
Qty: 270 TABLET | Refills: 3 | Status: SHIPPED | OUTPATIENT
Start: 2022-07-28

## 2022-07-29 VITALS
OXYGEN SATURATION: 96 % | TEMPERATURE: 99 F | DIASTOLIC BLOOD PRESSURE: 70 MMHG | HEART RATE: 81 BPM | RESPIRATION RATE: 20 BRPM | HEIGHT: 72 IN | BODY MASS INDEX: 36.16 KG/M2 | SYSTOLIC BLOOD PRESSURE: 138 MMHG | WEIGHT: 267 LBS

## 2022-07-29 NOTE — ED INITIAL ASSESSMENT (HPI)
Patient stated he has not been feeling well since yesterday, took two at home covid tests and tested positive. Requesting to have antibodies. Patient is on peritoneal dialysis 6 days a week.

## 2022-09-07 ENCOUNTER — TELEPHONE (OUTPATIENT)
Dept: PULMONOLOGY | Facility: CLINIC | Age: 67
End: 2022-09-07

## 2022-09-07 NOTE — TELEPHONE ENCOUNTER
Received physician's order for CPAP supplies from [facility]. Order placed in Dr. Mary Carmen Walters folder for signature.

## 2022-09-08 NOTE — TELEPHONE ENCOUNTER
Order signed by Dr. Loree Wooten and faxed back to [facility] with confirmation. Sent out for scanning.

## 2022-09-20 NOTE — TELEPHONE ENCOUNTER
Received a fax from [facility] requesting a refill on Amlodipine. Looks like the medication was last refilled in April for a full year supply. Last medicare annual with Dr Amos Aragon was on 5/31/2022.

## 2022-09-21 RX ORDER — AMLODIPINE BESYLATE 10 MG/1
10 TABLET ORAL DAILY
Qty: 90 TABLET | Refills: 3 | Status: SHIPPED | OUTPATIENT
Start: 2022-09-21

## 2022-09-27 ENCOUNTER — LAB ENCOUNTER (OUTPATIENT)
Dept: LAB | Facility: HOSPITAL | Age: 67
End: 2022-09-27
Attending: INTERNAL MEDICINE
Payer: MEDICARE

## 2022-09-27 DIAGNOSIS — I10 ESSENTIAL HYPERTENSION, MALIGNANT: Primary | ICD-10-CM

## 2022-09-27 LAB
ANION GAP SERPL CALC-SCNC: 11 MMOL/L (ref 0–18)
BUN BLD-MCNC: 82 MG/DL (ref 7–18)
BUN/CREAT SERPL: 14.4 (ref 10–20)
CALCIUM BLD-MCNC: 8.3 MG/DL (ref 8.5–10.1)
CHLORIDE SERPL-SCNC: 112 MMOL/L (ref 98–112)
CO2 SERPL-SCNC: 21 MMOL/L (ref 21–32)
CREAT BLD-MCNC: 5.71 MG/DL
FASTING STATUS PATIENT QL REPORTED: YES
GFR SERPLBLD BASED ON 1.73 SQ M-ARVRAT: 10 ML/MIN/1.73M2 (ref 60–?)
GLUCOSE BLD-MCNC: 63 MG/DL (ref 70–99)
OSMOLALITY SERPL CALC.SUM OF ELEC: 321 MOSM/KG (ref 275–295)
POTASSIUM SERPL-SCNC: 4.7 MMOL/L (ref 3.5–5.1)
SODIUM SERPL-SCNC: 144 MMOL/L (ref 136–145)

## 2022-09-27 PROCEDURE — 80048 BASIC METABOLIC PNL TOTAL CA: CPT

## 2022-09-27 PROCEDURE — 36415 COLL VENOUS BLD VENIPUNCTURE: CPT

## 2022-10-04 ENCOUNTER — HOSPITAL ENCOUNTER (OUTPATIENT)
Dept: GENERAL RADIOLOGY | Facility: HOSPITAL | Age: 67
Discharge: HOME | End: 2022-10-04
Attending: INTERNAL MEDICINE
Payer: MEDICARE

## 2022-10-04 ENCOUNTER — OFFICE VISIT (OUTPATIENT)
Dept: INTERNAL MEDICINE CLINIC | Facility: CLINIC | Age: 67
End: 2022-10-04
Payer: MEDICARE

## 2022-10-04 ENCOUNTER — TELEPHONE (OUTPATIENT)
Dept: SURGERY | Facility: CLINIC | Age: 67
End: 2022-10-04

## 2022-10-04 VITALS
HEIGHT: 72 IN | BODY MASS INDEX: 37.52 KG/M2 | HEART RATE: 64 BPM | DIASTOLIC BLOOD PRESSURE: 68 MMHG | SYSTOLIC BLOOD PRESSURE: 130 MMHG | WEIGHT: 277 LBS | TEMPERATURE: 98 F

## 2022-10-04 DIAGNOSIS — N18.6 TYPE 2 DIABETES MELLITUS WITH CHRONIC KIDNEY DISEASE ON CHRONIC DIALYSIS, WITH LONG-TERM CURRENT USE OF INSULIN (HCC): ICD-10-CM

## 2022-10-04 DIAGNOSIS — E66.9 OBESITY (BMI 30-39.9): ICD-10-CM

## 2022-10-04 DIAGNOSIS — M25.552 LEFT HIP PAIN: ICD-10-CM

## 2022-10-04 DIAGNOSIS — M54.50 ACUTE LEFT-SIDED LOW BACK PAIN, UNSPECIFIED WHETHER SCIATICA PRESENT: ICD-10-CM

## 2022-10-04 DIAGNOSIS — E78.1 HYPERTRIGLYCERIDEMIA: ICD-10-CM

## 2022-10-04 DIAGNOSIS — I10 ESSENTIAL HYPERTENSION: ICD-10-CM

## 2022-10-04 DIAGNOSIS — N40.1 BENIGN PROSTATIC HYPERPLASIA WITH LOWER URINARY TRACT SYMPTOMS, SYMPTOM DETAILS UNSPECIFIED: ICD-10-CM

## 2022-10-04 DIAGNOSIS — Z99.2 STAGE 5 CHRONIC KIDNEY DISEASE ON DIALYSIS (HCC): ICD-10-CM

## 2022-10-04 DIAGNOSIS — N18.6 STAGE 5 CHRONIC KIDNEY DISEASE ON DIALYSIS (HCC): ICD-10-CM

## 2022-10-04 DIAGNOSIS — Z79.4 TYPE 2 DIABETES MELLITUS WITH CHRONIC KIDNEY DISEASE ON CHRONIC DIALYSIS, WITH LONG-TERM CURRENT USE OF INSULIN (HCC): ICD-10-CM

## 2022-10-04 DIAGNOSIS — M25.562 ACUTE PAIN OF LEFT KNEE: ICD-10-CM

## 2022-10-04 DIAGNOSIS — M25.562 ACUTE PAIN OF LEFT KNEE: Primary | ICD-10-CM

## 2022-10-04 DIAGNOSIS — E11.22 TYPE 2 DIABETES MELLITUS WITH CHRONIC KIDNEY DISEASE ON CHRONIC DIALYSIS, WITH LONG-TERM CURRENT USE OF INSULIN (HCC): ICD-10-CM

## 2022-10-04 DIAGNOSIS — Z99.2 TYPE 2 DIABETES MELLITUS WITH CHRONIC KIDNEY DISEASE ON CHRONIC DIALYSIS, WITH LONG-TERM CURRENT USE OF INSULIN (HCC): ICD-10-CM

## 2022-10-04 PROCEDURE — 99214 OFFICE O/P EST MOD 30 MIN: CPT | Performed by: INTERNAL MEDICINE

## 2022-10-04 PROCEDURE — 73502 X-RAY EXAM HIP UNI 2-3 VIEWS: CPT | Performed by: INTERNAL MEDICINE

## 2022-10-04 PROCEDURE — 72110 X-RAY EXAM L-2 SPINE 4/>VWS: CPT | Performed by: INTERNAL MEDICINE

## 2022-10-04 PROCEDURE — 73564 X-RAY EXAM KNEE 4 OR MORE: CPT | Performed by: INTERNAL MEDICINE

## 2022-10-04 RX ORDER — TAMSULOSIN HYDROCHLORIDE 0.4 MG/1
0.4 CAPSULE ORAL DAILY
Qty: 90 CAPSULE | Refills: 3 | Status: SHIPPED | OUTPATIENT
Start: 2022-10-04

## 2022-10-07 ENCOUNTER — LAB ENCOUNTER (OUTPATIENT)
Dept: LAB | Facility: HOSPITAL | Age: 67
End: 2022-10-07
Attending: NURSE PRACTITIONER
Payer: MEDICARE

## 2022-10-07 ENCOUNTER — TELEPHONE (OUTPATIENT)
Dept: INTERNAL MEDICINE CLINIC | Facility: CLINIC | Age: 67
End: 2022-10-07

## 2022-10-07 DIAGNOSIS — I10 HTN (HYPERTENSION): Primary | ICD-10-CM

## 2022-10-07 LAB
ANION GAP SERPL CALC-SCNC: 8 MMOL/L (ref 0–18)
BUN BLD-MCNC: 89 MG/DL (ref 7–18)
BUN/CREAT SERPL: 16.4 (ref 10–20)
CALCIUM BLD-MCNC: 8.8 MG/DL (ref 8.5–10.1)
CHLORIDE SERPL-SCNC: 109 MMOL/L (ref 98–112)
CO2 SERPL-SCNC: 25 MMOL/L (ref 21–32)
CREAT BLD-MCNC: 5.42 MG/DL
FASTING STATUS PATIENT QL REPORTED: YES
GFR SERPLBLD BASED ON 1.73 SQ M-ARVRAT: 11 ML/MIN/1.73M2 (ref 60–?)
GLUCOSE BLD-MCNC: 132 MG/DL (ref 70–99)
OSMOLALITY SERPL CALC.SUM OF ELEC: 323 MOSM/KG (ref 275–295)
POTASSIUM SERPL-SCNC: 4.6 MMOL/L (ref 3.5–5.1)
SODIUM SERPL-SCNC: 142 MMOL/L (ref 136–145)

## 2022-10-07 PROCEDURE — 80048 BASIC METABOLIC PNL TOTAL CA: CPT

## 2022-10-07 PROCEDURE — 36415 COLL VENOUS BLD VENIPUNCTURE: CPT

## 2022-10-21 ENCOUNTER — TELEPHONE (OUTPATIENT)
Dept: INTERNAL MEDICINE CLINIC | Facility: CLINIC | Age: 67
End: 2022-10-21

## 2022-10-21 NOTE — TELEPHONE ENCOUNTER
[facility] faxed over a request for a refill for    METOPROLOL TART TAB    Current script with [facility] in [facility]

## 2022-10-26 RX ORDER — METOPROLOL TARTRATE 50 MG/1
75 TABLET, FILM COATED ORAL 2 TIMES DAILY
Qty: 270 TABLET | Refills: 3 | Status: SHIPPED | OUTPATIENT
Start: 2022-10-26

## 2022-11-15 ENCOUNTER — OFFICE VISIT (OUTPATIENT)
Dept: PULMONOLOGY | Facility: CLINIC | Age: 67
End: 2022-11-15
Payer: MEDICARE

## 2022-11-15 ENCOUNTER — TELEPHONE (OUTPATIENT)
Dept: FAMILY MEDICINE | Age: 67
End: 2022-11-15

## 2022-11-15 VITALS
BODY MASS INDEX: 38.33 KG/M2 | HEIGHT: 72 IN | WEIGHT: 283 LBS | DIASTOLIC BLOOD PRESSURE: 72 MMHG | SYSTOLIC BLOOD PRESSURE: 133 MMHG | RESPIRATION RATE: 14 BRPM | HEART RATE: 69 BPM | OXYGEN SATURATION: 99 %

## 2022-11-15 DIAGNOSIS — G47.33 OBSTRUCTIVE SLEEP APNEA: Primary | ICD-10-CM

## 2022-11-15 PROCEDURE — 99213 OFFICE O/P EST LOW 20 MIN: CPT | Performed by: INTERNAL MEDICINE

## 2022-11-15 NOTE — PROGRESS NOTES
The patient is a 71-year-old male who I know well from prior evaluation who comes in now for follow-up. In general, he is doing well. He has obstructive sleep apnea and his average daily usage of CPAP is 8 hours and 11 minutes and residual events are 1.9/h. He is benefiting from ongoing usage. Review of Systems:  Vision normal. Ear nose and throat normal. Bowel normal. Bladder function normal. No depression. No thyroid disease. No lymphatic system concerns. No rash. Muscles and joints unremarkable. No weight loss no weight gain. Physical Examination:  Vital signs normal. HEENT examination is unremarkable with pupils equal round and reactive to light and accommodation. Neck without adenopathy, thyromegaly, JVD nor bruit. Lungs subtle basilar rattle to auscultation and percussion. Cardiac regular rate and rhythm no murmur. Abdomen nontender, without hepatosplenomegaly and no mass appreciable. Extremities and Musculoskeletal without clubbing cyanosis nor edema, and mobility acceptable. Neurologic grossly intact with symmetric tone and strength and reflex. Assessment and plan:  1. Obstructive sleep apnea-excellent control. Recommendations: Vigilance with CPAP every night all night, weight loss, avoid alcohol, avoid sedating drug, never drive if sleepy, see me in the office at the 1 year interval again with download of data and contact me promptly if new troubles. 2.  Bronchitic syndrome-patient is already on amoxicillin.   Likely postviral.

## 2022-12-02 ENCOUNTER — IMMUNIZATION (OUTPATIENT)
Dept: LAB | Age: 67
End: 2022-12-02
Attending: EMERGENCY MEDICINE
Payer: MEDICARE

## 2022-12-02 DIAGNOSIS — Z23 NEED FOR VACCINATION: Primary | ICD-10-CM

## 2022-12-02 PROCEDURE — 0124A SARSCOV2 VAC BVL 30MCG/0.3ML: CPT

## 2022-12-13 ENCOUNTER — LAB ENCOUNTER (OUTPATIENT)
Dept: LAB | Facility: HOSPITAL | Age: 67
End: 2022-12-13
Attending: INTERNAL MEDICINE
Payer: MEDICARE

## 2022-12-13 DIAGNOSIS — E11.9 DIABETES MELLITUS (HCC): Primary | ICD-10-CM

## 2022-12-13 LAB
ALBUMIN SERPL-MCNC: 3.2 G/DL (ref 3.4–5)
ALBUMIN/GLOB SERPL: 1 {RATIO} (ref 1–2)
ALP LIVER SERPL-CCNC: 91 U/L
ALT SERPL-CCNC: 55 U/L
ANION GAP SERPL CALC-SCNC: 7 MMOL/L (ref 0–18)
AST SERPL-CCNC: 32 U/L (ref 15–37)
BILIRUB SERPL-MCNC: 0.3 MG/DL (ref 0.1–2)
BUN BLD-MCNC: 80 MG/DL (ref 7–18)
BUN/CREAT SERPL: 16.2 (ref 10–20)
CALCIUM BLD-MCNC: 8.4 MG/DL (ref 8.5–10.1)
CHLORIDE SERPL-SCNC: 113 MMOL/L (ref 98–112)
CHOLEST SERPL-MCNC: 103 MG/DL (ref ?–200)
CO2 SERPL-SCNC: 23 MMOL/L (ref 21–32)
CREAT BLD-MCNC: 4.94 MG/DL
EST. AVERAGE GLUCOSE BLD GHB EST-MCNC: 128 MG/DL (ref 68–126)
FASTING PATIENT LIPID ANSWER: YES
FASTING STATUS PATIENT QL REPORTED: YES
GFR SERPLBLD BASED ON 1.73 SQ M-ARVRAT: 12 ML/MIN/1.73M2 (ref 60–?)
GLOBULIN PLAS-MCNC: 3.2 G/DL (ref 2.8–4.4)
GLUCOSE BLD-MCNC: 115 MG/DL (ref 70–99)
HBA1C MFR BLD: 6.1 % (ref ?–5.7)
HDLC SERPL-MCNC: 33 MG/DL (ref 40–59)
LDLC SERPL CALC-MCNC: 39 MG/DL (ref ?–100)
NONHDLC SERPL-MCNC: 70 MG/DL (ref ?–130)
OSMOLALITY SERPL CALC.SUM OF ELEC: 321 MOSM/KG (ref 275–295)
POTASSIUM SERPL-SCNC: 4.8 MMOL/L (ref 3.5–5.1)
PROT SERPL-MCNC: 6.4 G/DL (ref 6.4–8.2)
SODIUM SERPL-SCNC: 143 MMOL/L (ref 136–145)
TRIGL SERPL-MCNC: 189 MG/DL (ref 30–149)
VLDLC SERPL CALC-MCNC: 26 MG/DL (ref 0–30)

## 2022-12-13 PROCEDURE — 36415 COLL VENOUS BLD VENIPUNCTURE: CPT

## 2022-12-13 PROCEDURE — 80053 COMPREHEN METABOLIC PANEL: CPT

## 2022-12-13 PROCEDURE — 83036 HEMOGLOBIN GLYCOSYLATED A1C: CPT

## 2022-12-13 PROCEDURE — 80061 LIPID PANEL: CPT

## 2023-01-11 ENCOUNTER — TELEPHONE (OUTPATIENT)
Dept: SURGERY | Facility: CLINIC | Age: 68
End: 2023-01-11

## 2023-01-11 NOTE — TELEPHONE ENCOUNTER
Per pt called to schedule prostate exam, offered next available and pt denied. Asking to be seen prior to 2/2/23.  States will be having a stent removed from when he had kidney transplant and they advised him to see his urologist prior to stent removal. Please advise

## 2023-01-30 ENCOUNTER — OFFICE VISIT (OUTPATIENT)
Dept: SURGERY | Facility: CLINIC | Age: 68
End: 2023-01-30

## 2023-01-30 DIAGNOSIS — N40.1 BENIGN PROSTATIC HYPERPLASIA WITH LOWER URINARY TRACT SYMPTOMS, SYMPTOM DETAILS UNSPECIFIED: Primary | ICD-10-CM

## 2023-01-30 PROCEDURE — 99213 OFFICE O/P EST LOW 20 MIN: CPT | Performed by: UROLOGY

## 2023-01-30 RX ORDER — ERGOCALCIFEROL 1.25 MG/1
50000 CAPSULE ORAL
COMMUNITY
Start: 2022-12-15 | End: 2023-02-15

## 2023-01-30 RX ORDER — SULFAMETHOXAZOLE AND TRIMETHOPRIM 800; 160 MG/1; MG/1
1 TABLET ORAL NIGHTLY
COMMUNITY
Start: 2022-12-15

## 2023-01-30 RX ORDER — VALGANCICLOVIR 450 MG/1
900 TABLET, FILM COATED ORAL DAILY
COMMUNITY
Start: 2023-01-20

## 2023-01-30 RX ORDER — MYCOPHENOLIC ACID 360 MG/1
360 TABLET, DELAYED RELEASE ORAL 4 TIMES DAILY
COMMUNITY
Start: 2022-12-27

## 2023-02-07 ENCOUNTER — OFFICE VISIT (OUTPATIENT)
Dept: INTERNAL MEDICINE CLINIC | Facility: CLINIC | Age: 68
End: 2023-02-07

## 2023-02-07 VITALS
WEIGHT: 271 LBS | OXYGEN SATURATION: 97 % | TEMPERATURE: 98 F | BODY MASS INDEX: 36.7 KG/M2 | HEIGHT: 72 IN | SYSTOLIC BLOOD PRESSURE: 130 MMHG | DIASTOLIC BLOOD PRESSURE: 84 MMHG | HEART RATE: 68 BPM

## 2023-02-07 DIAGNOSIS — E11.22 TYPE 2 DIABETES MELLITUS WITH CHRONIC KIDNEY DISEASE ON CHRONIC DIALYSIS, WITH LONG-TERM CURRENT USE OF INSULIN (HCC): ICD-10-CM

## 2023-02-07 DIAGNOSIS — E66.9 OBESITY (BMI 30-39.9): ICD-10-CM

## 2023-02-07 DIAGNOSIS — N18.6 TYPE 2 DIABETES MELLITUS WITH CHRONIC KIDNEY DISEASE ON CHRONIC DIALYSIS, WITH LONG-TERM CURRENT USE OF INSULIN (HCC): ICD-10-CM

## 2023-02-07 DIAGNOSIS — Z99.2 TYPE 2 DIABETES MELLITUS WITH CHRONIC KIDNEY DISEASE ON CHRONIC DIALYSIS, WITH LONG-TERM CURRENT USE OF INSULIN (HCC): ICD-10-CM

## 2023-02-07 DIAGNOSIS — Z94.0 KIDNEY TRANSPLANT RECIPIENT: ICD-10-CM

## 2023-02-07 DIAGNOSIS — I10 ESSENTIAL HYPERTENSION: Primary | ICD-10-CM

## 2023-02-07 DIAGNOSIS — E78.1 HYPERTRIGLYCERIDEMIA: ICD-10-CM

## 2023-02-07 DIAGNOSIS — Z79.4 TYPE 2 DIABETES MELLITUS WITH CHRONIC KIDNEY DISEASE ON CHRONIC DIALYSIS, WITH LONG-TERM CURRENT USE OF INSULIN (HCC): ICD-10-CM

## 2023-02-07 PROCEDURE — 99215 OFFICE O/P EST HI 40 MIN: CPT | Performed by: INTERNAL MEDICINE

## 2023-02-07 RX ORDER — LACTULOSE 10 G/15ML
SOLUTION ORAL
COMMUNITY
Start: 2023-01-13

## 2023-02-07 RX ORDER — MAGNESIUM OXIDE/MAG AA CHELATE 133 MG
399 TABLET ORAL 4 TIMES DAILY
COMMUNITY
Start: 2023-01-26 | End: 2024-01-26

## 2023-02-07 RX ORDER — VALGANCICLOVIR 450 MG/1
450 TABLET, FILM COATED ORAL DAILY
Refills: 0 | COMMUNITY
Start: 2023-02-07

## 2023-02-07 RX ORDER — CEPHALEXIN 250 MG/1
250 CAPSULE ORAL DAILY
COMMUNITY
Start: 2023-02-02 | End: 2023-02-07

## 2023-02-07 RX ORDER — METOPROLOL TARTRATE 100 MG/1
TABLET ORAL
COMMUNITY
Start: 2023-01-26 | End: 2023-02-07

## 2023-02-07 RX ORDER — DOXYCYCLINE 100 MG/1
100 CAPSULE ORAL 2 TIMES DAILY
COMMUNITY
Start: 2023-02-02 | End: 2023-02-07

## 2023-02-07 RX ORDER — SODIUM BICARBONATE 650 MG/1
1300 TABLET ORAL 4 TIMES DAILY
COMMUNITY
Start: 2023-01-26 | End: 2024-01-26

## 2023-02-07 RX ORDER — PSYLLIUM SEED (WITH DEXTROSE)
1 POWDER (GRAM) ORAL
COMMUNITY
Start: 2023-01-09

## 2023-02-07 RX ORDER — LEVOFLOXACIN 250 MG/1
250 TABLET ORAL 2 TIMES DAILY
COMMUNITY
Start: 2023-01-26

## 2023-02-07 RX ORDER — SULFAMETHOXAZOLE AND TRIMETHOPRIM 800; 160 MG/1; MG/1
TABLET ORAL
Refills: 0 | COMMUNITY
Start: 2023-02-07

## 2023-02-07 RX ORDER — AMOXICILLIN AND CLAVULANATE POTASSIUM 500; 125 MG/1; MG/1
1 TABLET, FILM COATED ORAL 2 TIMES DAILY WITH MEALS
COMMUNITY
Start: 2022-11-10 | End: 2023-02-07 | Stop reason: ALTCHOICE

## 2023-02-09 NOTE — TELEPHONE ENCOUNTER
Pt called stating pt has not received a call back to schedule to have stent removed.   Please call pt Cosentyx Pregnancy And Lactation Text: This medication is Pregnancy Category B and is considered safe during pregnancy. It is unknown if this medication is excreted in breast milk.

## 2023-03-16 ENCOUNTER — LAB ENCOUNTER (OUTPATIENT)
Dept: LAB | Facility: HOSPITAL | Age: 68
End: 2023-03-16
Attending: INTERNAL MEDICINE
Payer: MEDICARE

## 2023-03-16 DIAGNOSIS — E83.42 HYPOMAGNESEMIA: ICD-10-CM

## 2023-03-16 DIAGNOSIS — B34.8 BK VIREMIA: ICD-10-CM

## 2023-03-16 DIAGNOSIS — N39.0 URINARY TRACT INFECTION, SITE NOT SPECIFIED: ICD-10-CM

## 2023-03-16 DIAGNOSIS — R78.81 BACTEREMIA: ICD-10-CM

## 2023-03-16 DIAGNOSIS — Z51.81 THERAPEUTIC DRUG MONITORING: ICD-10-CM

## 2023-03-16 DIAGNOSIS — E78.5 HYPERLIPIDEMIA: Primary | ICD-10-CM

## 2023-03-16 DIAGNOSIS — E55.9 VITAMIN D DEFICIENCY: ICD-10-CM

## 2023-03-16 DIAGNOSIS — R73.9 HYPERGLYCEMIA: ICD-10-CM

## 2023-03-16 DIAGNOSIS — B25.9 CMV INFECTION (HCC): ICD-10-CM

## 2023-03-16 DIAGNOSIS — R80.9 PROTEINURIA: ICD-10-CM

## 2023-03-16 DIAGNOSIS — D70.9 NEUTROPENIA, UNSPECIFIED TYPE (HCC): ICD-10-CM

## 2023-03-16 DIAGNOSIS — Z94.0 KIDNEY REPLACED BY TRANSPLANT: ICD-10-CM

## 2023-03-16 LAB
ANION GAP SERPL CALC-SCNC: 7 MMOL/L (ref 0–18)
BASOPHILS # BLD AUTO: 0.03 X10(3) UL (ref 0–0.2)
BASOPHILS NFR BLD AUTO: 0.7 %
BILIRUB UR QL: NEGATIVE
BUN BLD-MCNC: 25 MG/DL (ref 7–18)
BUN/CREAT SERPL: 19.4 (ref 10–20)
CALCIUM BLD-MCNC: 8.9 MG/DL (ref 8.5–10.1)
CHLORIDE SERPL-SCNC: 111 MMOL/L (ref 98–112)
CHOLEST SERPL-MCNC: 64 MG/DL (ref ?–200)
CLARITY UR: CLEAR
CO2 SERPL-SCNC: 24 MMOL/L (ref 21–32)
COLOR UR: YELLOW
CREAT BLD-MCNC: 1.29 MG/DL
CREAT UR-SCNC: 104 MG/DL
CREAT UR-SCNC: 104 MG/DL
DEPRECATED RDW RBC AUTO: 45.8 FL (ref 35.1–46.3)
EOSINOPHIL # BLD AUTO: 0.05 X10(3) UL (ref 0–0.7)
EOSINOPHIL NFR BLD AUTO: 1.1 %
ERYTHROCYTE [DISTWIDTH] IN BLOOD BY AUTOMATED COUNT: 14.1 % (ref 11–15)
EST. AVERAGE GLUCOSE BLD GHB EST-MCNC: 126 MG/DL (ref 68–126)
FASTING PATIENT LIPID ANSWER: NO
FASTING STATUS PATIENT QL REPORTED: NO
GFR SERPLBLD BASED ON 1.73 SQ M-ARVRAT: 60 ML/MIN/1.73M2 (ref 60–?)
GLUCOSE BLD-MCNC: 142 MG/DL (ref 70–99)
GLUCOSE UR-MCNC: NEGATIVE MG/DL
HBA1C MFR BLD: 6 % (ref ?–5.7)
HCT VFR BLD AUTO: 32.1 %
HDLC SERPL-MCNC: 39 MG/DL (ref 40–59)
HGB BLD-MCNC: 10.6 G/DL
HYALINE CASTS #/AREA URNS AUTO: PRESENT /LPF
IMM GRANULOCYTES # BLD AUTO: 0.03 X10(3) UL (ref 0–1)
IMM GRANULOCYTES NFR BLD: 0.7 %
KETONES UR-MCNC: NEGATIVE MG/DL
LDLC SERPL CALC-MCNC: 8 MG/DL (ref ?–100)
LEUKOCYTE ESTERASE UR QL STRIP.AUTO: NEGATIVE
LYMPHOCYTES # BLD AUTO: 0.33 X10(3) UL (ref 1–4)
LYMPHOCYTES NFR BLD AUTO: 7.5 %
MAGNESIUM SERPL-MCNC: 1.7 MG/DL (ref 1.6–2.6)
MCH RBC QN AUTO: 29.4 PG (ref 26–34)
MCHC RBC AUTO-ENTMCNC: 33 G/DL (ref 31–37)
MCV RBC AUTO: 89.2 FL
MICROALBUMIN UR-MCNC: 50.9 MG/DL
MICROALBUMIN/CREAT 24H UR-RTO: 489.4 UG/MG (ref ?–30)
MONOCYTES # BLD AUTO: 0.28 X10(3) UL (ref 0.1–1)
MONOCYTES NFR BLD AUTO: 6.3 %
NEUTROPHILS # BLD AUTO: 3.7 X10 (3) UL (ref 1.5–7.7)
NEUTROPHILS # BLD AUTO: 3.7 X10(3) UL (ref 1.5–7.7)
NEUTROPHILS NFR BLD AUTO: 83.7 %
NITRITE UR QL STRIP.AUTO: NEGATIVE
NONHDLC SERPL-MCNC: 25 MG/DL (ref ?–130)
OSMOLALITY SERPL CALC.SUM OF ELEC: 301 MOSM/KG (ref 275–295)
PH UR: 6 [PH] (ref 5–8)
PHOSPHATE SERPL-MCNC: 3.5 MG/DL (ref 2.5–4.9)
PLATELET # BLD AUTO: 145 10(3)UL (ref 150–450)
POTASSIUM SERPL-SCNC: 4.7 MMOL/L (ref 3.5–5.1)
PROT UR-MCNC: 100 MG/DL
PROT UR-MCNC: 77.1 MG/DL
PTH-INTACT SERPL-MCNC: 52.8 PG/ML (ref 18.5–88)
RBC # BLD AUTO: 3.6 X10(6)UL
SODIUM SERPL-SCNC: 142 MMOL/L (ref 136–145)
SP GR UR STRIP: 1.01 (ref 1–1.03)
TRIGL SERPL-MCNC: 79 MG/DL (ref 30–149)
UROBILINOGEN UR STRIP-ACNC: <2
VIT C UR-MCNC: NEGATIVE MG/DL
VIT D+METAB SERPL-MCNC: 46.1 NG/ML (ref 30–100)
VLDLC SERPL CALC-MCNC: 10 MG/DL (ref 0–30)
WBC # BLD AUTO: 4.4 X10(3) UL (ref 4–11)

## 2023-03-16 PROCEDURE — 82043 UR ALBUMIN QUANTITATIVE: CPT

## 2023-03-16 PROCEDURE — 36415 COLL VENOUS BLD VENIPUNCTURE: CPT

## 2023-03-16 PROCEDURE — 87799 DETECT AGENT NOS DNA QUANT: CPT

## 2023-03-16 PROCEDURE — 80197 ASSAY OF TACROLIMUS: CPT

## 2023-03-16 PROCEDURE — 83036 HEMOGLOBIN GLYCOSYLATED A1C: CPT

## 2023-03-16 PROCEDURE — 84100 ASSAY OF PHOSPHORUS: CPT

## 2023-03-16 PROCEDURE — 82306 VITAMIN D 25 HYDROXY: CPT

## 2023-03-16 PROCEDURE — 81001 URINALYSIS AUTO W/SCOPE: CPT

## 2023-03-16 PROCEDURE — 80048 BASIC METABOLIC PNL TOTAL CA: CPT

## 2023-03-16 PROCEDURE — 84156 ASSAY OF PROTEIN URINE: CPT

## 2023-03-16 PROCEDURE — 83735 ASSAY OF MAGNESIUM: CPT

## 2023-03-16 PROCEDURE — 80061 LIPID PANEL: CPT

## 2023-03-16 PROCEDURE — 87086 URINE CULTURE/COLONY COUNT: CPT

## 2023-03-16 PROCEDURE — 83970 ASSAY OF PARATHORMONE: CPT

## 2023-03-16 PROCEDURE — 85025 COMPLETE CBC W/AUTO DIFF WBC: CPT

## 2023-03-16 PROCEDURE — 82570 ASSAY OF URINE CREATININE: CPT

## 2023-03-17 LAB — TACROLIMUS: 8.6 NG/ML

## 2023-03-18 LAB
BK VIRUS DNA, QUANT COPY/ML: NOT DETECTED
BK VIRUS DNA, QUANT INTERP: NOT DETECTED
BK VIRUS DNA, QUANTITATION: NOT DETECTED
CMV QUANT BY PCR, INTERP: DETECTED
CMV QUANT BY PCR, IU/ML: 2070 IU/ML
CMV QUANT BY PCR, LOG IU/ML: 3.32 LOG IU/ML

## 2023-03-27 ENCOUNTER — OFFICE VISIT (OUTPATIENT)
Dept: SURGERY | Facility: CLINIC | Age: 68
End: 2023-03-27

## 2023-03-27 DIAGNOSIS — N40.1 BENIGN PROSTATIC HYPERPLASIA WITH LOWER URINARY TRACT SYMPTOMS, SYMPTOM DETAILS UNSPECIFIED: Primary | ICD-10-CM

## 2023-03-27 PROCEDURE — 99214 OFFICE O/P EST MOD 30 MIN: CPT | Performed by: UROLOGY

## 2023-03-27 RX ORDER — FINASTERIDE 5 MG/1
5 TABLET, FILM COATED ORAL DAILY
Qty: 30 TABLET | Refills: 3 | Status: SHIPPED | OUTPATIENT
Start: 2023-03-27

## 2023-03-28 ENCOUNTER — LAB ENCOUNTER (OUTPATIENT)
Dept: LAB | Facility: HOSPITAL | Age: 68
End: 2023-03-28
Attending: INTERNAL MEDICINE
Payer: MEDICARE

## 2023-03-28 DIAGNOSIS — R73.9 HYPERGLYCEMIA: ICD-10-CM

## 2023-03-28 DIAGNOSIS — B34.8 BK VIREMIA: ICD-10-CM

## 2023-03-28 DIAGNOSIS — R80.9 PROTEINURIA: ICD-10-CM

## 2023-03-28 DIAGNOSIS — E78.5 HYPERLIPIDEMIA: Primary | ICD-10-CM

## 2023-03-28 DIAGNOSIS — N39.0 URINARY TRACT INFECTION: ICD-10-CM

## 2023-03-28 DIAGNOSIS — R78.81 BACTEREMIA: ICD-10-CM

## 2023-03-28 DIAGNOSIS — D70.8: ICD-10-CM

## 2023-03-28 DIAGNOSIS — E83.42 HYPOMAGNESEMIA: ICD-10-CM

## 2023-03-28 DIAGNOSIS — Z94.0 KIDNEY REPLACED BY TRANSPLANT: ICD-10-CM

## 2023-03-28 DIAGNOSIS — B25.9 CYTOMEGALOVIRAL DISEASE (HCC): ICD-10-CM

## 2023-03-28 DIAGNOSIS — Z51.81 THERAPEUTIC DRUG MONITORING: ICD-10-CM

## 2023-03-28 DIAGNOSIS — E55.9 VITAMIN D DEFICIENCY: ICD-10-CM

## 2023-03-28 LAB
ANION GAP SERPL CALC-SCNC: 7 MMOL/L (ref 0–18)
BASOPHILS # BLD AUTO: 0.02 X10(3) UL (ref 0–0.2)
BASOPHILS NFR BLD AUTO: 0.5 %
BILIRUB UR QL: NEGATIVE
BILIRUB UR QL: NEGATIVE
BUN BLD-MCNC: 26 MG/DL (ref 7–18)
BUN/CREAT SERPL: 18.1 (ref 10–20)
CALCIUM BLD-MCNC: 8.7 MG/DL (ref 8.5–10.1)
CHLORIDE SERPL-SCNC: 115 MMOL/L (ref 98–112)
CHOLEST SERPL-MCNC: 59 MG/DL (ref ?–200)
CLARITY UR: CLEAR
CLARITY UR: CLEAR
CO2 SERPL-SCNC: 21 MMOL/L (ref 21–32)
CREAT BLD-MCNC: 1.44 MG/DL
CREAT UR-SCNC: 161 MG/DL
CREAT UR-SCNC: 161 MG/DL
DEPRECATED RDW RBC AUTO: 46.4 FL (ref 35.1–46.3)
EOSINOPHIL # BLD AUTO: 0.02 X10(3) UL (ref 0–0.7)
EOSINOPHIL NFR BLD AUTO: 0.5 %
ERYTHROCYTE [DISTWIDTH] IN BLOOD BY AUTOMATED COUNT: 14.3 % (ref 11–15)
EST. AVERAGE GLUCOSE BLD GHB EST-MCNC: 137 MG/DL (ref 68–126)
FASTING PATIENT LIPID ANSWER: YES
FASTING STATUS PATIENT QL REPORTED: YES
GFR SERPLBLD BASED ON 1.73 SQ M-ARVRAT: 53 ML/MIN/1.73M2 (ref 60–?)
GLUCOSE BLD-MCNC: 108 MG/DL (ref 70–99)
GLUCOSE UR-MCNC: NORMAL MG/DL
GLUCOSE UR-MCNC: NORMAL MG/DL
HBA1C MFR BLD: 6.4 % (ref ?–5.7)
HCT VFR BLD AUTO: 34 %
HDLC SERPL-MCNC: 34 MG/DL (ref 40–59)
HGB BLD-MCNC: 11.2 G/DL
HYALINE CASTS #/AREA URNS AUTO: PRESENT /LPF
HYALINE CASTS #/AREA URNS AUTO: PRESENT /LPF
IMM GRANULOCYTES # BLD AUTO: 0.02 X10(3) UL (ref 0–1)
IMM GRANULOCYTES NFR BLD: 0.5 %
KETONES UR-MCNC: NEGATIVE MG/DL
KETONES UR-MCNC: NEGATIVE MG/DL
LDLC SERPL CALC-MCNC: 8 MG/DL (ref ?–100)
LEUKOCYTE ESTERASE UR QL STRIP.AUTO: NEGATIVE
LEUKOCYTE ESTERASE UR QL STRIP.AUTO: NEGATIVE
LYMPHOCYTES # BLD AUTO: 0.23 X10(3) UL (ref 1–4)
LYMPHOCYTES NFR BLD AUTO: 5.6 %
MAGNESIUM SERPL-MCNC: 1.8 MG/DL (ref 1.6–2.6)
MCH RBC QN AUTO: 29.6 PG (ref 26–34)
MCHC RBC AUTO-ENTMCNC: 32.9 G/DL (ref 31–37)
MCV RBC AUTO: 89.7 FL
MICROALBUMIN UR-MCNC: 35.7 MG/DL
MICROALBUMIN/CREAT 24H UR-RTO: 221.7 UG/MG (ref ?–30)
MONOCYTES # BLD AUTO: 0.1 X10(3) UL (ref 0.1–1)
MONOCYTES NFR BLD AUTO: 2.5 %
NEUTROPHILS # BLD AUTO: 3.69 X10 (3) UL (ref 1.5–7.7)
NEUTROPHILS # BLD AUTO: 3.69 X10(3) UL (ref 1.5–7.7)
NEUTROPHILS NFR BLD AUTO: 90.4 %
NITRITE UR QL STRIP.AUTO: NEGATIVE
NITRITE UR QL STRIP.AUTO: NEGATIVE
NONHDLC SERPL-MCNC: 25 MG/DL (ref ?–130)
OSMOLALITY SERPL CALC.SUM OF ELEC: 301 MOSM/KG (ref 275–295)
PH UR: 5.5 [PH] (ref 5–8)
PH UR: 5.5 [PH] (ref 5–8)
PHOSPHATE SERPL-MCNC: 4 MG/DL (ref 2.5–4.9)
PLATELET # BLD AUTO: 131 10(3)UL (ref 150–450)
POTASSIUM SERPL-SCNC: 4.6 MMOL/L (ref 3.5–5.1)
PROT UR-MCNC: 50 MG/DL
PROT UR-MCNC: 50 MG/DL
PROT UR-MCNC: 60.6 MG/DL
PROT/CREAT UR-RTO: 0.38
PTH-INTACT SERPL-MCNC: 80.7 PG/ML (ref 18.5–88)
RBC # BLD AUTO: 3.79 X10(6)UL
SODIUM SERPL-SCNC: 143 MMOL/L (ref 136–145)
SP GR UR STRIP: 1.02 (ref 1–1.03)
SP GR UR STRIP: 1.02 (ref 1–1.03)
TRIGL SERPL-MCNC: 77 MG/DL (ref 30–149)
UROBILINOGEN UR STRIP-ACNC: NORMAL
UROBILINOGEN UR STRIP-ACNC: NORMAL
VIT C UR-MCNC: NEGATIVE MG/DL
VIT D+METAB SERPL-MCNC: 62.9 NG/ML (ref 30–100)
VLDLC SERPL CALC-MCNC: 10 MG/DL (ref 0–30)
WBC # BLD AUTO: 4.1 X10(3) UL (ref 4–11)

## 2023-03-28 PROCEDURE — 82306 VITAMIN D 25 HYDROXY: CPT

## 2023-03-28 PROCEDURE — 84100 ASSAY OF PHOSPHORUS: CPT

## 2023-03-28 PROCEDURE — 84156 ASSAY OF PROTEIN URINE: CPT

## 2023-03-28 PROCEDURE — 82570 ASSAY OF URINE CREATININE: CPT

## 2023-03-28 PROCEDURE — 80061 LIPID PANEL: CPT

## 2023-03-28 PROCEDURE — 83970 ASSAY OF PARATHORMONE: CPT

## 2023-03-28 PROCEDURE — 83036 HEMOGLOBIN GLYCOSYLATED A1C: CPT

## 2023-03-28 PROCEDURE — 80048 BASIC METABOLIC PNL TOTAL CA: CPT

## 2023-03-28 PROCEDURE — 85025 COMPLETE CBC W/AUTO DIFF WBC: CPT

## 2023-03-28 PROCEDURE — 80197 ASSAY OF TACROLIMUS: CPT

## 2023-03-28 PROCEDURE — 83735 ASSAY OF MAGNESIUM: CPT

## 2023-03-28 PROCEDURE — 87799 DETECT AGENT NOS DNA QUANT: CPT

## 2023-03-28 PROCEDURE — 81001 URINALYSIS AUTO W/SCOPE: CPT

## 2023-03-28 PROCEDURE — 36415 COLL VENOUS BLD VENIPUNCTURE: CPT

## 2023-03-28 PROCEDURE — 82043 UR ALBUMIN QUANTITATIVE: CPT

## 2023-03-29 ENCOUNTER — TELEPHONE (OUTPATIENT)
Dept: SURGERY | Facility: CLINIC | Age: 68
End: 2023-03-29

## 2023-03-29 NOTE — TELEPHONE ENCOUNTER
[facility] that [facility] of [address], so patient wants to know if a new prescription can be sent to [facility] [address] in [facility] for 30 day supply.

## 2023-03-30 LAB
BK VIRUS DNA, QUANT COPY/ML: NOT DETECTED
BK VIRUS DNA, QUANT INTERP: NOT DETECTED
BK VIRUS DNA, QUANTITATION: NOT DETECTED
CMV QUANT BY PCR, INTERP: DETECTED
CMV QUANT BY PCR, IU/ML: ABNORMAL IU/ML
CMV QUANT BY PCR, LOG IU/ML: 4.14 LOG IU/ML
TACROLIMUS: 11.1 NG/ML

## 2023-03-31 RX ORDER — FINASTERIDE 5 MG/1
5 TABLET, FILM COATED ORAL DAILY
Qty: 30 TABLET | Refills: 0 | Status: SHIPPED | OUTPATIENT
Start: 2023-03-31

## 2023-03-31 NOTE — TELEPHONE ENCOUNTER
Called patient and confirmed full name and . I explained to the patient that I ordered a 30 day supply of Finasteride 5 mg and sent the prescription to his [address]. Pt verbalized understanding and had no further questions.

## 2023-04-11 ENCOUNTER — LAB ENCOUNTER (OUTPATIENT)
Dept: LAB | Facility: HOSPITAL | Age: 68
End: 2023-04-11
Attending: INTERNAL MEDICINE
Payer: MEDICARE

## 2023-04-11 DIAGNOSIS — B25.9 CMV INFECTION (HCC): ICD-10-CM

## 2023-04-11 DIAGNOSIS — N39.0 UTI (URINARY TRACT INFECTION): ICD-10-CM

## 2023-04-11 DIAGNOSIS — E83.42 HYPOMAGNESEMIA: ICD-10-CM

## 2023-04-11 DIAGNOSIS — E55.9 VITAMIN D DEFICIENCY: ICD-10-CM

## 2023-04-11 DIAGNOSIS — Z51.81 THERAPEUTIC DRUG MONITORING: ICD-10-CM

## 2023-04-11 DIAGNOSIS — R73.9 HYPERGLYCEMIA: ICD-10-CM

## 2023-04-11 DIAGNOSIS — E78.5 HYPERLIPIDEMIA: Primary | ICD-10-CM

## 2023-04-11 DIAGNOSIS — Z94.0 KIDNEY REPLACED BY TRANSPLANT: ICD-10-CM

## 2023-04-11 DIAGNOSIS — B34.8 BK VIREMIA: ICD-10-CM

## 2023-04-11 DIAGNOSIS — D70.9 NEUTROPENIA (HCC): ICD-10-CM

## 2023-04-11 DIAGNOSIS — R80.9 PROTEINURIA: ICD-10-CM

## 2023-04-11 DIAGNOSIS — R78.81 BACTEREMIA: ICD-10-CM

## 2023-04-11 LAB
ANION GAP SERPL CALC-SCNC: 5 MMOL/L (ref 0–18)
BASOPHILS # BLD AUTO: 0.02 X10(3) UL (ref 0–0.2)
BASOPHILS NFR BLD AUTO: 0.5 %
BILIRUB UR QL: NEGATIVE
BUN BLD-MCNC: 22 MG/DL (ref 7–18)
BUN/CREAT SERPL: 15.5 (ref 10–20)
CALCIUM BLD-MCNC: 8.7 MG/DL (ref 8.5–10.1)
CHLORIDE SERPL-SCNC: 112 MMOL/L (ref 98–112)
CLARITY UR: CLEAR
CO2 SERPL-SCNC: 24 MMOL/L (ref 21–32)
COLOR UR: YELLOW
CREAT BLD-MCNC: 1.42 MG/DL
CREAT UR-SCNC: 109 MG/DL
CREAT UR-SCNC: 109 MG/DL
DEPRECATED RDW RBC AUTO: 50.4 FL (ref 35.1–46.3)
EOSINOPHIL # BLD AUTO: 0.05 X10(3) UL (ref 0–0.7)
EOSINOPHIL NFR BLD AUTO: 1.2 %
ERYTHROCYTE [DISTWIDTH] IN BLOOD BY AUTOMATED COUNT: 15.6 % (ref 11–15)
FASTING STATUS PATIENT QL REPORTED: NO
GFR SERPLBLD BASED ON 1.73 SQ M-ARVRAT: 54 ML/MIN/1.73M2 (ref 60–?)
GLUCOSE BLD-MCNC: 111 MG/DL (ref 70–99)
GLUCOSE UR-MCNC: NEGATIVE MG/DL
HCT VFR BLD AUTO: 33.6 %
HGB BLD-MCNC: 10.7 G/DL
IMM GRANULOCYTES # BLD AUTO: 0.03 X10(3) UL (ref 0–1)
IMM GRANULOCYTES NFR BLD: 0.7 %
KETONES UR-MCNC: NEGATIVE MG/DL
LEUKOCYTE ESTERASE UR QL STRIP.AUTO: NEGATIVE
LYMPHOCYTES # BLD AUTO: 0.35 X10(3) UL (ref 1–4)
LYMPHOCYTES NFR BLD AUTO: 8.3 %
MAGNESIUM SERPL-MCNC: 1.8 MG/DL (ref 1.6–2.6)
MCH RBC QN AUTO: 28.3 PG (ref 26–34)
MCHC RBC AUTO-ENTMCNC: 31.8 G/DL (ref 31–37)
MCV RBC AUTO: 88.9 FL
MICROALBUMIN UR-MCNC: 56.5 MG/DL
MICROALBUMIN/CREAT 24H UR-RTO: 518.3 UG/MG (ref ?–30)
MONOCYTES # BLD AUTO: 0.09 X10(3) UL (ref 0.1–1)
MONOCYTES NFR BLD AUTO: 2.1 %
NEUTROPHILS # BLD AUTO: 3.67 X10 (3) UL (ref 1.5–7.7)
NEUTROPHILS # BLD AUTO: 3.67 X10(3) UL (ref 1.5–7.7)
NEUTROPHILS NFR BLD AUTO: 87.2 %
NITRITE UR QL STRIP.AUTO: NEGATIVE
OSMOLALITY SERPL CALC.SUM OF ELEC: 296 MOSM/KG (ref 275–295)
PH UR: 6 [PH] (ref 5–8)
PHOSPHATE SERPL-MCNC: 3.2 MG/DL (ref 2.5–4.9)
PLATELET # BLD AUTO: 130 10(3)UL (ref 150–450)
POTASSIUM SERPL-SCNC: 3.9 MMOL/L (ref 3.5–5.1)
PROT UR-MCNC: 100 MG/DL
PROT UR-MCNC: 78.6 MG/DL
RBC # BLD AUTO: 3.78 X10(6)UL
RBC #/AREA URNS AUTO: >10 /HPF
SODIUM SERPL-SCNC: 141 MMOL/L (ref 136–145)
SP GR UR STRIP: 1.01 (ref 1–1.03)
UROBILINOGEN UR STRIP-ACNC: <2
VIT C UR-MCNC: NEGATIVE MG/DL
WBC # BLD AUTO: 4.2 X10(3) UL (ref 4–11)

## 2023-04-11 PROCEDURE — 82570 ASSAY OF URINE CREATININE: CPT

## 2023-04-11 PROCEDURE — 80197 ASSAY OF TACROLIMUS: CPT

## 2023-04-11 PROCEDURE — 87799 DETECT AGENT NOS DNA QUANT: CPT

## 2023-04-11 PROCEDURE — 83735 ASSAY OF MAGNESIUM: CPT

## 2023-04-11 PROCEDURE — 36415 COLL VENOUS BLD VENIPUNCTURE: CPT

## 2023-04-11 PROCEDURE — 82043 UR ALBUMIN QUANTITATIVE: CPT

## 2023-04-11 PROCEDURE — 84156 ASSAY OF PROTEIN URINE: CPT

## 2023-04-11 PROCEDURE — 87086 URINE CULTURE/COLONY COUNT: CPT

## 2023-04-11 PROCEDURE — 84100 ASSAY OF PHOSPHORUS: CPT

## 2023-04-11 PROCEDURE — 85025 COMPLETE CBC W/AUTO DIFF WBC: CPT

## 2023-04-11 PROCEDURE — 80048 BASIC METABOLIC PNL TOTAL CA: CPT

## 2023-04-11 PROCEDURE — 81001 URINALYSIS AUTO W/SCOPE: CPT

## 2023-04-12 LAB
BKV DNA, QUANT PCR, PLASMA: NEGATIVE IU/ML
CMV QN DNA PCR: NORMAL IU/ML
LOG10 CMV QN DNA: 4.65 LOG10 IU/ML

## 2023-04-13 LAB — TACROLIMUS LVL: 6.5 NG/ML

## 2023-04-18 ENCOUNTER — LAB ENCOUNTER (OUTPATIENT)
Dept: LAB | Facility: HOSPITAL | Age: 68
End: 2023-04-18
Attending: INTERNAL MEDICINE
Payer: MEDICARE

## 2023-04-18 DIAGNOSIS — N39.0 UTI (URINARY TRACT INFECTION): ICD-10-CM

## 2023-04-18 DIAGNOSIS — B34.8 BK VIREMIA: ICD-10-CM

## 2023-04-18 DIAGNOSIS — B25.9 CMV INFECTION (HCC): ICD-10-CM

## 2023-04-18 DIAGNOSIS — R80.9 PROTEINURIA: ICD-10-CM

## 2023-04-18 DIAGNOSIS — Z51.81 THERAPEUTIC DRUG MONITORING: ICD-10-CM

## 2023-04-18 DIAGNOSIS — E83.42 HYPOMAGNESEMIA: ICD-10-CM

## 2023-04-18 DIAGNOSIS — E78.5 HYPERLIPIDEMIA: ICD-10-CM

## 2023-04-18 DIAGNOSIS — R73.9 HYPERGLYCEMIA: ICD-10-CM

## 2023-04-18 DIAGNOSIS — D70.9 NEUTROPENIA (HCC): ICD-10-CM

## 2023-04-18 DIAGNOSIS — Z94.0 KIDNEY REPLACED BY TRANSPLANT: ICD-10-CM

## 2023-04-18 DIAGNOSIS — R78.81 BACTEREMIA: ICD-10-CM

## 2023-04-18 DIAGNOSIS — E55.9 VITAMIN D DEFICIENCY: ICD-10-CM

## 2023-04-18 LAB
ANION GAP SERPL CALC-SCNC: 3 MMOL/L (ref 0–18)
BASOPHILS # BLD AUTO: 0.05 X10(3) UL (ref 0–0.2)
BASOPHILS NFR BLD AUTO: 0.9 %
BILIRUB UR QL: NEGATIVE
BUN BLD-MCNC: 25 MG/DL (ref 7–18)
BUN/CREAT SERPL: 21.4 (ref 10–20)
CALCIUM BLD-MCNC: 8.5 MG/DL (ref 8.5–10.1)
CHLORIDE SERPL-SCNC: 113 MMOL/L (ref 98–112)
CLARITY UR: CLEAR
CO2 SERPL-SCNC: 23 MMOL/L (ref 21–32)
CREAT BLD-MCNC: 1.17 MG/DL
CREAT UR-SCNC: 87 MG/DL
CREAT UR-SCNC: 87 MG/DL
DEPRECATED RDW RBC AUTO: 51.8 FL (ref 35.1–46.3)
EOSINOPHIL # BLD AUTO: 0.04 X10(3) UL (ref 0–0.7)
EOSINOPHIL NFR BLD AUTO: 0.7 %
ERYTHROCYTE [DISTWIDTH] IN BLOOD BY AUTOMATED COUNT: 15.9 % (ref 11–15)
FASTING STATUS PATIENT QL REPORTED: NO
GFR SERPLBLD BASED ON 1.73 SQ M-ARVRAT: 68 ML/MIN/1.73M2 (ref 60–?)
GLUCOSE BLD-MCNC: 98 MG/DL (ref 70–99)
GLUCOSE UR-MCNC: NORMAL MG/DL
HCT VFR BLD AUTO: 35.2 %
HGB BLD-MCNC: 11.3 G/DL
HGB UR QL STRIP.AUTO: NEGATIVE
IMM GRANULOCYTES # BLD AUTO: 0.11 X10(3) UL (ref 0–1)
IMM GRANULOCYTES NFR BLD: 2 %
KETONES UR-MCNC: NEGATIVE MG/DL
LEUKOCYTE ESTERASE UR QL STRIP.AUTO: NEGATIVE
LYMPHOCYTES # BLD AUTO: 0.54 X10(3) UL (ref 1–4)
LYMPHOCYTES NFR BLD AUTO: 9.7 %
MAGNESIUM SERPL-MCNC: 1.6 MG/DL (ref 1.6–2.6)
MCH RBC QN AUTO: 28.8 PG (ref 26–34)
MCHC RBC AUTO-ENTMCNC: 32.1 G/DL (ref 31–37)
MCV RBC AUTO: 89.6 FL
MICROALBUMIN UR-MCNC: 50.8 MG/DL
MICROALBUMIN/CREAT 24H UR-RTO: 583.9 UG/MG (ref ?–30)
MONOCYTES # BLD AUTO: 0.75 X10(3) UL (ref 0.1–1)
MONOCYTES NFR BLD AUTO: 13.4 %
NEUTROPHILS # BLD AUTO: 4.09 X10 (3) UL (ref 1.5–7.7)
NEUTROPHILS # BLD AUTO: 4.09 X10(3) UL (ref 1.5–7.7)
NEUTROPHILS NFR BLD AUTO: 73.3 %
NITRITE UR QL STRIP.AUTO: NEGATIVE
OSMOLALITY SERPL CALC.SUM OF ELEC: 292 MOSM/KG (ref 275–295)
PH UR: 6 [PH] (ref 5–8)
PHOSPHATE SERPL-MCNC: 3.4 MG/DL (ref 2.5–4.9)
PLATELET # BLD AUTO: 128 10(3)UL (ref 150–450)
POTASSIUM SERPL-SCNC: 4.3 MMOL/L (ref 3.5–5.1)
PROT UR-MCNC: 50 MG/DL
PROT UR-MCNC: 77.1 MG/DL
RBC # BLD AUTO: 3.93 X10(6)UL
SODIUM SERPL-SCNC: 139 MMOL/L (ref 136–145)
SP GR UR STRIP: 1.01 (ref 1–1.03)
UROBILINOGEN UR STRIP-ACNC: NORMAL
WBC # BLD AUTO: 5.6 X10(3) UL (ref 4–11)

## 2023-04-18 PROCEDURE — 84156 ASSAY OF PROTEIN URINE: CPT

## 2023-04-18 PROCEDURE — 82043 UR ALBUMIN QUANTITATIVE: CPT

## 2023-04-18 PROCEDURE — 85025 COMPLETE CBC W/AUTO DIFF WBC: CPT

## 2023-04-18 PROCEDURE — 83735 ASSAY OF MAGNESIUM: CPT

## 2023-04-18 PROCEDURE — 36415 COLL VENOUS BLD VENIPUNCTURE: CPT

## 2023-04-18 PROCEDURE — 82570 ASSAY OF URINE CREATININE: CPT

## 2023-04-18 PROCEDURE — 81001 URINALYSIS AUTO W/SCOPE: CPT

## 2023-04-18 PROCEDURE — 87799 DETECT AGENT NOS DNA QUANT: CPT

## 2023-04-18 PROCEDURE — 84100 ASSAY OF PHOSPHORUS: CPT

## 2023-04-18 PROCEDURE — 80048 BASIC METABOLIC PNL TOTAL CA: CPT

## 2023-04-18 PROCEDURE — 80197 ASSAY OF TACROLIMUS: CPT

## 2023-04-20 LAB
BKV DNA, QUANT PCR, PLASMA: NEGATIVE IU/ML
CMV QN DNA PCR: NORMAL IU/ML
LOG10 CMV QN DNA: 4.42 LOG10 IU/ML

## 2023-04-21 LAB — TACROLIMUS LVL: 5.9 NG/ML

## 2023-05-02 ENCOUNTER — LAB ENCOUNTER (OUTPATIENT)
Dept: LAB | Facility: HOSPITAL | Age: 68
End: 2023-05-02
Attending: INTERNAL MEDICINE
Payer: MEDICARE

## 2023-05-02 DIAGNOSIS — B34.8 BK VIREMIA: ICD-10-CM

## 2023-05-02 DIAGNOSIS — E78.5 HYPERLIPIDEMIA: ICD-10-CM

## 2023-05-02 DIAGNOSIS — N39.0 UTI (URINARY TRACT INFECTION): ICD-10-CM

## 2023-05-02 DIAGNOSIS — R73.9 HYPERGLYCEMIA: ICD-10-CM

## 2023-05-02 DIAGNOSIS — N39.0 URINARY TRACT INFECTION, SITE NOT SPECIFIED: ICD-10-CM

## 2023-05-02 DIAGNOSIS — D70.8: ICD-10-CM

## 2023-05-02 DIAGNOSIS — B25.9 CYTOMEGALOVIRAL DISEASE (HCC): ICD-10-CM

## 2023-05-02 DIAGNOSIS — Z94.0 KIDNEY REPLACED BY TRANSPLANT: ICD-10-CM

## 2023-05-02 DIAGNOSIS — E83.42 HYPOMAGNESEMIA: ICD-10-CM

## 2023-05-02 DIAGNOSIS — R78.81 BACTEREMIA: ICD-10-CM

## 2023-05-02 DIAGNOSIS — E55.9 VITAMIN D DEFICIENCY: ICD-10-CM

## 2023-05-02 DIAGNOSIS — R80.9 PROTEINURIA: ICD-10-CM

## 2023-05-02 DIAGNOSIS — B25.9 CMV INFECTION (HCC): ICD-10-CM

## 2023-05-02 DIAGNOSIS — Z51.81 THERAPEUTIC DRUG MONITORING: ICD-10-CM

## 2023-05-02 DIAGNOSIS — D70.9 NEUTROPENIA, UNSPECIFIED TYPE (HCC): ICD-10-CM

## 2023-05-02 DIAGNOSIS — N39.0 URINARY TRACT INFECTION: ICD-10-CM

## 2023-05-02 DIAGNOSIS — D70.9 NEUTROPENIA (HCC): ICD-10-CM

## 2023-05-02 LAB
ANION GAP SERPL CALC-SCNC: 8 MMOL/L (ref 0–18)
BASOPHILS # BLD AUTO: 0.04 X10(3) UL (ref 0–0.2)
BASOPHILS NFR BLD AUTO: 0.8 %
BILIRUB UR QL: NEGATIVE
BUN BLD-MCNC: 34 MG/DL (ref 7–18)
BUN/CREAT SERPL: 26.8 (ref 10–20)
CALCIUM BLD-MCNC: 8.7 MG/DL (ref 8.5–10.1)
CHLORIDE SERPL-SCNC: 113 MMOL/L (ref 98–112)
CHOLEST SERPL-MCNC: 98 MG/DL (ref ?–200)
CLARITY UR: CLEAR
CO2 SERPL-SCNC: 21 MMOL/L (ref 21–32)
CREAT BLD-MCNC: 1.27 MG/DL
CREAT UR-SCNC: 74.7 MG/DL
CREAT UR-SCNC: 74.7 MG/DL
DEPRECATED RDW RBC AUTO: 52.5 FL (ref 35.1–46.3)
EOSINOPHIL # BLD AUTO: 0.04 X10(3) UL (ref 0–0.7)
EOSINOPHIL NFR BLD AUTO: 0.8 %
ERYTHROCYTE [DISTWIDTH] IN BLOOD BY AUTOMATED COUNT: 15.8 % (ref 11–15)
EST. AVERAGE GLUCOSE BLD GHB EST-MCNC: 157 MG/DL (ref 68–126)
FASTING PATIENT LIPID ANSWER: YES
FASTING STATUS PATIENT QL REPORTED: YES
GFR SERPLBLD BASED ON 1.73 SQ M-ARVRAT: 62 ML/MIN/1.73M2 (ref 60–?)
GLUCOSE BLD-MCNC: 95 MG/DL (ref 70–99)
GLUCOSE UR-MCNC: NORMAL MG/DL
HBA1C MFR BLD: 7.1 % (ref ?–5.7)
HCT VFR BLD AUTO: 36.4 %
HDLC SERPL-MCNC: 47 MG/DL (ref 40–59)
HGB BLD-MCNC: 11.7 G/DL
HGB UR QL STRIP.AUTO: NEGATIVE
HYALINE CASTS #/AREA URNS AUTO: PRESENT /LPF
IMM GRANULOCYTES # BLD AUTO: 0.04 X10(3) UL (ref 0–1)
IMM GRANULOCYTES NFR BLD: 0.8 %
KETONES UR-MCNC: NEGATIVE MG/DL
LDLC SERPL CALC-MCNC: 24 MG/DL (ref ?–100)
LEUKOCYTE ESTERASE UR QL STRIP.AUTO: NEGATIVE
LYMPHOCYTES # BLD AUTO: 1.24 X10(3) UL (ref 1–4)
LYMPHOCYTES NFR BLD AUTO: 23.3 %
MAGNESIUM SERPL-MCNC: 1.9 MG/DL (ref 1.6–2.6)
MCH RBC QN AUTO: 29.3 PG (ref 26–34)
MCHC RBC AUTO-ENTMCNC: 32.1 G/DL (ref 31–37)
MCV RBC AUTO: 91 FL
MICROALBUMIN UR-MCNC: 27.3 MG/DL
MICROALBUMIN/CREAT 24H UR-RTO: 365.5 UG/MG (ref ?–30)
MONOCYTES # BLD AUTO: 0.57 X10(3) UL (ref 0.1–1)
MONOCYTES NFR BLD AUTO: 10.7 %
NEUTROPHILS # BLD AUTO: 3.4 X10 (3) UL (ref 1.5–7.7)
NEUTROPHILS # BLD AUTO: 3.4 X10(3) UL (ref 1.5–7.7)
NEUTROPHILS NFR BLD AUTO: 63.6 %
NITRITE UR QL STRIP.AUTO: NEGATIVE
NONHDLC SERPL-MCNC: 51 MG/DL (ref ?–130)
OSMOLALITY SERPL CALC.SUM OF ELEC: 301 MOSM/KG (ref 275–295)
PH UR: 6.5 [PH] (ref 5–8)
PHOSPHATE SERPL-MCNC: 4.3 MG/DL (ref 2.5–4.9)
PLATELET # BLD AUTO: 102 10(3)UL (ref 150–450)
POTASSIUM SERPL-SCNC: 4.4 MMOL/L (ref 3.5–5.1)
PROT UR-MCNC: 30 MG/DL
PROT UR-MCNC: 44 MG/DL
PTH-INTACT SERPL-MCNC: 67.9 PG/ML (ref 18.5–88)
RBC # BLD AUTO: 4 X10(6)UL
SODIUM SERPL-SCNC: 142 MMOL/L (ref 136–145)
SP GR UR STRIP: 1.01 (ref 1–1.03)
TRIGL SERPL-MCNC: 166 MG/DL (ref 30–149)
UROBILINOGEN UR STRIP-ACNC: NORMAL
VIT D+METAB SERPL-MCNC: 47.1 NG/ML (ref 30–100)
VLDLC SERPL CALC-MCNC: 22 MG/DL (ref 0–30)
WBC # BLD AUTO: 5.3 X10(3) UL (ref 4–11)

## 2023-05-02 PROCEDURE — 80061 LIPID PANEL: CPT

## 2023-05-02 PROCEDURE — 82043 UR ALBUMIN QUANTITATIVE: CPT

## 2023-05-02 PROCEDURE — 84100 ASSAY OF PHOSPHORUS: CPT

## 2023-05-02 PROCEDURE — 80197 ASSAY OF TACROLIMUS: CPT

## 2023-05-02 PROCEDURE — 85025 COMPLETE CBC W/AUTO DIFF WBC: CPT

## 2023-05-02 PROCEDURE — 82570 ASSAY OF URINE CREATININE: CPT

## 2023-05-02 PROCEDURE — 81001 URINALYSIS AUTO W/SCOPE: CPT

## 2023-05-02 PROCEDURE — 83036 HEMOGLOBIN GLYCOSYLATED A1C: CPT

## 2023-05-02 PROCEDURE — 83970 ASSAY OF PARATHORMONE: CPT

## 2023-05-02 PROCEDURE — 36415 COLL VENOUS BLD VENIPUNCTURE: CPT

## 2023-05-02 PROCEDURE — 83735 ASSAY OF MAGNESIUM: CPT

## 2023-05-02 PROCEDURE — 87799 DETECT AGENT NOS DNA QUANT: CPT

## 2023-05-02 PROCEDURE — 80048 BASIC METABOLIC PNL TOTAL CA: CPT

## 2023-05-02 PROCEDURE — 84156 ASSAY OF PROTEIN URINE: CPT

## 2023-05-02 PROCEDURE — 82306 VITAMIN D 25 HYDROXY: CPT

## 2023-05-04 LAB
BKV DNA, QUANT PCR, PLASMA: NEGATIVE IU/ML
CMV QN DNA PCR: 341 IU/ML
LOG10 CMV QN DNA: 2.53 LOG10 IU/ML

## 2023-05-05 LAB — TACROLIMUS LVL: 9.1 NG/ML

## 2023-05-09 ENCOUNTER — LAB ENCOUNTER (OUTPATIENT)
Dept: LAB | Facility: HOSPITAL | Age: 68
End: 2023-05-09
Attending: INTERNAL MEDICINE
Payer: MEDICARE

## 2023-05-09 DIAGNOSIS — Z94.0 KIDNEY REPLACED BY TRANSPLANT: ICD-10-CM

## 2023-05-09 DIAGNOSIS — R80.9 PROTEINURIA: ICD-10-CM

## 2023-05-09 DIAGNOSIS — R73.9 HYPERGLYCEMIA: ICD-10-CM

## 2023-05-09 DIAGNOSIS — E78.5 HYPERLIPIDEMIA: ICD-10-CM

## 2023-05-09 DIAGNOSIS — D70.9 NEUTROPENIA (HCC): ICD-10-CM

## 2023-05-09 DIAGNOSIS — E83.42 HYPOMAGNESEMIA: ICD-10-CM

## 2023-05-09 DIAGNOSIS — Z51.81 THERAPEUTIC DRUG MONITORING: ICD-10-CM

## 2023-05-09 DIAGNOSIS — R78.81 BACTEREMIA: ICD-10-CM

## 2023-05-09 DIAGNOSIS — B34.8 BK VIREMIA: ICD-10-CM

## 2023-05-09 DIAGNOSIS — B25.9 CMV INFECTION (HCC): ICD-10-CM

## 2023-05-09 DIAGNOSIS — N39.0 UTI (URINARY TRACT INFECTION): ICD-10-CM

## 2023-05-09 DIAGNOSIS — E55.9 VITAMIN D DEFICIENCY: ICD-10-CM

## 2023-05-09 LAB
ANION GAP SERPL CALC-SCNC: 7 MMOL/L (ref 0–18)
BASOPHILS # BLD AUTO: 0.02 X10(3) UL (ref 0–0.2)
BASOPHILS NFR BLD AUTO: 0.4 %
BILIRUB UR QL: NEGATIVE
BUN BLD-MCNC: 32 MG/DL (ref 7–18)
BUN/CREAT SERPL: 24.2 (ref 10–20)
CALCIUM BLD-MCNC: 9 MG/DL (ref 8.5–10.1)
CHLORIDE SERPL-SCNC: 110 MMOL/L (ref 98–112)
CLARITY UR: CLEAR
CO2 SERPL-SCNC: 24 MMOL/L (ref 21–32)
COLOR UR: COLORLESS
CREAT BLD-MCNC: 1.32 MG/DL
CREAT UR-SCNC: 53.1 MG/DL
CREAT UR-SCNC: 53.1 MG/DL
DEPRECATED RDW RBC AUTO: 54.1 FL (ref 35.1–46.3)
EOSINOPHIL # BLD AUTO: 0.04 X10(3) UL (ref 0–0.7)
EOSINOPHIL NFR BLD AUTO: 0.7 %
ERYTHROCYTE [DISTWIDTH] IN BLOOD BY AUTOMATED COUNT: 16 % (ref 11–15)
FASTING STATUS PATIENT QL REPORTED: YES
GFR SERPLBLD BASED ON 1.73 SQ M-ARVRAT: 59 ML/MIN/1.73M2 (ref 60–?)
GLUCOSE BLD-MCNC: 118 MG/DL (ref 70–99)
GLUCOSE UR-MCNC: NORMAL MG/DL
HCT VFR BLD AUTO: 34.7 %
HGB BLD-MCNC: 11.1 G/DL
HGB UR QL STRIP.AUTO: NEGATIVE
HYALINE CASTS #/AREA URNS AUTO: PRESENT /LPF
IMM GRANULOCYTES # BLD AUTO: 0.04 X10(3) UL (ref 0–1)
IMM GRANULOCYTES NFR BLD: 0.7 %
KETONES UR-MCNC: NEGATIVE MG/DL
LEUKOCYTE ESTERASE UR QL STRIP.AUTO: NEGATIVE
LYMPHOCYTES # BLD AUTO: 1.1 X10(3) UL (ref 1–4)
LYMPHOCYTES NFR BLD AUTO: 19.6 %
MAGNESIUM SERPL-MCNC: 1.8 MG/DL (ref 1.6–2.6)
MCH RBC QN AUTO: 29.4 PG (ref 26–34)
MCHC RBC AUTO-ENTMCNC: 32 G/DL (ref 31–37)
MCV RBC AUTO: 91.8 FL
MICROALBUMIN UR-MCNC: 26.5 MG/DL
MICROALBUMIN/CREAT 24H UR-RTO: 499.1 UG/MG (ref ?–30)
MONOCYTES # BLD AUTO: 0.43 X10(3) UL (ref 0.1–1)
MONOCYTES NFR BLD AUTO: 7.7 %
NEUTROPHILS # BLD AUTO: 3.99 X10 (3) UL (ref 1.5–7.7)
NEUTROPHILS # BLD AUTO: 3.99 X10(3) UL (ref 1.5–7.7)
NEUTROPHILS NFR BLD AUTO: 70.9 %
NITRITE UR QL STRIP.AUTO: NEGATIVE
OSMOLALITY SERPL CALC.SUM OF ELEC: 300 MOSM/KG (ref 275–295)
PH UR: 6.5 [PH] (ref 5–8)
PHOSPHATE SERPL-MCNC: 3.9 MG/DL (ref 2.5–4.9)
PLATELET # BLD AUTO: 76 10(3)UL (ref 150–450)
POTASSIUM SERPL-SCNC: 5.6 MMOL/L (ref 3.5–5.1)
PROT UR-MCNC: 30 MG/DL
PROT UR-MCNC: 46.9 MG/DL
RBC # BLD AUTO: 3.78 X10(6)UL
SODIUM SERPL-SCNC: 141 MMOL/L (ref 136–145)
SP GR UR STRIP: 1.01 (ref 1–1.03)
UROBILINOGEN UR STRIP-ACNC: NORMAL
WBC # BLD AUTO: 5.6 X10(3) UL (ref 4–11)

## 2023-05-09 PROCEDURE — 83735 ASSAY OF MAGNESIUM: CPT

## 2023-05-09 PROCEDURE — 87799 DETECT AGENT NOS DNA QUANT: CPT

## 2023-05-09 PROCEDURE — 80048 BASIC METABOLIC PNL TOTAL CA: CPT

## 2023-05-09 PROCEDURE — 82570 ASSAY OF URINE CREATININE: CPT

## 2023-05-09 PROCEDURE — 36415 COLL VENOUS BLD VENIPUNCTURE: CPT

## 2023-05-09 PROCEDURE — 84156 ASSAY OF PROTEIN URINE: CPT

## 2023-05-09 PROCEDURE — 85025 COMPLETE CBC W/AUTO DIFF WBC: CPT

## 2023-05-09 PROCEDURE — 80197 ASSAY OF TACROLIMUS: CPT

## 2023-05-09 PROCEDURE — 81001 URINALYSIS AUTO W/SCOPE: CPT

## 2023-05-09 PROCEDURE — 82043 UR ALBUMIN QUANTITATIVE: CPT

## 2023-05-09 PROCEDURE — 84100 ASSAY OF PHOSPHORUS: CPT

## 2023-05-11 LAB
BKV DNA, QUANT PCR, PLASMA: NEGATIVE IU/ML
CMV QN DNA PCR: 282 IU/ML
LOG10 CMV QN DNA: 2.45 LOG10 IU/ML
TACROLIMUS LVL: 8.1 NG/ML

## 2023-05-23 ENCOUNTER — LAB ENCOUNTER (OUTPATIENT)
Dept: LAB | Facility: HOSPITAL | Age: 68
End: 2023-05-23
Attending: INTERNAL MEDICINE
Payer: MEDICARE

## 2023-05-23 DIAGNOSIS — E55.9 VITAMIN D DEFICIENCY: ICD-10-CM

## 2023-05-23 DIAGNOSIS — E83.42 HYPOMAGNESEMIA: ICD-10-CM

## 2023-05-23 DIAGNOSIS — D70.8: ICD-10-CM

## 2023-05-23 DIAGNOSIS — D70.9 NEUTROPENIA (HCC): ICD-10-CM

## 2023-05-23 DIAGNOSIS — B25.9 CYTOMEGALOVIRAL DISEASE (HCC): ICD-10-CM

## 2023-05-23 DIAGNOSIS — R80.9 PROTEINURIA: ICD-10-CM

## 2023-05-23 DIAGNOSIS — E78.5 HYPERLIPIDEMIA: ICD-10-CM

## 2023-05-23 DIAGNOSIS — Z94.0 KIDNEY REPLACED BY TRANSPLANT: ICD-10-CM

## 2023-05-23 DIAGNOSIS — N39.0 URINARY TRACT INFECTION: ICD-10-CM

## 2023-05-23 DIAGNOSIS — B34.8 BK VIREMIA: ICD-10-CM

## 2023-05-23 DIAGNOSIS — B25.9 CMV INFECTION (HCC): ICD-10-CM

## 2023-05-23 DIAGNOSIS — R78.81 BACTEREMIA: ICD-10-CM

## 2023-05-23 DIAGNOSIS — R73.9 HYPERGLYCEMIA: ICD-10-CM

## 2023-05-23 DIAGNOSIS — Z51.81 THERAPEUTIC DRUG MONITORING: ICD-10-CM

## 2023-05-23 DIAGNOSIS — N39.0 UTI (URINARY TRACT INFECTION): ICD-10-CM

## 2023-05-23 LAB
ANION GAP SERPL CALC-SCNC: 7 MMOL/L (ref 0–18)
BASOPHILS # BLD AUTO: 0.03 X10(3) UL (ref 0–0.2)
BASOPHILS NFR BLD AUTO: 0.5 %
BILIRUB UR QL: NEGATIVE
BUN BLD-MCNC: 29 MG/DL (ref 7–18)
BUN/CREAT SERPL: 22.1 (ref 10–20)
CALCIUM BLD-MCNC: 8.5 MG/DL (ref 8.5–10.1)
CHLORIDE SERPL-SCNC: 111 MMOL/L (ref 98–112)
CHOLEST SERPL-MCNC: 82 MG/DL (ref ?–200)
CLARITY UR: CLEAR
CO2 SERPL-SCNC: 23 MMOL/L (ref 21–32)
CREAT BLD-MCNC: 1.31 MG/DL
CREAT UR-SCNC: 88.6 MG/DL
CREAT UR-SCNC: 88.6 MG/DL
DEPRECATED RDW RBC AUTO: 54.3 FL (ref 35.1–46.3)
EOSINOPHIL # BLD AUTO: 0.06 X10(3) UL (ref 0–0.7)
EOSINOPHIL NFR BLD AUTO: 1.1 %
ERYTHROCYTE [DISTWIDTH] IN BLOOD BY AUTOMATED COUNT: 16.4 % (ref 11–15)
EST. AVERAGE GLUCOSE BLD GHB EST-MCNC: 157 MG/DL (ref 68–126)
FASTING PATIENT LIPID ANSWER: NO
FASTING STATUS PATIENT QL REPORTED: NO
GFR SERPLBLD BASED ON 1.73 SQ M-ARVRAT: 59 ML/MIN/1.73M2 (ref 60–?)
GLUCOSE BLD-MCNC: 91 MG/DL (ref 70–99)
GLUCOSE UR-MCNC: NORMAL MG/DL
HBA1C MFR BLD: 7.1 % (ref ?–5.7)
HCT VFR BLD AUTO: 32 %
HDLC SERPL-MCNC: 52 MG/DL (ref 40–59)
HGB BLD-MCNC: 10.6 G/DL
HGB UR QL STRIP.AUTO: NEGATIVE
IMM GRANULOCYTES # BLD AUTO: 0.06 X10(3) UL (ref 0–1)
IMM GRANULOCYTES NFR BLD: 1.1 %
KETONES UR-MCNC: NEGATIVE MG/DL
LDLC SERPL CALC-MCNC: 11 MG/DL (ref ?–100)
LEUKOCYTE ESTERASE UR QL STRIP.AUTO: NEGATIVE
LYMPHOCYTES # BLD AUTO: 1.49 X10(3) UL (ref 1–4)
LYMPHOCYTES NFR BLD AUTO: 26.3 %
MAGNESIUM SERPL-MCNC: 1.7 MG/DL (ref 1.6–2.6)
MCH RBC QN AUTO: 29.8 PG (ref 26–34)
MCHC RBC AUTO-ENTMCNC: 33.1 G/DL (ref 31–37)
MCV RBC AUTO: 89.9 FL
MICROALBUMIN UR-MCNC: 32.1 MG/DL
MICROALBUMIN/CREAT 24H UR-RTO: 362.3 UG/MG (ref ?–30)
MONOCYTES # BLD AUTO: 0.37 X10(3) UL (ref 0.1–1)
MONOCYTES NFR BLD AUTO: 6.5 %
NEUTROPHILS # BLD AUTO: 3.65 X10 (3) UL (ref 1.5–7.7)
NEUTROPHILS # BLD AUTO: 3.65 X10(3) UL (ref 1.5–7.7)
NEUTROPHILS NFR BLD AUTO: 64.5 %
NITRITE UR QL STRIP.AUTO: NEGATIVE
NONHDLC SERPL-MCNC: 30 MG/DL (ref ?–130)
OSMOLALITY SERPL CALC.SUM OF ELEC: 297 MOSM/KG (ref 275–295)
PH UR: 6 [PH] (ref 5–8)
PHOSPHATE SERPL-MCNC: 4.9 MG/DL (ref 2.5–4.9)
PLATELET # BLD AUTO: 113 10(3)UL (ref 150–450)
POTASSIUM SERPL-SCNC: 4.2 MMOL/L (ref 3.5–5.1)
PROT UR-MCNC: 50 MG/DL
PROT UR-MCNC: 62 MG/DL
PTH-INTACT SERPL-MCNC: 74 PG/ML (ref 18.5–88)
RBC # BLD AUTO: 3.56 X10(6)UL
SODIUM SERPL-SCNC: 141 MMOL/L (ref 136–145)
SP GR UR STRIP: 1.01 (ref 1–1.03)
TRIGL SERPL-MCNC: 101 MG/DL (ref 30–149)
UROBILINOGEN UR STRIP-ACNC: NORMAL
VIT D+METAB SERPL-MCNC: 44.9 NG/ML (ref 30–100)
VLDLC SERPL CALC-MCNC: 13 MG/DL (ref 0–30)
WBC # BLD AUTO: 5.7 X10(3) UL (ref 4–11)

## 2023-05-23 PROCEDURE — 84100 ASSAY OF PHOSPHORUS: CPT

## 2023-05-23 PROCEDURE — 36415 COLL VENOUS BLD VENIPUNCTURE: CPT

## 2023-05-23 PROCEDURE — 82570 ASSAY OF URINE CREATININE: CPT

## 2023-05-23 PROCEDURE — 80061 LIPID PANEL: CPT

## 2023-05-23 PROCEDURE — 84156 ASSAY OF PROTEIN URINE: CPT

## 2023-05-23 PROCEDURE — 82306 VITAMIN D 25 HYDROXY: CPT

## 2023-05-23 PROCEDURE — 87086 URINE CULTURE/COLONY COUNT: CPT

## 2023-05-23 PROCEDURE — 80048 BASIC METABOLIC PNL TOTAL CA: CPT

## 2023-05-23 PROCEDURE — 82043 UR ALBUMIN QUANTITATIVE: CPT

## 2023-05-23 PROCEDURE — 83036 HEMOGLOBIN GLYCOSYLATED A1C: CPT

## 2023-05-23 PROCEDURE — 80197 ASSAY OF TACROLIMUS: CPT

## 2023-05-23 PROCEDURE — 81001 URINALYSIS AUTO W/SCOPE: CPT

## 2023-05-23 PROCEDURE — 83970 ASSAY OF PARATHORMONE: CPT

## 2023-05-23 PROCEDURE — 83735 ASSAY OF MAGNESIUM: CPT

## 2023-05-23 PROCEDURE — 85025 COMPLETE CBC W/AUTO DIFF WBC: CPT

## 2023-05-27 LAB — TACROLIMUS LVL: 5.3 NG/ML

## 2023-05-30 ENCOUNTER — LAB ENCOUNTER (OUTPATIENT)
Dept: LAB | Facility: HOSPITAL | Age: 68
End: 2023-05-30
Attending: INTERNAL MEDICINE
Payer: MEDICARE

## 2023-05-30 DIAGNOSIS — R73.9 HYPERGLYCEMIA: ICD-10-CM

## 2023-05-30 DIAGNOSIS — D70.9 NEUTROPENIA (HCC): ICD-10-CM

## 2023-05-30 DIAGNOSIS — E83.42 HYPOMAGNESEMIA: ICD-10-CM

## 2023-05-30 DIAGNOSIS — E78.5 HYPERLIPIDEMIA: ICD-10-CM

## 2023-05-30 DIAGNOSIS — Z51.81 THERAPEUTIC DRUG MONITORING: ICD-10-CM

## 2023-05-30 DIAGNOSIS — R78.81 BACTEREMIA: ICD-10-CM

## 2023-05-30 DIAGNOSIS — B34.8 BK VIREMIA: ICD-10-CM

## 2023-05-30 DIAGNOSIS — E55.9 VITAMIN D DEFICIENCY: ICD-10-CM

## 2023-05-30 DIAGNOSIS — N39.0 UTI (URINARY TRACT INFECTION): ICD-10-CM

## 2023-05-30 DIAGNOSIS — R80.9 PROTEINURIA: ICD-10-CM

## 2023-05-30 DIAGNOSIS — B25.9 CMV INFECTION (HCC): ICD-10-CM

## 2023-05-30 DIAGNOSIS — Z94.0 KIDNEY REPLACED BY TRANSPLANT: ICD-10-CM

## 2023-05-30 LAB
ANION GAP SERPL CALC-SCNC: 5 MMOL/L (ref 0–18)
BASOPHILS # BLD AUTO: 0.02 X10(3) UL (ref 0–0.2)
BASOPHILS NFR BLD AUTO: 0.4 %
BILIRUB UR QL: NEGATIVE
BUN BLD-MCNC: 32 MG/DL (ref 7–18)
BUN/CREAT SERPL: 24.1 (ref 10–20)
CALCIUM BLD-MCNC: 8.6 MG/DL (ref 8.5–10.1)
CHLORIDE SERPL-SCNC: 113 MMOL/L (ref 98–112)
CLARITY UR: CLEAR
CO2 SERPL-SCNC: 22 MMOL/L (ref 21–32)
CREAT BLD-MCNC: 1.33 MG/DL
CREAT UR-SCNC: 79.9 MG/DL
CREAT UR-SCNC: 79.9 MG/DL
DEPRECATED RDW RBC AUTO: 55 FL (ref 35.1–46.3)
EOSINOPHIL # BLD AUTO: 0.04 X10(3) UL (ref 0–0.7)
EOSINOPHIL NFR BLD AUTO: 0.7 %
ERYTHROCYTE [DISTWIDTH] IN BLOOD BY AUTOMATED COUNT: 16.4 % (ref 11–15)
FASTING STATUS PATIENT QL REPORTED: NO
GFR SERPLBLD BASED ON 1.73 SQ M-ARVRAT: 58 ML/MIN/1.73M2 (ref 60–?)
GLUCOSE BLD-MCNC: 95 MG/DL (ref 70–99)
GLUCOSE UR-MCNC: NORMAL MG/DL
HCT VFR BLD AUTO: 32.8 %
HGB BLD-MCNC: 10.7 G/DL
HGB UR QL STRIP.AUTO: NEGATIVE
IMM GRANULOCYTES # BLD AUTO: 0.06 X10(3) UL (ref 0–1)
IMM GRANULOCYTES NFR BLD: 1.1 %
KETONES UR-MCNC: NEGATIVE MG/DL
LEUKOCYTE ESTERASE UR QL STRIP.AUTO: NEGATIVE
LYMPHOCYTES # BLD AUTO: 1.28 X10(3) UL (ref 1–4)
LYMPHOCYTES NFR BLD AUTO: 23 %
MAGNESIUM SERPL-MCNC: 1.9 MG/DL (ref 1.6–2.6)
MCH RBC QN AUTO: 29.9 PG (ref 26–34)
MCHC RBC AUTO-ENTMCNC: 32.6 G/DL (ref 31–37)
MCV RBC AUTO: 91.6 FL
MICROALBUMIN UR-MCNC: 39.2 MG/DL
MICROALBUMIN/CREAT 24H UR-RTO: 490.6 UG/MG (ref ?–30)
MONOCYTES # BLD AUTO: 0.31 X10(3) UL (ref 0.1–1)
MONOCYTES NFR BLD AUTO: 5.6 %
NEUTROPHILS # BLD AUTO: 3.86 X10 (3) UL (ref 1.5–7.7)
NEUTROPHILS # BLD AUTO: 3.86 X10(3) UL (ref 1.5–7.7)
NEUTROPHILS NFR BLD AUTO: 69.2 %
NITRITE UR QL STRIP.AUTO: NEGATIVE
OSMOLALITY SERPL CALC.SUM OF ELEC: 297 MOSM/KG (ref 275–295)
PH UR: 6.5 [PH] (ref 5–8)
PHOSPHATE SERPL-MCNC: 3.1 MG/DL (ref 2.5–4.9)
PLATELET # BLD AUTO: 115 10(3)UL (ref 150–450)
POTASSIUM SERPL-SCNC: 4.5 MMOL/L (ref 3.5–5.1)
PROT UR-MCNC: 50 MG/DL
PROT UR-MCNC: 60 MG/DL
RBC # BLD AUTO: 3.58 X10(6)UL
SODIUM SERPL-SCNC: 140 MMOL/L (ref 136–145)
SP GR UR STRIP: 1.01 (ref 1–1.03)
UROBILINOGEN UR STRIP-ACNC: NORMAL
WBC # BLD AUTO: 5.6 X10(3) UL (ref 4–11)

## 2023-05-30 PROCEDURE — 84100 ASSAY OF PHOSPHORUS: CPT

## 2023-05-30 PROCEDURE — 36415 COLL VENOUS BLD VENIPUNCTURE: CPT

## 2023-05-30 PROCEDURE — 84156 ASSAY OF PROTEIN URINE: CPT

## 2023-05-30 PROCEDURE — 80048 BASIC METABOLIC PNL TOTAL CA: CPT

## 2023-05-30 PROCEDURE — 81001 URINALYSIS AUTO W/SCOPE: CPT

## 2023-05-30 PROCEDURE — 82570 ASSAY OF URINE CREATININE: CPT

## 2023-05-30 PROCEDURE — 80197 ASSAY OF TACROLIMUS: CPT

## 2023-05-30 PROCEDURE — 85025 COMPLETE CBC W/AUTO DIFF WBC: CPT

## 2023-05-30 PROCEDURE — 83735 ASSAY OF MAGNESIUM: CPT

## 2023-05-30 PROCEDURE — 82043 UR ALBUMIN QUANTITATIVE: CPT

## 2023-05-31 ENCOUNTER — LAB ENCOUNTER (OUTPATIENT)
Dept: LAB | Facility: HOSPITAL | Age: 68
End: 2023-05-31
Attending: INTERNAL MEDICINE
Payer: MEDICARE

## 2023-05-31 DIAGNOSIS — Z51.81 ENCOUNTER FOR THERAPEUTIC DRUG MONITORING: ICD-10-CM

## 2023-05-31 DIAGNOSIS — R80.9 PROTEINURIA: Primary | ICD-10-CM

## 2023-05-31 DIAGNOSIS — Z94.0 KIDNEY REPLACED BY TRANSPLANT: ICD-10-CM

## 2023-05-31 DIAGNOSIS — R80.9 PROTEINURIA: ICD-10-CM

## 2023-05-31 PROCEDURE — 36415 COLL VENOUS BLD VENIPUNCTURE: CPT

## 2023-06-01 LAB — TACROLIMUS LVL: 3.9 NG/ML

## 2023-06-02 LAB
CMV QN DNA PCR: NORMAL IU/ML
LOG10 CMV QN DNA: 4.18 LOG10 IU/ML

## 2023-06-06 ENCOUNTER — LAB ENCOUNTER (OUTPATIENT)
Dept: LAB | Facility: HOSPITAL | Age: 68
End: 2023-06-06
Attending: INTERNAL MEDICINE
Payer: MEDICARE

## 2023-06-06 DIAGNOSIS — R78.81 BACTEREMIA: ICD-10-CM

## 2023-06-06 DIAGNOSIS — E83.42 HYPOMAGNESEMIA: ICD-10-CM

## 2023-06-06 DIAGNOSIS — E78.5 HYPERLIPIDEMIA: ICD-10-CM

## 2023-06-06 DIAGNOSIS — R73.9 HYPERGLYCEMIA: ICD-10-CM

## 2023-06-06 DIAGNOSIS — E55.9 VITAMIN D DEFICIENCY: ICD-10-CM

## 2023-06-06 DIAGNOSIS — B34.8 BK VIREMIA: ICD-10-CM

## 2023-06-06 DIAGNOSIS — R80.9 PROTEINURIA: ICD-10-CM

## 2023-06-06 DIAGNOSIS — Z51.81 THERAPEUTIC DRUG MONITORING: ICD-10-CM

## 2023-06-06 DIAGNOSIS — N39.0 UTI (URINARY TRACT INFECTION): ICD-10-CM

## 2023-06-06 DIAGNOSIS — Z94.0 KIDNEY REPLACED BY TRANSPLANT: ICD-10-CM

## 2023-06-06 DIAGNOSIS — D70.9 NEUTROPENIA (HCC): ICD-10-CM

## 2023-06-06 DIAGNOSIS — B25.9 CMV INFECTION (HCC): ICD-10-CM

## 2023-06-06 LAB
ANION GAP SERPL CALC-SCNC: 5 MMOL/L (ref 0–18)
BASOPHILS # BLD AUTO: 0.02 X10(3) UL (ref 0–0.2)
BASOPHILS NFR BLD AUTO: 0.5 %
BILIRUB UR QL: NEGATIVE
BUN BLD-MCNC: 25 MG/DL (ref 7–18)
BUN/CREAT SERPL: 19.7 (ref 10–20)
CALCIUM BLD-MCNC: 8.9 MG/DL (ref 8.5–10.1)
CHLORIDE SERPL-SCNC: 116 MMOL/L (ref 98–112)
CLARITY UR: CLEAR
CO2 SERPL-SCNC: 27 MMOL/L (ref 21–32)
CREAT BLD-MCNC: 1.27 MG/DL
CREAT UR-SCNC: 83.3 MG/DL
CREAT UR-SCNC: 83.3 MG/DL
DEPRECATED RDW RBC AUTO: 56.9 FL (ref 35.1–46.3)
EOSINOPHIL # BLD AUTO: 0.04 X10(3) UL (ref 0–0.7)
EOSINOPHIL NFR BLD AUTO: 1 %
ERYTHROCYTE [DISTWIDTH] IN BLOOD BY AUTOMATED COUNT: 16.6 % (ref 11–15)
FASTING STATUS PATIENT QL REPORTED: YES
GFR SERPLBLD BASED ON 1.73 SQ M-ARVRAT: 62 ML/MIN/1.73M2 (ref 60–?)
GLUCOSE BLD-MCNC: 114 MG/DL (ref 70–99)
GLUCOSE UR-MCNC: NORMAL MG/DL
HCT VFR BLD AUTO: 34 %
HGB BLD-MCNC: 11 G/DL
HGB UR QL STRIP.AUTO: NEGATIVE
IMM GRANULOCYTES # BLD AUTO: 0.04 X10(3) UL (ref 0–1)
IMM GRANULOCYTES NFR BLD: 1 %
KETONES UR-MCNC: NEGATIVE MG/DL
LEUKOCYTE ESTERASE UR QL STRIP.AUTO: NEGATIVE
LYMPHOCYTES # BLD AUTO: 1.6 X10(3) UL (ref 1–4)
LYMPHOCYTES NFR BLD AUTO: 38.5 %
MAGNESIUM SERPL-MCNC: 1.8 MG/DL (ref 1.6–2.6)
MCH RBC QN AUTO: 30.4 PG (ref 26–34)
MCHC RBC AUTO-ENTMCNC: 32.4 G/DL (ref 31–37)
MCV RBC AUTO: 93.9 FL
MICROALBUMIN UR-MCNC: 53.1 MG/DL
MICROALBUMIN/CREAT 24H UR-RTO: 637.5 UG/MG (ref ?–30)
MONOCYTES # BLD AUTO: 0.36 X10(3) UL (ref 0.1–1)
MONOCYTES NFR BLD AUTO: 8.7 %
NEUTROPHILS # BLD AUTO: 2.1 X10 (3) UL (ref 1.5–7.7)
NEUTROPHILS # BLD AUTO: 2.1 X10(3) UL (ref 1.5–7.7)
NEUTROPHILS NFR BLD AUTO: 50.3 %
NITRITE UR QL STRIP.AUTO: NEGATIVE
OSMOLALITY SERPL CALC.SUM OF ELEC: 311 MOSM/KG (ref 275–295)
PH UR: 6.5 [PH] (ref 5–8)
PHOSPHATE SERPL-MCNC: 3.9 MG/DL (ref 2.5–4.9)
PLATELET # BLD AUTO: 109 10(3)UL (ref 150–450)
POTASSIUM SERPL-SCNC: 5.3 MMOL/L (ref 3.5–5.1)
PROT UR-MCNC: 50 MG/DL
PROT UR-MCNC: 79.1 MG/DL
RBC # BLD AUTO: 3.62 X10(6)UL
SODIUM SERPL-SCNC: 148 MMOL/L (ref 136–145)
SP GR UR STRIP: 1.01 (ref 1–1.03)
UROBILINOGEN UR STRIP-ACNC: NORMAL
WBC # BLD AUTO: 4.2 X10(3) UL (ref 4–11)

## 2023-06-06 PROCEDURE — 82570 ASSAY OF URINE CREATININE: CPT

## 2023-06-06 PROCEDURE — 80048 BASIC METABOLIC PNL TOTAL CA: CPT

## 2023-06-06 PROCEDURE — 36415 COLL VENOUS BLD VENIPUNCTURE: CPT

## 2023-06-06 PROCEDURE — 83735 ASSAY OF MAGNESIUM: CPT

## 2023-06-06 PROCEDURE — 87086 URINE CULTURE/COLONY COUNT: CPT

## 2023-06-06 PROCEDURE — 84100 ASSAY OF PHOSPHORUS: CPT

## 2023-06-06 PROCEDURE — 84156 ASSAY OF PROTEIN URINE: CPT

## 2023-06-06 PROCEDURE — 80197 ASSAY OF TACROLIMUS: CPT

## 2023-06-06 PROCEDURE — 82043 UR ALBUMIN QUANTITATIVE: CPT

## 2023-06-06 PROCEDURE — 85025 COMPLETE CBC W/AUTO DIFF WBC: CPT

## 2023-06-06 PROCEDURE — 81001 URINALYSIS AUTO W/SCOPE: CPT

## 2023-06-08 LAB
CMV QN DNA PCR: 3290 IU/ML
LOG10 CMV QN DNA: 3.52 LOG10 IU/ML
TACROLIMUS LVL: 5.3 NG/ML

## 2023-06-08 NOTE — TELEPHONE ENCOUNTER
Pt called to request refills for   Cyanocobalamin 1000 mg and syringes  Please send to [facility]  [place]  Pt needs refills for this months injection  Pt plans to see Dr Fabrice Kaufman at [facility]; pt will call back to schedule an appointment with her

## 2023-06-09 RX ORDER — CYANOCOBALAMIN 1000 UG/ML
1000 INJECTION, SOLUTION INTRAMUSCULAR; SUBCUTANEOUS
Qty: 3 ML | Refills: 1 | Status: SHIPPED | OUTPATIENT
Start: 2023-06-09

## 2023-06-09 RX ORDER — SYRINGE WITH NEEDLE, 1 ML 25GX5/8"
SYRINGE, EMPTY DISPOSABLE MISCELLANEOUS
Qty: 12 EACH | Refills: 0 | Status: SHIPPED | OUTPATIENT
Start: 2023-06-09

## 2023-06-09 NOTE — TELEPHONE ENCOUNTER
To Gricel Austin for assistance, pt has only seen Dr. Bebeto Araujo, plans on seeing Dr. Deja Kennedy - kindly advise if ok to send B12/needles

## 2023-06-20 ENCOUNTER — LAB ENCOUNTER (OUTPATIENT)
Dept: LAB | Facility: HOSPITAL | Age: 68
End: 2023-06-20
Attending: INTERNAL MEDICINE
Payer: MEDICARE

## 2023-06-20 DIAGNOSIS — E83.42 HYPOMAGNESEMIA: ICD-10-CM

## 2023-06-20 DIAGNOSIS — B34.8 BK VIREMIA: ICD-10-CM

## 2023-06-20 DIAGNOSIS — R78.81 BACTEREMIA: ICD-10-CM

## 2023-06-20 DIAGNOSIS — B25.9 CMV INFECTION (HCC): ICD-10-CM

## 2023-06-20 DIAGNOSIS — R73.9 HYPERGLYCEMIA: ICD-10-CM

## 2023-06-20 DIAGNOSIS — E55.9 VITAMIN D DEFICIENCY: ICD-10-CM

## 2023-06-20 DIAGNOSIS — R80.9 PROTEINURIA: ICD-10-CM

## 2023-06-20 DIAGNOSIS — Z51.81 THERAPEUTIC DRUG MONITORING: ICD-10-CM

## 2023-06-20 DIAGNOSIS — Z94.0 KIDNEY REPLACED BY TRANSPLANT: ICD-10-CM

## 2023-06-20 DIAGNOSIS — D70.9 NEUTROPENIA (HCC): ICD-10-CM

## 2023-06-20 DIAGNOSIS — N39.0 UTI (URINARY TRACT INFECTION): ICD-10-CM

## 2023-06-20 DIAGNOSIS — E78.5 HYPERLIPIDEMIA: ICD-10-CM

## 2023-06-20 LAB
ANION GAP SERPL CALC-SCNC: 7 MMOL/L (ref 0–18)
BASOPHILS # BLD AUTO: 0.03 X10(3) UL (ref 0–0.2)
BASOPHILS NFR BLD AUTO: 0.7 %
BILIRUB UR QL: NEGATIVE
BUN BLD-MCNC: 25 MG/DL (ref 7–18)
BUN/CREAT SERPL: 19.2 (ref 10–20)
CALCIUM BLD-MCNC: 8.8 MG/DL (ref 8.5–10.1)
CHLORIDE SERPL-SCNC: 114 MMOL/L (ref 98–112)
CLARITY UR: CLEAR
CO2 SERPL-SCNC: 24 MMOL/L (ref 21–32)
CREAT BLD-MCNC: 1.3 MG/DL
CREAT UR-SCNC: 91.4 MG/DL
CREAT UR-SCNC: 91.4 MG/DL
DEPRECATED RDW RBC AUTO: 57.3 FL (ref 35.1–46.3)
EOSINOPHIL # BLD AUTO: 0.03 X10(3) UL (ref 0–0.7)
EOSINOPHIL NFR BLD AUTO: 0.7 %
ERYTHROCYTE [DISTWIDTH] IN BLOOD BY AUTOMATED COUNT: 16.1 % (ref 11–15)
FASTING STATUS PATIENT QL REPORTED: YES
GFR SERPLBLD BASED ON 1.73 SQ M-ARVRAT: 60 ML/MIN/1.73M2 (ref 60–?)
GLUCOSE BLD-MCNC: 99 MG/DL (ref 70–99)
GLUCOSE UR-MCNC: NORMAL MG/DL
HCT VFR BLD AUTO: 34.8 %
HGB BLD-MCNC: 11.3 G/DL
IMM GRANULOCYTES # BLD AUTO: 0.04 X10(3) UL (ref 0–1)
IMM GRANULOCYTES NFR BLD: 0.9 %
KETONES UR-MCNC: NEGATIVE MG/DL
LEUKOCYTE ESTERASE UR QL STRIP.AUTO: NEGATIVE
LYMPHOCYTES # BLD AUTO: 1.84 X10(3) UL (ref 1–4)
LYMPHOCYTES NFR BLD AUTO: 40.6 %
MAGNESIUM SERPL-MCNC: 2.1 MG/DL (ref 1.6–2.6)
MCH RBC QN AUTO: 31.1 PG (ref 26–34)
MCHC RBC AUTO-ENTMCNC: 32.5 G/DL (ref 31–37)
MCV RBC AUTO: 95.9 FL
MICROALBUMIN UR-MCNC: 21.6 MG/DL
MICROALBUMIN/CREAT 24H UR-RTO: 236.3 UG/MG (ref ?–30)
MONOCYTES # BLD AUTO: 0.41 X10(3) UL (ref 0.1–1)
MONOCYTES NFR BLD AUTO: 9.1 %
NEUTROPHILS # BLD AUTO: 2.18 X10 (3) UL (ref 1.5–7.7)
NEUTROPHILS # BLD AUTO: 2.18 X10(3) UL (ref 1.5–7.7)
NEUTROPHILS NFR BLD AUTO: 48 %
NITRITE UR QL STRIP.AUTO: NEGATIVE
OSMOLALITY SERPL CALC.SUM OF ELEC: 304 MOSM/KG (ref 275–295)
PH UR: 6 [PH] (ref 5–8)
PHOSPHATE SERPL-MCNC: 4 MG/DL (ref 2.5–4.9)
PLATELET # BLD AUTO: 127 10(3)UL (ref 150–450)
POTASSIUM SERPL-SCNC: 4.8 MMOL/L (ref 3.5–5.1)
PROT UR-MCNC: 30 MG/DL
PROT UR-MCNC: 39.4 MG/DL
RBC # BLD AUTO: 3.63 X10(6)UL
SODIUM SERPL-SCNC: 145 MMOL/L (ref 136–145)
SP GR UR STRIP: 1.01 (ref 1–1.03)
UROBILINOGEN UR STRIP-ACNC: NORMAL
WBC # BLD AUTO: 4.5 X10(3) UL (ref 4–11)

## 2023-06-20 PROCEDURE — 82043 UR ALBUMIN QUANTITATIVE: CPT

## 2023-06-20 PROCEDURE — 81001 URINALYSIS AUTO W/SCOPE: CPT

## 2023-06-20 PROCEDURE — 80048 BASIC METABOLIC PNL TOTAL CA: CPT

## 2023-06-20 PROCEDURE — 85025 COMPLETE CBC W/AUTO DIFF WBC: CPT

## 2023-06-20 PROCEDURE — 82570 ASSAY OF URINE CREATININE: CPT

## 2023-06-20 PROCEDURE — 84100 ASSAY OF PHOSPHORUS: CPT

## 2023-06-20 PROCEDURE — 80197 ASSAY OF TACROLIMUS: CPT

## 2023-06-20 PROCEDURE — 83735 ASSAY OF MAGNESIUM: CPT

## 2023-06-20 PROCEDURE — 36415 COLL VENOUS BLD VENIPUNCTURE: CPT

## 2023-06-20 PROCEDURE — 84156 ASSAY OF PROTEIN URINE: CPT

## 2023-06-22 LAB
CMV QN DNA PCR: 237 IU/ML
LOG10 CMV QN DNA: 2.38 LOG10 IU/ML

## 2023-06-23 LAB — TACROLIMUS LVL: 9.2 NG/ML

## 2023-07-06 ENCOUNTER — LAB ENCOUNTER (OUTPATIENT)
Dept: LAB | Facility: HOSPITAL | Age: 68
End: 2023-07-06
Attending: INTERNAL MEDICINE
Payer: MEDICARE

## 2023-07-06 ENCOUNTER — OFFICE VISIT (OUTPATIENT)
Dept: SURGERY | Facility: CLINIC | Age: 68
End: 2023-07-06

## 2023-07-06 DIAGNOSIS — R73.9 HYPERGLYCEMIA: ICD-10-CM

## 2023-07-06 DIAGNOSIS — E78.5 HYPERLIPIDEMIA: ICD-10-CM

## 2023-07-06 DIAGNOSIS — R80.9 PROTEINURIA: ICD-10-CM

## 2023-07-06 DIAGNOSIS — B25.9 CMV INFECTION (HCC): ICD-10-CM

## 2023-07-06 DIAGNOSIS — B34.8 BK VIREMIA: ICD-10-CM

## 2023-07-06 DIAGNOSIS — N39.0 UTI (URINARY TRACT INFECTION): ICD-10-CM

## 2023-07-06 DIAGNOSIS — E83.42 HYPOMAGNESEMIA: ICD-10-CM

## 2023-07-06 DIAGNOSIS — D70.9 NEUTROPENIA (HCC): ICD-10-CM

## 2023-07-06 DIAGNOSIS — R78.81 BACTEREMIA: ICD-10-CM

## 2023-07-06 DIAGNOSIS — N40.1 BENIGN PROSTATIC HYPERPLASIA WITH LOWER URINARY TRACT SYMPTOMS, SYMPTOM DETAILS UNSPECIFIED: Primary | ICD-10-CM

## 2023-07-06 DIAGNOSIS — Z51.81 THERAPEUTIC DRUG MONITORING: ICD-10-CM

## 2023-07-06 DIAGNOSIS — E55.9 VITAMIN D DEFICIENCY: ICD-10-CM

## 2023-07-06 DIAGNOSIS — Z94.0 KIDNEY REPLACED BY TRANSPLANT: ICD-10-CM

## 2023-07-06 LAB
ANION GAP SERPL CALC-SCNC: 6 MMOL/L (ref 0–18)
BASOPHILS # BLD AUTO: 0.02 X10(3) UL (ref 0–0.2)
BASOPHILS NFR BLD AUTO: 0.4 %
BILIRUB UR QL: NEGATIVE
BUN BLD-MCNC: 40 MG/DL (ref 7–18)
BUN/CREAT SERPL: 26.7 (ref 10–20)
CALCIUM BLD-MCNC: 8.3 MG/DL (ref 8.5–10.1)
CHLORIDE SERPL-SCNC: 113 MMOL/L (ref 98–112)
CLARITY UR: CLEAR
CO2 SERPL-SCNC: 25 MMOL/L (ref 21–32)
CREAT BLD-MCNC: 1.5 MG/DL
CREAT UR-SCNC: 91.6 MG/DL
CREAT UR-SCNC: 91.6 MG/DL
DEPRECATED RDW RBC AUTO: 55 FL (ref 35.1–46.3)
EOSINOPHIL # BLD AUTO: 0.05 X10(3) UL (ref 0–0.7)
EOSINOPHIL NFR BLD AUTO: 0.9 %
ERYTHROCYTE [DISTWIDTH] IN BLOOD BY AUTOMATED COUNT: 15.2 % (ref 11–15)
FASTING STATUS PATIENT QL REPORTED: YES
GFR SERPLBLD BASED ON 1.73 SQ M-ARVRAT: 50 ML/MIN/1.73M2 (ref 60–?)
GLUCOSE BLD-MCNC: 122 MG/DL (ref 70–99)
GLUCOSE UR-MCNC: NORMAL MG/DL
HCT VFR BLD AUTO: 34.3 %
HGB BLD-MCNC: 11.3 G/DL
IMM GRANULOCYTES # BLD AUTO: 0.05 X10(3) UL (ref 0–1)
IMM GRANULOCYTES NFR BLD: 0.9 %
KETONES UR-MCNC: NEGATIVE MG/DL
LEUKOCYTE ESTERASE UR QL STRIP.AUTO: NEGATIVE
LYMPHOCYTES # BLD AUTO: 1.09 X10(3) UL (ref 1–4)
LYMPHOCYTES NFR BLD AUTO: 19.4 %
MAGNESIUM SERPL-MCNC: 2.4 MG/DL (ref 1.6–2.6)
MCH RBC QN AUTO: 31.8 PG (ref 26–34)
MCHC RBC AUTO-ENTMCNC: 32.9 G/DL (ref 31–37)
MCV RBC AUTO: 96.6 FL
MICROALBUMIN UR-MCNC: 27.9 MG/DL
MICROALBUMIN/CREAT 24H UR-RTO: 304.6 UG/MG (ref ?–30)
MONOCYTES # BLD AUTO: 0.4 X10(3) UL (ref 0.1–1)
MONOCYTES NFR BLD AUTO: 7.1 %
NEUTROPHILS # BLD AUTO: 4.02 X10 (3) UL (ref 1.5–7.7)
NEUTROPHILS # BLD AUTO: 4.02 X10(3) UL (ref 1.5–7.7)
NEUTROPHILS NFR BLD AUTO: 71.3 %
NITRITE UR QL STRIP.AUTO: NEGATIVE
OSMOLALITY SERPL CALC.SUM OF ELEC: 309 MOSM/KG (ref 275–295)
PH UR: 6 [PH] (ref 5–8)
PHOSPHATE SERPL-MCNC: 3.5 MG/DL (ref 2.5–4.9)
PLATELET # BLD AUTO: 129 10(3)UL (ref 150–450)
POTASSIUM SERPL-SCNC: 4.6 MMOL/L (ref 3.5–5.1)
PROT UR-MCNC: 49.8 MG/DL
PROT UR-MCNC: 50 MG/DL
RBC # BLD AUTO: 3.55 X10(6)UL
RBC #/AREA URNS AUTO: >10 /HPF
SODIUM SERPL-SCNC: 144 MMOL/L (ref 136–145)
SP GR UR STRIP: 1.01 (ref 1–1.03)
UROBILINOGEN UR STRIP-ACNC: NORMAL
WBC # BLD AUTO: 5.6 X10(3) UL (ref 4–11)

## 2023-07-06 PROCEDURE — 82570 ASSAY OF URINE CREATININE: CPT

## 2023-07-06 PROCEDURE — 84100 ASSAY OF PHOSPHORUS: CPT

## 2023-07-06 PROCEDURE — 99213 OFFICE O/P EST LOW 20 MIN: CPT | Performed by: UROLOGY

## 2023-07-06 PROCEDURE — 81001 URINALYSIS AUTO W/SCOPE: CPT

## 2023-07-06 PROCEDURE — 85025 COMPLETE CBC W/AUTO DIFF WBC: CPT

## 2023-07-06 PROCEDURE — 80048 BASIC METABOLIC PNL TOTAL CA: CPT

## 2023-07-06 PROCEDURE — 36415 COLL VENOUS BLD VENIPUNCTURE: CPT

## 2023-07-06 PROCEDURE — 82043 UR ALBUMIN QUANTITATIVE: CPT

## 2023-07-06 PROCEDURE — 84156 ASSAY OF PROTEIN URINE: CPT

## 2023-07-06 PROCEDURE — 80197 ASSAY OF TACROLIMUS: CPT

## 2023-07-06 PROCEDURE — 83735 ASSAY OF MAGNESIUM: CPT

## 2023-07-06 NOTE — PROGRESS NOTES
[facility]rology  Follow-Up Visit    HPI: Bhavik Moe is a 76year old male presents for a follow up visit. Patient was last seen on 3/27/23. He presents by himself. INTERVAL HISTORY: Patient previously seen regarding history of microscopic hematuria in light of end-stage renal disease on peritoneal dialysis. CT urogram 2022 revealed bilateral upper tract filling defects, concerning for blood clots or urothelial neoplasms. Patient underwent cystoscopy with bilateral retrograde pyelogram, left nephroureteroscopy with biopsy and bilateral ureteral stent insertion. Findings were consistent with a free-floating blood clot in the left kidney. Fungal cultures were negative. Second look cystoscopy with bilateral retrograde pyelogram, bilateral nephrouteroscopy with manipulation and removal of clots in the bilateral kidneys, and bilateral stent removal.  Clinically, no evidence of tumors. Free-floating blood clots/proteinaceous debris were noted within the bilateral collecting systems. Again pathology results consistent with proteinaceous material with peripheral blood elements. He underwent  donor kidney transplant at [facility] on 12/15/2022. Post kidney transplant allograft and bladder ultrasounds have been revealing an elevated PVR volume in the 140 to 170 mL range. Patient was on Flomax for BPH. Maximize medical therapy with the addition of finasteride 3/27/2023. He reports improvement in the urinary stream, better sensation of bladder emptying and less urinary urgency. His IPSS score today is down to 12 (/2/2//3/) with a quality-of-life score of 2. Bladder scan today reveals a PVR of 0 mL. He denies gross hematuria or dysuria. 1. Microscopic Hematuria --> Bilateral upper tract filling defects on CT-Urogram  2. BPH with LUTS  Patient with history of BPH with LUTS, previously seen by FRANCIS Sibley in 2018 and started on Flomax with improvement in symptoms. Current IPSS score of 15 (0/3/3/2/5/1/1) with a quality-of-life score of 3. Recently hospitalized 10/2021 for fluid retention. Urinalysis revealed microscopic hematuria with >10 RBCs per hpf. Repeat urinalysis 12/2021 persistent microscopic hematuria revealing 6-10 RBCs per hpf. No UTI as culture was negative. Urine cytology 12/2021 was benign. CT urogram completed 1/17/2022 revealing \"filling defects in the right renal pelvis, left upper pole and interpolar renal collecting system may represent blood clots or urothelial neoplasms\". Patient denies gross hematuria or dysuria. No history of nephrolithiasis or recurrent UTIs. No flank pain. No family history of prostate cancer. Screening PSA level 11/2021 normal at 1.1 ng/mL. Of note he has ESRD and is on peritoneal dialysis since 2018. His nephrologist is Dr. Ricky Interiano. He is being evaluated for pancreas-kidney transplant at [facility] and is on the list. He performs peritoneal dialysis 6 times a week. He makes about 2 L of urine per day. He is on some fluid restriction per nephrology. Patient underwent cystoscopy with bilateral retrograde pyelogram, left nephroureteroscopy with biopsy and bilateral ureteral stent insertion. Findings were consistent with a free-floating blood clot in the left kidney. Fungal cultures were negative. Patient underwent second look cystoscopy with bilateral retrograde pyelogram, bilateral nephrouteroscopy with manipulation and removal of clots in the bilateral kidneys, and bilateral stent removal.  Clinically, no evidence of tumors. Free-floating blood clots/proteinaceous debris were noted within the bilateral collecting systems. Again pathology results consistent with proteinaceous material with peripheral blood elements. - 1/2023 F/U: Patient underwent kidney transplant 12/2022. Post transplant allograft ultrasound revealing elevated PVR in the 100-200 mL range.   IPSS score 16 (2/4/1/1/4/1/3) with a quality-of-life score of 3. On Flomax. . Monitor and continue Flomax. Follow-up in 6 to 8 weeks for uroflow. - 3/2023 F/U: Uroflow + PVR: Voided 61 mL, Qmax 7.1 mL/s, Qave 4.2 mL/s, flow time 14.4 sec, voiding time 27.6 sec, voiding curve shape flattened with multiple low peaks,  mL. Elected maximizing medical therapy with the addition of finasteride.    - 7/2023 F/U: On finasteride and tamsulosin. Improved LUTS. IPSS down to 12 (1/2/2/1/3/1/2) with a quality-of-life score of 2. PVR 0 male.  CCM. PAST MEDICAL HISTORY: ESRD on peritoneal dialysis since 7/2018, HTN, HLD, DM since 1982, AMELIE, BPH, nonobstructive CAD, colon cancer. PAST SURGICAL HISTORY: Open right hemicolectomy and ventral hernia repair 2010 Dr. Jose Martin, PD catheter insertion 6/2018 Dr. Veronica Schmitz, appendectomy, tonsillectomy, renal transplant surgery 12/2022. SOCIAL HISTORY:  and has 2 children. Former smoker 1 pack/week and quit 1982. He drinks social alcohol. Retired  however currently works as an [employer] . Reviewed past medical, surgical, family, and social history. Reviewed med list and allergies. REVIEW OF SYSTEMS:  Pertinent positives and negatives per HPI. A 10-point ROS was performed and is otherwise negative. EXAM:  There were no vitals taken for this visit. Physical Exam  Constitutional:       Appearance: He is well-developed. He is obese. HENT:      Head: Normocephalic. Eyes:      General: No scleral icterus. Cardiovascular:      Rate and Rhythm: Normal rate. Pulmonary:      Effort: Pulmonary effort is normal.   Skin:     General: Skin is warm and dry. Neurological:      Mental Status: He is alert and oriented to person, place, and time.    Psychiatric:         Mood and Affect: Mood normal.         Behavior: Behavior normal.       PATHOLOGY:    Non-Gynecologic Cytology                          Case: [id number]      Provider:  Lizandro Hunt MD Collected:           03/04/2022    A. Right renal pelvis; barbotage:   Adequacy: Satisfactory for evaluation  General Category: Negative for high-grade urothelial carcinoma ([address])  Diagnosis:  Reactive urothelial cells present  Numerous neutrophils and neutrophilic debris present     B. Left renal pelvis; barbotage:  Adequacy: Satisfactory for evaluation  General Category: Negative for high-grade urothelial carcinoma ([address])  Diagnosis:  Reactive urothelial cells present  Numerous neutrophils and neutrophilic debris present     C. Left renal pelvis; barbotage:  Adequacy: Satisfactory for evaluation  General Category: Negative for high-grade urothelial carcinoma ([address])  Diagnosis:  Rare degenerated urothelial cells present   Numerous neutrophils and neutrophilic debris present      Pathology                                Case: [id number]   Provider:  Joseph Paulino MD       Collected:           04/22/2022    A. Blood clot right pelvis:  Aggregate of proteinaceous debris with rare peripheral blood elements. B. Left kidney stone:   Calculus fragments (see comment). C. Right kidney stone:   Calculus fragments (see comment). D. Blood clot left pelvis:  Aggregate of proteinaceous debris with rare peripheral blood elements. LABS:  See HPI for details. IMAGING:    CT-UROGRAM (1/17/2022):  1. Filling defects in the right renal pelvis, left upper pole and interpolar renal collecting system may represent blood clots or urothelial neoplasms. Urology consultation recommended. 2. Prostate enlargement. 3. Mild splenomegaly. 4. Peritoneal dialysis catheter with tip in the left lower quadrant. 5. Post right hemicolectomy. Colonic diverticulosis. 6. Generalized atherosclerotic vascular calcification including coronary artery calcification. 7. Mitral annular calcification. IMPRESSION:  76year old comorbid male with past medical history as above.  Microscopic hematuria evaluation revealing filling defects in the bilateral renal collecting systems, concerning for blood clots or urothelial neoplasms. Voided cytology benign. Patient underwent cystoscopy with bilateral staged nephro ureteroscopy, biopsy, and washings from the bilateral collecting systems. Clinical findings consistent with free-floating blood clots/proteinaceous material in the collecting system. No gross evidence of tumor. Cytologies have been benign. Patient status post kidney transplant. He has mildly elevated PVR volumes on ultrasound following transplant. Currently on maximal medical therapy with finasteride and tamsulosin. Improved LUTS. Improved bladder emptying with minimal postvoid residual noted on bladder scan today. Discussed with patient. Discussed continuing current management versus considering minimally invasive surgical treatments for BPH. Patient elects continuing maximal medical therapy for the time being. Patient verbalized understanding and agrees to the plan of care. All questions answered. PLAN:  1. Continue Flomax for BPH. 2.  Continue Finasteride 5 mg daily. Kidney transplant management per medical and surgical teams at [facility]. RTC for follow-up in 6 months for an updated IPSS score and bladder scan/PVR.       David Rosa MD  7/6/2023

## 2023-07-08 LAB
CMV QN DNA PCR: 200 IU/ML
LOG10 CMV QN DNA: 2.3 LOG10 IU/ML

## 2023-07-09 LAB — TACROLIMUS LVL: 10.3 NG/ML

## 2023-07-18 ENCOUNTER — LAB ENCOUNTER (OUTPATIENT)
Dept: LAB | Facility: HOSPITAL | Age: 68
End: 2023-07-18
Attending: INTERNAL MEDICINE
Payer: MEDICARE

## 2023-07-18 DIAGNOSIS — E83.42 HYPOMAGNESEMIA: ICD-10-CM

## 2023-07-18 DIAGNOSIS — Z94.0 KIDNEY REPLACED BY TRANSPLANT: ICD-10-CM

## 2023-07-18 DIAGNOSIS — R78.81 BACTEREMIA: ICD-10-CM

## 2023-07-18 DIAGNOSIS — B25.9 CMV INFECTION (HCC): ICD-10-CM

## 2023-07-18 DIAGNOSIS — E55.9 VITAMIN D DEFICIENCY: ICD-10-CM

## 2023-07-18 DIAGNOSIS — E78.5 HYPERLIPIDEMIA: ICD-10-CM

## 2023-07-18 DIAGNOSIS — N39.0 UTI (URINARY TRACT INFECTION): ICD-10-CM

## 2023-07-18 DIAGNOSIS — R73.9 HYPERGLYCEMIA: ICD-10-CM

## 2023-07-18 DIAGNOSIS — Z51.81 THERAPEUTIC DRUG MONITORING: ICD-10-CM

## 2023-07-18 DIAGNOSIS — B34.8 BK VIREMIA: ICD-10-CM

## 2023-07-18 DIAGNOSIS — D70.9 NEUTROPENIA (HCC): ICD-10-CM

## 2023-07-18 DIAGNOSIS — R80.9 PROTEINURIA: ICD-10-CM

## 2023-07-18 LAB
ALBUMIN SERPL-MCNC: 3.6 G/DL (ref 3.4–5)
ALBUMIN/GLOB SERPL: 1.3 {RATIO} (ref 1–2)
ALP LIVER SERPL-CCNC: 48 U/L
ALT SERPL-CCNC: 43 U/L
ANION GAP SERPL CALC-SCNC: 6 MMOL/L (ref 0–18)
AST SERPL-CCNC: 28 U/L (ref 15–37)
BASOPHILS # BLD AUTO: 0.03 X10(3) UL (ref 0–0.2)
BASOPHILS NFR BLD AUTO: 0.6 %
BILIRUB SERPL-MCNC: 0.6 MG/DL (ref 0.1–2)
BILIRUB UR QL: NEGATIVE
BUN BLD-MCNC: 34 MG/DL (ref 7–18)
BUN/CREAT SERPL: 21.9 (ref 10–20)
CALCIUM BLD-MCNC: 8.8 MG/DL (ref 8.5–10.1)
CHLORIDE SERPL-SCNC: 113 MMOL/L (ref 98–112)
CHOLEST SERPL-MCNC: 104 MG/DL (ref ?–200)
CLARITY UR: CLEAR
CO2 SERPL-SCNC: 26 MMOL/L (ref 21–32)
CREAT BLD-MCNC: 1.55 MG/DL
CREAT UR-SCNC: 92.7 MG/DL
CREAT UR-SCNC: 92.7 MG/DL
DEPRECATED RDW RBC AUTO: 51.7 FL (ref 35.1–46.3)
EOSINOPHIL # BLD AUTO: 0.04 X10(3) UL (ref 0–0.7)
EOSINOPHIL NFR BLD AUTO: 0.8 %
ERYTHROCYTE [DISTWIDTH] IN BLOOD BY AUTOMATED COUNT: 14.7 % (ref 11–15)
FASTING PATIENT LIPID ANSWER: YES
FASTING STATUS PATIENT QL REPORTED: YES
GFR SERPLBLD BASED ON 1.73 SQ M-ARVRAT: 48 ML/MIN/1.73M2 (ref 60–?)
GLOBULIN PLAS-MCNC: 2.8 G/DL (ref 2.8–4.4)
GLUCOSE BLD-MCNC: 65 MG/DL (ref 70–99)
GLUCOSE UR-MCNC: 30 MG/DL
HCT VFR BLD AUTO: 33.7 %
HDLC SERPL-MCNC: 45 MG/DL (ref 40–59)
HGB BLD-MCNC: 11.3 G/DL
IMM GRANULOCYTES # BLD AUTO: 0.11 X10(3) UL (ref 0–1)
IMM GRANULOCYTES NFR BLD: 2.1 %
KETONES UR-MCNC: NEGATIVE MG/DL
LDLC SERPL CALC-MCNC: 36 MG/DL (ref ?–100)
LEUKOCYTE ESTERASE UR QL STRIP.AUTO: NEGATIVE
LYMPHOCYTES # BLD AUTO: 1.77 X10(3) UL (ref 1–4)
LYMPHOCYTES NFR BLD AUTO: 34.2 %
MAGNESIUM SERPL-MCNC: 2 MG/DL (ref 1.6–2.6)
MCH RBC QN AUTO: 31.8 PG (ref 26–34)
MCHC RBC AUTO-ENTMCNC: 33.5 G/DL (ref 31–37)
MCV RBC AUTO: 94.9 FL
MICROALBUMIN UR-MCNC: 22.7 MG/DL
MICROALBUMIN/CREAT 24H UR-RTO: 244.9 UG/MG (ref ?–30)
MONOCYTES # BLD AUTO: 0.44 X10(3) UL (ref 0.1–1)
MONOCYTES NFR BLD AUTO: 8.5 %
NEUTROPHILS # BLD AUTO: 2.78 X10 (3) UL (ref 1.5–7.7)
NEUTROPHILS # BLD AUTO: 2.78 X10(3) UL (ref 1.5–7.7)
NEUTROPHILS NFR BLD AUTO: 53.8 %
NITRITE UR QL STRIP.AUTO: NEGATIVE
NONHDLC SERPL-MCNC: 59 MG/DL (ref ?–130)
OSMOLALITY SERPL CALC.SUM OF ELEC: 306 MOSM/KG (ref 275–295)
PH UR: 6.5 [PH] (ref 5–8)
PHOSPHATE SERPL-MCNC: 4.3 MG/DL (ref 2.5–4.9)
PLATELET # BLD AUTO: 109 10(3)UL (ref 150–450)
POTASSIUM SERPL-SCNC: 4.2 MMOL/L (ref 3.5–5.1)
PROT SERPL-MCNC: 6.4 G/DL (ref 6.4–8.2)
PROT UR-MCNC: 30 MG/DL
PROT UR-MCNC: 44.2 MG/DL
RBC # BLD AUTO: 3.55 X10(6)UL
RBC #/AREA URNS AUTO: >10 /HPF
SODIUM SERPL-SCNC: 145 MMOL/L (ref 136–145)
SP GR UR STRIP: 1.01 (ref 1–1.03)
TRIGL SERPL-MCNC: 135 MG/DL (ref 30–149)
UROBILINOGEN UR STRIP-ACNC: NORMAL
VLDLC SERPL CALC-MCNC: 18 MG/DL (ref 0–30)
WBC # BLD AUTO: 5.2 X10(3) UL (ref 4–11)

## 2023-07-18 PROCEDURE — 82043 UR ALBUMIN QUANTITATIVE: CPT

## 2023-07-18 PROCEDURE — 82570 ASSAY OF URINE CREATININE: CPT

## 2023-07-18 PROCEDURE — 83735 ASSAY OF MAGNESIUM: CPT

## 2023-07-18 PROCEDURE — 84156 ASSAY OF PROTEIN URINE: CPT

## 2023-07-18 PROCEDURE — 81001 URINALYSIS AUTO W/SCOPE: CPT

## 2023-07-18 PROCEDURE — 36415 COLL VENOUS BLD VENIPUNCTURE: CPT

## 2023-07-18 PROCEDURE — 85025 COMPLETE CBC W/AUTO DIFF WBC: CPT

## 2023-07-18 PROCEDURE — 84100 ASSAY OF PHOSPHORUS: CPT

## 2023-07-18 PROCEDURE — 80197 ASSAY OF TACROLIMUS: CPT

## 2023-07-18 PROCEDURE — 80061 LIPID PANEL: CPT

## 2023-07-18 PROCEDURE — 80053 COMPREHEN METABOLIC PANEL: CPT

## 2023-07-20 LAB
CMV QN DNA PCR: 258 IU/ML
LOG10 CMV QN DNA: 2.41 LOG10 IU/ML
TACROLIMUS LVL: 5.9 NG/ML

## 2023-07-25 ENCOUNTER — LAB ENCOUNTER (OUTPATIENT)
Dept: LAB | Facility: HOSPITAL | Age: 68
End: 2023-07-25
Attending: INTERNAL MEDICINE
Payer: MEDICARE

## 2023-07-25 DIAGNOSIS — N39.0 UTI (URINARY TRACT INFECTION): ICD-10-CM

## 2023-07-25 DIAGNOSIS — E78.5 HYPERLIPIDEMIA: ICD-10-CM

## 2023-07-25 DIAGNOSIS — R80.9 PROTEINURIA: ICD-10-CM

## 2023-07-25 DIAGNOSIS — E55.9 VITAMIN D DEFICIENCY: ICD-10-CM

## 2023-07-25 DIAGNOSIS — R73.9 HYPERGLYCEMIA: ICD-10-CM

## 2023-07-25 DIAGNOSIS — Z51.81 THERAPEUTIC DRUG MONITORING: ICD-10-CM

## 2023-07-25 DIAGNOSIS — E83.42 HYPOMAGNESEMIA: ICD-10-CM

## 2023-07-25 DIAGNOSIS — B34.8 BK VIREMIA: ICD-10-CM

## 2023-07-25 DIAGNOSIS — R78.81 BACTEREMIA: ICD-10-CM

## 2023-07-25 DIAGNOSIS — B25.9 CMV INFECTION (HCC): ICD-10-CM

## 2023-07-25 DIAGNOSIS — D70.9 NEUTROPENIA (HCC): ICD-10-CM

## 2023-07-25 DIAGNOSIS — Z94.0 KIDNEY REPLACED BY TRANSPLANT: ICD-10-CM

## 2023-07-25 LAB
ANION GAP SERPL CALC-SCNC: 7 MMOL/L (ref 0–18)
BASOPHILS # BLD AUTO: 0.05 X10(3) UL (ref 0–0.2)
BASOPHILS NFR BLD AUTO: 0.9 %
BILIRUB UR QL: NEGATIVE
BUN BLD-MCNC: 37 MG/DL (ref 7–18)
BUN/CREAT SERPL: 25.9 (ref 10–20)
CALCIUM BLD-MCNC: 9 MG/DL (ref 8.5–10.1)
CHLORIDE SERPL-SCNC: 113 MMOL/L (ref 98–112)
CLARITY UR: CLEAR
CO2 SERPL-SCNC: 24 MMOL/L (ref 21–32)
CREAT BLD-MCNC: 1.43 MG/DL
CREAT UR-SCNC: 80.3 MG/DL
CREAT UR-SCNC: 80.3 MG/DL
DEPRECATED RDW RBC AUTO: 52.5 FL (ref 35.1–46.3)
EGFRCR SERPLBLD CKD-EPI 2021: 53 ML/MIN/1.73M2 (ref 60–?)
EOSINOPHIL # BLD AUTO: 0.07 X10(3) UL (ref 0–0.7)
EOSINOPHIL NFR BLD AUTO: 1.3 %
ERYTHROCYTE [DISTWIDTH] IN BLOOD BY AUTOMATED COUNT: 14.7 % (ref 11–15)
FASTING STATUS PATIENT QL REPORTED: YES
GLUCOSE BLD-MCNC: 62 MG/DL (ref 70–99)
GLUCOSE UR-MCNC: NORMAL MG/DL
HCT VFR BLD AUTO: 36 %
HGB BLD-MCNC: 12 G/DL
HGB UR QL STRIP.AUTO: NEGATIVE
IMM GRANULOCYTES # BLD AUTO: 0.21 X10(3) UL (ref 0–1)
IMM GRANULOCYTES NFR BLD: 3.8 %
KETONES UR-MCNC: NEGATIVE MG/DL
LEUKOCYTE ESTERASE UR QL STRIP.AUTO: NEGATIVE
LYMPHOCYTES # BLD AUTO: 1.97 X10(3) UL (ref 1–4)
LYMPHOCYTES NFR BLD AUTO: 35.8 %
MAGNESIUM SERPL-MCNC: 1.8 MG/DL (ref 1.6–2.6)
MCH RBC QN AUTO: 32.4 PG (ref 26–34)
MCHC RBC AUTO-ENTMCNC: 33.3 G/DL (ref 31–37)
MCV RBC AUTO: 97.3 FL
MICROALBUMIN UR-MCNC: 17.9 MG/DL
MICROALBUMIN/CREAT 24H UR-RTO: 222.9 UG/MG (ref ?–30)
MONOCYTES # BLD AUTO: 0.52 X10(3) UL (ref 0.1–1)
MONOCYTES NFR BLD AUTO: 9.5 %
NEUTROPHILS # BLD AUTO: 2.68 X10 (3) UL (ref 1.5–7.7)
NEUTROPHILS # BLD AUTO: 2.68 X10(3) UL (ref 1.5–7.7)
NEUTROPHILS NFR BLD AUTO: 48.7 %
NITRITE UR QL STRIP.AUTO: NEGATIVE
OSMOLALITY SERPL CALC.SUM OF ELEC: 305 MOSM/KG (ref 275–295)
PH UR: 6 [PH] (ref 5–8)
PHOSPHATE SERPL-MCNC: 5.5 MG/DL (ref 2.5–4.9)
PLATELET # BLD AUTO: 114 10(3)UL (ref 150–450)
POTASSIUM SERPL-SCNC: 4.2 MMOL/L (ref 3.5–5.1)
PROT UR-MCNC: 20 MG/DL
PROT UR-MCNC: 34.2 MG/DL
RBC # BLD AUTO: 3.7 X10(6)UL
SODIUM SERPL-SCNC: 144 MMOL/L (ref 136–145)
SP GR UR STRIP: 1.01 (ref 1–1.03)
UROBILINOGEN UR STRIP-ACNC: NORMAL
WBC # BLD AUTO: 5.5 X10(3) UL (ref 4–11)

## 2023-07-25 PROCEDURE — 83735 ASSAY OF MAGNESIUM: CPT

## 2023-07-25 PROCEDURE — 82570 ASSAY OF URINE CREATININE: CPT

## 2023-07-25 PROCEDURE — 84100 ASSAY OF PHOSPHORUS: CPT

## 2023-07-25 PROCEDURE — 82043 UR ALBUMIN QUANTITATIVE: CPT

## 2023-07-25 PROCEDURE — 81001 URINALYSIS AUTO W/SCOPE: CPT

## 2023-07-25 PROCEDURE — 80197 ASSAY OF TACROLIMUS: CPT

## 2023-07-25 PROCEDURE — 36415 COLL VENOUS BLD VENIPUNCTURE: CPT

## 2023-07-25 PROCEDURE — 84156 ASSAY OF PROTEIN URINE: CPT

## 2023-07-25 PROCEDURE — 80048 BASIC METABOLIC PNL TOTAL CA: CPT

## 2023-07-25 PROCEDURE — 87086 URINE CULTURE/COLONY COUNT: CPT

## 2023-07-25 PROCEDURE — 85025 COMPLETE CBC W/AUTO DIFF WBC: CPT

## 2023-07-28 LAB — TACROLIMUS LVL: 3.8 NG/ML

## 2023-08-01 ENCOUNTER — LAB ENCOUNTER (OUTPATIENT)
Dept: LAB | Facility: HOSPITAL | Age: 68
End: 2023-08-01
Attending: INTERNAL MEDICINE
Payer: MEDICARE

## 2023-08-01 DIAGNOSIS — D70.9 NEUTROPENIA (HCC): ICD-10-CM

## 2023-08-01 DIAGNOSIS — E55.9 VITAMIN D DEFICIENCY: ICD-10-CM

## 2023-08-01 DIAGNOSIS — B34.8 BK VIREMIA: ICD-10-CM

## 2023-08-01 DIAGNOSIS — Z94.0 KIDNEY REPLACED BY TRANSPLANT: ICD-10-CM

## 2023-08-01 DIAGNOSIS — R78.81 BACTEREMIA: ICD-10-CM

## 2023-08-01 DIAGNOSIS — B25.9 CMV INFECTION (HCC): ICD-10-CM

## 2023-08-01 DIAGNOSIS — R73.9 HYPERGLYCEMIA: ICD-10-CM

## 2023-08-01 DIAGNOSIS — E78.5 HYPERLIPIDEMIA: ICD-10-CM

## 2023-08-01 DIAGNOSIS — R80.9 PROTEINURIA: ICD-10-CM

## 2023-08-01 DIAGNOSIS — N39.0 UTI (URINARY TRACT INFECTION): ICD-10-CM

## 2023-08-01 DIAGNOSIS — Z51.81 THERAPEUTIC DRUG MONITORING: ICD-10-CM

## 2023-08-01 DIAGNOSIS — E83.42 HYPOMAGNESEMIA: ICD-10-CM

## 2023-08-01 LAB
ANION GAP SERPL CALC-SCNC: 8 MMOL/L (ref 0–18)
BASOPHILS # BLD AUTO: 0.03 X10(3) UL (ref 0–0.2)
BASOPHILS NFR BLD AUTO: 0.6 %
BILIRUB UR QL: NEGATIVE
BUN BLD-MCNC: 35 MG/DL (ref 7–18)
BUN/CREAT SERPL: 24.8 (ref 10–20)
CALCIUM BLD-MCNC: 8.5 MG/DL (ref 8.5–10.1)
CHLORIDE SERPL-SCNC: 113 MMOL/L (ref 98–112)
CLARITY UR: CLEAR
CO2 SERPL-SCNC: 23 MMOL/L (ref 21–32)
CREAT BLD-MCNC: 1.41 MG/DL
CREAT UR-SCNC: 111 MG/DL
CREAT UR-SCNC: 111 MG/DL
DEPRECATED RDW RBC AUTO: 52.5 FL (ref 35.1–46.3)
EGFRCR SERPLBLD CKD-EPI 2021: 54 ML/MIN/1.73M2 (ref 60–?)
EOSINOPHIL # BLD AUTO: 0.05 X10(3) UL (ref 0–0.7)
EOSINOPHIL NFR BLD AUTO: 1 %
ERYTHROCYTE [DISTWIDTH] IN BLOOD BY AUTOMATED COUNT: 14.6 % (ref 11–15)
FASTING STATUS PATIENT QL REPORTED: YES
GLUCOSE BLD-MCNC: 78 MG/DL (ref 70–99)
GLUCOSE UR-MCNC: NORMAL MG/DL
HCT VFR BLD AUTO: 35.4 %
HGB BLD-MCNC: 11.7 G/DL
HGB UR QL STRIP.AUTO: NEGATIVE
HYALINE CASTS #/AREA URNS AUTO: PRESENT /LPF
IMM GRANULOCYTES # BLD AUTO: 0.07 X10(3) UL (ref 0–1)
IMM GRANULOCYTES NFR BLD: 1.4 %
KETONES UR-MCNC: NEGATIVE MG/DL
LEUKOCYTE ESTERASE UR QL STRIP.AUTO: NEGATIVE
LYMPHOCYTES # BLD AUTO: 1.81 X10(3) UL (ref 1–4)
LYMPHOCYTES NFR BLD AUTO: 35.4 %
MAGNESIUM SERPL-MCNC: 2.1 MG/DL (ref 1.6–2.6)
MCH RBC QN AUTO: 32.1 PG (ref 26–34)
MCHC RBC AUTO-ENTMCNC: 33.1 G/DL (ref 31–37)
MCV RBC AUTO: 97 FL
MICROALBUMIN UR-MCNC: 19.3 MG/DL
MICROALBUMIN/CREAT 24H UR-RTO: 173.9 UG/MG (ref ?–30)
MONOCYTES # BLD AUTO: 0.53 X10(3) UL (ref 0.1–1)
MONOCYTES NFR BLD AUTO: 10.4 %
NEUTROPHILS # BLD AUTO: 2.63 X10 (3) UL (ref 1.5–7.7)
NEUTROPHILS # BLD AUTO: 2.63 X10(3) UL (ref 1.5–7.7)
NEUTROPHILS NFR BLD AUTO: 51.2 %
NITRITE UR QL STRIP.AUTO: NEGATIVE
OSMOLALITY SERPL CALC.SUM OF ELEC: 305 MOSM/KG (ref 275–295)
PH UR: 6 [PH] (ref 5–8)
PHOSPHATE SERPL-MCNC: 4.2 MG/DL (ref 2.5–4.9)
PLATELET # BLD AUTO: 110 10(3)UL (ref 150–450)
POTASSIUM SERPL-SCNC: 3.9 MMOL/L (ref 3.5–5.1)
PROT UR-MCNC: 30 MG/DL
PROT UR-MCNC: 40.5 MG/DL
RBC # BLD AUTO: 3.65 X10(6)UL
SODIUM SERPL-SCNC: 144 MMOL/L (ref 136–145)
SP GR UR STRIP: 1.02 (ref 1–1.03)
UROBILINOGEN UR STRIP-ACNC: NORMAL
WBC # BLD AUTO: 5.1 X10(3) UL (ref 4–11)

## 2023-08-01 PROCEDURE — 80197 ASSAY OF TACROLIMUS: CPT

## 2023-08-01 PROCEDURE — 84100 ASSAY OF PHOSPHORUS: CPT

## 2023-08-01 PROCEDURE — 85025 COMPLETE CBC W/AUTO DIFF WBC: CPT

## 2023-08-01 PROCEDURE — 81001 URINALYSIS AUTO W/SCOPE: CPT

## 2023-08-01 PROCEDURE — 82570 ASSAY OF URINE CREATININE: CPT

## 2023-08-01 PROCEDURE — 83735 ASSAY OF MAGNESIUM: CPT

## 2023-08-01 PROCEDURE — 84156 ASSAY OF PROTEIN URINE: CPT

## 2023-08-01 PROCEDURE — 87086 URINE CULTURE/COLONY COUNT: CPT

## 2023-08-01 PROCEDURE — 82043 UR ALBUMIN QUANTITATIVE: CPT

## 2023-08-04 LAB — TACROLIMUS LVL: 7.1 NG/ML

## 2023-08-07 ENCOUNTER — TELEPHONE (OUTPATIENT)
Dept: INTERNAL MEDICINE CLINIC | Facility: CLINIC | Age: 68
End: 2023-08-07

## 2023-08-07 RX ORDER — CYANOCOBALAMIN 1000 UG/ML
1000 INJECTION, SOLUTION INTRAMUSCULAR; SUBCUTANEOUS
Qty: 3 ML | Refills: 3 | Status: SHIPPED | OUTPATIENT
Start: 2023-08-07

## 2023-08-15 ENCOUNTER — LAB ENCOUNTER (OUTPATIENT)
Dept: LAB | Facility: HOSPITAL | Age: 68
End: 2023-08-15
Attending: INTERNAL MEDICINE
Payer: MEDICARE

## 2023-08-15 DIAGNOSIS — D70.9 NEUTROPENIA (HCC): ICD-10-CM

## 2023-08-15 DIAGNOSIS — E78.5 HYPERLIPIDEMIA: ICD-10-CM

## 2023-08-15 DIAGNOSIS — B25.9 CMV INFECTION (HCC): ICD-10-CM

## 2023-08-15 DIAGNOSIS — B34.8 BK VIREMIA: ICD-10-CM

## 2023-08-15 DIAGNOSIS — R73.9 HYPERGLYCEMIA: ICD-10-CM

## 2023-08-15 DIAGNOSIS — Z94.0 KIDNEY REPLACED BY TRANSPLANT: ICD-10-CM

## 2023-08-15 DIAGNOSIS — N39.0 UTI (URINARY TRACT INFECTION): ICD-10-CM

## 2023-08-15 DIAGNOSIS — E83.42 HYPOMAGNESEMIA: ICD-10-CM

## 2023-08-15 DIAGNOSIS — R78.81 BACTEREMIA: ICD-10-CM

## 2023-08-15 DIAGNOSIS — E55.9 VITAMIN D DEFICIENCY: ICD-10-CM

## 2023-08-15 DIAGNOSIS — R80.9 PROTEINURIA: ICD-10-CM

## 2023-08-15 DIAGNOSIS — Z51.81 THERAPEUTIC DRUG MONITORING: ICD-10-CM

## 2023-08-15 LAB
ANION GAP SERPL CALC-SCNC: 12 MMOL/L (ref 0–18)
BASOPHILS # BLD AUTO: 0.05 X10(3) UL (ref 0–0.2)
BASOPHILS NFR BLD AUTO: 0.9 %
BILIRUB UR QL: NEGATIVE
BUN BLD-MCNC: 32 MG/DL (ref 7–18)
BUN/CREAT SERPL: 22.7 (ref 10–20)
CALCIUM BLD-MCNC: 8.9 MG/DL (ref 8.5–10.1)
CHLORIDE SERPL-SCNC: 111 MMOL/L (ref 98–112)
CLARITY UR: CLEAR
CO2 SERPL-SCNC: 20 MMOL/L (ref 21–32)
CREAT BLD-MCNC: 1.41 MG/DL
CREAT UR-SCNC: 87.6 MG/DL
CREAT UR-SCNC: 87.6 MG/DL
DEPRECATED RDW RBC AUTO: 50.6 FL (ref 35.1–46.3)
EGFRCR SERPLBLD CKD-EPI 2021: 54 ML/MIN/1.73M2 (ref 60–?)
EOSINOPHIL # BLD AUTO: 0.06 X10(3) UL (ref 0–0.7)
EOSINOPHIL NFR BLD AUTO: 1.1 %
ERYTHROCYTE [DISTWIDTH] IN BLOOD BY AUTOMATED COUNT: 14.3 % (ref 11–15)
FASTING STATUS PATIENT QL REPORTED: YES
GLUCOSE BLD-MCNC: 156 MG/DL (ref 70–99)
GLUCOSE UR-MCNC: NORMAL MG/DL
HCT VFR BLD AUTO: 34.7 %
HGB BLD-MCNC: 11.8 G/DL
HGB UR QL STRIP.AUTO: NEGATIVE
IMM GRANULOCYTES # BLD AUTO: 0.09 X10(3) UL (ref 0–1)
IMM GRANULOCYTES NFR BLD: 1.7 %
KETONES UR-MCNC: NEGATIVE MG/DL
LEUKOCYTE ESTERASE UR QL STRIP.AUTO: NEGATIVE
LYMPHOCYTES # BLD AUTO: 1.59 X10(3) UL (ref 1–4)
LYMPHOCYTES NFR BLD AUTO: 29.9 %
MAGNESIUM SERPL-MCNC: 1.8 MG/DL (ref 1.6–2.6)
MCH RBC QN AUTO: 33 PG (ref 26–34)
MCHC RBC AUTO-ENTMCNC: 34 G/DL (ref 31–37)
MCV RBC AUTO: 96.9 FL
MICROALBUMIN UR-MCNC: 18.5 MG/DL
MICROALBUMIN/CREAT 24H UR-RTO: 211.2 UG/MG (ref ?–30)
MONOCYTES # BLD AUTO: 0.46 X10(3) UL (ref 0.1–1)
MONOCYTES NFR BLD AUTO: 8.7 %
NEUTROPHILS # BLD AUTO: 3.06 X10 (3) UL (ref 1.5–7.7)
NEUTROPHILS # BLD AUTO: 3.06 X10(3) UL (ref 1.5–7.7)
NEUTROPHILS NFR BLD AUTO: 57.7 %
NITRITE UR QL STRIP.AUTO: NEGATIVE
OSMOLALITY SERPL CALC.SUM OF ELEC: 306 MOSM/KG (ref 275–295)
PH UR: 6.5 [PH] (ref 5–8)
PHOSPHATE SERPL-MCNC: 4.3 MG/DL (ref 2.5–4.9)
PLATELET # BLD AUTO: 119 10(3)UL (ref 150–450)
POTASSIUM SERPL-SCNC: 4.2 MMOL/L (ref 3.5–5.1)
PROT UR-MCNC: 20 MG/DL
PROT UR-MCNC: 38.6 MG/DL
RBC # BLD AUTO: 3.58 X10(6)UL
SODIUM SERPL-SCNC: 143 MMOL/L (ref 136–145)
SP GR UR STRIP: 1.02 (ref 1–1.03)
UROBILINOGEN UR STRIP-ACNC: NORMAL
WBC # BLD AUTO: 5.3 X10(3) UL (ref 4–11)

## 2023-08-15 PROCEDURE — 80048 BASIC METABOLIC PNL TOTAL CA: CPT

## 2023-08-15 PROCEDURE — 82570 ASSAY OF URINE CREATININE: CPT

## 2023-08-15 PROCEDURE — 84100 ASSAY OF PHOSPHORUS: CPT

## 2023-08-15 PROCEDURE — 84156 ASSAY OF PROTEIN URINE: CPT

## 2023-08-15 PROCEDURE — 83735 ASSAY OF MAGNESIUM: CPT

## 2023-08-15 PROCEDURE — 87086 URINE CULTURE/COLONY COUNT: CPT

## 2023-08-15 PROCEDURE — 81001 URINALYSIS AUTO W/SCOPE: CPT

## 2023-08-15 PROCEDURE — 36415 COLL VENOUS BLD VENIPUNCTURE: CPT

## 2023-08-15 PROCEDURE — 85025 COMPLETE CBC W/AUTO DIFF WBC: CPT

## 2023-08-15 PROCEDURE — 80197 ASSAY OF TACROLIMUS: CPT

## 2023-08-15 PROCEDURE — 82043 UR ALBUMIN QUANTITATIVE: CPT

## 2023-08-18 LAB — TACROLIMUS LVL: 6.5 NG/ML

## 2023-08-22 ENCOUNTER — LAB ENCOUNTER (OUTPATIENT)
Dept: LAB | Facility: HOSPITAL | Age: 68
End: 2023-08-22
Attending: INTERNAL MEDICINE
Payer: MEDICARE

## 2023-08-22 DIAGNOSIS — R78.81 BACTEREMIA: ICD-10-CM

## 2023-08-22 DIAGNOSIS — Z51.81 THERAPEUTIC DRUG MONITORING: ICD-10-CM

## 2023-08-22 DIAGNOSIS — D70.9 NEUTROPENIA (HCC): ICD-10-CM

## 2023-08-22 DIAGNOSIS — E55.9 VITAMIN D DEFICIENCY: ICD-10-CM

## 2023-08-22 DIAGNOSIS — E78.5 HYPERLIPIDEMIA: ICD-10-CM

## 2023-08-22 DIAGNOSIS — B25.9 CMV INFECTION (HCC): ICD-10-CM

## 2023-08-22 DIAGNOSIS — B34.8 BK VIREMIA: ICD-10-CM

## 2023-08-22 DIAGNOSIS — Z94.0 KIDNEY REPLACED BY TRANSPLANT: ICD-10-CM

## 2023-08-22 DIAGNOSIS — R80.9 PROTEINURIA: ICD-10-CM

## 2023-08-22 DIAGNOSIS — N39.0 UTI (URINARY TRACT INFECTION): ICD-10-CM

## 2023-08-22 DIAGNOSIS — E83.42 HYPOMAGNESEMIA: ICD-10-CM

## 2023-08-22 DIAGNOSIS — R73.9 HYPERGLYCEMIA: ICD-10-CM

## 2023-08-22 LAB
ANION GAP SERPL CALC-SCNC: 7 MMOL/L (ref 0–18)
BASOPHILS # BLD AUTO: 0.03 X10(3) UL (ref 0–0.2)
BASOPHILS NFR BLD AUTO: 0.6 %
BILIRUB UR QL: NEGATIVE
BUN BLD-MCNC: 27 MG/DL (ref 7–18)
BUN/CREAT SERPL: 21.3 (ref 10–20)
CALCIUM BLD-MCNC: 8.3 MG/DL (ref 8.5–10.1)
CHLORIDE SERPL-SCNC: 110 MMOL/L (ref 98–112)
CLARITY UR: CLEAR
CO2 SERPL-SCNC: 26 MMOL/L (ref 21–32)
CREAT BLD-MCNC: 1.27 MG/DL
CREAT UR-SCNC: 82.8 MG/DL
CREAT UR-SCNC: 82.8 MG/DL
DEPRECATED RDW RBC AUTO: 50.1 FL (ref 35.1–46.3)
EGFRCR SERPLBLD CKD-EPI 2021: 62 ML/MIN/1.73M2 (ref 60–?)
EOSINOPHIL # BLD AUTO: 0.07 X10(3) UL (ref 0–0.7)
EOSINOPHIL NFR BLD AUTO: 1.4 %
ERYTHROCYTE [DISTWIDTH] IN BLOOD BY AUTOMATED COUNT: 14.1 % (ref 11–15)
FASTING STATUS PATIENT QL REPORTED: YES
GLUCOSE BLD-MCNC: 106 MG/DL (ref 70–99)
GLUCOSE UR-MCNC: NORMAL MG/DL
HCT VFR BLD AUTO: 35.8 %
HGB BLD-MCNC: 12 G/DL
IMM GRANULOCYTES # BLD AUTO: 0.08 X10(3) UL (ref 0–1)
IMM GRANULOCYTES NFR BLD: 1.5 %
KETONES UR-MCNC: NEGATIVE MG/DL
LEUKOCYTE ESTERASE UR QL STRIP.AUTO: NEGATIVE
LYMPHOCYTES # BLD AUTO: 1.57 X10(3) UL (ref 1–4)
LYMPHOCYTES NFR BLD AUTO: 30.4 %
MAGNESIUM SERPL-MCNC: 1.9 MG/DL (ref 1.6–2.6)
MCH RBC QN AUTO: 32.5 PG (ref 26–34)
MCHC RBC AUTO-ENTMCNC: 33.5 G/DL (ref 31–37)
MCV RBC AUTO: 97 FL
MICROALBUMIN UR-MCNC: 27.7 MG/DL
MICROALBUMIN/CREAT 24H UR-RTO: 334.5 UG/MG (ref ?–30)
MONOCYTES # BLD AUTO: 0.52 X10(3) UL (ref 0.1–1)
MONOCYTES NFR BLD AUTO: 10.1 %
NEUTROPHILS # BLD AUTO: 2.9 X10 (3) UL (ref 1.5–7.7)
NEUTROPHILS # BLD AUTO: 2.9 X10(3) UL (ref 1.5–7.7)
NEUTROPHILS NFR BLD AUTO: 56 %
NITRITE UR QL STRIP.AUTO: NEGATIVE
OSMOLALITY SERPL CALC.SUM OF ELEC: 302 MOSM/KG (ref 275–295)
PH UR: 6 [PH] (ref 5–8)
PHOSPHATE SERPL-MCNC: 4.2 MG/DL (ref 2.5–4.9)
PLATELET # BLD AUTO: 119 10(3)UL (ref 150–450)
POTASSIUM SERPL-SCNC: 3.9 MMOL/L (ref 3.5–5.1)
PROT UR-MCNC: 30 MG/DL
PROT UR-MCNC: 41.5 MG/DL
RBC # BLD AUTO: 3.69 X10(6)UL
SODIUM SERPL-SCNC: 143 MMOL/L (ref 136–145)
SP GR UR STRIP: 1.01 (ref 1–1.03)
UROBILINOGEN UR STRIP-ACNC: NORMAL
WBC # BLD AUTO: 5.2 X10(3) UL (ref 4–11)

## 2023-08-22 PROCEDURE — 82570 ASSAY OF URINE CREATININE: CPT

## 2023-08-22 PROCEDURE — 84156 ASSAY OF PROTEIN URINE: CPT

## 2023-08-22 PROCEDURE — 85025 COMPLETE CBC W/AUTO DIFF WBC: CPT

## 2023-08-22 PROCEDURE — 83735 ASSAY OF MAGNESIUM: CPT

## 2023-08-22 PROCEDURE — 80048 BASIC METABOLIC PNL TOTAL CA: CPT

## 2023-08-22 PROCEDURE — 84100 ASSAY OF PHOSPHORUS: CPT

## 2023-08-22 PROCEDURE — 80197 ASSAY OF TACROLIMUS: CPT

## 2023-08-22 PROCEDURE — 36415 COLL VENOUS BLD VENIPUNCTURE: CPT

## 2023-08-22 PROCEDURE — 82043 UR ALBUMIN QUANTITATIVE: CPT

## 2023-08-22 PROCEDURE — 81001 URINALYSIS AUTO W/SCOPE: CPT

## 2023-08-23 ENCOUNTER — TELEPHONE (OUTPATIENT)
Dept: SURGERY | Facility: CLINIC | Age: 68
End: 2023-08-23

## 2023-08-23 DIAGNOSIS — N40.1 BENIGN PROSTATIC HYPERPLASIA WITH LOWER URINARY TRACT SYMPTOMS, SYMPTOM DETAILS UNSPECIFIED: ICD-10-CM

## 2023-08-24 RX ORDER — FINASTERIDE 5 MG/1
5 TABLET, FILM COATED ORAL DAILY
Qty: 30 TABLET | Refills: 3 | Status: SHIPPED | OUTPATIENT
Start: 2023-08-24

## 2023-08-24 NOTE — TELEPHONE ENCOUNTER
I LM for pt that that [facility] sent a year supply of Finasteride to [facility] on 3/23 and then 30 day supply on 3/27 to his [facility] in . I suggested he call us or call [facility] as it shows receipt that they received it. again.

## 2023-08-24 NOTE — TELEPHONE ENCOUNTER
I s/w pt and determined that back in March that they were out of Finasteride and now they are telling him that they do not have the script that we sent in March. I told pt that our system shows that they did receive it and I will resend it to them now.

## 2023-08-25 LAB — TACROLIMUS LVL: 4.9 NG/ML

## 2023-08-29 ENCOUNTER — LAB ENCOUNTER (OUTPATIENT)
Dept: LAB | Facility: HOSPITAL | Age: 68
End: 2023-08-29
Attending: INTERNAL MEDICINE
Payer: MEDICARE

## 2023-08-29 DIAGNOSIS — B25.9 CMV INFECTION (HCC): ICD-10-CM

## 2023-08-29 DIAGNOSIS — E83.42 HYPOMAGNESEMIA: ICD-10-CM

## 2023-08-29 DIAGNOSIS — D70.9 NEUTROPENIA (HCC): ICD-10-CM

## 2023-08-29 DIAGNOSIS — B34.8 BK VIREMIA: ICD-10-CM

## 2023-08-29 DIAGNOSIS — N39.0 UTI (URINARY TRACT INFECTION): ICD-10-CM

## 2023-08-29 DIAGNOSIS — R80.9 PROTEINURIA: ICD-10-CM

## 2023-08-29 DIAGNOSIS — R78.81 BACTEREMIA: ICD-10-CM

## 2023-08-29 DIAGNOSIS — E78.5 HYPERLIPIDEMIA: ICD-10-CM

## 2023-08-29 DIAGNOSIS — E55.9 VITAMIN D DEFICIENCY: ICD-10-CM

## 2023-08-29 DIAGNOSIS — R73.9 HYPERGLYCEMIA: ICD-10-CM

## 2023-08-29 DIAGNOSIS — Z94.0 KIDNEY REPLACED BY TRANSPLANT: ICD-10-CM

## 2023-08-29 DIAGNOSIS — Z51.81 THERAPEUTIC DRUG MONITORING: ICD-10-CM

## 2023-08-29 LAB
ANION GAP SERPL CALC-SCNC: 9 MMOL/L (ref 0–18)
BASOPHILS # BLD AUTO: 0.04 X10(3) UL (ref 0–0.2)
BASOPHILS NFR BLD AUTO: 0.7 %
BILIRUB UR QL: NEGATIVE
BUN BLD-MCNC: 36 MG/DL (ref 7–18)
BUN/CREAT SERPL: 27.9 (ref 10–20)
CALCIUM BLD-MCNC: 8.6 MG/DL (ref 8.5–10.1)
CHLORIDE SERPL-SCNC: 112 MMOL/L (ref 98–112)
CLARITY UR: CLEAR
CO2 SERPL-SCNC: 24 MMOL/L (ref 21–32)
CREAT BLD-MCNC: 1.29 MG/DL
CREAT UR-SCNC: 80.1 MG/DL
CREAT UR-SCNC: 80.1 MG/DL
DEPRECATED RDW RBC AUTO: 49.3 FL (ref 35.1–46.3)
EGFRCR SERPLBLD CKD-EPI 2021: 60 ML/MIN/1.73M2 (ref 60–?)
EOSINOPHIL # BLD AUTO: 0.06 X10(3) UL (ref 0–0.7)
EOSINOPHIL NFR BLD AUTO: 1.1 %
ERYTHROCYTE [DISTWIDTH] IN BLOOD BY AUTOMATED COUNT: 13.9 % (ref 11–15)
FASTING STATUS PATIENT QL REPORTED: YES
GLUCOSE BLD-MCNC: 67 MG/DL (ref 70–99)
GLUCOSE UR-MCNC: NORMAL MG/DL
HCT VFR BLD AUTO: 36.7 %
HGB BLD-MCNC: 12.3 G/DL
HYALINE CASTS #/AREA URNS AUTO: PRESENT /LPF
IMM GRANULOCYTES # BLD AUTO: 0.07 X10(3) UL (ref 0–1)
IMM GRANULOCYTES NFR BLD: 1.3 %
KETONES UR-MCNC: NEGATIVE MG/DL
LEUKOCYTE ESTERASE UR QL STRIP.AUTO: NEGATIVE
LYMPHOCYTES # BLD AUTO: 1.77 X10(3) UL (ref 1–4)
LYMPHOCYTES NFR BLD AUTO: 32.4 %
MAGNESIUM SERPL-MCNC: 1.9 MG/DL (ref 1.6–2.6)
MCH RBC QN AUTO: 32.4 PG (ref 26–34)
MCHC RBC AUTO-ENTMCNC: 33.5 G/DL (ref 31–37)
MCV RBC AUTO: 96.6 FL
MICROALBUMIN UR-MCNC: 22.3 MG/DL
MICROALBUMIN/CREAT 24H UR-RTO: 278.4 UG/MG (ref ?–30)
MONOCYTES # BLD AUTO: 0.51 X10(3) UL (ref 0.1–1)
MONOCYTES NFR BLD AUTO: 9.3 %
NEUTROPHILS # BLD AUTO: 3.02 X10 (3) UL (ref 1.5–7.7)
NEUTROPHILS # BLD AUTO: 3.02 X10(3) UL (ref 1.5–7.7)
NEUTROPHILS NFR BLD AUTO: 55.2 %
NITRITE UR QL STRIP.AUTO: NEGATIVE
OSMOLALITY SERPL CALC.SUM OF ELEC: 307 MOSM/KG (ref 275–295)
PH UR: 6 [PH] (ref 5–8)
PHOSPHATE SERPL-MCNC: 4.9 MG/DL (ref 2.5–4.9)
PLATELET # BLD AUTO: 124 10(3)UL (ref 150–450)
POTASSIUM SERPL-SCNC: 4.2 MMOL/L (ref 3.5–5.1)
PROT UR-MCNC: 30 MG/DL
PROT UR-MCNC: 36.4 MG/DL
RBC # BLD AUTO: 3.8 X10(6)UL
SODIUM SERPL-SCNC: 145 MMOL/L (ref 136–145)
SP GR UR STRIP: 1.01 (ref 1–1.03)
UROBILINOGEN UR STRIP-ACNC: NORMAL
WBC # BLD AUTO: 5.5 X10(3) UL (ref 4–11)

## 2023-08-29 PROCEDURE — 36415 COLL VENOUS BLD VENIPUNCTURE: CPT

## 2023-08-29 PROCEDURE — 84100 ASSAY OF PHOSPHORUS: CPT

## 2023-08-29 PROCEDURE — 83735 ASSAY OF MAGNESIUM: CPT

## 2023-08-29 PROCEDURE — 82043 UR ALBUMIN QUANTITATIVE: CPT

## 2023-08-29 PROCEDURE — 80197 ASSAY OF TACROLIMUS: CPT

## 2023-08-29 PROCEDURE — 87086 URINE CULTURE/COLONY COUNT: CPT

## 2023-08-29 PROCEDURE — 85025 COMPLETE CBC W/AUTO DIFF WBC: CPT

## 2023-08-29 PROCEDURE — 82570 ASSAY OF URINE CREATININE: CPT

## 2023-08-29 PROCEDURE — 84156 ASSAY OF PROTEIN URINE: CPT

## 2023-08-29 PROCEDURE — 80048 BASIC METABOLIC PNL TOTAL CA: CPT

## 2023-08-29 PROCEDURE — 87799 DETECT AGENT NOS DNA QUANT: CPT

## 2023-08-29 PROCEDURE — 81001 URINALYSIS AUTO W/SCOPE: CPT

## 2023-08-31 LAB
BKV DNA, QUANT PCR, PLASMA: NEGATIVE IU/ML
CMV QN DNA PCR: NEGATIVE IU/ML
TACROLIMUS LVL: 9.8 NG/ML

## 2023-09-13 ENCOUNTER — TELEPHONE (OUTPATIENT)
Dept: PULMONOLOGY | Facility: CLINIC | Age: 68
End: 2023-09-13

## 2023-09-19 ENCOUNTER — LAB ENCOUNTER (OUTPATIENT)
Dept: LAB | Facility: HOSPITAL | Age: 68
End: 2023-09-19
Attending: INTERNAL MEDICINE
Payer: MEDICARE

## 2023-09-19 DIAGNOSIS — R73.9 HYPERGLYCEMIA: ICD-10-CM

## 2023-09-19 DIAGNOSIS — Z94.0 KIDNEY REPLACED BY TRANSPLANT: ICD-10-CM

## 2023-09-19 DIAGNOSIS — Z51.81 THERAPEUTIC DRUG MONITORING: ICD-10-CM

## 2023-09-19 DIAGNOSIS — E78.5 HYPERLIPIDEMIA: ICD-10-CM

## 2023-09-19 DIAGNOSIS — D70.9 NEUTROPENIA (HCC): ICD-10-CM

## 2023-09-19 DIAGNOSIS — N39.0 UTI (URINARY TRACT INFECTION): ICD-10-CM

## 2023-09-19 DIAGNOSIS — B34.8 BK VIREMIA: ICD-10-CM

## 2023-09-19 DIAGNOSIS — R80.9 PROTEINURIA: ICD-10-CM

## 2023-09-19 DIAGNOSIS — E55.9 VITAMIN D DEFICIENCY: ICD-10-CM

## 2023-09-19 DIAGNOSIS — E83.42 HYPOMAGNESEMIA: ICD-10-CM

## 2023-09-19 DIAGNOSIS — B25.9 CMV INFECTION (HCC): ICD-10-CM

## 2023-09-19 DIAGNOSIS — R78.81 BACTEREMIA: ICD-10-CM

## 2023-09-19 LAB
ANION GAP SERPL CALC-SCNC: 6 MMOL/L (ref 0–18)
BASOPHILS # BLD AUTO: 0.03 X10(3) UL (ref 0–0.2)
BASOPHILS NFR BLD AUTO: 0.5 %
BILIRUB UR QL: NEGATIVE
BUN BLD-MCNC: 34 MG/DL (ref 7–18)
BUN/CREAT SERPL: 27.6 (ref 10–20)
CALCIUM BLD-MCNC: 8.7 MG/DL (ref 8.5–10.1)
CHLORIDE SERPL-SCNC: 113 MMOL/L (ref 98–112)
CLARITY UR: CLEAR
CO2 SERPL-SCNC: 22 MMOL/L (ref 21–32)
CREAT BLD-MCNC: 1.23 MG/DL
CREAT UR-SCNC: 61.1 MG/DL
CREAT UR-SCNC: 61.1 MG/DL
DEPRECATED RDW RBC AUTO: 49.7 FL (ref 35.1–46.3)
EGFRCR SERPLBLD CKD-EPI 2021: 64 ML/MIN/1.73M2 (ref 60–?)
EOSINOPHIL # BLD AUTO: 0.07 X10(3) UL (ref 0–0.7)
EOSINOPHIL NFR BLD AUTO: 1.1 %
ERYTHROCYTE [DISTWIDTH] IN BLOOD BY AUTOMATED COUNT: 14.3 % (ref 11–15)
FASTING STATUS PATIENT QL REPORTED: YES
GLUCOSE BLD-MCNC: 91 MG/DL (ref 70–99)
GLUCOSE UR-MCNC: 30 MG/DL
HCT VFR BLD AUTO: 36.2 %
HGB BLD-MCNC: 12.2 G/DL
IMM GRANULOCYTES # BLD AUTO: 0.13 X10(3) UL (ref 0–1)
IMM GRANULOCYTES NFR BLD: 2.1 %
KETONES UR-MCNC: NEGATIVE MG/DL
LEUKOCYTE ESTERASE UR QL STRIP.AUTO: NEGATIVE
LYMPHOCYTES # BLD AUTO: 1.94 X10(3) UL (ref 1–4)
LYMPHOCYTES NFR BLD AUTO: 30.7 %
MAGNESIUM SERPL-MCNC: 1.9 MG/DL (ref 1.6–2.6)
MCH RBC QN AUTO: 32.2 PG (ref 26–34)
MCHC RBC AUTO-ENTMCNC: 33.7 G/DL (ref 31–37)
MCV RBC AUTO: 95.5 FL
MICROALBUMIN UR-MCNC: 9.23 MG/DL
MICROALBUMIN/CREAT 24H UR-RTO: 151.1 UG/MG (ref ?–30)
MONOCYTES # BLD AUTO: 0.58 X10(3) UL (ref 0.1–1)
MONOCYTES NFR BLD AUTO: 9.2 %
NEUTROPHILS # BLD AUTO: 3.56 X10 (3) UL (ref 1.5–7.7)
NEUTROPHILS # BLD AUTO: 3.56 X10(3) UL (ref 1.5–7.7)
NEUTROPHILS NFR BLD AUTO: 56.4 %
NITRITE UR QL STRIP.AUTO: NEGATIVE
OSMOLALITY SERPL CALC.SUM OF ELEC: 299 MOSM/KG (ref 275–295)
PH UR: 6 [PH] (ref 5–8)
PHOSPHATE SERPL-MCNC: 4.6 MG/DL (ref 2.5–4.9)
PLATELET # BLD AUTO: 129 10(3)UL (ref 150–450)
POTASSIUM SERPL-SCNC: 4.3 MMOL/L (ref 3.5–5.1)
PROT UR-MCNC: 19.9 MG/DL
RBC # BLD AUTO: 3.79 X10(6)UL
SODIUM SERPL-SCNC: 141 MMOL/L (ref 136–145)
SP GR UR STRIP: 1.01 (ref 1–1.03)
UROBILINOGEN UR STRIP-ACNC: NORMAL
WBC # BLD AUTO: 6.3 X10(3) UL (ref 4–11)

## 2023-09-19 PROCEDURE — 82570 ASSAY OF URINE CREATININE: CPT

## 2023-09-19 PROCEDURE — 36415 COLL VENOUS BLD VENIPUNCTURE: CPT

## 2023-09-19 PROCEDURE — 82043 UR ALBUMIN QUANTITATIVE: CPT

## 2023-09-19 PROCEDURE — 84100 ASSAY OF PHOSPHORUS: CPT

## 2023-09-19 PROCEDURE — 83735 ASSAY OF MAGNESIUM: CPT

## 2023-09-19 PROCEDURE — 84156 ASSAY OF PROTEIN URINE: CPT

## 2023-09-19 PROCEDURE — 80048 BASIC METABOLIC PNL TOTAL CA: CPT

## 2023-09-19 PROCEDURE — 81001 URINALYSIS AUTO W/SCOPE: CPT

## 2023-09-19 PROCEDURE — 80197 ASSAY OF TACROLIMUS: CPT

## 2023-09-19 PROCEDURE — 85025 COMPLETE CBC W/AUTO DIFF WBC: CPT

## 2023-09-21 LAB
CMV QN DNA PCR: NEGATIVE IU/ML
TACROLIMUS LVL: 6.1 NG/ML

## 2023-09-22 ENCOUNTER — OFFICE VISIT (OUTPATIENT)
Dept: INTERNAL MEDICINE CLINIC | Facility: CLINIC | Age: 68
End: 2023-09-22

## 2023-09-22 VITALS
HEART RATE: 60 BPM | DIASTOLIC BLOOD PRESSURE: 64 MMHG | SYSTOLIC BLOOD PRESSURE: 120 MMHG | BODY MASS INDEX: 39.74 KG/M2 | TEMPERATURE: 98 F | WEIGHT: 293.38 LBS | HEIGHT: 72 IN | OXYGEN SATURATION: 97 %

## 2023-09-22 DIAGNOSIS — E53.8 VITAMIN B12 DEFICIENCY: ICD-10-CM

## 2023-09-22 DIAGNOSIS — Z00.00 MEDICARE ANNUAL WELLNESS VISIT, SUBSEQUENT: Primary | ICD-10-CM

## 2023-09-22 PROCEDURE — G0439 PPPS, SUBSEQ VISIT: HCPCS | Performed by: INTERNAL MEDICINE

## 2023-09-22 PROCEDURE — 99215 OFFICE O/P EST HI 40 MIN: CPT | Performed by: INTERNAL MEDICINE

## 2023-09-22 RX ORDER — CHLORTHALIDONE 25 MG/1
25 TABLET ORAL DAILY
COMMUNITY
Start: 2023-09-07 | End: 2024-09-06

## 2023-09-22 RX ORDER — APIXABAN 5 MG/1
1 TABLET, FILM COATED ORAL 2 TIMES DAILY
COMMUNITY
Start: 2023-03-06

## 2023-09-22 RX ORDER — AMLODIPINE BESYLATE 10 MG/1
10 TABLET ORAL DAILY
COMMUNITY
Start: 2023-09-19

## 2023-09-22 RX ORDER — ERGOCALCIFEROL 1.25 MG/1
50000 CAPSULE ORAL
COMMUNITY
Start: 2022-12-16

## 2023-09-22 RX ORDER — LISINOPRIL 10 MG/1
10 TABLET ORAL DAILY
COMMUNITY
Start: 2023-09-07 | End: 2024-09-06

## 2023-09-22 RX ORDER — PREDNISONE 10 MG/1
10 TABLET ORAL DAILY
COMMUNITY
Start: 2023-05-18

## 2023-10-03 ENCOUNTER — LAB ENCOUNTER (OUTPATIENT)
Dept: LAB | Facility: HOSPITAL | Age: 68
End: 2023-10-03
Attending: INTERNAL MEDICINE
Payer: MEDICARE

## 2023-10-03 DIAGNOSIS — E53.8 VITAMIN B12 DEFICIENCY: ICD-10-CM

## 2023-10-03 DIAGNOSIS — R78.81 BACTEREMIA: ICD-10-CM

## 2023-10-03 DIAGNOSIS — Z94.0 KIDNEY REPLACED BY TRANSPLANT: ICD-10-CM

## 2023-10-03 DIAGNOSIS — N39.0 UTI (URINARY TRACT INFECTION): ICD-10-CM

## 2023-10-03 DIAGNOSIS — E83.42 HYPOMAGNESEMIA: ICD-10-CM

## 2023-10-03 DIAGNOSIS — Z51.81 THERAPEUTIC DRUG MONITORING: ICD-10-CM

## 2023-10-03 DIAGNOSIS — R73.9 HYPERGLYCEMIA: ICD-10-CM

## 2023-10-03 DIAGNOSIS — E55.9 VITAMIN D DEFICIENCY: ICD-10-CM

## 2023-10-03 DIAGNOSIS — B25.9 CMV INFECTION (HCC): ICD-10-CM

## 2023-10-03 DIAGNOSIS — B34.8 BK VIREMIA: ICD-10-CM

## 2023-10-03 DIAGNOSIS — E78.5 HYPERLIPIDEMIA: ICD-10-CM

## 2023-10-03 DIAGNOSIS — Z00.00 MEDICARE ANNUAL WELLNESS VISIT, SUBSEQUENT: ICD-10-CM

## 2023-10-03 DIAGNOSIS — R80.9 PROTEINURIA: ICD-10-CM

## 2023-10-03 DIAGNOSIS — D70.9 NEUTROPENIA (HCC): ICD-10-CM

## 2023-10-03 LAB
ANION GAP SERPL CALC-SCNC: 7 MMOL/L (ref 0–18)
BASOPHILS # BLD AUTO: 0.02 X10(3) UL (ref 0–0.2)
BASOPHILS NFR BLD AUTO: 0.4 %
BILIRUB UR QL: NEGATIVE
BUN BLD-MCNC: 35 MG/DL (ref 7–18)
BUN/CREAT SERPL: 25.2 (ref 10–20)
CALCIUM BLD-MCNC: 8.9 MG/DL (ref 8.5–10.1)
CHLORIDE SERPL-SCNC: 112 MMOL/L (ref 98–112)
CLARITY UR: CLEAR
CO2 SERPL-SCNC: 25 MMOL/L (ref 21–32)
CREAT BLD-MCNC: 1.39 MG/DL
CREAT UR-SCNC: 73.3 MG/DL
CREAT UR-SCNC: 73.3 MG/DL
DEPRECATED RDW RBC AUTO: 48.9 FL (ref 35.1–46.3)
EGFRCR SERPLBLD CKD-EPI 2021: 55 ML/MIN/1.73M2 (ref 60–?)
EOSINOPHIL # BLD AUTO: 0.05 X10(3) UL (ref 0–0.7)
EOSINOPHIL NFR BLD AUTO: 0.9 %
ERYTHROCYTE [DISTWIDTH] IN BLOOD BY AUTOMATED COUNT: 13.8 % (ref 11–15)
EST. AVERAGE GLUCOSE BLD GHB EST-MCNC: 157 MG/DL (ref 68–126)
FASTING STATUS PATIENT QL REPORTED: YES
GLUCOSE BLD-MCNC: 149 MG/DL (ref 70–99)
GLUCOSE UR-MCNC: 30 MG/DL
HBA1C MFR BLD: 7.1 % (ref ?–5.7)
HCT VFR BLD AUTO: 36.5 %
HGB BLD-MCNC: 12.1 G/DL
IMM GRANULOCYTES # BLD AUTO: 0.1 X10(3) UL (ref 0–1)
IMM GRANULOCYTES NFR BLD: 1.9 %
KETONES UR-MCNC: NEGATIVE MG/DL
LEUKOCYTE ESTERASE UR QL STRIP.AUTO: NEGATIVE
LYMPHOCYTES # BLD AUTO: 1.57 X10(3) UL (ref 1–4)
LYMPHOCYTES NFR BLD AUTO: 29.6 %
MAGNESIUM SERPL-MCNC: 2 MG/DL (ref 1.6–2.6)
MCH RBC QN AUTO: 32.2 PG (ref 26–34)
MCHC RBC AUTO-ENTMCNC: 33.2 G/DL (ref 31–37)
MCV RBC AUTO: 97.1 FL
MICROALBUMIN UR-MCNC: 10.2 MG/DL
MICROALBUMIN/CREAT 24H UR-RTO: 139.2 UG/MG (ref ?–30)
MONOCYTES # BLD AUTO: 0.55 X10(3) UL (ref 0.1–1)
MONOCYTES NFR BLD AUTO: 10.4 %
NEUTROPHILS # BLD AUTO: 3.02 X10 (3) UL (ref 1.5–7.7)
NEUTROPHILS # BLD AUTO: 3.02 X10(3) UL (ref 1.5–7.7)
NEUTROPHILS NFR BLD AUTO: 56.8 %
NITRITE UR QL STRIP.AUTO: NEGATIVE
OSMOLALITY SERPL CALC.SUM OF ELEC: 309 MOSM/KG (ref 275–295)
PH UR: 6 [PH] (ref 5–8)
PHOSPHATE SERPL-MCNC: 4.3 MG/DL (ref 2.5–4.9)
PLATELET # BLD AUTO: 117 10(3)UL (ref 150–450)
POTASSIUM SERPL-SCNC: 4.5 MMOL/L (ref 3.5–5.1)
PROT UR-MCNC: 20.7 MG/DL
RBC # BLD AUTO: 3.76 X10(6)UL
SODIUM SERPL-SCNC: 144 MMOL/L (ref 136–145)
SP GR UR STRIP: 1.01 (ref 1–1.03)
TSI SER-ACNC: 1.92 MIU/ML (ref 0.36–3.74)
UROBILINOGEN UR STRIP-ACNC: NORMAL
VIT B12 SERPL-MCNC: 502 PG/ML (ref 193–986)
WBC # BLD AUTO: 5.3 X10(3) UL (ref 4–11)

## 2023-10-03 PROCEDURE — 82607 VITAMIN B-12: CPT

## 2023-10-03 PROCEDURE — 85025 COMPLETE CBC W/AUTO DIFF WBC: CPT

## 2023-10-03 PROCEDURE — 80197 ASSAY OF TACROLIMUS: CPT

## 2023-10-03 PROCEDURE — 84100 ASSAY OF PHOSPHORUS: CPT

## 2023-10-03 PROCEDURE — 87086 URINE CULTURE/COLONY COUNT: CPT

## 2023-10-03 PROCEDURE — 83036 HEMOGLOBIN GLYCOSYLATED A1C: CPT

## 2023-10-03 PROCEDURE — 36415 COLL VENOUS BLD VENIPUNCTURE: CPT

## 2023-10-03 PROCEDURE — 84156 ASSAY OF PROTEIN URINE: CPT

## 2023-10-03 PROCEDURE — 84443 ASSAY THYROID STIM HORMONE: CPT

## 2023-10-03 PROCEDURE — 80048 BASIC METABOLIC PNL TOTAL CA: CPT

## 2023-10-03 PROCEDURE — 83735 ASSAY OF MAGNESIUM: CPT

## 2023-10-03 PROCEDURE — 81001 URINALYSIS AUTO W/SCOPE: CPT

## 2023-10-03 PROCEDURE — 82570 ASSAY OF URINE CREATININE: CPT

## 2023-10-03 PROCEDURE — 82043 UR ALBUMIN QUANTITATIVE: CPT

## 2023-10-04 ENCOUNTER — TELEPHONE (OUTPATIENT)
Dept: INTERNAL MEDICINE CLINIC | Facility: CLINIC | Age: 68
End: 2023-10-04

## 2023-10-05 LAB — TACROLIMUS LVL: 6.4 NG/ML

## 2023-10-25 DIAGNOSIS — N40.1 BENIGN PROSTATIC HYPERPLASIA WITH LOWER URINARY TRACT SYMPTOMS, SYMPTOM DETAILS UNSPECIFIED: ICD-10-CM

## 2023-10-25 RX ORDER — FINASTERIDE 5 MG/1
5 TABLET, FILM COATED ORAL DAILY
Qty: 90 TABLET | Refills: 3 | Status: SHIPPED | OUTPATIENT
Start: 2023-10-25

## 2023-10-31 ENCOUNTER — LAB ENCOUNTER (OUTPATIENT)
Dept: LAB | Facility: HOSPITAL | Age: 68
End: 2023-10-31
Attending: INTERNAL MEDICINE
Payer: MEDICARE

## 2023-10-31 ENCOUNTER — IMMUNIZATION (OUTPATIENT)
Dept: LAB | Age: 68
End: 2023-10-31
Attending: EMERGENCY MEDICINE
Payer: MEDICARE

## 2023-10-31 DIAGNOSIS — N39.0 UTI (URINARY TRACT INFECTION): ICD-10-CM

## 2023-10-31 DIAGNOSIS — R80.9 PROTEINURIA: ICD-10-CM

## 2023-10-31 DIAGNOSIS — Z23 NEED FOR VACCINATION: Primary | ICD-10-CM

## 2023-10-31 DIAGNOSIS — B25.9 CMV INFECTION (HCC): ICD-10-CM

## 2023-10-31 DIAGNOSIS — E55.9 VITAMIN D DEFICIENCY: ICD-10-CM

## 2023-10-31 DIAGNOSIS — B34.8 BK VIREMIA: ICD-10-CM

## 2023-10-31 DIAGNOSIS — D70.9 NEUTROPENIA (HCC): ICD-10-CM

## 2023-10-31 DIAGNOSIS — R73.9 HYPERGLYCEMIA: ICD-10-CM

## 2023-10-31 DIAGNOSIS — E83.42 HYPOMAGNESEMIA: ICD-10-CM

## 2023-10-31 DIAGNOSIS — R78.81 BACTEREMIA: ICD-10-CM

## 2023-10-31 DIAGNOSIS — E78.5 HYPERLIPIDEMIA: ICD-10-CM

## 2023-10-31 DIAGNOSIS — Z94.0 KIDNEY REPLACED BY TRANSPLANT: ICD-10-CM

## 2023-10-31 DIAGNOSIS — Z51.81 THERAPEUTIC DRUG MONITORING: ICD-10-CM

## 2023-10-31 LAB
BASOPHILS # BLD AUTO: 0.04 X10(3) UL (ref 0–0.2)
BASOPHILS NFR BLD AUTO: 0.6 %
BILIRUB UR QL: NEGATIVE
CLARITY UR: CLEAR
DEPRECATED RDW RBC AUTO: 51.8 FL (ref 35.1–46.3)
EOSINOPHIL # BLD AUTO: 0.06 X10(3) UL (ref 0–0.7)
EOSINOPHIL NFR BLD AUTO: 0.9 %
ERYTHROCYTE [DISTWIDTH] IN BLOOD BY AUTOMATED COUNT: 14.5 % (ref 11–15)
GLUCOSE UR-MCNC: NORMAL MG/DL
HCT VFR BLD AUTO: 39 %
HGB BLD-MCNC: 12.9 G/DL
HGB UR QL STRIP.AUTO: NEGATIVE
IMM GRANULOCYTES # BLD AUTO: 0.12 X10(3) UL (ref 0–1)
IMM GRANULOCYTES NFR BLD: 1.9 %
KETONES UR-MCNC: NEGATIVE MG/DL
LEUKOCYTE ESTERASE UR QL STRIP.AUTO: NEGATIVE
LYMPHOCYTES # BLD AUTO: 1.95 X10(3) UL (ref 1–4)
LYMPHOCYTES NFR BLD AUTO: 30.4 %
MCH RBC QN AUTO: 32 PG (ref 26–34)
MCHC RBC AUTO-ENTMCNC: 33.1 G/DL (ref 31–37)
MCV RBC AUTO: 96.8 FL
MONOCYTES # BLD AUTO: 0.57 X10(3) UL (ref 0.1–1)
MONOCYTES NFR BLD AUTO: 8.9 %
NEUTROPHILS # BLD AUTO: 3.68 X10 (3) UL (ref 1.5–7.7)
NEUTROPHILS # BLD AUTO: 3.68 X10(3) UL (ref 1.5–7.7)
NEUTROPHILS NFR BLD AUTO: 57.3 %
NITRITE UR QL STRIP.AUTO: NEGATIVE
PH UR: 5.5 [PH] (ref 5–8)
PLATELET # BLD AUTO: 116 10(3)UL (ref 150–450)
PROT UR-MCNC: 20 MG/DL
RBC # BLD AUTO: 4.03 X10(6)UL
SP GR UR STRIP: 1.02 (ref 1–1.03)
UROBILINOGEN UR STRIP-ACNC: NORMAL
WBC # BLD AUTO: 6.4 X10(3) UL (ref 4–11)

## 2023-10-31 PROCEDURE — 84100 ASSAY OF PHOSPHORUS: CPT

## 2023-10-31 PROCEDURE — 87086 URINE CULTURE/COLONY COUNT: CPT

## 2023-10-31 PROCEDURE — 81001 URINALYSIS AUTO W/SCOPE: CPT

## 2023-10-31 PROCEDURE — 82570 ASSAY OF URINE CREATININE: CPT

## 2023-10-31 PROCEDURE — 83735 ASSAY OF MAGNESIUM: CPT

## 2023-10-31 PROCEDURE — 90662 IIV NO PRSV INCREASED AG IM: CPT

## 2023-10-31 PROCEDURE — 84156 ASSAY OF PROTEIN URINE: CPT

## 2023-10-31 PROCEDURE — 85025 COMPLETE CBC W/AUTO DIFF WBC: CPT

## 2023-10-31 PROCEDURE — 36415 COLL VENOUS BLD VENIPUNCTURE: CPT

## 2023-10-31 PROCEDURE — 80048 BASIC METABOLIC PNL TOTAL CA: CPT

## 2023-10-31 PROCEDURE — 90471 IMMUNIZATION ADMIN: CPT

## 2023-10-31 PROCEDURE — 80197 ASSAY OF TACROLIMUS: CPT

## 2023-10-31 PROCEDURE — 82043 UR ALBUMIN QUANTITATIVE: CPT

## 2023-11-01 LAB
ANION GAP SERPL CALC-SCNC: 12 MMOL/L (ref 0–18)
BUN BLD-MCNC: 25 MG/DL (ref 9–23)
BUN/CREAT SERPL: 22.1 (ref 10–20)
CALCIUM BLD-MCNC: 9.6 MG/DL (ref 8.7–10.4)
CHLORIDE SERPL-SCNC: 109 MMOL/L (ref 98–112)
CO2 SERPL-SCNC: 21 MMOL/L (ref 21–32)
CREAT BLD-MCNC: 1.13 MG/DL
CREAT UR-SCNC: 71.8 MG/DL
CREAT UR-SCNC: 71.8 MG/DL
EGFRCR SERPLBLD CKD-EPI 2021: 71 ML/MIN/1.73M2 (ref 60–?)
FASTING STATUS PATIENT QL REPORTED: YES
GLUCOSE BLD-MCNC: 54 MG/DL (ref 70–99)
MAGNESIUM SERPL-MCNC: 1.8 MG/DL (ref 1.6–2.6)
MICROALBUMIN UR-MCNC: 7.6 MG/DL
MICROALBUMIN/CREAT 24H UR-RTO: 105.8 UG/MG (ref ?–30)
OSMOLALITY SERPL CALC.SUM OF ELEC: 296 MOSM/KG (ref 275–295)
PHOSPHATE SERPL-MCNC: 5.2 MG/DL (ref 2.4–5.1)
POTASSIUM SERPL-SCNC: 4.4 MMOL/L (ref 3.5–5.1)
PROT UR-MCNC: 21.6 MG/DL (ref ?–14)
SODIUM SERPL-SCNC: 142 MMOL/L (ref 136–145)

## 2023-11-02 LAB
CMV QN DNA PCR: NEGATIVE IU/ML
TACROLIMUS LVL: 7.3 NG/ML

## 2023-11-13 ENCOUNTER — IMMUNIZATION (OUTPATIENT)
Dept: LAB | Age: 68
End: 2023-11-13
Attending: EMERGENCY MEDICINE
Payer: MEDICARE

## 2023-11-13 DIAGNOSIS — Z23 NEED FOR VACCINATION: Primary | ICD-10-CM

## 2023-11-13 PROCEDURE — 90480 ADMN SARSCOV2 VAC 1/ONLY CMP: CPT

## 2023-11-14 ENCOUNTER — LAB ENCOUNTER (OUTPATIENT)
Dept: LAB | Facility: HOSPITAL | Age: 68
End: 2023-11-14
Attending: INTERNAL MEDICINE
Payer: MEDICARE

## 2023-11-14 DIAGNOSIS — E78.5 HYPERLIPIDEMIA: ICD-10-CM

## 2023-11-14 DIAGNOSIS — R78.81 BACTEREMIA: ICD-10-CM

## 2023-11-14 DIAGNOSIS — D70.9 NEUTROPENIA (HCC): ICD-10-CM

## 2023-11-14 DIAGNOSIS — B25.9 CMV INFECTION (HCC): ICD-10-CM

## 2023-11-14 DIAGNOSIS — Z94.0 KIDNEY REPLACED BY TRANSPLANT: ICD-10-CM

## 2023-11-14 DIAGNOSIS — R80.9 PROTEINURIA: ICD-10-CM

## 2023-11-14 DIAGNOSIS — R73.9 HYPERGLYCEMIA: ICD-10-CM

## 2023-11-14 DIAGNOSIS — B34.8 BK VIREMIA: ICD-10-CM

## 2023-11-14 DIAGNOSIS — E55.9 VITAMIN D DEFICIENCY: ICD-10-CM

## 2023-11-14 DIAGNOSIS — E83.42 HYPOMAGNESEMIA: ICD-10-CM

## 2023-11-14 DIAGNOSIS — Z51.81 THERAPEUTIC DRUG MONITORING: ICD-10-CM

## 2023-11-14 DIAGNOSIS — N39.0 UTI (URINARY TRACT INFECTION): ICD-10-CM

## 2023-11-14 LAB
ANION GAP SERPL CALC-SCNC: 9 MMOL/L (ref 0–18)
BASOPHILS # BLD AUTO: 0.05 X10(3) UL (ref 0–0.2)
BASOPHILS NFR BLD AUTO: 0.8 %
BILIRUB UR QL: NEGATIVE
BUN BLD-MCNC: 33 MG/DL (ref 9–23)
BUN/CREAT SERPL: 28.2 (ref 10–20)
CALCIUM BLD-MCNC: 9.3 MG/DL (ref 8.7–10.4)
CHLORIDE SERPL-SCNC: 111 MMOL/L (ref 98–112)
CLARITY UR: CLEAR
CO2 SERPL-SCNC: 24 MMOL/L (ref 21–32)
COLOR UR: YELLOW
CREAT BLD-MCNC: 1.17 MG/DL
CREAT UR-SCNC: 59.5 MG/DL
CREAT UR-SCNC: 59.5 MG/DL
DEPRECATED RDW RBC AUTO: 51.8 FL (ref 35.1–46.3)
EGFRCR SERPLBLD CKD-EPI 2021: 68 ML/MIN/1.73M2 (ref 60–?)
EOSINOPHIL # BLD AUTO: 0.06 X10(3) UL (ref 0–0.7)
EOSINOPHIL NFR BLD AUTO: 0.9 %
ERYTHROCYTE [DISTWIDTH] IN BLOOD BY AUTOMATED COUNT: 14.4 % (ref 11–15)
FASTING STATUS PATIENT QL REPORTED: YES
GLUCOSE BLD-MCNC: 55 MG/DL (ref 70–99)
GLUCOSE UR-MCNC: NORMAL MG/DL
HCT VFR BLD AUTO: 38.1 %
HGB BLD-MCNC: 12.5 G/DL
HGB UR QL STRIP.AUTO: NEGATIVE
IMM GRANULOCYTES # BLD AUTO: 0.19 X10(3) UL (ref 0–1)
IMM GRANULOCYTES NFR BLD: 2.9 %
KETONES UR-MCNC: NEGATIVE MG/DL
LEUKOCYTE ESTERASE UR QL STRIP.AUTO: NEGATIVE
LYMPHOCYTES # BLD AUTO: 1.84 X10(3) UL (ref 1–4)
LYMPHOCYTES NFR BLD AUTO: 27.8 %
MAGNESIUM SERPL-MCNC: 1.8 MG/DL (ref 1.6–2.6)
MCH RBC QN AUTO: 32.1 PG (ref 26–34)
MCHC RBC AUTO-ENTMCNC: 32.8 G/DL (ref 31–37)
MCV RBC AUTO: 97.7 FL
MICROALBUMIN UR-MCNC: 6 MG/DL
MICROALBUMIN/CREAT 24H UR-RTO: 100.8 UG/MG (ref ?–30)
MONOCYTES # BLD AUTO: 0.71 X10(3) UL (ref 0.1–1)
MONOCYTES NFR BLD AUTO: 10.7 %
NEUTROPHILS # BLD AUTO: 3.77 X10 (3) UL (ref 1.5–7.7)
NEUTROPHILS # BLD AUTO: 3.77 X10(3) UL (ref 1.5–7.7)
NEUTROPHILS NFR BLD AUTO: 56.9 %
NITRITE UR QL STRIP.AUTO: NEGATIVE
OSMOLALITY SERPL CALC.SUM OF ELEC: 303 MOSM/KG (ref 275–295)
PH UR: 5.5 [PH] (ref 5–8)
PHOSPHATE SERPL-MCNC: 4.7 MG/DL (ref 2.4–5.1)
PLATELET # BLD AUTO: 109 10(3)UL (ref 150–450)
POTASSIUM SERPL-SCNC: 4.8 MMOL/L (ref 3.5–5.1)
PROT UR-MCNC: 16.9 MG/DL (ref ?–14)
PROT UR-MCNC: NEGATIVE MG/DL
RBC # BLD AUTO: 3.9 X10(6)UL
SODIUM SERPL-SCNC: 144 MMOL/L (ref 136–145)
SP GR UR STRIP: 1.01 (ref 1–1.03)
UROBILINOGEN UR STRIP-ACNC: NORMAL
WBC # BLD AUTO: 6.6 X10(3) UL (ref 4–11)

## 2023-11-14 PROCEDURE — 82570 ASSAY OF URINE CREATININE: CPT

## 2023-11-14 PROCEDURE — 80048 BASIC METABOLIC PNL TOTAL CA: CPT

## 2023-11-14 PROCEDURE — 36415 COLL VENOUS BLD VENIPUNCTURE: CPT

## 2023-11-14 PROCEDURE — 84100 ASSAY OF PHOSPHORUS: CPT

## 2023-11-14 PROCEDURE — 84156 ASSAY OF PROTEIN URINE: CPT

## 2023-11-14 PROCEDURE — 87086 URINE CULTURE/COLONY COUNT: CPT

## 2023-11-14 PROCEDURE — 82043 UR ALBUMIN QUANTITATIVE: CPT

## 2023-11-14 PROCEDURE — 80197 ASSAY OF TACROLIMUS: CPT

## 2023-11-14 PROCEDURE — 81003 URINALYSIS AUTO W/O SCOPE: CPT

## 2023-11-14 PROCEDURE — 85025 COMPLETE CBC W/AUTO DIFF WBC: CPT

## 2023-11-14 PROCEDURE — 83735 ASSAY OF MAGNESIUM: CPT

## 2023-11-15 LAB — CMV QN DNA PCR: NEGATIVE IU/ML

## 2023-11-16 LAB — TACROLIMUS LVL: 5.2 NG/ML

## 2023-12-11 ENCOUNTER — PATIENT MESSAGE (OUTPATIENT)
Dept: INTERNAL MEDICINE CLINIC | Facility: CLINIC | Age: 68
End: 2023-12-11

## 2023-12-11 ENCOUNTER — TELEPHONE (OUTPATIENT)
Dept: INTERNAL MEDICINE CLINIC | Facility: CLINIC | Age: 68
End: 2023-12-11

## 2023-12-11 DIAGNOSIS — R05.9 COUGH, UNSPECIFIED TYPE: Primary | ICD-10-CM

## 2023-12-11 NOTE — TELEPHONE ENCOUNTER
From: Marie Kendrickits  To: Jt Stevenson  Sent: 12/11/2023 11:22 AM CST  Subject: Cold since Thanksgiving    I been fighting a cold for weeks and seams to have settled in my chest, I have been using Mucinex and Robentusin is there anything else I can do to end this cold?

## 2023-12-11 NOTE — TELEPHONE ENCOUNTER
URI Triage:    Fever: Yes[]  No[]  Unknown[x] Hasn't measured temperature. Denies feeling feverish  [] Temperature:   [] Chills  [] Night sweats  [] Body aches    Cough: Yes[x] No[]  [] Productive cough  [] Cough with exertion  [x] Dry cough    Respiratory Symptoms: Yes[x]  No[]  [x] Wheezing (mild, intermittent)  [] Pain with deep breathing  [] SOB with exertion  [] SOB at rest  [] Heavy breathing  [] Chest discomfort with deep breathing or coughing    GI Symptoms: Yes [] No[x]  [] Diarrhea  [] Nausea  [] Vomiting  [] Abdominal pain  [] Lack of appetite    Other symptoms:  [] Sore throat  [] Difficulty swallowing  [x] Sinus pressure (rarely, 20% of the time)  [] Nasal drainage  [] Nasal congestion  [x] Chest congestion  [] Head congestion  [x] PND  [] Facial pain   [] Ear pain  [] Conjunctivitis  [] Headache  [] Fatigue  [] Weakness  [] Loss of sense of smell   [] Loss of sense of taste    [x]OTC Medications: Flonase prn, Mucinex 2 tabs BID, Robitussin nightly, Emergen-C    [] Any recent travel  [] Any sick contacts    [] Positive Covid exposure (<6 feet, >15 min, no mask worn)  If yes, date of exposure (last contact):     [x] Covid Test  Date/Result: Negative 11/30 & 12/1      Vaccinated (covid): Yes  [x]   No []  Booster:  Yes  [x]  No []    Symptom onset: 11/25/23    To Dr. Allie Grove seeking recommendations for persistent symptoms. States, as of last week, symptoms have improved. However, he continues with a dry cough. Occasional coughing fits. + intermittent wheezing. + occasional sinus pressure/congestion. S/p Kidney transplant 12/2022. Anti-rejection medications reconciled and updated. [facility] [address], [phone], [phone]     Patient was notified that this message will be routed to the physician to determine what their recommendation (s) would be.  In the meantime, if they develop new or worsening symptoms, they were advised to call back or seek emergent evaluation at the ER. ER precautions reviewed.

## 2023-12-11 NOTE — TELEPHONE ENCOUNTER
From: Shraddha Burton Suits  To: Tyson Ayon  Sent: 12/11/2023 11:22 AM CST  Subject: Cold since Thanksgiving     I been fighting a cold for weeks and seams to have settled in my chest, I have been using Mucinex and Robentusin is there anything else I can do to end this cold?

## 2023-12-12 ENCOUNTER — LAB ENCOUNTER (OUTPATIENT)
Dept: LAB | Facility: HOSPITAL | Age: 68
End: 2023-12-12
Attending: INTERNAL MEDICINE
Payer: MEDICARE

## 2023-12-12 ENCOUNTER — TELEPHONE (OUTPATIENT)
Dept: INTERNAL MEDICINE CLINIC | Facility: CLINIC | Age: 68
End: 2023-12-12

## 2023-12-12 ENCOUNTER — HOSPITAL ENCOUNTER (OUTPATIENT)
Dept: GENERAL RADIOLOGY | Facility: HOSPITAL | Age: 68
Discharge: HOME | End: 2023-12-12
Attending: INTERNAL MEDICINE
Payer: MEDICARE

## 2023-12-12 DIAGNOSIS — D70.9 NEUTROPENIA (HCC): ICD-10-CM

## 2023-12-12 DIAGNOSIS — N39.0 URINARY TRACT INFECTION, SITE NOT SPECIFIED: ICD-10-CM

## 2023-12-12 DIAGNOSIS — B25.9 CMV INFECTION (HCC): ICD-10-CM

## 2023-12-12 DIAGNOSIS — B25.9 CYTOMEGALOVIRAL DISEASE (HCC): ICD-10-CM

## 2023-12-12 DIAGNOSIS — N39.0 URINARY TRACT INFECTION: ICD-10-CM

## 2023-12-12 DIAGNOSIS — R78.81 BACTEREMIA: ICD-10-CM

## 2023-12-12 DIAGNOSIS — R80.9 PROTEINURIA: ICD-10-CM

## 2023-12-12 DIAGNOSIS — E83.42 HYPOMAGNESEMIA: ICD-10-CM

## 2023-12-12 DIAGNOSIS — D70.9 NEUTROPENIA, UNSPECIFIED TYPE (HCC): ICD-10-CM

## 2023-12-12 DIAGNOSIS — R05.9 COUGH, UNSPECIFIED TYPE: ICD-10-CM

## 2023-12-12 DIAGNOSIS — R73.9 HYPERGLYCEMIA: ICD-10-CM

## 2023-12-12 DIAGNOSIS — Z94.0 KIDNEY REPLACED BY TRANSPLANT: ICD-10-CM

## 2023-12-12 DIAGNOSIS — N39.0 UTI (URINARY TRACT INFECTION): ICD-10-CM

## 2023-12-12 DIAGNOSIS — D70.8: ICD-10-CM

## 2023-12-12 DIAGNOSIS — E55.9 VITAMIN D DEFICIENCY: ICD-10-CM

## 2023-12-12 DIAGNOSIS — E78.5 HYPERLIPIDEMIA: ICD-10-CM

## 2023-12-12 DIAGNOSIS — B34.8 BK VIREMIA: ICD-10-CM

## 2023-12-12 DIAGNOSIS — Z51.81 THERAPEUTIC DRUG MONITORING: ICD-10-CM

## 2023-12-12 LAB
ANION GAP SERPL CALC-SCNC: 6 MMOL/L (ref 0–18)
BASOPHILS # BLD AUTO: 0.03 X10(3) UL (ref 0–0.2)
BASOPHILS NFR BLD AUTO: 0.4 %
BILIRUB UR QL: NEGATIVE
BUN BLD-MCNC: 30 MG/DL (ref 9–23)
BUN/CREAT SERPL: 22.4 (ref 10–20)
CALCIUM BLD-MCNC: 9.2 MG/DL (ref 8.7–10.4)
CHLORIDE SERPL-SCNC: 108 MMOL/L (ref 98–112)
CHOLEST SERPL-MCNC: 104 MG/DL (ref ?–200)
CLARITY UR: CLEAR
CO2 SERPL-SCNC: 27 MMOL/L (ref 21–32)
CREAT BLD-MCNC: 1.34 MG/DL
CREAT UR-SCNC: 86.6 MG/DL
CREAT UR-SCNC: 86.6 MG/DL
DEPRECATED RDW RBC AUTO: 49.4 FL (ref 35.1–46.3)
EGFRCR SERPLBLD CKD-EPI 2021: 58 ML/MIN/1.73M2 (ref 60–?)
EOSINOPHIL # BLD AUTO: 0.06 X10(3) UL (ref 0–0.7)
EOSINOPHIL NFR BLD AUTO: 0.9 %
ERYTHROCYTE [DISTWIDTH] IN BLOOD BY AUTOMATED COUNT: 13.9 % (ref 11–15)
EST. AVERAGE GLUCOSE BLD GHB EST-MCNC: 143 MG/DL (ref 68–126)
FASTING PATIENT LIPID ANSWER: YES
FASTING STATUS PATIENT QL REPORTED: YES
GLUCOSE BLD-MCNC: 114 MG/DL (ref 70–99)
GLUCOSE UR-MCNC: NORMAL MG/DL
HBA1C MFR BLD: 6.6 % (ref ?–5.7)
HCT VFR BLD AUTO: 38 %
HDLC SERPL-MCNC: 36 MG/DL (ref 40–59)
HGB BLD-MCNC: 12.4 G/DL
HGB UR QL STRIP.AUTO: NEGATIVE
IMM GRANULOCYTES # BLD AUTO: 0.21 X10(3) UL (ref 0–1)
IMM GRANULOCYTES NFR BLD: 3 %
KETONES UR-MCNC: NEGATIVE MG/DL
LDLC SERPL CALC-MCNC: 35 MG/DL (ref ?–100)
LEUKOCYTE ESTERASE UR QL STRIP.AUTO: NEGATIVE
LYMPHOCYTES # BLD AUTO: 1.81 X10(3) UL (ref 1–4)
LYMPHOCYTES NFR BLD AUTO: 25.7 %
MAGNESIUM SERPL-MCNC: 1.9 MG/DL (ref 1.6–2.6)
MCH RBC QN AUTO: 31.6 PG (ref 26–34)
MCHC RBC AUTO-ENTMCNC: 32.6 G/DL (ref 31–37)
MCV RBC AUTO: 96.9 FL
MICROALBUMIN UR-MCNC: 4.3 MG/DL
MICROALBUMIN/CREAT 24H UR-RTO: 49.7 UG/MG (ref ?–30)
MONOCYTES # BLD AUTO: 0.6 X10(3) UL (ref 0.1–1)
MONOCYTES NFR BLD AUTO: 8.5 %
NEUTROPHILS # BLD AUTO: 4.32 X10 (3) UL (ref 1.5–7.7)
NEUTROPHILS # BLD AUTO: 4.32 X10(3) UL (ref 1.5–7.7)
NEUTROPHILS NFR BLD AUTO: 61.5 %
NITRITE UR QL STRIP.AUTO: NEGATIVE
NONHDLC SERPL-MCNC: 68 MG/DL (ref ?–130)
OSMOLALITY SERPL CALC.SUM OF ELEC: 299 MOSM/KG (ref 275–295)
PH UR: 5.5 [PH] (ref 5–8)
PHOSPHATE SERPL-MCNC: 4.9 MG/DL (ref 2.4–5.1)
PLATELET # BLD AUTO: 137 10(3)UL (ref 150–450)
POTASSIUM SERPL-SCNC: 4.8 MMOL/L (ref 3.5–5.1)
PROT UR-MCNC: 18.1 MG/DL (ref ?–14)
PROT UR-MCNC: NEGATIVE MG/DL
PTH-INTACT SERPL-MCNC: 56.2 PG/ML (ref 18.5–88)
RBC # BLD AUTO: 3.92 X10(6)UL
SODIUM SERPL-SCNC: 141 MMOL/L (ref 136–145)
SP GR UR STRIP: 1.02 (ref 1–1.03)
TRIGL SERPL-MCNC: 209 MG/DL (ref 30–149)
UROBILINOGEN UR STRIP-ACNC: NORMAL
VIT D+METAB SERPL-MCNC: 59.9 NG/ML (ref 30–100)
VLDLC SERPL CALC-MCNC: 28 MG/DL (ref 0–30)
WBC # BLD AUTO: 7 X10(3) UL (ref 4–11)

## 2023-12-12 PROCEDURE — 83735 ASSAY OF MAGNESIUM: CPT

## 2023-12-12 PROCEDURE — 84156 ASSAY OF PROTEIN URINE: CPT

## 2023-12-12 PROCEDURE — 83970 ASSAY OF PARATHORMONE: CPT

## 2023-12-12 PROCEDURE — 82306 VITAMIN D 25 HYDROXY: CPT

## 2023-12-12 PROCEDURE — 82043 UR ALBUMIN QUANTITATIVE: CPT

## 2023-12-12 PROCEDURE — 83036 HEMOGLOBIN GLYCOSYLATED A1C: CPT

## 2023-12-12 PROCEDURE — 71046 X-RAY EXAM CHEST 2 VIEWS: CPT | Performed by: INTERNAL MEDICINE

## 2023-12-12 PROCEDURE — 87086 URINE CULTURE/COLONY COUNT: CPT

## 2023-12-12 PROCEDURE — 82570 ASSAY OF URINE CREATININE: CPT

## 2023-12-12 PROCEDURE — 84100 ASSAY OF PHOSPHORUS: CPT

## 2023-12-12 PROCEDURE — 80061 LIPID PANEL: CPT

## 2023-12-12 PROCEDURE — 36415 COLL VENOUS BLD VENIPUNCTURE: CPT

## 2023-12-12 PROCEDURE — 81003 URINALYSIS AUTO W/O SCOPE: CPT

## 2023-12-12 PROCEDURE — 80048 BASIC METABOLIC PNL TOTAL CA: CPT

## 2023-12-12 PROCEDURE — 87799 DETECT AGENT NOS DNA QUANT: CPT

## 2023-12-12 PROCEDURE — 80197 ASSAY OF TACROLIMUS: CPT

## 2023-12-12 PROCEDURE — 85025 COMPLETE CBC W/AUTO DIFF WBC: CPT

## 2023-12-12 NOTE — TELEPHONE ENCOUNTER
Recommend CXR or appointment for physical exam to r/o pneumonia. Order placed for CXR. In the meantime, continue steam in shower, mucinex as needed. For expectations - lingering cough from URIs this season seem to be lasting upwards of 8+ weeks after resolution of initial symptoms even in immunocompetent patients. Thank you!

## 2023-12-12 NOTE — TELEPHONE ENCOUNTER
Spoke with patient and relayed MD's message. Verbalized understanding and agreement with plan. Patient opts to complete CXR. Scheduling information provided. Patient advised to seek emergent care UC/ER for new/worsening symptoms. Trip planned for Thursday.

## 2023-12-13 NOTE — TELEPHONE ENCOUNTER
[facility] sent to Alfredo Daily:    Jody Guerrero,      I tried calling without success and am hoping this 'Mychart' reaches you well. Dr. Marco Andrew asked her clinical team to get in touch with you to notify you that there was no pneumonia seen on your Chest X-Ray. She states, it is okay to continue conservative measures as it could be bronchitis or reaction to post-nasal drip. It is also recommended if your symptoms worsen, you develop a fever or your cough becomes productive to please let us know. Our office phone is [phone]. Thank you and Happy Holidays!

## 2023-12-13 NOTE — TELEPHONE ENCOUNTER
To nursing staff, please relay the following to [address]:    No pneumonia on CXR. Okay to continue conservative measures as it could be bronchitis or reaction to post-nasal drip. If your symptoms worsen, you develop a fever or your cough becomes productive, please let us know. Thank you!

## 2023-12-14 LAB
BKV DNA, QUANT PCR, PLASMA: NEGATIVE IU/ML
CMV QN DNA PCR: NEGATIVE IU/ML

## 2023-12-15 LAB — TACROLIMUS LVL: 4.8 NG/ML

## 2023-12-27 ENCOUNTER — LAB ENCOUNTER (OUTPATIENT)
Dept: LAB | Facility: HOSPITAL | Age: 68
End: 2023-12-27
Attending: INTERNAL MEDICINE
Payer: MEDICARE

## 2023-12-27 DIAGNOSIS — B34.8 BK VIREMIA: ICD-10-CM

## 2023-12-27 DIAGNOSIS — E78.5 HYPERLIPIDEMIA: ICD-10-CM

## 2023-12-27 DIAGNOSIS — R78.81 BACTEREMIA: ICD-10-CM

## 2023-12-27 DIAGNOSIS — B25.9 CMV INFECTION (HCC): ICD-10-CM

## 2023-12-27 DIAGNOSIS — D70.9 NEUTROPENIA, UNSPECIFIED TYPE (HCC): ICD-10-CM

## 2023-12-27 DIAGNOSIS — N39.0 URINARY TRACT INFECTION: ICD-10-CM

## 2023-12-27 DIAGNOSIS — N39.0 UTI (URINARY TRACT INFECTION): ICD-10-CM

## 2023-12-27 DIAGNOSIS — R80.9 PROTEINURIA: ICD-10-CM

## 2023-12-27 DIAGNOSIS — D70.8: ICD-10-CM

## 2023-12-27 DIAGNOSIS — B25.9 CYTOMEGALOVIRAL DISEASE (HCC): ICD-10-CM

## 2023-12-27 DIAGNOSIS — D70.9 NEUTROPENIA (HCC): ICD-10-CM

## 2023-12-27 DIAGNOSIS — E55.9 VITAMIN D DEFICIENCY: ICD-10-CM

## 2023-12-27 DIAGNOSIS — R73.9 HYPERGLYCEMIA: ICD-10-CM

## 2023-12-27 DIAGNOSIS — Z51.81 THERAPEUTIC DRUG MONITORING: ICD-10-CM

## 2023-12-27 DIAGNOSIS — E83.42 HYPOMAGNESEMIA: ICD-10-CM

## 2023-12-27 DIAGNOSIS — N39.0 URINARY TRACT INFECTION, SITE NOT SPECIFIED: ICD-10-CM

## 2023-12-27 DIAGNOSIS — Z94.0 KIDNEY REPLACED BY TRANSPLANT: ICD-10-CM

## 2023-12-27 LAB
ANION GAP SERPL CALC-SCNC: 11 MMOL/L (ref 0–18)
BASOPHILS # BLD AUTO: 0.01 X10(3) UL (ref 0–0.2)
BASOPHILS NFR BLD AUTO: 0.2 %
BILIRUB UR QL: NEGATIVE
BUN BLD-MCNC: 60 MG/DL (ref 9–23)
BUN/CREAT SERPL: 29.6 (ref 10–20)
CALCIUM BLD-MCNC: 8.8 MG/DL (ref 8.7–10.4)
CHLORIDE SERPL-SCNC: 109 MMOL/L (ref 98–112)
CHOLEST SERPL-MCNC: 92 MG/DL (ref ?–200)
CLARITY UR: CLEAR
CO2 SERPL-SCNC: 19 MMOL/L (ref 21–32)
CREAT BLD-MCNC: 2.03 MG/DL
CREAT UR-SCNC: 149.7 MG/DL
CREAT UR-SCNC: 149.7 MG/DL
DEPRECATED RDW RBC AUTO: 48.6 FL (ref 35.1–46.3)
EGFRCR SERPLBLD CKD-EPI 2021: 35 ML/MIN/1.73M2 (ref 60–?)
EOSINOPHIL # BLD AUTO: 0.02 X10(3) UL (ref 0–0.7)
EOSINOPHIL NFR BLD AUTO: 0.5 %
ERYTHROCYTE [DISTWIDTH] IN BLOOD BY AUTOMATED COUNT: 13.8 % (ref 11–15)
FASTING PATIENT LIPID ANSWER: YES
FASTING STATUS PATIENT QL REPORTED: YES
GLUCOSE BLD-MCNC: 101 MG/DL (ref 70–99)
GLUCOSE UR-MCNC: NORMAL MG/DL
HCT VFR BLD AUTO: 36.8 %
HDLC SERPL-MCNC: 30 MG/DL (ref 40–59)
HGB BLD-MCNC: 12.2 G/DL
IMM GRANULOCYTES # BLD AUTO: 0.06 X10(3) UL (ref 0–1)
IMM GRANULOCYTES NFR BLD: 1.4 %
KETONES UR-MCNC: NEGATIVE MG/DL
LDLC SERPL CALC-MCNC: 28 MG/DL (ref ?–100)
LEUKOCYTE ESTERASE UR QL STRIP.AUTO: NEGATIVE
LYMPHOCYTES # BLD AUTO: 1.57 X10(3) UL (ref 1–4)
LYMPHOCYTES NFR BLD AUTO: 37.6 %
MAGNESIUM SERPL-MCNC: 2 MG/DL (ref 1.6–2.6)
MCH RBC QN AUTO: 31.7 PG (ref 26–34)
MCHC RBC AUTO-ENTMCNC: 33.2 G/DL (ref 31–37)
MCV RBC AUTO: 95.6 FL
MICROALBUMIN UR-MCNC: 1.6 MG/DL
MICROALBUMIN/CREAT 24H UR-RTO: 10.7 UG/MG (ref ?–30)
MONOCYTES # BLD AUTO: 0.29 X10(3) UL (ref 0.1–1)
MONOCYTES NFR BLD AUTO: 7 %
NEUTROPHILS # BLD AUTO: 2.22 X10 (3) UL (ref 1.5–7.7)
NEUTROPHILS # BLD AUTO: 2.22 X10(3) UL (ref 1.5–7.7)
NEUTROPHILS NFR BLD AUTO: 53.3 %
NITRITE UR QL STRIP.AUTO: NEGATIVE
NONHDLC SERPL-MCNC: 62 MG/DL (ref ?–130)
OSMOLALITY SERPL CALC.SUM OF ELEC: 305 MOSM/KG (ref 275–295)
PH UR: 5 [PH] (ref 5–8)
PHOSPHATE SERPL-MCNC: 7.2 MG/DL (ref 2.4–5.1)
PLATELET # BLD AUTO: 110 10(3)UL (ref 150–450)
POTASSIUM SERPL-SCNC: 4.4 MMOL/L (ref 3.5–5.1)
PROT UR-MCNC: 28.9 MG/DL (ref ?–14)
PTH-INTACT SERPL-MCNC: 78.7 PG/ML (ref 18.5–88)
RBC # BLD AUTO: 3.85 X10(6)UL
RBC #/AREA URNS AUTO: >10 /HPF
SODIUM SERPL-SCNC: 139 MMOL/L (ref 136–145)
SP GR UR STRIP: 1.02 (ref 1–1.03)
TRIGL SERPL-MCNC: 214 MG/DL (ref 30–149)
UROBILINOGEN UR STRIP-ACNC: NORMAL
VIT D+METAB SERPL-MCNC: 75.2 NG/ML (ref 30–100)
VLDLC SERPL CALC-MCNC: 28 MG/DL (ref 0–30)
WBC # BLD AUTO: 4.2 X10(3) UL (ref 4–11)

## 2023-12-27 PROCEDURE — 84156 ASSAY OF PROTEIN URINE: CPT

## 2023-12-27 PROCEDURE — 80197 ASSAY OF TACROLIMUS: CPT

## 2023-12-27 PROCEDURE — 82570 ASSAY OF URINE CREATININE: CPT

## 2023-12-27 PROCEDURE — 85025 COMPLETE CBC W/AUTO DIFF WBC: CPT

## 2023-12-27 PROCEDURE — 82306 VITAMIN D 25 HYDROXY: CPT

## 2023-12-27 PROCEDURE — 83735 ASSAY OF MAGNESIUM: CPT

## 2023-12-27 PROCEDURE — 80048 BASIC METABOLIC PNL TOTAL CA: CPT

## 2023-12-27 PROCEDURE — 36415 COLL VENOUS BLD VENIPUNCTURE: CPT

## 2023-12-27 PROCEDURE — 82043 UR ALBUMIN QUANTITATIVE: CPT

## 2023-12-27 PROCEDURE — 81001 URINALYSIS AUTO W/SCOPE: CPT

## 2023-12-27 PROCEDURE — 84100 ASSAY OF PHOSPHORUS: CPT

## 2023-12-27 PROCEDURE — 87799 DETECT AGENT NOS DNA QUANT: CPT

## 2023-12-27 PROCEDURE — 83970 ASSAY OF PARATHORMONE: CPT

## 2023-12-27 PROCEDURE — 80061 LIPID PANEL: CPT

## 2023-12-27 PROCEDURE — 87086 URINE CULTURE/COLONY COUNT: CPT

## 2023-12-29 LAB — TACROLIMUS LVL: 8.3 NG/ML

## 2023-12-30 LAB
BKV DNA, QUANT PCR, PLASMA: NEGATIVE IU/ML
CMV QN DNA PCR: NEGATIVE IU/ML

## 2024-01-03 ENCOUNTER — TELEPHONE (OUTPATIENT)
Dept: INTERNAL MEDICINE CLINIC | Facility: CLINIC | Age: 69
End: 2024-01-03

## 2024-01-03 NOTE — TELEPHONE ENCOUNTER
Please call patient  He is scheduled for dental procedure at [facility] on Friday, 1/5/24  Fax was sent on 12/15/23  Patient appt with be cancelled if clearance not rec'd  Please call to advise  Tasked to nursing

## 2024-01-04 NOTE — TELEPHONE ENCOUNTER
Called patient, routine dental cleaning scheduled for 1/5/24.     Faxed last progress note and completed form to [facility].

## 2024-01-10 ENCOUNTER — OFFICE VISIT (OUTPATIENT)
Dept: SURGERY | Facility: CLINIC | Age: 69
End: 2024-01-10
Payer: MEDICARE

## 2024-01-10 DIAGNOSIS — N40.1 BENIGN PROSTATIC HYPERPLASIA WITH LOWER URINARY TRACT SYMPTOMS, SYMPTOM DETAILS UNSPECIFIED: Primary | ICD-10-CM

## 2024-01-10 PROCEDURE — 99213 OFFICE O/P EST LOW 20 MIN: CPT | Performed by: UROLOGY

## 2024-01-10 NOTE — PROGRESS NOTES
[facility]  Follow-Up Visit    HPI: Rolly Sanchez is a 68 year old male presents for a follow up visit. Patient was last seen on 2023.  He presents by himself.    INTERVAL HISTORY: Patient previously seen regarding history of microscopic hematuria in light of end-stage renal disease on peritoneal dialysis. CT urogram 2022 revealed bilateral upper tract filling defects, concerning for blood clots or urothelial neoplasms.    Patient underwent cystoscopy with bilateral retrograde pyelogram, left nephroureteroscopy with biopsy and bilateral ureteral stent insertion.  Findings were consistent with a free-floating blood clot in the left kidney.  Fungal cultures were negative.    Second look cystoscopy with bilateral retrograde pyelogram, bilateral nephrouteroscopy with manipulation and removal of clots in the bilateral kidneys, and bilateral stent removal.  Clinically, no evidence of tumors.  Free-floating blood clots/proteinaceous debris were noted within the bilateral collecting systems.    Again pathology results consistent with proteinaceous material with peripheral blood elements.    He underwent  donor kidney transplant at [facility] on 12/15/2022.  Post kidney transplant allograft and bladder ultrasounds have been revealing an elevated PVR volume in the 140 to 170 mL range.     Patient was on Flomax for BPH.  Maximized medical therapy with the addition of finasteride 3/27/2023.    He reports improvement in the urinary stream, better sensation of bladder emptying and less urinary urgency.    His IPSS score today is down to 9 (/0/1/3/2) with a quality-of-life score of 1.     Bladder scan today reveals a PVR of 97 mL.    He denies gross hematuria or dysuria.       1. Microscopic Hematuria --> Bilateral upper tract filling defects on CT-Urogram  2. BPH with LUTS  Patient with history of BPH with LUTS, previously seen by FRANCIS Glover in 2018 and started on Flomax with improvement in symptoms.  Current IPSS score of 15 (0/3/3/2/5/1/1) with a quality-of-life score of 3.     Recently hospitalized 10/2021 for fluid retention. Urinalysis revealed microscopic hematuria with >10 RBCs per hpf. Repeat urinalysis 12/2021 persistent microscopic hematuria revealing 6-10 RBCs per hpf. No UTI as culture was negative.     Urine cytology 12/2021 was benign.     CT urogram completed 1/17/2022 revealing \"filling defects in the right renal pelvis, left upper pole and interpolar renal collecting system may represent blood clots or urothelial neoplasms\".     Patient denies gross hematuria or dysuria. No history of nephrolithiasis or recurrent UTIs. No flank pain.     No family history of prostate cancer. Screening PSA level 11/2021 normal at 1.1 ng/mL.     Of note he has ESRD and is on peritoneal dialysis since 2018. His nephrologist is Dr. Dexter. He is being evaluated for pancreas-kidney transplant at [facility] and is on the list. He performs peritoneal dialysis 6 times a week. He makes about 2 L of urine per day. He is on some fluid restriction per nephrology.    Patient underwent cystoscopy with bilateral retrograde pyelogram, left nephroureteroscopy with biopsy and bilateral ureteral stent insertion.  Findings were consistent with a free-floating blood clot in the left kidney.  Fungal cultures were negative.    Patient underwent second look cystoscopy with bilateral retrograde pyelogram, bilateral nephrouteroscopy with manipulation and removal of clots in the bilateral kidneys, and bilateral stent removal.  Clinically, no evidence of tumors.  Free-floating blood clots/proteinaceous debris were noted within the bilateral collecting systems.    Again pathology results consistent with proteinaceous material with peripheral blood elements.    - 1/2023 F/U: Patient underwent kidney transplant 12/2022.  Post transplant allograft ultrasound revealing elevated PVR in the 100-200 mL range.  IPSS score 16 (2/4/1/1/4/1/3) with a  quality-of-life score of 3.  On Flomax.  .  Monitor and continue Flomax.  Follow-up in 6 to 8 weeks for uroflow.    - 3/2023 F/U: Uroflow + PVR: Voided 61 mL, Qmax 7.1 mL/s, Qave 4.2 mL/s, flow time 14.4 sec, voiding time 27.6 sec, voiding curve shape flattened with multiple low peaks,  mL.  Elected maximizing medical therapy with the addition of finasteride.    - 7/2023 F/U: On finasteride and tamsulosin.  Improved LUTS.  IPSS down to 12 (1/2/2/1/3/1/2) with a quality-of-life score of 2.  PVR 0 ml.  CCM.    - 1/2024 F/U: On Flomax and Proscar.  Improved LUTS.  IPSS 9 (1/1/0/1/3/1/2) with a quality-of-life score of 1.  PVR 97 mL.  CCM.      PAST MEDICAL HISTORY: ESRD on peritoneal dialysis since 7/2018, HTN, HLD, DM since 1982, AMELIE, BPH, nonobstructive CAD, colon cancer.     PAST SURGICAL HISTORY: Open right hemicolectomy and ventral hernia repair 2010 Dr. Gamble, PD catheter insertion 6/2018 Dr. Croft, appendectomy, tonsillectomy, renal transplant surgery 12/2022.     SOCIAL HISTORY:  and has 2 children. Former smoker 1 pack/week and quit 1982. He drinks social alcohol. Retired  however currently works as an [employer] .     Reviewed past medical, surgical, family, and social history.  Reviewed med list and allergies.      REVIEW OF SYSTEMS:  Pertinent positives and negatives per HPI. A 10-point ROS was performed and is otherwise negative.       EXAM:  There were no vitals taken for this visit.     Physical Exam  Constitutional:       Appearance: He is well-developed. He is obese.   HENT:      Head: Normocephalic.   Eyes:      General: No scleral icterus.  Cardiovascular:      Rate and Rhythm: Normal rate.   Pulmonary:      Effort: Pulmonary effort is normal.   Skin:     General: Skin is warm and dry.   Neurological:      Mental Status: He is alert and oriented to person, place, and time.   Psychiatric:         Mood and Affect: Mood normal.         Behavior: Behavior normal.        PATHOLOGY:    Non-Gynecologic Cytology                          Case: [id number]      Provider:  Panda Garcia MD       Collected:           03/04/2022    A.  Right renal pelvis; barbotage:   Adequacy: Satisfactory for evaluation  General Category: Negative for high-grade urothelial carcinoma ([facility])  Diagnosis:  Reactive urothelial cells present  Numerous neutrophils and neutrophilic debris present     B.  Left renal pelvis; barbotage:  Adequacy: Satisfactory for evaluation  General Category: Negative for high-grade urothelial carcinoma ([facility])  Diagnosis:  Reactive urothelial cells present  Numerous neutrophils and neutrophilic debris present     C.  Left renal pelvis; barbotage:  Adequacy: Satisfactory for evaluation  General Category: Negative for high-grade urothelial carcinoma ([facility])  Diagnosis:  Rare degenerated urothelial cells present   Numerous neutrophils and neutrophilic debris present      Pathology                                Case: [id number]   Provider:  Panda Garcia MD       Collected:           04/22/2022    A. Blood clot right pelvis:  Aggregate of proteinaceous debris with rare peripheral blood elements.     B. Left kidney stone:   Calculus fragments (see comment).     C. Right kidney stone:   Calculus fragments (see comment).     D. Blood clot left pelvis:  Aggregate of proteinaceous debris with rare peripheral blood elements.      LABS:  See HPI for details.      IMAGING:    CT-UROGRAM (1/17/2022):  1. Filling defects in the right renal pelvis, left upper pole and interpolar renal collecting system may represent blood clots or urothelial neoplasms.  Urology consultation recommended. 2. Prostate enlargement. 3. Mild splenomegaly. 4. Peritoneal dialysis catheter with tip in the left lower quadrant. 5. Post right hemicolectomy.  Colonic diverticulosis. 6. Generalized atherosclerotic vascular calcification including coronary artery calcification. 7. Mitral annular calcification.        IMPRESSION:  68 year old comorbid male with past medical history as above. Microscopic hematuria evaluation revealing filling defects in the bilateral renal collecting systems, concerning for blood clots or urothelial neoplasms. Voided cytology benign.     Patient underwent cystoscopy with bilateral staged nephro ureteroscopy, biopsy, and washings from the bilateral collecting systems.  Clinical findings consistent with free-floating blood clots/proteinaceous material in the collecting system.  No gross evidence of tumor.    Cytologies have been benign.    Patient status post kidney transplant.  He has mildly elevated PVR volumes on ultrasound following transplant.      Currently on maximal medical therapy with finasteride and tamsulosin.  Improved LUTS.  Postvoid residual continues to be acceptable.  Discussed with patient.  Discussed continuing current management versus considering minimally invasive surgical treatments for BPH.    Patient elects continuing maximal medical therapy for the time being.    Patient verbalized understanding and agrees to the plan of care.  All questions answered.       PLAN:  1.  Continue Flomax for BPH.     2.  Continue Finasteride 5 mg daily.    Kidney transplant management per medical and surgical teams at [facility].    RTC for follow-up in 6 months for an updated IPSS score and bladder scan/PVR.      Panda Garcia MD  1/10/2024

## 2024-01-16 ENCOUNTER — OFFICE VISIT (OUTPATIENT)
Dept: INTERNAL MEDICINE CLINIC | Facility: CLINIC | Age: 69
End: 2024-01-16

## 2024-01-16 VITALS
HEIGHT: 72 IN | HEART RATE: 62 BPM | OXYGEN SATURATION: 98 % | SYSTOLIC BLOOD PRESSURE: 128 MMHG | TEMPERATURE: 98 F | WEIGHT: 297 LBS | DIASTOLIC BLOOD PRESSURE: 76 MMHG | BODY MASS INDEX: 40.23 KG/M2

## 2024-01-16 DIAGNOSIS — R19.7 DIARRHEA, UNSPECIFIED TYPE: Primary | ICD-10-CM

## 2024-01-16 PROCEDURE — 99214 OFFICE O/P EST MOD 30 MIN: CPT | Performed by: INTERNAL MEDICINE

## 2024-01-16 RX ORDER — METOPROLOL TARTRATE 100 MG/1
100 TABLET ORAL 2 TIMES DAILY
COMMUNITY
Start: 2023-12-06

## 2024-01-16 NOTE — PROGRESS NOTES
Rolly Sanchez is a 68 year old male  Chief Complaint   Patient presents with    Checkup     Diarrhea since bronchitis in November     HPI:   Rolly Sanchez is a 68 year old male who presents acutely for diarrhea.    Patient has been having diarrhea since 11/2023.     Diarrhea described as watery to \"cement\" in consistency, upwards of 5-7x/day. Denies fevers, hematochezia, melena, n/v, constipation, abdominal pain. Has noticed some blood upon wiping (has hx of internal hemorrhoids), CBC 1/11 with hgb stable. He has tried altering his diet, avoiding dairy without relief of symptoms. Having nocturnal symptoms.    He was seen by Dr. Finn (kidney transplant) last week. Patient discussed his concerns at that visit, had stool studies for infection including C. Diff, and CMV were negative. Per patient report immunosuppression unlikely to be cause of diarrhea based on discussion with transplant team.    Since that visit, he has increased his fiber intake and started taking probiotics with some improvement in diarrhea, 4-6x/day instead of 5-7x/day    Wt Readings from Last 6 Encounters:   01/16/24 297 lb (134.7 kg)   09/22/23 293 lb 6.4 oz (133.1 kg)   02/07/23 271 lb (122.9 kg)   11/15/22 283 lb (128.4 kg)   10/04/22 277 lb (125.6 kg)   07/28/22 267 lb (121.1 kg)     Body mass index is 40.28 kg/m².     Current Outpatient Medications   Medication Sig Dispense Refill    metoprolol tartrate 100 MG Oral Tab Take 1 tablet (100 mg total) by mouth 2 (two) times daily.      finasteride 5 MG Oral Tab Take 1 tablet (5 mg total) by mouth daily. 90 tablet 3    predniSONE 10 MG Oral Tab Take 1 tablet (10 mg total) by mouth daily.      ELIQUIS 5 MG Oral Tab Take 1 tablet (5 mg total) by mouth 2 (two) times daily.      chlorthalidone 25 MG Oral Tab Take 1 tablet (25 mg total) by mouth daily.      lisinopril 10 MG Oral Tab Take 1 tablet (10 mg total) by mouth daily.      amLODIPine 10 MG Oral Tab Take 1 tablet (10 mg total) by mouth daily.       ergocalciferol 1.25 MG (76650 UT) Oral Cap Take 1 capsule (50,000 Units total) by mouth every 7 days.      cyanocobalamin 1000 MCG/ML Injection Solution Inject 1 mL (1,000 mcg total) into the muscle every 30 (thirty) days. 3 mL 3    Syringe/Needle, Disp, (BD LUER-PILY SYRINGE) 23G X 1\" 3 ML Does not apply Misc USE FOR B12 INJECTIONS AS DIRECTED 12 each 0    Psyllium (METAMUCIL) Oral Wafer Take 1 Wafer by mouth daily as needed.      sodium bicarbonate 650 MG Oral Tab Take 1 tablet (650 mg total) by mouth in the morning and 1 tablet (650 mg total) before bedtime.      Specialty Vitamins Products (MG-PLUS PROTEIN) 133 MG Oral Tab Take 2 tablets by mouth 4 (four) times daily.      sulfamethoxazole-trimethoprim -160 MG Oral Tab per tablet Takes Mondays and Thursdays per transplant clinic  0    Tacrolimus ER 1 MG Oral Tablet 24 Hr Take 3 tablets (3 mg total) by mouth every morning.      tamsulosin 0.4 MG Oral Cap Take 1 capsule (0.4 mg total) by mouth daily. 90 capsule 3    atorvastatin 40 MG Oral Tab Take 1 tablet (40 mg total) by mouth nightly.      HUMULIN R U-500, CONCENTRATED, 500 UNIT/ML Subcutaneous Solution Insulin pump       PORFIRIO CONTOUR NEXT TEST In Vitro Strip         Past Medical History:   Diagnosis Date    Adenomatous polyp 2010    cscopy Dr. Singh 2010-misael. polyp, divertic, int hemrds    Allergic rhinitis     Asthma     BPH (benign prostatic hyperplasia)     Cancer (HCC) 11/2010    Carcinoma in situ of colon 2010    cecum-R hemicolectomy Dr. Lugo    Chronic kidney disease, stage 5 (HCC)     Peritoneal dialysis started 7/2018 Dr Dexter    Diabetes (HCC) 01/1980    Dialysis patient (HCC)     Peritoneal dialysis before 2022 kidney transplant    Diverticulosis     Gallstone 2009    Gallstone seen on US liver 11/09    Hepatic steatosis 2009    US liver 2009    High blood pressure     High cholesterol     Hyperlipemia     IBS (irritable bowel syndrome)     Kidney transplant recipient 2022    Kidney  transplant performed 12/15/2022 at [facility] ( donor kidney transplant).    Microalbuminuria 2005    Migraines     hx in grade school    Neuropathy     diabetic, jorge hands and feet    Obesity 10/1984    Pancytopenia (HCC) 2009    Dr. Ureña--hematologist    Peritoneal dialysis status ([facility]) 2018    Dr Dexter    Pernicious anemia 2008    Platelets decreased ([facility])     Psoriasis-eczema overlap condition     Renal disorder     CKD with proteinuria--Dr. 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    Viral disease     Hx spinal virus    Visual impairment     glasses      Past Surgical History:   Procedure Laterality Date    ADENOIDECTOMY  1965 estimate    Same time as tonsils removed    APPENDECTOMY      COLECTOMY Right 2010    hemicolectomy-Dr Resendez    COLONOSCOPY      Dr Singh    COLONOSCOPY  10/2014    polyp    COLONOSCOPY  2019    COLONOSCOPY N/A 2019    Procedure: COLONOSCOPY;  Surgeon: Blade Singh MD;  Location: [facility] ENDOSCOPY    CYSTOURETRO W/STONE REMOVE Bilateral 2022    Cystoscopy, bilateral retrograde pyelogram, bilateral ureteroscopy, manipulation/removal of bilateral blood clots/stones, ureteral stent removal.    HERNIA SURGERY      ventral hernia-at time of colon surgery    OTHER SURGICAL HISTORY  2010    Colon Resection    PERITONEAL DIALYSIS  2018    Dr Croft placed PD catheter 2018. started PD 2018 Dr Dexter    TONSILLECTOMY      T & A    TRANSPLANTATION OF KIDNEY  2022    At [facility]      Family History   Problem Relation Age of Onset    Diabetes Mother     Obesity Mother     Renal Disease Mother         ESRD    Other (Other) Mother     Hypertension Father      Obesity Father     Heart Attack Father     Stroke Father         TIA    Skin cancer Father     Heart Disorder Father     Other (Other) Father     Other (Brain bleed) Father 82         in his sleep-hx brain bleed    Diabetes Sister     Hypertension Brother       Social History:  Social History     Socioeconomic History    Marital status:     Number of children: 2   Occupational History    Occupation:    Tobacco Use    Smoking status: Former     Packs/day: 0.50     Years: 9.00     Additional pack years: 0.00     Total pack years: 4.50     Types: Cigarettes     Quit date: 1982     Years since quittin.9    Smokeless tobacco: Never    Tobacco comments:     smokes cigars occasionally   Vaping Use    Vaping Use: Never used   Substance and Sexual Activity    Alcohol use: Not Currently     Alcohol/week: 1.0 standard drink of alcohol     Types: 1 Standard drinks or equivalent per week    Drug use: No   Other Topics Concern    Caffeine Concern Yes     Comment: 3 cups of coffee per day    Pt has a pacemaker No    Pt has a defibrillator No    Reaction to local anesthetic No           REVIEW OF SYSTEMS:   GENERAL: denies fevers  GI: denies abdominal pain, N/V, + diarrhea, constipation, hematochezia or melena    EXAM:   /76   Pulse 62   Temp 97.6 °F (36.4 °C)   Ht 6' (1.829 m)   Wt 297 lb (134.7 kg)   SpO2 98%   BMI 40.28 kg/m²     GENERAL: well developed, well nourished, in NAD  LUNGS: clear to auscultation b/l, no w/r/r  CARDIO: RRR, normal S1S2, no gallops or m/r/g  GI: soft, NT, ND, NABS, no HSM  RECTAL: declined  EXTREMITIES: no edema, +2 radial & DP pulses bilaterally  NEURO: A&O x 3, moves all 4 extremities normally      ASSESSMENT AND PLAN:   Rolly Sanchez is a 68 year old male who presents acutely for diarrhea.    Diarrhea, unspecified type  - Infectious causes, cmv ruled out as above studies from ,   - Advised continued increased fiber intake  - Advised GI follow up  for further evaluation given this year will benita 5 years since last colonoscopy. Patient verbalized understanding and agreed to plan.    RTC in ~2 months as previously scheduled or sooner PRN.    For E/M code - 30 minutes spent reviewing performing chart review, obtaining a history, performing a physical exam, reviewing the assessment/plan, placing orders, and completing documentation.     Macey Hastings DO  1/16/2024  12:05 PM

## 2024-01-19 ENCOUNTER — LAB ENCOUNTER (OUTPATIENT)
Dept: LAB | Facility: HOSPITAL | Age: 69
End: 2024-01-19
Attending: INTERNAL MEDICINE
Payer: MEDICARE

## 2024-01-19 DIAGNOSIS — E78.5 HYPERLIPEMIA: ICD-10-CM

## 2024-01-19 DIAGNOSIS — E11.22 TYPE 2 DIABETES MELLITUS WITH ESRD (END-STAGE RENAL DISEASE) (HCC): Primary | ICD-10-CM

## 2024-01-19 DIAGNOSIS — N18.6 TYPE 2 DIABETES MELLITUS WITH ESRD (END-STAGE RENAL DISEASE) (HCC): Primary | ICD-10-CM

## 2024-01-19 DIAGNOSIS — Z79.4 ENCOUNTER FOR LONG-TERM (CURRENT) USE OF INSULIN (HCC): ICD-10-CM

## 2024-01-19 LAB
ALBUMIN SERPL-MCNC: 4 G/DL (ref 3.2–4.8)
ALBUMIN/GLOB SERPL: 1.8 {RATIO} (ref 1–2)
ALP LIVER SERPL-CCNC: 46 U/L
ALT SERPL-CCNC: 55 U/L
ANION GAP SERPL CALC-SCNC: 6 MMOL/L (ref 0–18)
AST SERPL-CCNC: 35 U/L (ref ?–34)
BILIRUB SERPL-MCNC: 0.6 MG/DL (ref 0.2–1.1)
BUN BLD-MCNC: 36 MG/DL (ref 9–23)
BUN/CREAT SERPL: 24.2 (ref 10–20)
CALCIUM BLD-MCNC: 9.1 MG/DL (ref 8.7–10.4)
CHLORIDE SERPL-SCNC: 113 MMOL/L (ref 98–112)
CHOLEST SERPL-MCNC: 100 MG/DL (ref ?–200)
CO2 SERPL-SCNC: 21 MMOL/L (ref 21–32)
CREAT BLD-MCNC: 1.49 MG/DL
EGFRCR SERPLBLD CKD-EPI 2021: 51 ML/MIN/1.73M2 (ref 60–?)
EST. AVERAGE GLUCOSE BLD GHB EST-MCNC: 140 MG/DL (ref 68–126)
FASTING PATIENT LIPID ANSWER: YES
FASTING STATUS PATIENT QL REPORTED: YES
GLOBULIN PLAS-MCNC: 2.2 G/DL (ref 2.8–4.4)
GLUCOSE BLD-MCNC: 47 MG/DL (ref 70–99)
HBA1C MFR BLD: 6.5 % (ref ?–5.7)
HDLC SERPL-MCNC: 39 MG/DL (ref 40–59)
LDLC SERPL CALC-MCNC: 35 MG/DL (ref ?–100)
NONHDLC SERPL-MCNC: 61 MG/DL (ref ?–130)
OSMOLALITY SERPL CALC.SUM OF ELEC: 295 MOSM/KG (ref 275–295)
POTASSIUM SERPL-SCNC: 4.7 MMOL/L (ref 3.5–5.1)
PROT SERPL-MCNC: 6.2 G/DL (ref 5.7–8.2)
SODIUM SERPL-SCNC: 140 MMOL/L (ref 136–145)
T3 SERPL-MCNC: 1.22 NG/ML (ref 0.6–1.81)
T4 FREE SERPL-MCNC: 1 NG/DL (ref 0.8–1.7)
TRIGL SERPL-MCNC: 152 MG/DL (ref 30–149)
TSI SER-ACNC: 2.35 MIU/ML (ref 0.55–4.78)
VLDLC SERPL CALC-MCNC: 21 MG/DL (ref 0–30)

## 2024-01-19 PROCEDURE — 36415 COLL VENOUS BLD VENIPUNCTURE: CPT

## 2024-01-19 PROCEDURE — 83036 HEMOGLOBIN GLYCOSYLATED A1C: CPT

## 2024-01-19 PROCEDURE — 80053 COMPREHEN METABOLIC PANEL: CPT

## 2024-01-19 PROCEDURE — 84480 ASSAY TRIIODOTHYRONINE (T3): CPT

## 2024-01-19 PROCEDURE — 84439 ASSAY OF FREE THYROXINE: CPT

## 2024-01-19 PROCEDURE — 80061 LIPID PANEL: CPT

## 2024-01-19 PROCEDURE — 84443 ASSAY THYROID STIM HORMONE: CPT

## 2024-01-20 NOTE — TELEPHONE ENCOUNTER
[facility] Medicine Services  DISCHARGE SUMMARY    Patient Name: Ruby Pettit  : 1945  MRN: [id number]    Date of Admission: 2024  1:26 PM  Date of Discharge: 2024  Primary Care Physician: Ly Funez MD    Consults       Date and Time Order Name Status Description    2024  9:04 PM Hematology & Oncology Inpatient Consult Completed     2024  9:04 PM Inpatient Neurology Consult General Completed             Hospital Course     Presenting Problem:     Active Hospital Problems    Diagnosis  POA   • COVID-19 [U07.1]  Yes   • Elevated troponin [R79.89]  Yes   • Malignant neoplasm of lower lobe of left lung [C34.32]  Yes   • GERD [K21.9]  Yes   • Hypertension [I10]  Yes   • T2DM (type 2 diabetes mellitus) [E11.9]  Yes   • Neuropathy [G62.9]  Yes      Resolved Hospital Problems    Diagnosis Date Resolved POA   • **AMS (altered mental status) [R41.82] 2024 Yes   • Acute kidney injury [N17.9] 2024 Yes          Hospital Course:  Ruby Pettit is a 78 y.o. female with past medical history of hypertension, diabetes mellitus type 2, GERD, neuropathy, asthma, stage III adenocarcinoma of the lung s/p left lower lobectomy 2023, chemotherapy completion in 2023, currently undergoing radiation who presented to the ED with complaint of altered mental status. She came to her radiation appointment altered compared to baseline. Was found to be COVID-positive on admission with evidence of dehydration and KANDY on labs. Patient was initiated on Paxlovid due to being immunocompromised from active lung cancer. Patient's mental status improved back to baseline after hydration. Patient was also found to have B12 deficiency, neurology evaluated and initiated B12 supplementation. Patient underwent scheduled radiation therapy on  prior to discharge. Patient was ultimately discharged home with plan for close follow-up with PCP and oncology.    Discharge Follow Up  Our office requested a medication for Fluticasone and insurance doesn't cover this medication. Pharmacy is requesting we order Mometasone which would be covered by insurance. Recommendations for outpatient labs/diagnostics:  -Complete course of Paxlovid, hold statin until therapy complete, discussed this with son on day of discharge  -Continue short course of B12 on discharge  -Follow-up with PCP in 1 week  -Follow-up with oncology in 1 week    Day of Discharge     HPI:   Evaluated patient this morning. Patient reported feeling asymptomatic, felt well. Denied all COVID symptoms. Was saturating well on room air.      Vital Signs:   Temp:  [98 °F (36.7 °C)-98.4 °F (36.9 °C)] 98 °F (36.7 °C)  Heart Rate:  [63-92] 92  Resp:  [18] 18  BP: (154-179)/(77-94) 168/94      Physical Exam:  Constitutional: Awake, alert, resting comfortably  HENT: NCAT, mucous membranes moist  Respiratory: Clear to auscultation bilaterally, respiratory effort normal   Cardiovascular: RRR, no murmurs, rubs, or gallops  Gastrointestinal: Positive bowel sounds, soft, nontender, nondistended  Musculoskeletal: No bilateral ankle edema  Neurologic: Alert and oriented x 3, no focal deficits, speech clear  Skin: No rashes      Pertinent  and/or Most Recent Results     LAB RESULTS:      Lab 01/19/24  0812 01/18/24  1421 01/18/24  0612 01/17/24  2140 01/17/24  1544 01/17/24  1335   WBC 3.48  --  3.22*  --   --  5.43   HEMOGLOBIN 11.0*  --  10.5*  --   --  12.3   HEMATOCRIT 33.7*  --  31.8*  --   --  37.9   PLATELETS 185  --  172  --   --  191   NEUTROS ABS  --   --  1.69*  --   --  3.09   IMMATURE GRANS (ABS)  --   --  0.01  --   --  0.03   LYMPHS ABS  --   --  0.99  --   --  1.38   MONOS ABS  --   --  0.49  --   --  0.91*   EOS ABS  --   --  0.03  --   --  0.01   MCV 94.4  --  92.4  --   --  95.7   CRP  --  7.96*  --   --   --   --    PROCALCITONIN  --   --   --   --  0.19  --    LACTATE  --   --   --  1.0  --   --          Lab 01/19/24  0446 01/18/24  0612 01/17/24  1335   SODIUM 137 137 136   POTASSIUM 4.6 3.7 3.8   CHLORIDE 101 105 99   CO2 24.0 23.0 24.0   ANION GAP 12.0 9.0 13.0   BUN 23 24* 28*   CREATININE 0.85 0.87  1.43*   EGFR 70.2 68.3 37.6*   GLUCOSE 124* 93 178*   CALCIUM 9.1 8.4* 9.4   MAGNESIUM  --   --  2.0   HEMOGLOBIN A1C  --  6.80*  --          Lab 01/19/24  0446 01/18/24  0612 01/17/24  1335   TOTAL PROTEIN 6.4 5.2* 6.5   ALBUMIN 4.0 3.7 4.5   GLOBULIN 2.4 1.5 2.0   ALT (SGPT) 25 28 38*   AST (SGOT) 18 22 37*   BILIRUBIN 0.3 0.3 0.5   ALK PHOS 139* 129* 170*         Lab 01/17/24  1544 01/17/24  1335   HSTROP T 43* 56*             Lab 01/18/24  0612   FOLATE >20.00   VITAMIN B 12 217         Brief Urine Lab Results  (Last result in the past 365 days)        Color   Clarity   Blood   Leuk Est   Nitrite   Protein   CREAT   Urine HCG        01/18/24 0139             151.3               Microbiology Results (last 10 days)       Procedure Component Value - Date/Time    Respiratory Panel PCR w/COVID-19(SARS-CoV-2) TOMI/PARISH/TRESA/PAD/COR/JACKIE In-House, NP Swab in UTM/VTM, 2 HR TAT - Swab, Nasopharynx [[id number]]  (Abnormal) Collected: 01/17/24 2317    Lab Status: Final result Specimen: Swab from Nasopharynx Updated: 01/18/24 0030     ADENOVIRUS, PCR Not Detected     Coronavirus 229E Not Detected     Coronavirus HKU1 Not Detected     Coronavirus NL63 Not Detected     Coronavirus OC43 Not Detected     COVID19 Detected     Human Metapneumovirus Not Detected     Human Rhinovirus/Enterovirus Not Detected     Influenza A PCR Not Detected     Influenza B PCR Not Detected     Parainfluenza Virus 1 Not Detected     Parainfluenza Virus 2 Not Detected     Parainfluenza Virus 3 Not Detected     Parainfluenza Virus 4 Not Detected     RSV, PCR Not Detected     Bordetella pertussis pcr Not Detected     Bordetella parapertussis PCR Not Detected     Chlamydophila pneumoniae PCR Not Detected     Mycoplasma pneumo by PCR Not Detected    Narrative:      In the setting of a positive respiratory panel with a viral infection PLUS a negative procalcitonin without other underlying concern for bacterial infection, consider observing off antibiotics or  discontinuation of antibiotics and continue supportive care. If the respiratory panel is positive for atypical bacterial infection (Bordetella pertussis, Chlamydophila pneumoniae, or Mycoplasma pneumoniae), consider antibiotic de-escalation to target atypical bacterial infection.            EEG    Result Date: 1/18/2024  Reason for referral: 78 y.o.female with altered mental status Technical Summary:  A 19 channel digital EEG was performed using the international 10-20 placement system, including eye leads and EKG leads. Duration: 20 minutes Findings: The patient is drowsy.  The background shows diffuse low to medium amplitude 5 to 6 Hz theta which is present symmetrically over both hemispheres.  Frequent but brief bursts of medium amplitude 3 Hz generalized delta are noted.  Low amplitude EMG artifact is variably prominent anteriorly.  Light sleep is seen with further slowing of the background.  Photic stimulation does not change the tracing.  No focal features or epileptiform activity are seen.  Hyperventilation is not performed. Video: Available Technical quality: Superior EKG: Regular, 70 bpm SUMMARY: Mild generalized slow Excessive drowsiness No focal features or epileptiform activity are seen     Diffuse cerebral dysfunction of mild degree, nonspecific No evidence for epilepsy is seen This report is transcribed using the Dragon dictation system.      MRI Brain Without Contrast    Result Date: 1/18/2024  MRI BRAIN WO CONTRAST Date of Exam: 1/18/2024 12:05 AM EST Indication: altered mental status; lung cancer.  Comparison: CT head from January 17, 2024 at 1422 hours; MRI of the brain from 7/24/2023 Technique:  Routine multiplanar/multisequence sequence images of the brain were obtained without contrast administration. Findings: There is mild diffuse generalized atrophy. There is a cystic area in the left temporal lobe favored to be a small arachnoid cyst measuring about 1.4 cm in maximum diameter. There are  areas of increased T2 and FLAIR signal in the periventricular white matter consistent with chronic microvascular ischemia. There is no mass or mass effect. There are no abnormal extra-axial fluid collections. There are no areas of acute hemorrhage. The diffusion weighted sequences are normal.     Impression: Atrophy and chronic microvascular ischemic change. No acute intracranial process. Electronically Signed: Piter Rain MD  1/18/2024 12:27 AM EST  Workstation ID: [id number]    CT Abdomen Pelvis Without Contrast    Result Date: 1/17/2024  CT ABDOMEN PELVIS WO CONTRAST Date of Exam: 1/17/2024 1:19 PM CST Indication: l flank pain. Comparison: 9/15/2023 Technique: Axial CT images were obtained of the abdomen and pelvis without the administration of contrast. Reconstructed coronal and sagittal images were also obtained. Automated exposure control and iterative construction methods were used. Findings: LUNG BASES: Resolution of previous left lower lobe lesion with what appear to be postoperative changes. Correlate with history. LIVER: No significant change in appearance of the liver on unenhanced CT imaging. BILIARY/GALLBLADDER: Cholecystectomy SPLEEN:  Unremarkable PANCREAS:  Unremarkable ADRENAL: Stable 2.1 cm nonspecific left adrenal nodule. KIDNEYS:  Unremarkable parenchyma with no solid mass identified. No obstruction.  There is a nonobstructive 2 mm calculus within the mid right kidney. No left renal calculus. There are no ureteral calculi. GASTROINTESTINAL/MESENTERY: Resolution of previous descending colonic epiploic appendagitis. No evidence of obstruction nor inflammation currently.  AORTA/IVC:  Normal caliber. RETROPERITONEUM/LYMPH NODES:  Unremarkable REPRODUCTIVE: Hysterectomy BLADDER:  Unremarkable OSSEUS STRUCTURES: No acute process nor significant herbal change.     Impression: 1.No acute process is identified. 2.Resolution of previous epiploic appendagitis. 3.Presumed resection of previous left lower  lobe pulmonary lesion. Correlate with history. 4.Other stable chronic findings. Electronically Signed: Long Casanova MD  1/17/2024 1:58 PM CST  Workstation ID: [id number]    CT Head Without Contrast    Result Date: 1/17/2024  CT HEAD WO CONTRAST Date of Exam: 1/17/2024 2:19 PM EST Indication: ams. Comparison: Head CT 11/2/2020, brain MRI 7/24/2023 Technique: Axial CT images were obtained of the head without contrast administration.  Automated exposure control and iterative construction methods were used. Findings: No acute intracranial hemorrhage. No acute large territory infarct. Mild scattered subcortical and periventricular white matter hypodensities are nonspecific and can be seen in the setting of chronic small vessel ischemic change. Redemonstration of a round circumscribed 1.4 cm hypodensity in the anterior left temporal lobe, unchanged, previously characterized as likely arachnoid cyst on prior brain MRI. No extra-axial collections. No midline shift or herniation. Normal size and configuration of the ventricles. Unremarkable appearance of the orbits. There is some mucosal thickening in the posterior right ethmoid air cells and along the floor of the maxillary sinuses. Hypopneumatized mastoid air cells appear clear. No acute or suspicious bony findings. Redemonstration of multiple flat and rounded cutaneous lesions of the scalp.     Impression: No acute intracranial findings. Electronically Signed: Omar Sanchez MD  1/17/2024 2:41 PM EST  Workstation ID: [id number]    XR Chest 1 View    Result Date: 1/17/2024  XR CHEST 1 VW Date of Exam: 1/17/2024 12:36 PM CST Indication: Weak/Dizzy/AMS triage protocol Comparison: 8/17/2023 Findings: Cardiomediastinal silhouette is unremarkable. Mild diffuse interstitial prominence is similar to prior exam. No airspace disease, pneumothorax, nor pleural effusion. No acute osseous abnormality identified.     Impression: No acute process identified. Electronically Signed: Long  MD Edilberto  1/17/2024 12:54 PM CST  Workstation ID: [id number]     Results for orders placed during the hospital encounter of 03/10/21    Duplex carotid ultrasound CAR    Interpretation Summary  · Proximal right internal carotid artery plaque without significant stenosis.  · Proximal left internal carotid artery plaque without significant stenosis.  · Bilateral antegrade vertebral flow.      Results for orders placed during the hospital encounter of 03/10/21    Duplex carotid ultrasound CAR    Interpretation Summary  · Proximal right internal carotid artery plaque without significant stenosis.  · Proximal left internal carotid artery plaque without significant stenosis.  · Bilateral antegrade vertebral flow.      Results for orders placed during the hospital encounter of 07/08/19    Adult Transthoracic Echo Complete W/ Cont if Necessary Per Protocol    Interpretation Summary  · Estimated EF = 65%. Near cavitary obliteration at rest with contractility  · Left ventricular systolic function is normal.  · Left ventricular diastolic dysfunction (grade I) consistent with impaired relaxation.  · Trace mitral regurgitation  · Trace to mild tricuspid regurgitation      Plan for Follow-up of Pending Labs/Results: N/A    Discharge Details        Discharge Medications        New Medications        Instructions Start Date   Nirmatrelvir & Ritonavir (300mg/100mg) 20 x 150 MG & 10 x 100MG tablet therapy pack tablet  Commonly known as: PAXLOVID   3 tablets, Oral, 2 Times Daily      vitamin B-12 1000 MCG tablet  Commonly known as: [facility] Vitamin B-12   1,000 mcg, Oral, Daily             Changes to Medications        Instructions Start Date   HYDROcodone-acetaminophen 5-325 MG per tablet  Commonly known as: NORCO  What changed: Another medication with the same name was removed. Continue taking this medication, and follow the directions you see here.   Oral, Every 12 Hours             Continue These Medications        Instructions Start  Date   amLODIPine 10 MG tablet  Commonly known as: NORVASC       Biotin 1000 MCG chewable tablet   Oral      cetirizine 10 MG tablet  Commonly known as: zyrTEC   10 mg, Oral      gabapentin 300 MG capsule  Commonly known as: NEURONTIN   900 mg, Oral, 3 Times Daily      hydroCHLOROthiazide 12.5 MG capsule  Commonly known as: MICROZIDE   12.5 mg, Oral, Daily      hydrOXYzine 25 MG tablet  Commonly known as: ATARAX   12.5-25 mg, Oral      Hyoscyamine Sulfate SL 0.125 MG sublingual tablet   125 mcg, Sublingual      ibuprofen 600 MG tablet  Commonly known as: ADVIL,MOTRIN   600 mg, Oral, Every 6 Hours PRN      levothyroxine 50 MCG tablet  Commonly known as: SYNTHROID, LEVOTHROID   50 mcg, Oral, Daily      losartan 50 MG tablet  Commonly known as: COZAAR   50 mg, Oral      omeprazole 20 MG capsule  Commonly known as: priLOSEC   20 mg, Oral, 3 Times Daily      ondansetron 8 MG tablet  Commonly known as: ZOFRAN   8 mg, Oral             Stop These Medications      lovastatin 20 MG tablet  Commonly known as: MEVACOR              Allergies   Allergen Reactions   • Augmentin [Amoxicillin-Pot Clavulanate] Diarrhea     Has tolerated Ceftriaxone, Cefdinir, Cefuroxime.   • Benadryl [Diphenhydramine] Other (See Comments)     High Dose Caused uncontrollable shaking in legs   • Codeine Nausea Only   • Metformin Diarrhea   • Rosuvastatin GI Intolerance         Discharge Disposition:  Home or Self Care    Diet:  Hospital:  Diet Order   Procedures   • Diet: Regular/House Diet, Cardiac Diets, Diabetic Diets; Healthy Heart (2-3 Na+); Consistent Carbohydrate; Texture: Regular Texture (IDDSI 7); Fluid Consistency: Thin (IDDSI 0)       Diet Instructions    Regular Diet             Activity:  Activity Instructions    Ambulate as tolerated.            Restrictions or Other Recommendations:  N/A       CODE STATUS:    Code Status and Medical Interventions:   Ordered at: 01/17/24 6190     Code Status (Patient has no pulse and is not breathing):     CPR (Attempt to Resuscitate)     Medical Interventions (Patient has pulse or is breathing):    Full Support       No future appointments.    Additional Instructions for the Follow-ups that You Need to Schedule       Discharge Follow-up with PCP   As directed       Currently Documented PCP:    Ly Funez MD    PCP Phone Number:    [phone]     Follow Up Details: in 1 week        Discharge Follow-up with Specified Provider: Hollie Sanford (Oncology)   As directed      To: Hollie Sanford (Oncology)   Follow Up Details: in 1 week                      Yamel Jordan DO  01/19/24      Time Spent on Discharge:  I spent 45 minutes on this discharge activity which included: face-to-face encounter with the patient, reviewing the data in the system, coordination of the care with the nursing staff as well as consultants, documentation, and entering orders.

## 2024-02-12 ENCOUNTER — OFFICE VISIT (OUTPATIENT)
Facility: CLINIC | Age: 69
End: 2024-02-12
Payer: MEDICARE

## 2024-02-12 VITALS
DIASTOLIC BLOOD PRESSURE: 70 MMHG | WEIGHT: 293 LBS | BODY MASS INDEX: 39.68 KG/M2 | HEIGHT: 72 IN | SYSTOLIC BLOOD PRESSURE: 118 MMHG | HEART RATE: 80 BPM

## 2024-02-12 DIAGNOSIS — E11.21 DIABETIC GLOMERULOPATHY (HCC): ICD-10-CM

## 2024-02-12 DIAGNOSIS — R19.7 DIARRHEA, UNSPECIFIED TYPE: ICD-10-CM

## 2024-02-12 DIAGNOSIS — N18.31 HYPERTENSIVE KIDNEY DISEASE WITH STAGE 3A CHRONIC KIDNEY DISEASE (HCC): ICD-10-CM

## 2024-02-12 DIAGNOSIS — I12.9 HYPERTENSIVE KIDNEY DISEASE WITH STAGE 3A CHRONIC KIDNEY DISEASE (HCC): ICD-10-CM

## 2024-02-12 DIAGNOSIS — N18.31 STAGE 3A CHRONIC KIDNEY DISEASE (HCC): Primary | ICD-10-CM

## 2024-02-12 PROCEDURE — 99204 OFFICE O/P NEW MOD 45 MIN: CPT | Performed by: INTERNAL MEDICINE

## 2024-02-12 RX ORDER — SODIUM BICARBONATE 650 MG/1
650 TABLET ORAL 2 TIMES DAILY
COMMUNITY

## 2024-02-12 RX ORDER — TACROLIMUS 1 MG/1
1 TABLET, EXTENDED RELEASE ORAL DAILY
COMMUNITY

## 2024-02-12 NOTE — PROGRESS NOTES
Progress Note     Rolly GERBER Suits    Here for follow up - here after DDTx 12/15/22.    Had diarrhea back in Dec and not totally gone now    HISTORY:  Past Medical History:   Diagnosis Date    Adenomatous polyp 2010    cscopy Dr. Singh 2010-misael. polyp, divertic, int hemrds    Allergic rhinitis     Asthma     BPH (benign prostatic hyperplasia)     Cancer (HCC) 2010    Carcinoma in situ of colon     cecum-R hemicolectomy Dr. Lugo    Chronic kidney disease, stage 5 (HCC)     Peritoneal dialysis started 2018 Dr Dexter    Diabetes (HCC) 1980    Diabetes mellitus (HCC)     Dialysis patient (HCC)     Peritoneal dialysis before  kidney transplant    Diverticulosis     Gallstone     Gallstone seen on US liver     Hepatic steatosis     US liver     High blood pressure     High cholesterol     Hyperlipemia     IBS (irritable bowel syndrome)     Kidney transplant recipient     Kidney transplant performed 12/15/2022 at [facility] ( donor kidney transplant).    Microalbuminuria     Migraines     hx in grade school    Neuropathy     diabetic, jorge hands and feet    Obesity 10/1984    Pancytopenia ([facility])     Dr. Ureña--hematologist    Peritoneal dialysis status ([facility]) 2018    Dr Dexter    Pernicious anemia 2008    Platelets decreased ([facility])     Psoriasis-eczema overlap condition     Renal disorder     CKD with proteinuria--Dr. 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    Viral disease     Hx spinal virus    Visual impairment     glasses      Past Surgical History:   Procedure Laterality Date    ADENOIDECTOMY  1965 estimate    Same time as tonsils removed    APPENDECTOMY       COLECTOMY Right 2010    hemicolectomy-Dr Resendez    COLONOSCOPY  2010    Dr Singh    COLONOSCOPY  10/2014    polyp    COLONOSCOPY  12/2019    COLONOSCOPY N/A 12/24/2019    Procedure: COLONOSCOPY;  Surgeon: Blade Singh MD;  Location: [facility] ENDOSCOPY    CYSTOURETRO W/STONE REMOVE Bilateral 04/22/2022    Cystoscopy, bilateral retrograde pyelogram, bilateral ureteroscopy, manipulation/removal of bilateral blood clots/stones, ureteral stent removal.    HERNIA SURGERY  2010    ventral hernia-at time of colon surgery    OTHER SURGICAL HISTORY  11/1/2010    Colon Resection    PERITONEAL DIALYSIS  07/2018    Dr Croft placed PD catheter 6/2018. started PD 7/2018 Dr Dexter    TONSILLECTOMY      T & A    TRANSPLANTATION OF KIDNEY  12/2022    At [facility]           Medications (Active prior to today's visit):  Current Outpatient Medications   Medication Sig Dispense Refill    sodium bicarbonate 650 MG Oral Tab Take 1 tablet (650 mg total) by mouth 2 (two) times daily.      semaglutide 2 MG/3ML Subcutaneous Solution Pen-injector Inject 0.25 mg into the skin once a week.      Tacrolimus ER (ENVARSUS XR) 1 MG Oral Tablet 24 Hr Take 1 tablet (1 mg total) by mouth daily.      metoprolol tartrate 100 MG Oral Tab Take 1 tablet (100 mg total) by mouth 2 (two) times daily.      finasteride 5 MG Oral Tab Take 1 tablet (5 mg total) by mouth daily. 90 tablet 3    predniSONE 10 MG Oral Tab Take 1 tablet (10 mg total) by mouth daily.      ELIQUIS 5 MG Oral Tab Take 1 tablet (5 mg total) by mouth 2 (two) times daily.      chlorthalidone 25 MG Oral Tab Take 1 tablet (25 mg total) by mouth daily.      lisinopril 10 MG Oral Tab Take 1 tablet (10 mg total) by mouth daily.      amLODIPine 10 MG Oral Tab Take 1 tablet (10 mg total) by mouth daily.      ergocalciferol 1.25 MG (21519 UT) Oral Cap Take 1 capsule (50,000 Units total) by mouth every 7 days.      cyanocobalamin 1000 MCG/ML Injection Solution Inject 1 mL (1,000 mcg total) into the  muscle every 30 (thirty) days. 3 mL 3    Psyllium (METAMUCIL) Oral Wafer Take 1 Wafer by mouth daily as needed.      sulfamethoxazole-trimethoprim -160 MG Oral Tab per tablet Takes Mondays and Thursdays per transplant clinic  0    tamsulosin 0.4 MG Oral Cap Take 1 capsule (0.4 mg total) by mouth daily. 90 capsule 3    atorvastatin 40 MG Oral Tab Take 1 tablet (40 mg total) by mouth nightly.      HUMULIN R U-500, CONCENTRATED, 500 UNIT/ML Subcutaneous Solution Insulin pump       Syringe/Needle, Disp, (BD LUER-PILY SYRINGE) 23G X 1\" 3 ML Does not apply Misc USE FOR B12 INJECTIONS AS DIRECTED 12 each 0    PORFIRIO CONTOUR NEXT TEST In Vitro Strip          Allergies:  Allergies   Allergen Reactions    Merbromin RASH and SWELLING    Mercury SWELLING and OTHER (SEE COMMENTS)     Blisters,itching,swelling    Merthilate [Thimerosal] RASH and SWELLING         ROS:     Constitutional:  Negative for decreased activity, fever, irritability and lethargy  ENMT:  Negative for ear drainage, hearing loss and nasal drainage  Eyes:  Negative for eye discharge and vision loss  Cardiovascular:  Negative for chest pain, sob  Respiratory:  Negative for cough, dyspnea and wheezing  Gastrointestinal:  Negative for abdominal pain, constipation  Genitourinary:  Negative for dysuria and hematuria  Endocrine:  Negative for abnormal sleep patterns  Hema/Lymph:  Negative for easy bleeding and easy bruising  Integumentary:  Negative for pruritus and rash  Musculoskeletal:  Negative for bone/joint symptoms  Neurological:  Negative for gait disturbance  Psychiatric:  Negative for inappropriate interaction and psychiatric symptoms      Vitals:    02/12/24 0951   BP: 118/70   Pulse: 80       PHYSICAL EXAM:   Constitutional: appears well hydrated alert and responsive   Head/Face: normocephalic  Eyes/Vision: normal extraocular motion is intact  Nose/Mouth/Throat:mucous membranes are moist   Neck/Thyroid: neck is supple without adenopathy  Lymphatic:  no abnormal cervical, supraclavicular adenopathy is noted  Respiratory:  lungs are clear to auscultation bilaterally  Cardiovascular: regular rate and rhythm   Abdomen: soft, non-tender, non-distended, BS normal  Skin/Hair: no unusual rashes present, no abnormal bruising noted  Musculoskeletal: no deformities  Extremities: no edema  Neurological:  Grossly normal       Lab Results   Component Value Date    GLU 47 (LL) 01/19/2024     01/19/2024    K 4.7 01/19/2024     (H) 01/19/2024    CO2 21.0 01/19/2024    ANIONGAP 6 01/19/2024    BUN 36 (H) 01/19/2024    CREATSERUM 1.49 (H) 01/19/2024    CA 9.1 01/19/2024    OSMOCALC 295 01/19/2024    EGFRCR 51 (L) 01/19/2024    ALB 4.0 01/19/2024    PHOS 7.2 (H) 12/27/2023         ASSESSMENT/PLAN:   Assessment   1. Stage 3a chronic kidney disease (HCC)  Continue prednisone and envarsus  Follows up with Dr Finn at [facility]  I advised him to discontinue his sodium bicarbonate, as his bicarb is normal    2. Diabetic glomerulopathy (HCC)  Sees Dr Lainez  He is on ozempic and humulin    3. Hypertensive kidney disease with stage 3a chronic kidney disease (HCC)  On amlodpine, chlorthalidone lisinopril, metoprolol    4. Diarrhea, unspecified type  The patient is trying probiotics             Orders This Visit:  No orders of the defined types were placed in this encounter.      Meds This Visit:  Requested Prescriptions      No prescriptions requested or ordered in this encounter       Imaging & Referrals:  None     2/12/2024   HELLEN DE LUNA MD    No follow-ups on file.

## 2024-02-13 ENCOUNTER — OFFICE VISIT (OUTPATIENT)
Dept: PULMONOLOGY | Facility: CLINIC | Age: 69
End: 2024-02-13
Payer: MEDICARE

## 2024-02-13 VITALS
HEIGHT: 72 IN | BODY MASS INDEX: 40.04 KG/M2 | DIASTOLIC BLOOD PRESSURE: 68 MMHG | SYSTOLIC BLOOD PRESSURE: 123 MMHG | HEART RATE: 69 BPM | OXYGEN SATURATION: 97 % | WEIGHT: 295.63 LBS

## 2024-02-13 DIAGNOSIS — G47.33 OBSTRUCTIVE SLEEP APNEA: Primary | ICD-10-CM

## 2024-02-13 PROCEDURE — 99213 OFFICE O/P EST LOW 20 MIN: CPT | Performed by: INTERNAL MEDICINE

## 2024-02-13 NOTE — PROGRESS NOTES
The patient is a 69-year-old male who I know well from prior evaluation comes in now for follow-up.  His downloaded data from his CPAP device is excellent with average daily usage 8 hours and 47 minutes and residual events of 2.6/h.  The patient is benefiting from ongoing usage of CPAP.  He also had a bronchitic syndrome beginning around Thanksgiving and that lingered and he still has mild uncomfortable awareness with some postnasal drip.  There is no GERD.    Review of Systems:  Vision normal. Ear nose and throat normal. Bowel normal. Bladder function normal. No depression. No thyroid disease. No lymphatic system concerns.  No rash. Muscles and joints unremarkable. No weight loss no weight gain.    Physical Examination:  Vital signs normal. HEENT examination is unremarkable with pupils equal round and reactive to light and accommodation. Neck without adenopathy, thyromegaly, JVD nor bruit. Lungs clear to auscultation and percussion. Cardiac regular rate and rhythm no murmur. Abdomen nontender, without hepatosplenomegaly and no mass appreciable. Extremities and Musculoskeletal without clubbing cyanosis nor edema, and mobility acceptable. Neurologic grossly intact with symmetric tone and strength and reflex.    Assessment and plan:  1.  Lingering cough-no evidence of GERD but does have postnasal drip.  I encouraged him to try Allegra.  Lungs sound good today.  Patient chest x-ray was unrevealing.    2.  Obstructive sleep apnea-excellent control.    Recommendations: Vigilance with CPAP every night all night, weight loss, avoid alcohol, avoid sedating drug, never drive if sleepy and see me in the office at the 1 year interval again with download of data and contact me promptly if new trouble.

## 2024-02-20 ENCOUNTER — LAB ENCOUNTER (OUTPATIENT)
Dept: LAB | Facility: HOSPITAL | Age: 69
End: 2024-02-20
Attending: INTERNAL MEDICINE
Payer: MEDICARE

## 2024-02-20 DIAGNOSIS — B34.8 BK VIREMIA: ICD-10-CM

## 2024-02-20 DIAGNOSIS — B25.9 CMV INFECTION (HCC): ICD-10-CM

## 2024-02-20 DIAGNOSIS — R73.9 HYPERGLYCEMIA: ICD-10-CM

## 2024-02-20 DIAGNOSIS — R78.81 BACTEREMIA: ICD-10-CM

## 2024-02-20 DIAGNOSIS — E55.9 VITAMIN D DEFICIENCY: ICD-10-CM

## 2024-02-20 DIAGNOSIS — Z51.81 THERAPEUTIC DRUG MONITORING: ICD-10-CM

## 2024-02-20 DIAGNOSIS — R80.9 PROTEINURIA: ICD-10-CM

## 2024-02-20 DIAGNOSIS — N39.0 UTI (URINARY TRACT INFECTION): ICD-10-CM

## 2024-02-20 DIAGNOSIS — E83.42 HYPOMAGNESEMIA: ICD-10-CM

## 2024-02-20 DIAGNOSIS — Z94.0 KIDNEY REPLACED BY TRANSPLANT (HCC): ICD-10-CM

## 2024-02-20 DIAGNOSIS — D70.9 NEUTROPENIA (HCC): ICD-10-CM

## 2024-02-20 DIAGNOSIS — E78.5 HYPERLIPIDEMIA: ICD-10-CM

## 2024-02-20 LAB
ANION GAP SERPL CALC-SCNC: 10 MMOL/L (ref 0–18)
BASOPHILS # BLD AUTO: 0.04 X10(3) UL (ref 0–0.2)
BASOPHILS NFR BLD AUTO: 0.6 %
BILIRUB UR QL: NEGATIVE
BUN BLD-MCNC: 43 MG/DL (ref 9–23)
BUN/CREAT SERPL: 23.8 (ref 10–20)
CALCIUM BLD-MCNC: 9.3 MG/DL (ref 8.7–10.4)
CHLORIDE SERPL-SCNC: 110 MMOL/L (ref 98–112)
CLARITY UR: CLEAR
CO2 SERPL-SCNC: 20 MMOL/L (ref 21–32)
COLOR UR: YELLOW
CREAT BLD-MCNC: 1.81 MG/DL
CREAT UR-SCNC: 98 MG/DL
CREAT UR-SCNC: 98 MG/DL
DEPRECATED RDW RBC AUTO: 47 FL (ref 35.1–46.3)
EGFRCR SERPLBLD CKD-EPI 2021: 40 ML/MIN/1.73M2 (ref 60–?)
EOSINOPHIL # BLD AUTO: 0.06 X10(3) UL (ref 0–0.7)
EOSINOPHIL NFR BLD AUTO: 0.9 %
ERYTHROCYTE [DISTWIDTH] IN BLOOD BY AUTOMATED COUNT: 13.6 % (ref 11–15)
FASTING STATUS PATIENT QL REPORTED: YES
GLUCOSE BLD-MCNC: 68 MG/DL (ref 70–99)
GLUCOSE UR-MCNC: NEGATIVE MG/DL
HCT VFR BLD AUTO: 38.1 %
HGB BLD-MCNC: 12.5 G/DL
HGB UR QL STRIP.AUTO: NEGATIVE
IMM GRANULOCYTES # BLD AUTO: 0.12 X10(3) UL (ref 0–1)
IMM GRANULOCYTES NFR BLD: 1.7 %
KETONES UR-MCNC: NEGATIVE MG/DL
LEUKOCYTE ESTERASE UR QL STRIP.AUTO: NEGATIVE
LYMPHOCYTES # BLD AUTO: 2.3 X10(3) UL (ref 1–4)
LYMPHOCYTES NFR BLD AUTO: 33 %
MAGNESIUM SERPL-MCNC: 2.1 MG/DL (ref 1.6–2.6)
MCH RBC QN AUTO: 30.8 PG (ref 26–34)
MCHC RBC AUTO-ENTMCNC: 32.8 G/DL (ref 31–37)
MCV RBC AUTO: 93.8 FL
MICROALBUMIN UR-MCNC: 1.2 MG/DL
MICROALBUMIN/CREAT 24H UR-RTO: 12.2 UG/MG (ref ?–30)
MONOCYTES # BLD AUTO: 0.63 X10(3) UL (ref 0.1–1)
MONOCYTES NFR BLD AUTO: 9 %
NEUTROPHILS # BLD AUTO: 3.83 X10 (3) UL (ref 1.5–7.7)
NEUTROPHILS # BLD AUTO: 3.83 X10(3) UL (ref 1.5–7.7)
NEUTROPHILS NFR BLD AUTO: 54.8 %
NITRITE UR QL STRIP.AUTO: NEGATIVE
OSMOLALITY SERPL CALC.SUM OF ELEC: 299 MOSM/KG (ref 275–295)
PH UR: 5 [PH] (ref 5–8)
PHOSPHATE SERPL-MCNC: 5.3 MG/DL (ref 2.4–5.1)
PLATELET # BLD AUTO: 131 10(3)UL (ref 150–450)
POTASSIUM SERPL-SCNC: 4.5 MMOL/L (ref 3.5–5.1)
PROT UR-MCNC: 7.5 MG/DL (ref ?–14)
PROT UR-MCNC: NEGATIVE MG/DL
RBC # BLD AUTO: 4.06 X10(6)UL
SODIUM SERPL-SCNC: 140 MMOL/L (ref 136–145)
SP GR UR STRIP: 1.01 (ref 1–1.03)
UROBILINOGEN UR STRIP-ACNC: 0.2
WBC # BLD AUTO: 7 X10(3) UL (ref 4–11)

## 2024-02-20 PROCEDURE — 80048 BASIC METABOLIC PNL TOTAL CA: CPT

## 2024-02-20 PROCEDURE — 81003 URINALYSIS AUTO W/O SCOPE: CPT

## 2024-02-20 PROCEDURE — 85025 COMPLETE CBC W/AUTO DIFF WBC: CPT

## 2024-02-20 PROCEDURE — 82043 UR ALBUMIN QUANTITATIVE: CPT

## 2024-02-20 PROCEDURE — 82570 ASSAY OF URINE CREATININE: CPT

## 2024-02-20 PROCEDURE — 36415 COLL VENOUS BLD VENIPUNCTURE: CPT

## 2024-02-20 PROCEDURE — 84156 ASSAY OF PROTEIN URINE: CPT

## 2024-02-20 PROCEDURE — 83735 ASSAY OF MAGNESIUM: CPT

## 2024-02-20 PROCEDURE — 87086 URINE CULTURE/COLONY COUNT: CPT

## 2024-02-20 PROCEDURE — 80197 ASSAY OF TACROLIMUS: CPT

## 2024-02-20 PROCEDURE — 84100 ASSAY OF PHOSPHORUS: CPT

## 2024-02-21 LAB
CMV DNA DETECT, QN LOG: NOT DETECTED {LOG_IU}/ML
CMV DNA DETECT, QN: NOT DETECTED [IU]/ML

## 2024-02-23 ENCOUNTER — TELEPHONE (OUTPATIENT)
Dept: PULMONOLOGY | Facility: CLINIC | Age: 69
End: 2024-02-23

## 2024-02-23 NOTE — TELEPHONE ENCOUNTER
Received fax from [facility] with order for CPAP supplies. Placed in Dr Lemus's folder for signature

## 2024-02-24 LAB — TACROLIMUS LVL: 8.2 NG/ML

## 2024-03-21 ENCOUNTER — LAB ENCOUNTER (OUTPATIENT)
Dept: LAB | Facility: HOSPITAL | Age: 69
End: 2024-03-21
Attending: INTERNAL MEDICINE
Payer: MEDICARE

## 2024-03-21 DIAGNOSIS — E78.5 HYPERLIPIDEMIA: ICD-10-CM

## 2024-03-21 DIAGNOSIS — N39.0 UTI (URINARY TRACT INFECTION): ICD-10-CM

## 2024-03-21 DIAGNOSIS — E55.9 VITAMIN D DEFICIENCY: ICD-10-CM

## 2024-03-21 DIAGNOSIS — R78.81 BACTEREMIA: ICD-10-CM

## 2024-03-21 DIAGNOSIS — D70.9 NEUTROPENIA (HCC): ICD-10-CM

## 2024-03-21 DIAGNOSIS — E83.42 HYPOMAGNESEMIA: ICD-10-CM

## 2024-03-21 DIAGNOSIS — Z51.81 THERAPEUTIC DRUG MONITORING: ICD-10-CM

## 2024-03-21 DIAGNOSIS — R73.9 HYPERGLYCEMIA: ICD-10-CM

## 2024-03-21 DIAGNOSIS — B25.9 CMV INFECTION (HCC): ICD-10-CM

## 2024-03-21 DIAGNOSIS — Z94.0 KIDNEY REPLACED BY TRANSPLANT (HCC): ICD-10-CM

## 2024-03-21 DIAGNOSIS — B34.8 BK VIREMIA: ICD-10-CM

## 2024-03-21 DIAGNOSIS — R80.9 PROTEINURIA: ICD-10-CM

## 2024-03-21 LAB
ANION GAP SERPL CALC-SCNC: 9 MMOL/L (ref 0–18)
BASOPHILS # BLD AUTO: 0.04 X10(3) UL (ref 0–0.2)
BASOPHILS NFR BLD AUTO: 0.5 %
BILIRUB UR QL: NEGATIVE
BUN BLD-MCNC: 44 MG/DL (ref 9–23)
BUN/CREAT SERPL: 28.2 (ref 10–20)
CALCIUM BLD-MCNC: 9.3 MG/DL (ref 8.7–10.4)
CHLORIDE SERPL-SCNC: 114 MMOL/L (ref 98–112)
CLARITY UR: CLEAR
CO2 SERPL-SCNC: 20 MMOL/L (ref 21–32)
CREAT BLD-MCNC: 1.56 MG/DL
CREAT UR-SCNC: 135.6 MG/DL
CREAT UR-SCNC: 135.6 MG/DL
DEPRECATED RDW RBC AUTO: 48 FL (ref 35.1–46.3)
EGFRCR SERPLBLD CKD-EPI 2021: 48 ML/MIN/1.73M2 (ref 60–?)
EOSINOPHIL # BLD AUTO: 0.07 X10(3) UL (ref 0–0.7)
EOSINOPHIL NFR BLD AUTO: 0.9 %
ERYTHROCYTE [DISTWIDTH] IN BLOOD BY AUTOMATED COUNT: 13.8 % (ref 11–15)
FASTING STATUS PATIENT QL REPORTED: YES
GLUCOSE BLD-MCNC: 59 MG/DL (ref 70–99)
GLUCOSE UR-MCNC: NORMAL MG/DL
HCT VFR BLD AUTO: 36.5 %
HGB BLD-MCNC: 12.6 G/DL
HGB UR QL STRIP.AUTO: NEGATIVE
IMM GRANULOCYTES # BLD AUTO: 0.12 X10(3) UL (ref 0–1)
IMM GRANULOCYTES NFR BLD: 1.5 %
KETONES UR-MCNC: NEGATIVE MG/DL
LEUKOCYTE ESTERASE UR QL STRIP.AUTO: 75
LYMPHOCYTES # BLD AUTO: 2.45 X10(3) UL (ref 1–4)
LYMPHOCYTES NFR BLD AUTO: 29.9 %
MAGNESIUM SERPL-MCNC: 2.2 MG/DL (ref 1.6–2.6)
MCH RBC QN AUTO: 32.8 PG (ref 26–34)
MCHC RBC AUTO-ENTMCNC: 34.5 G/DL (ref 31–37)
MCV RBC AUTO: 95.1 FL
MICROALBUMIN UR-MCNC: 2.5 MG/DL
MICROALBUMIN/CREAT 24H UR-RTO: 18.4 UG/MG (ref ?–30)
MONOCYTES # BLD AUTO: 0.66 X10(3) UL (ref 0.1–1)
MONOCYTES NFR BLD AUTO: 8 %
NEUTROPHILS # BLD AUTO: 4.86 X10 (3) UL (ref 1.5–7.7)
NEUTROPHILS # BLD AUTO: 4.86 X10(3) UL (ref 1.5–7.7)
NEUTROPHILS NFR BLD AUTO: 59.2 %
NITRITE UR QL STRIP.AUTO: NEGATIVE
OSMOLALITY SERPL CALC.SUM OF ELEC: 305 MOSM/KG (ref 275–295)
PH UR: 5.5 [PH] (ref 5–8)
PHOSPHATE SERPL-MCNC: 4.8 MG/DL (ref 2.4–5.1)
PLATELET # BLD AUTO: 129 10(3)UL (ref 150–450)
PLATELETS.RETICULATED NFR BLD AUTO: 6.5 % (ref 0–7)
POTASSIUM SERPL-SCNC: 4.4 MMOL/L (ref 3.5–5.1)
PROT UR-MCNC: 17.3 MG/DL (ref ?–14)
PROT UR-MCNC: NEGATIVE MG/DL
RBC # BLD AUTO: 3.84 X10(6)UL
SODIUM SERPL-SCNC: 143 MMOL/L (ref 136–145)
SP GR UR STRIP: 1.02 (ref 1–1.03)
UROBILINOGEN UR STRIP-ACNC: NORMAL
WBC # BLD AUTO: 8.2 X10(3) UL (ref 4–11)

## 2024-03-21 PROCEDURE — 81001 URINALYSIS AUTO W/SCOPE: CPT

## 2024-03-21 PROCEDURE — 85025 COMPLETE CBC W/AUTO DIFF WBC: CPT

## 2024-03-21 PROCEDURE — 82570 ASSAY OF URINE CREATININE: CPT

## 2024-03-21 PROCEDURE — 80197 ASSAY OF TACROLIMUS: CPT

## 2024-03-21 PROCEDURE — 80048 BASIC METABOLIC PNL TOTAL CA: CPT

## 2024-03-21 PROCEDURE — 84100 ASSAY OF PHOSPHORUS: CPT

## 2024-03-21 PROCEDURE — 84156 ASSAY OF PROTEIN URINE: CPT

## 2024-03-21 PROCEDURE — 36415 COLL VENOUS BLD VENIPUNCTURE: CPT

## 2024-03-21 PROCEDURE — 87086 URINE CULTURE/COLONY COUNT: CPT

## 2024-03-21 PROCEDURE — 82043 UR ALBUMIN QUANTITATIVE: CPT

## 2024-03-21 PROCEDURE — 83735 ASSAY OF MAGNESIUM: CPT

## 2024-03-22 LAB
CMV DNA DETECT, QN LOG: NOT DETECTED {LOG_IU}/ML
CMV DNA DETECT, QN: NOT DETECTED [IU]/ML

## 2024-03-24 LAB — TACROLIMUS LVL: 8.1 NG/ML

## 2024-03-25 ENCOUNTER — OFFICE VISIT (OUTPATIENT)
Dept: INTERNAL MEDICINE CLINIC | Facility: CLINIC | Age: 69
End: 2024-03-25
Payer: MEDICARE

## 2024-03-25 VITALS
HEART RATE: 60 BPM | SYSTOLIC BLOOD PRESSURE: 98 MMHG | WEIGHT: 294 LBS | BODY MASS INDEX: 39.82 KG/M2 | DIASTOLIC BLOOD PRESSURE: 70 MMHG | OXYGEN SATURATION: 98 % | HEIGHT: 72 IN

## 2024-03-25 DIAGNOSIS — G62.9 NEUROPATHY: ICD-10-CM

## 2024-03-25 DIAGNOSIS — E53.8 VITAMIN B12 DEFICIENCY: ICD-10-CM

## 2024-03-25 DIAGNOSIS — H91.90 HEARING LOSS, UNSPECIFIED HEARING LOSS TYPE, UNSPECIFIED LATERALITY: Primary | ICD-10-CM

## 2024-03-25 DIAGNOSIS — Z12.5 SCREENING FOR MALIGNANT NEOPLASM OF PROSTATE: ICD-10-CM

## 2024-03-25 DIAGNOSIS — R29.818 DIFFICULTY BALANCING: ICD-10-CM

## 2024-03-25 PROCEDURE — 99214 OFFICE O/P EST MOD 30 MIN: CPT | Performed by: INTERNAL MEDICINE

## 2024-03-25 PROCEDURE — G2211 COMPLEX E/M VISIT ADD ON: HCPCS | Performed by: INTERNAL MEDICINE

## 2024-03-25 RX ORDER — MAGNESIUM 200 MG
1 TABLET ORAL DAILY
Qty: 90 TABLET | Refills: 3 | Status: SHIPPED | OUTPATIENT
Start: 2024-03-25 | End: 2024-04-24

## 2024-03-25 NOTE — PROGRESS NOTES
Rolly Sanchez is a 69 year old male  Chief Complaint   Patient presents with    Checkup     6 months; Discuss B12 Deficiency. OK to transition from Injections to Oral Tab    Neuropathy     C/O Worsening Neuropathy, Balance Issue \"uneven ground\"; Has not thought about use of cane but rather was hoping there was something else he could do to improve Neuropaty    Hearing Loss     C/o Changes to Hearing / Hearing Loss. Never evaluated by Audiologist     Obesity     C/O Fatty Liver     Obstructive Sleep Apnea (AMELIE)     Hx of AMELIE on CPAP ([facility]); Noticing more post nasal drip. Added Allergra per Pulmo but seeking additional guidance.      HPI:   Rolly Sanchez is a 69 year old male who presents for a 6 month check-up.    LOV 1/16/24.    Since then, patient's diarrhea has improved after taking a probiotic and apple cider vinegar with water daily along with metamucil.    C/o hearing loss in groups of people or who speak softly. Has never had hearing checked.    C/o allergic rhinitis, constant nose running dripping down the back of his throat and causing him to cough and wake up at night while wearing his CPAP. Taking allegra. Takes flonase PRN. States his brother had nasal polyps just diagnosed by ENT. Wanting to see an ENT specialist.    C/f worsening neuropathy and gait instability. Declines medications for this. Open to PT. Denies falls, is up to date with podiatry examinations.    Asking to stop B12 injections and take oral supplements out of request of transplant team for potential injury to while taking eliquis. Patient has been doing injections for 10 years, never took oral supplements. Denies hx of small bowel surgery.    Seen by endo 1/23/24, note reviewed. Ozempic 0.5 mg weekly added. No side effects. Patient has not lost weight but feels less hungry. Pre-prandial blood sugars 90-120s.    Seen by dental 2/7/24, 3/20/24. Fixed chipped tooth and tooth on the side. Scheduled for a crown in the future. Abx  ppx per transplant team instructed to take amoxicillin before dental procedures.     Seen by nephrology 2/12/24, sodium bicarb discontinued.    Seen by pulm for CPAP 2/13/24. Encouraged trial of allegra.    Seen by renal transplant 3/14/24. Labs obtained.    Wt Readings from Last 6 Encounters:   03/25/24 294 lb (133.4 kg)   02/13/24 295 lb 9.6 oz (134.1 kg)   02/12/24 293 lb (132.9 kg)   01/16/24 297 lb (134.7 kg)   09/22/23 293 lb 6.4 oz (133.1 kg)   02/07/23 271 lb (122.9 kg)     Body mass index is 39.87 kg/m².     Current Outpatient Medications   Medication Sig Dispense Refill    Cyanocobalamin (VITAMIN B-12) 1000 MCG Sublingual SL Tab Place 1 tablet under the tongue daily. 90 tablet 3    sodium bicarbonate 650 MG Oral Tab Take 1 tablet (650 mg total) by mouth 2 (two) times daily.      semaglutide 2 MG/3ML Subcutaneous Solution Pen-injector Inject 0.25 mg into the skin once a week.      Tacrolimus ER (ENVARSUS XR) 1 MG Oral Tablet 24 Hr Take 1 tablet (1 mg total) by mouth daily.      metoprolol tartrate 100 MG Oral Tab Take 1 tablet (100 mg total) by mouth 2 (two) times daily.      finasteride 5 MG Oral Tab Take 1 tablet (5 mg total) by mouth daily. 90 tablet 3    predniSONE 10 MG Oral Tab Take 1 tablet (10 mg total) by mouth daily.      ELIQUIS 5 MG Oral Tab Take 1 tablet (5 mg total) by mouth 2 (two) times daily.      chlorthalidone 25 MG Oral Tab Take 1 tablet (25 mg total) by mouth daily.      lisinopril 10 MG Oral Tab Take 1 tablet (10 mg total) by mouth daily.      amLODIPine 10 MG Oral Tab Take 1 tablet (10 mg total) by mouth daily.      ergocalciferol 1.25 MG (20916 UT) Oral Cap Take 1 capsule (50,000 Units total) by mouth every 7 days.      cyanocobalamin 1000 MCG/ML Injection Solution Inject 1 mL (1,000 mcg total) into the muscle every 30 (thirty) days. 3 mL 3    Syringe/Needle, Disp, (BD LUER-PILY SYRINGE) 23G X 1\" 3 ML Does not apply Misc USE FOR B12 INJECTIONS AS DIRECTED 12 each 0    Psyllium  (METAMUCIL) Oral Wafer Take 1 Wafer by mouth daily as needed.      sulfamethoxazole-trimethoprim -160 MG Oral Tab per tablet Takes  and  per transplant clinic  0    tamsulosin 0.4 MG Oral Cap Take 1 capsule (0.4 mg total) by mouth daily. 90 capsule 3    atorvastatin 40 MG Oral Tab Take 1 tablet (40 mg total) by mouth nightly.      HUMULIN R U-500, CONCENTRATED, 500 UNIT/ML Subcutaneous Solution Insulin pump       PORFIRIO CONTOUR NEXT TEST In Vitro Strip         Past Medical History:   Diagnosis Date    Adenomatous polyp     cscopy Dr. Singh 2010-misael. polyp, divertic, int hemrds    Allergic rhinitis     Asthma (HCC)     BPH (benign prostatic hyperplasia)     Cancer ([facility]) 2010    Carcinoma in situ of colon     cecum-R hemicolectomy Dr. Lugo    Chronic kidney disease, stage 5 ([facility])     Peritoneal dialysis started 2018 Dr Dexter    Diabetes ([facility]) 1980    Diabetes mellitus ([facility])     Dialysis patient ([facility])     Peritoneal dialysis before  kidney transplant    Diverticulosis     Gallstone     Gallstone seen on US liver     Hepatic steatosis     US liver     High blood pressure     High cholesterol     Hyperlipemia     IBS (irritable bowel syndrome)     Kidney transplant recipient ([facility])     Kidney transplant performed 12/15/2022 at [facility] ( donor kidney transplant).    Microalbuminuria     Migraines     hx in grade school    Neuropathy     diabetic, jorge hands and feet    Obesity 10/1984    Pancytopenia ([facility])     Dr. Ureña--hematologist    Peritoneal dialysis status ([facility]) 2018    Dr Dexter    Pernicious anemia     Platelets decreased ([facility])     Psoriasis-eczema overlap condition     Renal disorder     CKD with proteinuria--Dr. 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    Viral disease     Hx spinal virus    Visual impairment     glasses      Past Surgical History:   Procedure Laterality Date    ADENOIDECTOMY  1965 estimate    Same time as tonsils removed    APPENDECTOMY      COLECTOMY Right 2010    hemicolectomy-Dr Resendez    COLONOSCOPY      Dr Singh    COLONOSCOPY  10/2014    polyp    COLONOSCOPY  2019    COLONOSCOPY N/A 2019    Procedure: COLONOSCOPY;  Surgeon: Blade Singh MD;  Location: [facility] ENDOSCOPY    CYSTOURETRO W/STONE REMOVE Bilateral 2022    Cystoscopy, bilateral retrograde pyelogram, bilateral ureteroscopy, manipulation/removal of bilateral blood clots/stones, ureteral stent removal.    HERNIA SURGERY      ventral hernia-at time of colon surgery    OTHER SURGICAL HISTORY  2010    Colon Resection    PERITONEAL DIALYSIS  2018    Dr Croft placed PD catheter 2018. started PD 2018 Dr Dexter    TONSILLECTOMY      T & A    TRANSPLANTATION OF KIDNEY  2022    At [facility]      Family History   Problem Relation Age of Onset    Diabetes Mother     Obesity Mother     Renal Disease Mother         ESRD    Other (Other) Mother     Hypertension Father     Obesity Father     Heart Attack Father     Stroke Father         TIA    Skin cancer Father     Heart Disorder Father     Other (Other) Father     Other (Brain bleed) Father 82         in his sleep-hx brain bleed    Diabetes Sister     Hypertension Brother       Social History:  Social History     Socioeconomic History    Marital status:     Number of children: 2   Occupational History    Occupation:    Tobacco Use    Smoking status: Former     Packs/day: 0.50     Years: 9.00     Additional pack years: 0.00     Total pack years: 4.50     Types: Cigarettes     Quit date: 1982     Years since quittin.0    Smokeless tobacco: Never     Tobacco comments:     smokes cigars occasionally   Vaping Use    Vaping Use: Never used   Substance and Sexual Activity    Alcohol use: Not Currently     Alcohol/week: 1.0 standard drink of alcohol     Types: 1 Standard drinks or equivalent per week    Drug use: No   Other Topics Concern    Caffeine Concern Yes     Comment: 3 cups of coffee per day    Pt has a pacemaker No    Pt has a defibrillator No    Reaction to local anesthetic No           REVIEW OF SYSTEMS:   GENERAL: feels well otherwise  HEENT: + decreased hearing b/l, + rhinorrhea  GI: + diarrhea, improved  NEURO: + neuropathy, gait imbalance    EXAM:   BP 98/70   Pulse 60   Ht 6' (1.829 m)   Wt 294 lb (133.4 kg)   SpO2 98%   BMI 39.87 kg/m²     GENERAL: well developed, well nourished, in NAD  HEENT: normal oropharynx without erythema or exudate, normal TM's b/l  EYES: PERRLA, EOMI, conjunctivae pink  NECK: supple, no cervical or supraclavicular lymphadenopathy, no carotid bruits, no thyromegaly  NEURO: A&O x 3, moves all 4 extremities normally      ASSESSMENT AND PLAN:   Rolly Sanchez is a 69 year old male who presents for a 6 month check-up.    Hearing loss, unspecified hearing loss type, unspecified laterality  - Audiology Referral - In Network    Allergic rhinitis  - recommend continuing allegra/flonase daily  - referral for ENT provided to patient     Difficulty balancing  Neuropathy  - declined gabapentin  - Physical Therapy Referral - [facility]    Vitamin B12 deficiency  - ok to stop b12 injections, start oral b12, can recheck level prior to next visit  - Vitamin B12 [E]; Future    Screening for malignant neoplasm of prostate  - PSA (Screening) [E]; Future    Kidney transplant recipient c/b CMV viremia  - s/p DDKT 12/15/2022 at [facility]    - Previously on PD w/Dr. Dexter prior to transplant  - Follows with Dr. Tommy Bryan , Ken Finn at [facility]  - Immunosuppression:   - envarsus 3 mg daily  - prednisone 10 mg daily  - holding myfortic iso  cmv viremia  - ppx: bactrim bid for PCP  - CMV viremia: maribavir 400 mg bid    CKD3a  - taken off of bicarb by nephrologist, Dr. Dexter     DM2 with retinopathy  - Endocrinologist: Dr. Lainez, next appt 10/26/23  - Most recent hemoglobin A1c 6.1% on 3/14/24  - Current regimen:  - insulin pump - humalog U500.    - ozempic 0.5 mg weekly  - Complications:   - CV: atorvastatin 40 mg qhs   - Nephropathy: microalb/cr ratio 3/21/24 wnl   - Neuropathy: yes, declines medications   - Retinopathy: [facility] Dr. Joya for retinopathy, seen q3 months  - Foot exam: sees podiatry Dr. Hagan q3 months. Has therapeutic shoes from [employer]. Hx of R foot ulcer 2/2019 tx at [facility] center.      Hypertension   - Controlled on current regimen:  - amlodipine 10 mg daily  - metoprolol 100 mg bid  - lisinopril 10 mg daily               - chlorthalidone 25 mg daily     Hyperlipidemia  - Cardiologist: Dr. Cueto at [facility]/Dr. Gil  - s/p card cath 3/10/21   - On atorvastatin 40 at bedtime   - Since kidney transplant in 12/2022, no longer takes fish oil per tx clinic.  - LDL 36  on 7/18/23    - Off genfibrozil and niacin since 4/2021     Paroxysmal atrial fibrillation  - EP: Dr. Emilie Gonzalez, [facility]  - [id number] score: at least 4 (HTN, Age x1, DM, CAD)  - AC: Eliquis  - Rate control: metoprolol 100 mg bid  - Rhythm control: none, monitoring per smart watch     Obesity  - declined referral to bariatric clinic, Dr. Garica  - recommend diet and exercise  - ozempic as above     BPH  Hx Prostatitis 8/2018  - US kidneys bladder 9/7/18-no hydroneph, 7 mm kidney stone non obst.  ml    - Maty Catherine APRN rx tamsulosin 0.4 mg daily.    - PSA 1.8 on 9/11/21 per Maty  - Follows with Dr. Panda Garcia, started finasteride 3/27/23              - tamsulosin 0.4 mg daily              - finasteride 5 mg daily     AMELIE  - CPAP rx per Dr. Mariah     Non-obstructive CAD  - Elev calcium heart score 312 in circumflex 2016.    - Echo 7/2/16-[facility]  - Dr. Wadas 9/2016 - nuc stress test nl.   - 3/2018-small rev area in apex - Dr. Wadas recom avoid strenuous activity.   - Dr. Gil 10/12/20 - stress test with reversible apical defect in 2018 and if to have renal transplant, then would proceed with angiogram  - s/p card cath 3/10/21 [facility] (transplant eval) w/Dr. Cueto - nonobstructive CAD.   - Follows w Dr. Gil.   - TTE 10/14/21 LVEFF 65%, 12/5/22 LVEF 55%  - On AC, metoprolol 100 mg bid     Microscopic hematuria  - for abnormal CT scan, kidney  - Had cystoscopy with jorge retrogrades/L ureteroscopy, jorge stents 3/4/22 and  jorge ureteroscopy, stents removal 4/22/2022 -Dr. Garcia.  - findings of free-floating blood clot/stone in right renal pelvis and left upper and midpole calyces. Path w/proteinaceous material consistent with peripheral blood elements.  - Dr. Garcia felt no further evaluation needed.     Epidermoid cyst of skin, left groin  - s/p I & D in March 2021 Dr. Croft.      Anemia of CKD  Chronic pancytopenia  - used to follow with w/Dr. Ureña for pancytopenia.   - Hx splenomegaly  - On B12 injections  - Message from Dr. Molina 9/16/20 about seeing a hematologist for platelet count 86 previously 111 in dialysis clinic -> Saw Dr. Pavon 1/21/21 - mild splenomegaly, she recom monitoring       Healthcare Maintenance  Cancer Screenings:  - Colon: diverticulosis w/unremarkable ileocolonic anastomosis on 12/24/19 w/Dr. Singh, next at 5-10 years. Hx R hemicolectomy 11/2010 for c-i-s colon.       Vaccines:  - Tdap: 12/1/15  - Shingles: shingrix x2, vostavax 9/2017  - Pneumonia: pmvx 3/16/22, prevnar 10/28/14  - Flu: UTD  - COVID-19: UTD  - RSV: plans to get at pharmacy     Misc:  - AAA: no mention of AAA on [facility] CT A/P 10/6/20, 2/23/23   - Advanced directives: discussed, wife Suzan is medical power       RTC in 6 months for MAWV with labs prior or sooner PRN.    For E/M code - 60 minutes spent reviewing performing chart review,  obtaining a history, performing a physical exam, reviewing the assessment/plan, placing orders, and completing documentation.     Macey Hastings DO  3/25/2024  7:39 AM/

## 2024-03-29 ENCOUNTER — PATIENT MESSAGE (OUTPATIENT)
Dept: INTERNAL MEDICINE CLINIC | Facility: CLINIC | Age: 69
End: 2024-03-29

## 2024-04-11 ENCOUNTER — OFFICE VISIT (OUTPATIENT)
Dept: OTOLARYNGOLOGY | Facility: CLINIC | Age: 69
End: 2024-04-11
Payer: MEDICARE

## 2024-04-11 DIAGNOSIS — R09.82 POSTNASAL DISCHARGE: Primary | ICD-10-CM

## 2024-04-11 PROCEDURE — 99203 OFFICE O/P NEW LOW 30 MIN: CPT | Performed by: OTOLARYNGOLOGY

## 2024-04-11 RX ORDER — MONTELUKAST SODIUM 10 MG/1
10 TABLET ORAL NIGHTLY
Qty: 30 TABLET | Refills: 3 | Status: SHIPPED | OUTPATIENT
Start: 2024-04-11

## 2024-04-11 RX ORDER — GLYCOPYRROLATE 1 MG/1
1 TABLET ORAL 3 TIMES DAILY
Qty: 90 TABLET | Refills: 1 | Status: SHIPPED | OUTPATIENT
Start: 2024-04-11

## 2024-04-11 NOTE — PROGRESS NOTES
Rolly Sanchez is a 69 year old male.    Chief Complaint   Patient presents with    Nose Problem     Constant runny nose for multiple years  Pt reports he has been using Flonase       HISTORY OF PRESENT ILLNESS  Constant runny nose for multiple years feels a running down the back of his throat all the time causing him throat discomfort with constant throat clearing and cough.  No nasal mucopurulence.  He is currently on Allegra and fluticasone.  No other significant signs, symptoms or complaints.  Does not tolerate Sudafed due to underlying blood pressure issues.      Social History     Socioeconomic History    Marital status:     Number of children: 2   Occupational History    Occupation:    Tobacco Use    Smoking status: Former     Current packs/day: 0.00     Average packs/day: 0.5 packs/day for 9.0 years (4.5 ttl pk-yrs)     Types: Cigarettes     Start date: 1973     Quit date: 1982     Years since quittin.1    Smokeless tobacco: Never    Tobacco comments:     smokes cigars occasionally   Vaping Use    Vaping status: Never Used   Substance and Sexual Activity    Alcohol use: Not Currently     Alcohol/week: 1.0 standard drink of alcohol     Types: 1 Standard drinks or equivalent per week    Drug use: No   Other Topics Concern    Caffeine Concern Yes     Comment: 3 cups of coffee per day    Pt has a pacemaker No    Pt has a defibrillator No    Reaction to local anesthetic No       Family History   Problem Relation Age of Onset    Diabetes Mother     Obesity Mother     Renal Disease Mother         ESRD    Other (Other) Mother     Hypertension Father     Obesity Father     Heart Attack Father     Stroke Father         TIA    Skin cancer Father     Heart Disorder Father     Other (Other) Father     Other (Brain bleed) Father 82         in his sleep-hx brain bleed    Diabetes Sister     Hypertension Brother        Past Medical History:    Adenomatous polyp    cscopy Dr. Singh  2010-misael. polyp, divertic, int hemrds    Allergic rhinitis    Asthma (HCC)    BPH (benign prostatic hyperplasia)    Cancer (HCC)    Carcinoma in situ of colon    cecum-R hemicolectomy Dr. Lugo    Chronic kidney disease, stage 5 (HCC)    Peritoneal dialysis started 2018 Dr Dexter    Diabetes (HCC)    Diabetes mellitus (HCC)    Dialysis patient (HCC)    Peritoneal dialysis before  kidney transplant    Diverticulosis    Gallstone    Gallstone seen on US liver     Hepatic steatosis    US liver     High blood pressure    High cholesterol    Hyperlipemia    IBS (irritable bowel syndrome)    Kidney transplant recipient (HCC)    Kidney transplant performed 12/15/2022 at [facility] ( donor kidney transplant).    Microalbuminuria    Migraines    hx in grade school    Neuropathy    diabetic, jorge hands and feet    Obesity    Pancytopenia (HCC)    Dr. Ureña--hematologist    Peritoneal dialysis status (HCC)    Dr Dexter    Pernicious anemia    Platelets decreased (HCC)    Psoriasis-eczema overlap condition    Renal disorder    CKD with proteinuria--Dr. 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    Viral disease    Hx spinal virus    Visual impairment    glasses       Past Surgical History:   Procedure Laterality Date    Adenoidectomy  1965 estimate    Same time as tonsils removed    Appendectomy      Colectomy Right 2010    hemicolectomy-Dr Resendez    Colonoscopy      Dr Singh    Colonoscopy  10/2014    polyp    Colonoscopy  2019    Colonoscopy N/A 2019    Procedure: COLONOSCOPY;  Surgeon: Blade Singh MD;  Location: [facility] ENDOSCOPY    Cystouretro w/stone remove Bilateral 2022    Cystoscopy, bilateral retrograde  pyelogram, bilateral ureteroscopy, manipulation/removal of bilateral blood clots/stones, ureteral stent removal.    Hernia surgery  2010    ventral hernia-at time of colon surgery    Other surgical history  11/1/2010    Colon Resection    Peritoneal dialysis  07/2018    Dr Croft placed PD catheter 6/2018. started PD 7/2018 Dr Dexter    Tonsillectomy      T & A    Transplantation of kidney  12/2022    At [facility]         REVIEW OF SYSTEMS    System Neg/Pos Details   Constitutional Negative Fatigue, fever and weight loss.   ENMT Negative Drooling.   Eyes Negative Blurred vision and vision changes.   Respiratory Negative Dyspnea and wheezing.   Cardio Negative Chest pain, irregular heartbeat/palpitations and syncope.   GI Negative Abdominal pain and diarrhea.   Endocrine Negative Cold intolerance and heat intolerance.   Neuro Negative Tremors.   Psych Negative Anxiety and depression.   Integumentary Negative Frequent skin infections, pigment change and rash.   Hema/Lymph Negative Easy bleeding and easy bruising.           PHYSICAL EXAM    There were no vitals taken for this visit.       Constitutional Normal Overall appearance - Normal.   Psychiatric Normal Orientation - Oriented to time, place, person & situation. Appropriate mood and affect.   Neck Exam Normal Inspection - Normal. Palpation - Normal. Parotid gland - Normal. Thyroid gland - Normal.   Eyes Normal Conjunctiva - Right: Normal, Left: Normal. Pupil - Right: Normal, Left: Normal. Fundus - Right: Normal, Left: Normal.   Neurological Normal Memory - Normal. Cranial nerves - Cranial nerves II through XII grossly intact.   Head/Face Normal Facial features - Normal. Eyebrows - Normal. Skull - Normal.        Nasopharynx Normal External nose - Normal. Lips/teeth/gums - Normal. Tonsils - Normal. Oropharynx - Normal.   Ears Normal Inspection - Right: Normal, Left: Normal. Canal - Right: Normal, Left: Normal. TM - Right: Normal, Left: Normal.   Skin Normal Inspection -  Normal.        Lymph Detail Normal Submental. Submandibular. Anterior cervical. Posterior cervical. Supraclavicular.        Nose/Mouth/Throat Normal External nose - Normal. Lips/teeth/gums - Normal. Tonsils - Normal. Oropharynx - Normal.   Nose/Mouth/Throat Normal Nares - Right: Normal Left: Normal. Septum -deviated to the left inferiorly and to the right anteriorly turbinates - Right: Normal, Left: Normal.  Significant intranasal congestion bilaterally       Current Outpatient Medications:     glycopyrrolate 1 MG Oral Tab, Take 1 tablet (1 mg total) by mouth 3 (three) times daily., Disp: 90 tablet, Rfl: 1    montelukast 10 MG Oral Tab, Take 1 tablet (10 mg total) by mouth nightly., Disp: 30 tablet, Rfl: 3    Cyanocobalamin (VITAMIN B-12) 1000 MCG Sublingual SL Tab, Place 1 tablet under the tongue daily., Disp: 90 tablet, Rfl: 3    semaglutide 2 MG/3ML Subcutaneous Solution Pen-injector, Inject 0.25 mg into the skin once a week., Disp: , Rfl:     Tacrolimus ER (ENVARSUS XR) 1 MG Oral Tablet 24 Hr, Take 1 tablet (1 mg total) by mouth daily., Disp: , Rfl:     metoprolol tartrate 100 MG Oral Tab, Take 1 tablet (100 mg total) by mouth 2 (two) times daily., Disp: , Rfl:     finasteride 5 MG Oral Tab, Take 1 tablet (5 mg total) by mouth daily., Disp: 90 tablet, Rfl: 3    predniSONE 10 MG Oral Tab, Take 1 tablet (10 mg total) by mouth daily., Disp: , Rfl:     ELIQUIS 5 MG Oral Tab, Take 1 tablet (5 mg total) by mouth 2 (two) times daily., Disp: , Rfl:     chlorthalidone 25 MG Oral Tab, Take 1 tablet (25 mg total) by mouth daily., Disp: , Rfl:     lisinopril 10 MG Oral Tab, Take 1 tablet (10 mg total) by mouth daily., Disp: , Rfl:     amLODIPine 10 MG Oral Tab, Take 1 tablet (10 mg total) by mouth daily., Disp: , Rfl:     ergocalciferol 1.25 MG (02385 UT) Oral Cap, Take 1 capsule (50,000 Units total) by mouth every 7 days., Disp: , Rfl:     Syringe/Needle, Disp, (BD LUER-PILY SYRINGE) 23G X 1\" 3 ML Does not apply Misc, USE  FOR B12 INJECTIONS AS DIRECTED, Disp: 12 each, Rfl: 0    Psyllium (METAMUCIL) Oral Wafer, Take 1 Wafer by mouth daily as needed., Disp: , Rfl:     sulfamethoxazole-trimethoprim -160 MG Oral Tab per tablet, Takes Mondays and Thursdays per transplant clinic, Disp: , Rfl: 0    tamsulosin 0.4 MG Oral Cap, Take 1 capsule (0.4 mg total) by mouth daily., Disp: 90 capsule, Rfl: 3    atorvastatin 40 MG Oral Tab, Take 1 tablet (40 mg total) by mouth nightly., Disp: , Rfl:     HUMULIN R U-500, CONCENTRATED, 500 UNIT/ML Subcutaneous Solution, Insulin pump , Disp: , Rfl:     PORFIRIO CONTOUR NEXT TEST In Vitro Strip, , Disp: , Rfl:   ASSESSMENT AND PLAN    1. Postnasal discharge  Continue with Allegra and fluticasone and I will start him on glycopyrrolate and Singulair as he does not tolerate Sudafed.  Return to see me in 1 month for reevaluation.        This note was prepared using Dragon Medical voice recognition dictation software. As a result errors may occur. When identified these errors have been corrected. While every attempt is made to correct errors during dictation discrepancies may still exist    Angel Oilvas MD    4/11/2024    12:03 PM

## 2024-04-12 ENCOUNTER — TELEPHONE (OUTPATIENT)
Dept: OTOLARYNGOLOGY | Facility: CLINIC | Age: 69
End: 2024-04-12

## 2024-04-12 NOTE — TELEPHONE ENCOUNTER
Fax received from [facility]  Is not covered under insurance  Requesting to dispense a covered alternative  Covered alternatives are  PANTOPRAZOLE, LANSOPRAZOLE, OMEPRAZOLE      Current Outpatient Medications:     ROBINUL TAB

## 2024-04-12 NOTE — TELEPHONE ENCOUNTER
Dr. Olivas, Glycopyrrolate is not being covered under patients insurance, they gave you 3 alternatives that are covered to prescribe instead:  PANTOPRAZOLE, LANSOPRAZOLE, OMEPRAZOLE     Please advise.

## 2024-04-13 NOTE — TELEPHONE ENCOUNTER
Please call in methscopolamine 2.5 mg p.o. twice daily #61 refill.  See if this is covered instead of glycopyrrolate

## 2024-04-15 NOTE — TELEPHONE ENCOUNTER
Dr. Olivas, the Methscopolamine is note covered by insurance, depending on how many pills it's cost wasaround $500 per the [facility] Pharmacist.  She is recommending Pantoprazole. Please advise.

## 2024-04-17 ENCOUNTER — PATIENT MESSAGE (OUTPATIENT)
Dept: OTOLARYNGOLOGY | Facility: CLINIC | Age: 69
End: 2024-04-17

## 2024-04-18 NOTE — TELEPHONE ENCOUNTER
From: Rolly Sanchez  Sent: 4/18/2024 11:36 AM CDT  To: Em Ent Clinical Staff  Subject: Medications    I got the Montelukast from [facility] and found the glycopyrrolate at [facility] at [address] Please send prescription to the [facility] at [address]    Thanks Rolly

## 2024-04-19 ENCOUNTER — LAB ENCOUNTER (OUTPATIENT)
Dept: LAB | Facility: HOSPITAL | Age: 69
End: 2024-04-19
Attending: INTERNAL MEDICINE
Payer: MEDICARE

## 2024-04-19 DIAGNOSIS — Z79.4 TYPE 2 DIABETES MELLITUS WITH CHRONIC KIDNEY DISEASE, WITH LONG-TERM CURRENT USE OF INSULIN, UNSPECIFIED CKD STAGE (HCC): Primary | ICD-10-CM

## 2024-04-19 DIAGNOSIS — E11.22 TYPE 2 DIABETES MELLITUS WITH CHRONIC KIDNEY DISEASE, WITH LONG-TERM CURRENT USE OF INSULIN, UNSPECIFIED CKD STAGE (HCC): Primary | ICD-10-CM

## 2024-04-19 DIAGNOSIS — E53.8 VITAMIN B12 DEFICIENCY: ICD-10-CM

## 2024-04-19 DIAGNOSIS — Z12.5 SCREENING FOR MALIGNANT NEOPLASM OF PROSTATE: ICD-10-CM

## 2024-04-19 LAB
ALBUMIN SERPL-MCNC: 4 G/DL (ref 3.2–4.8)
ALBUMIN/GLOB SERPL: 2 {RATIO} (ref 1–2)
ALP LIVER SERPL-CCNC: 46 U/L
ALT SERPL-CCNC: 23 U/L
ANION GAP SERPL CALC-SCNC: 7 MMOL/L (ref 0–18)
AST SERPL-CCNC: 20 U/L (ref ?–34)
BILIRUB SERPL-MCNC: 0.5 MG/DL (ref 0.2–1.1)
BUN BLD-MCNC: 54 MG/DL (ref 9–23)
BUN/CREAT SERPL: 29.8 (ref 10–20)
CALCIUM BLD-MCNC: 9.1 MG/DL (ref 8.7–10.4)
CHLORIDE SERPL-SCNC: 115 MMOL/L (ref 98–112)
CHOLEST SERPL-MCNC: 91 MG/DL (ref ?–200)
CO2 SERPL-SCNC: 20 MMOL/L (ref 21–32)
COMPLEXED PSA SERPL-MCNC: 0.73 NG/ML (ref ?–4)
CREAT BLD-MCNC: 1.81 MG/DL
EGFRCR SERPLBLD CKD-EPI 2021: 40 ML/MIN/1.73M2 (ref 60–?)
EST. AVERAGE GLUCOSE BLD GHB EST-MCNC: 140 MG/DL (ref 68–126)
FASTING PATIENT LIPID ANSWER: YES
FASTING STATUS PATIENT QL REPORTED: YES
GLOBULIN PLAS-MCNC: 2 G/DL (ref 2.8–4.4)
GLUCOSE BLD-MCNC: 74 MG/DL (ref 70–99)
HBA1C MFR BLD: 6.5 % (ref ?–5.7)
HDLC SERPL-MCNC: 32 MG/DL (ref 40–59)
LDLC SERPL CALC-MCNC: 34 MG/DL (ref ?–100)
NONHDLC SERPL-MCNC: 59 MG/DL (ref ?–130)
OSMOLALITY SERPL CALC.SUM OF ELEC: 307 MOSM/KG (ref 275–295)
POTASSIUM SERPL-SCNC: 4.7 MMOL/L (ref 3.5–5.1)
PROT SERPL-MCNC: 6 G/DL (ref 5.7–8.2)
SODIUM SERPL-SCNC: 142 MMOL/L (ref 136–145)
TRIGL SERPL-MCNC: 144 MG/DL (ref 30–149)
VIT D+METAB SERPL-MCNC: 71.3 NG/ML (ref 30–100)
VLDLC SERPL CALC-MCNC: 20 MG/DL (ref 0–30)

## 2024-04-19 PROCEDURE — 36415 COLL VENOUS BLD VENIPUNCTURE: CPT

## 2024-04-19 PROCEDURE — 80053 COMPREHEN METABOLIC PANEL: CPT

## 2024-04-19 PROCEDURE — 83036 HEMOGLOBIN GLYCOSYLATED A1C: CPT

## 2024-04-19 PROCEDURE — 82607 VITAMIN B-12: CPT

## 2024-04-19 PROCEDURE — 80061 LIPID PANEL: CPT

## 2024-04-23 ENCOUNTER — TELEPHONE (OUTPATIENT)
Dept: INTERNAL MEDICINE CLINIC | Facility: CLINIC | Age: 69
End: 2024-04-23

## 2024-04-23 ENCOUNTER — LAB ENCOUNTER (OUTPATIENT)
Dept: LAB | Facility: HOSPITAL | Age: 69
End: 2024-04-23
Attending: INTERNAL MEDICINE
Payer: MEDICARE

## 2024-04-23 DIAGNOSIS — E83.42 HYPOMAGNESEMIA: ICD-10-CM

## 2024-04-23 DIAGNOSIS — E78.5 HYPERLIPIDEMIA: Primary | ICD-10-CM

## 2024-04-23 DIAGNOSIS — Z12.5 SCREENING FOR MALIGNANT NEOPLASM OF PROSTATE: ICD-10-CM

## 2024-04-23 DIAGNOSIS — E53.8 VITAMIN B12 DEFICIENCY: ICD-10-CM

## 2024-04-23 DIAGNOSIS — Z79.4 TYPE 2 DIABETES MELLITUS WITH CHRONIC KIDNEY DISEASE, WITH LONG-TERM CURRENT USE OF INSULIN, UNSPECIFIED CKD STAGE (HCC): ICD-10-CM

## 2024-04-23 DIAGNOSIS — E11.22 TYPE 2 DIABETES MELLITUS WITH CHRONIC KIDNEY DISEASE, WITH LONG-TERM CURRENT USE OF INSULIN, UNSPECIFIED CKD STAGE (HCC): ICD-10-CM

## 2024-04-23 DIAGNOSIS — D70.8 OTHER NEUTROPENIA (HCC): ICD-10-CM

## 2024-04-23 DIAGNOSIS — R78.81 BACTEREMIA: ICD-10-CM

## 2024-04-23 DIAGNOSIS — N39.0 URINARY TRACT INFECTION, SITE NOT SPECIFIED: ICD-10-CM

## 2024-04-23 DIAGNOSIS — Z94.0 KIDNEY TRANSPLANTED (HCC): ICD-10-CM

## 2024-04-23 DIAGNOSIS — E55.9 VITAMIN D DEFICIENCY DISEASE: ICD-10-CM

## 2024-04-23 DIAGNOSIS — R80.9 PROTEINURIA: ICD-10-CM

## 2024-04-23 DIAGNOSIS — B25.9 CYTOMEGALOVIRAL DISEASE (HCC): ICD-10-CM

## 2024-04-23 LAB
ANION GAP SERPL CALC-SCNC: 8 MMOL/L (ref 0–18)
BASOPHILS # BLD AUTO: 0.03 X10(3) UL (ref 0–0.2)
BASOPHILS NFR BLD AUTO: 0.4 %
BILIRUB UR QL: NEGATIVE
BUN BLD-MCNC: 38 MG/DL (ref 9–23)
BUN/CREAT SERPL: 24.1 (ref 10–20)
CALCIUM BLD-MCNC: 9 MG/DL (ref 8.7–10.4)
CHLORIDE SERPL-SCNC: 112 MMOL/L (ref 98–112)
CLARITY UR: CLEAR
CO2 SERPL-SCNC: 20 MMOL/L (ref 21–32)
CREAT BLD-MCNC: 1.58 MG/DL
CREAT UR-SCNC: 80.9 MG/DL
DEPRECATED RDW RBC AUTO: 47.8 FL (ref 35.1–46.3)
EGFRCR SERPLBLD CKD-EPI 2021: 47 ML/MIN/1.73M2 (ref 60–?)
EOSINOPHIL # BLD AUTO: 0.03 X10(3) UL (ref 0–0.7)
EOSINOPHIL NFR BLD AUTO: 0.4 %
ERYTHROCYTE [DISTWIDTH] IN BLOOD BY AUTOMATED COUNT: 13.6 % (ref 11–15)
FASTING STATUS PATIENT QL REPORTED: YES
GLUCOSE BLD-MCNC: 108 MG/DL (ref 70–99)
GLUCOSE UR-MCNC: NORMAL MG/DL
HCT VFR BLD AUTO: 35.2 %
HGB BLD-MCNC: 12.1 G/DL
HGB UR QL STRIP.AUTO: NEGATIVE
IMM GRANULOCYTES # BLD AUTO: 0.04 X10(3) UL (ref 0–1)
IMM GRANULOCYTES NFR BLD: 0.6 %
KETONES UR-MCNC: NEGATIVE MG/DL
LEUKOCYTE ESTERASE UR QL STRIP.AUTO: NEGATIVE
LYMPHOCYTES # BLD AUTO: 1.77 X10(3) UL (ref 1–4)
LYMPHOCYTES NFR BLD AUTO: 24.3 %
MAGNESIUM SERPL-MCNC: 2.1 MG/DL (ref 1.6–2.6)
MCH RBC QN AUTO: 32.7 PG (ref 26–34)
MCHC RBC AUTO-ENTMCNC: 34.4 G/DL (ref 31–37)
MCV RBC AUTO: 95.1 FL
MICROALBUMIN UR-MCNC: 1 MG/DL
MICROALBUMIN/CREAT 24H UR-RTO: 12.4 UG/MG (ref ?–30)
MONOCYTES # BLD AUTO: 0.73 X10(3) UL (ref 0.1–1)
MONOCYTES NFR BLD AUTO: 10 %
NEUTROPHILS # BLD AUTO: 4.67 X10 (3) UL (ref 1.5–7.7)
NEUTROPHILS # BLD AUTO: 4.67 X10(3) UL (ref 1.5–7.7)
NEUTROPHILS NFR BLD AUTO: 64.3 %
NITRITE UR QL STRIP.AUTO: NEGATIVE
OSMOLALITY SERPL CALC.SUM OF ELEC: 300 MOSM/KG (ref 275–295)
PH UR: 5.5 [PH] (ref 5–8)
PHOSPHATE SERPL-MCNC: 3.4 MG/DL (ref 2.4–5.1)
PLATELET # BLD AUTO: 118 10(3)UL (ref 150–450)
PLATELETS.RETICULATED NFR BLD AUTO: 5.3 % (ref 0–7)
POTASSIUM SERPL-SCNC: 4.6 MMOL/L (ref 3.5–5.1)
PROT UR-MCNC: 11 MG/DL (ref ?–14)
PROT UR-MCNC: NEGATIVE MG/DL
RBC # BLD AUTO: 3.7 X10(6)UL
SODIUM SERPL-SCNC: 140 MMOL/L (ref 136–145)
SP GR UR STRIP: 1.01 (ref 1–1.03)
UROBILINOGEN UR STRIP-ACNC: NORMAL
VIT B12 SERPL-MCNC: 673 PG/ML (ref 211–911)
WBC # BLD AUTO: 7.3 X10(3) UL (ref 4–11)

## 2024-04-23 PROCEDURE — 81003 URINALYSIS AUTO W/O SCOPE: CPT

## 2024-04-23 PROCEDURE — 80048 BASIC METABOLIC PNL TOTAL CA: CPT

## 2024-04-23 PROCEDURE — 83735 ASSAY OF MAGNESIUM: CPT

## 2024-04-23 PROCEDURE — 87086 URINE CULTURE/COLONY COUNT: CPT

## 2024-04-23 PROCEDURE — 87077 CULTURE AEROBIC IDENTIFY: CPT

## 2024-04-23 PROCEDURE — 36415 COLL VENOUS BLD VENIPUNCTURE: CPT

## 2024-04-23 PROCEDURE — 85025 COMPLETE CBC W/AUTO DIFF WBC: CPT

## 2024-04-23 PROCEDURE — 82607 VITAMIN B-12: CPT

## 2024-04-23 PROCEDURE — 82570 ASSAY OF URINE CREATININE: CPT

## 2024-04-23 PROCEDURE — 84156 ASSAY OF PROTEIN URINE: CPT

## 2024-04-23 PROCEDURE — 80197 ASSAY OF TACROLIMUS: CPT

## 2024-04-23 PROCEDURE — 84100 ASSAY OF PHOSPHORUS: CPT

## 2024-04-23 PROCEDURE — 82043 UR ALBUMIN QUANTITATIVE: CPT

## 2024-04-24 LAB
CMV DNA DETECT, QN LOG: NOT DETECTED {LOG_IU}/ML
CMV DNA DETECT, QN: NOT DETECTED [IU]/ML

## 2024-04-25 LAB — TACROLIMUS LVL: 8.3 NG/ML

## 2024-04-26 ENCOUNTER — TELEPHONE (OUTPATIENT)
Dept: INTERNAL MEDICINE CLINIC | Facility: CLINIC | Age: 69
End: 2024-04-26

## 2024-05-02 ENCOUNTER — PATIENT MESSAGE (OUTPATIENT)
Dept: INTERNAL MEDICINE CLINIC | Facility: CLINIC | Age: 69
End: 2024-05-02

## 2024-05-03 ENCOUNTER — APPOINTMENT (OUTPATIENT)
Dept: GENERAL RADIOLOGY | Age: 69
End: 2024-05-03
Attending: EMERGENCY MEDICINE
Payer: MEDICARE

## 2024-05-03 ENCOUNTER — TELEPHONE (OUTPATIENT)
Dept: INTERNAL MEDICINE CLINIC | Facility: CLINIC | Age: 69
End: 2024-05-03

## 2024-05-03 ENCOUNTER — HOSPITAL ENCOUNTER (OUTPATIENT)
Age: 69
Discharge: HOME | End: 2024-05-03
Attending: EMERGENCY MEDICINE
Payer: MEDICARE

## 2024-05-03 VITALS
HEART RATE: 110 BPM | DIASTOLIC BLOOD PRESSURE: 66 MMHG | OXYGEN SATURATION: 97 % | TEMPERATURE: 98 F | RESPIRATION RATE: 20 BRPM | SYSTOLIC BLOOD PRESSURE: 98 MMHG

## 2024-05-03 DIAGNOSIS — N30.00 ACUTE CYSTITIS WITHOUT HEMATURIA: ICD-10-CM

## 2024-05-03 DIAGNOSIS — R00.0 SINUS TACHYCARDIA: Primary | ICD-10-CM

## 2024-05-03 LAB
#MXD IC: 0.9 X10ˆ3/UL (ref 0.1–1)
BILIRUB UR QL STRIP: NEGATIVE
BUN BLD-MCNC: 41 MG/DL (ref 7–18)
CHLORIDE BLD-SCNC: 107 MMOL/L (ref 98–112)
CO2 BLD-SCNC: 22 MMOL/L (ref 21–32)
COLOR UR: YELLOW
CREAT BLD-MCNC: 1.9 MG/DL
DDIMER WHOLE BLOOD: <200 NG/ML DDU (ref ?–400)
EGFRCR SERPLBLD CKD-EPI 2021: 38 ML/MIN/1.73M2 (ref 60–?)
GLUCOSE BLD-MCNC: 85 MG/DL (ref 70–99)
GLUCOSE UR STRIP-MCNC: NEGATIVE MG/DL
HCT VFR BLD AUTO: 36.7 %
HCT VFR BLD CALC: 37 %
HGB BLD-MCNC: 12.2 G/DL
ISTAT IONIZED CALCIUM FOR CHEM 8: 1.28 MMOL/L (ref 1.12–1.32)
KETONES UR STRIP-MCNC: NEGATIVE MG/DL
LYMPHOCYTES # BLD AUTO: 1.1 X10ˆ3/UL (ref 1–4)
LYMPHOCYTES NFR BLD AUTO: 18.1 %
MCH RBC QN AUTO: 31.4 PG (ref 26–34)
MCHC RBC AUTO-ENTMCNC: 33.2 G/DL (ref 31–37)
MCV RBC AUTO: 94.6 FL (ref 80–100)
MIXED CELL %: 14.4 %
NEUTROPHILS # BLD AUTO: 4.1 X10ˆ3/UL (ref 1.5–7.7)
NEUTROPHILS NFR BLD AUTO: 67.5 %
NITRITE UR QL STRIP: NEGATIVE
PH UR STRIP: 6 [PH]
PLATELET # BLD AUTO: 122 X10ˆ3/UL (ref 150–450)
POTASSIUM BLD-SCNC: 4.9 MMOL/L (ref 3.6–5.1)
PROT UR STRIP-MCNC: NEGATIVE MG/DL
RBC # BLD AUTO: 3.88 X10ˆ6/UL
SODIUM BLD-SCNC: 137 MMOL/L (ref 136–145)
SP GR UR STRIP: 1.02
TROPONIN I BLD-MCNC: <0.02 NG/ML
UROBILINOGEN UR STRIP-ACNC: <2 MG/DL
WBC # BLD AUTO: 6.1 X10ˆ3/UL (ref 4–11)

## 2024-05-03 PROCEDURE — 81002 URINALYSIS NONAUTO W/O SCOPE: CPT

## 2024-05-03 PROCEDURE — 85378 FIBRIN DEGRADE SEMIQUANT: CPT | Performed by: EMERGENCY MEDICINE

## 2024-05-03 PROCEDURE — 36415 COLL VENOUS BLD VENIPUNCTURE: CPT

## 2024-05-03 PROCEDURE — 87086 URINE CULTURE/COLONY COUNT: CPT | Performed by: EMERGENCY MEDICINE

## 2024-05-03 PROCEDURE — 84484 ASSAY OF TROPONIN QUANT: CPT

## 2024-05-03 PROCEDURE — 99215 OFFICE O/P EST HI 40 MIN: CPT

## 2024-05-03 PROCEDURE — 80047 BASIC METABLC PNL IONIZED CA: CPT

## 2024-05-03 PROCEDURE — 87077 CULTURE AEROBIC IDENTIFY: CPT | Performed by: EMERGENCY MEDICINE

## 2024-05-03 PROCEDURE — 85025 COMPLETE CBC W/AUTO DIFF WBC: CPT | Performed by: EMERGENCY MEDICINE

## 2024-05-03 PROCEDURE — 93005 ELECTROCARDIOGRAM TRACING: CPT

## 2024-05-03 PROCEDURE — 93010 ELECTROCARDIOGRAM REPORT: CPT

## 2024-05-03 PROCEDURE — 71046 X-RAY EXAM CHEST 2 VIEWS: CPT | Performed by: EMERGENCY MEDICINE

## 2024-05-03 RX ORDER — CEPHALEXIN 500 MG/1
500 CAPSULE ORAL 2 TIMES DAILY
Qty: 14 CAPSULE | Refills: 0 | Status: SHIPPED | OUTPATIENT
Start: 2024-05-03 | End: 2024-05-10

## 2024-05-03 NOTE — TELEPHONE ENCOUNTER
From: Rolly Sanchez  To: Macey Hastings  Sent: 5/2/2024 7:25 PM CDT  Subject: High heart rate     For the last couple of days I have a high heart rate around 100 or above. I just used my watch to check for atrfibulation and it's positive. Is there something I can do to lower my pulse

## 2024-05-03 NOTE — TELEPHONE ENCOUNTER
Called patient who states Wednesday he was mowing grass and since then his HR stayed around 100 or above and he felt very exhausted  Today HR is 78-62-RN advised to be evaluated at  agrees and will go to [facility]  F/U 5/6

## 2024-05-03 NOTE — ED INITIAL ASSESSMENT (HPI)
Patient reports having an increased heart rate on his watch since Wednesday evening. Patient states it has shown to be around 115. Patient denies any dizziness or shortness of breath. Patient states he has noticed more fatigue with when he exerts himself. Patient reports being on Eliquis for a PMH of AFIB when he had a kidney transplant in 12/2022.

## 2024-05-03 NOTE — DISCHARGE INSTRUCTIONS
Hold chlorthalidone medication over the weekend.  Keflex as prescribed.  Follow-up with Dr. Hastings at 11:30 AM on Monday.  Go to the ER immediately for any fever, lightheadedness, worsening fatigue

## 2024-05-03 NOTE — TELEPHONE ENCOUNTER
----- Message from Rolly Sanchez sent at 5/2/2024  7:25 PM CDT -----  Regarding: High heart rate   Contact: [phone]  For the last couple of days I have a high heart rate around 100 or above. I just used my watch to check for atrfibulation and it's positive. Is there something I can do to lower my pulse

## 2024-05-03 NOTE — ED PROVIDER NOTES
Patient Seen in: [facility]      History     Chief Complaint   Patient presents with    Arrythmia/Palpitations     Stated Complaint: increased heart rate    Subjective:   HPI    The patient is a 69-year-old male with past history of asthma, chronic kidney disease, status post renal transplant, insulin-dependent diabetes, hypertension, atrial fibrillation in the past who presents now with elevated heart rate.  The patient states since approximately Wednesday he had noticed that his heart rate is somewhat elevated.  The patient reports noting heart rates of approximately 115 at home.  Patient states that he had an episode of atrial fibrillation at the time of his kidney transplant but states that he wore Holter monitor for 3 months afterward and they noticed no recurrent atrial fibrillation.  The patient does remain on Eliquis.  Patient denies any chest pain.  The patient states he has had some minimal shortness of breath over the last few days.  The patient denies any leg pain or swelling.  The patient denies any URI symptoms.  Patient states he has felt slightly more fatigued with exertion than typical.  Patient states he has had some mild urinary frequency.    Objective:   Past Medical History:    Adenomatous polyp    cscopy Dr. Singh 2010-misael. polyp, divertic, int hemrds    Allergic rhinitis    Asthma (HCC)    BPH (benign prostatic hyperplasia)    Cancer (HCC)    Carcinoma in situ of colon    cecum-R hemicolectomy Dr. Lugo    Chronic kidney disease, stage 5 (HCC)    Peritoneal dialysis started 7/2018 Dr Dexter    Diabetes (HCC)    Diabetes mellitus (HCC)    Dialysis patient (HCC)    Peritoneal dialysis before 2022 kidney transplant    Diverticulosis    Gallstone    Gallstone seen on US liver 11/09    Hepatic steatosis    US liver 2009    High blood pressure    High cholesterol    Hyperlipemia    IBS (irritable bowel syndrome)    Kidney transplant recipient (HCC)    Kidney transplant performed  12/15/2022 at [facility] ( donor kidney transplant).    Microalbuminuria    Migraines    hx in grade school    Neuropathy    diabetic, jorge hands and feet    Obesity    Pancytopenia ([facility])    Dr. Ureña--hematologist    Peritoneal dialysis status ([facility])    Dr Dexter    Pernicious anemia    Platelets decreased ([facility])    Psoriasis-eczema overlap condition    Renal disorder    CKD with proteinuria--Dr. 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              No pertinent social history.            Review of Systems    Positive for stated complaint: increased heart rate  Other systems are as noted in HPI.  Constitutional and vital signs reviewed.      All other systems reviewed and negative except as noted above.    Physical Exam     ED Triage Vitals [24 1428]   /72   Pulse 110   Resp 24   Temp 98.1 °F (36.7 °C)   Temp src Temporal   SpO2 95 %   O2 Device None (Room air)       Current:BP 98/66   Pulse 110   Temp 98.1 °F (36.7 °C) (Temporal)   Resp 20   SpO2 97%         Physical Exam    Constitutional: Well-developed well-nourished in no acute distress  Head: Normocephalic, no swelling or tenderness  Eyes: Nonicteric sclera, no conjunctival injection  ENT: TMs are clear and flat bilaterally.  There is no posterior pharyngeal erythema  Chest: Clear to auscultation, no tenderness  Cardiovascular: Regular rate and rhythm without murmur  Abdomen: Soft, nontender and nondistended  Neurologic: Patient is awake, alert and oriented ×3.  The patient's motor strength is 5 out of 5 and symmetric in the upper and lower extremities  bilaterally  Extremities: No focal swelling or tenderness  Skin: No pallor, no redness or warmth to the touch      ED Course     Labs Reviewed   POCT CBC - Abnormal; Notable for the following components:       Result Value    HGB IC 12.2 (*)     HCT IC 36.7 (*)     PLT .0 (*)     All other components within normal limits   POCT ISTAT CHEM8 CARTRIDGE - Abnormal; Notable for the following components:    ISTAT BUN 41 (*)     ISTAT Creatinine 1.90 (*)     eGFR-Cr 38 (*)     All other components within normal limits   [facility] POCT URINALYSIS DIPSTICK - Abnormal; Notable for the following components:    Urine Clarity Slightly cloudy (*)     Blood, Urine Trace-lysed (*)     Leukocyte esterase urine Small (*)     All other components within normal limits   D-DIMER (POC) - Normal   ISTAT TROPONIN - Normal   URINE CULTURE, ROUTINE     EKG    Rate, intervals and axes as noted on EKG Report.  Rate: 106  Rhythm: Sinus Rhythm  Reading: Patient's EKG demonstrates a sinus rhythm with a rate of 106.  Patient's axis and intervals are normal.  There is no noted acute ST elevation.                 Patient's chest x-ray was independently reviewed by myself.  Heart size is normal.  The lungs are clear.    Patient's lab results were reviewed.  The patient's creatinine today was 1.9 with a GFR of 38.  On 4/23/2024, patient's creatinine was 1.58 with a GFR of 47.     Patient's clinical presentation, lab results were reviewed with , on-call for .  She recommends that the patient hold his chlorthalidone over the weekend.  A urine culture will be sent.  Will initiate Keflex 500 p.o. twice daily.  An appointment has been scheduled for the patient at 11:30 AM on Monday morning.  The need to go to the ER over the weekend for any worsening symptoms including fever, lightheadedness, worsening fatigue were reviewed with the patient.    MDM      SVT versus atrial fibs versus sinus tachycardia                                    Medical Decision Making      Disposition and Plan     Clinical Impression:  1. Sinus tachycardia    2. Acute cystitis without hematuria         Disposition:  Discharge  5/3/2024  3:52 pm    Follow-up:  Macey Hastings DO  [address] [place] [id number]  [phone]      11:30 AM on Monday.          Medications Prescribed:  Current Discharge Medication List        START taking these medications    Details   cephalexin 500 MG Oral Cap Take 1 capsule (500 mg total) by mouth 2 (two) times daily for 7 days.  Qty: 14 capsule, Refills: 0

## 2024-05-03 NOTE — TELEPHONE ENCOUNTER
Left message to call back about patient's mychart message    FYI--per Dr Hastings's last OV note:

## 2024-05-04 LAB
ATRIAL RATE: 106 BPM
P AXIS: 48 DEGREES
P-R INTERVAL: 228 MS
Q-T INTERVAL: 326 MS
QRS DURATION: 74 MS
QTC CALCULATION (BEZET): 433 MS
R AXIS: -24 DEGREES
T AXIS: 9 DEGREES
VENTRICULAR RATE: 106 BPM

## 2024-05-06 ENCOUNTER — LAB ENCOUNTER (OUTPATIENT)
Dept: LAB | Age: 69
End: 2024-05-06
Attending: INTERNAL MEDICINE
Payer: MEDICARE

## 2024-05-06 ENCOUNTER — TELEPHONE (OUTPATIENT)
Dept: INTERNAL MEDICINE CLINIC | Facility: CLINIC | Age: 69
End: 2024-05-06

## 2024-05-06 ENCOUNTER — OFFICE VISIT (OUTPATIENT)
Dept: INTERNAL MEDICINE CLINIC | Facility: CLINIC | Age: 69
End: 2024-05-06

## 2024-05-06 VITALS
HEART RATE: 73 BPM | SYSTOLIC BLOOD PRESSURE: 92 MMHG | TEMPERATURE: 98 F | HEIGHT: 72 IN | BODY MASS INDEX: 38.33 KG/M2 | WEIGHT: 283 LBS | OXYGEN SATURATION: 99 % | DIASTOLIC BLOOD PRESSURE: 54 MMHG

## 2024-05-06 DIAGNOSIS — I10 HYPERTENSION, UNSPECIFIED TYPE: ICD-10-CM

## 2024-05-06 DIAGNOSIS — N30.00 ACUTE CYSTITIS WITHOUT HEMATURIA: Primary | ICD-10-CM

## 2024-05-06 DIAGNOSIS — N30.00 ACUTE CYSTITIS WITHOUT HEMATURIA: ICD-10-CM

## 2024-05-06 LAB
ANION GAP SERPL CALC-SCNC: 9 MMOL/L (ref 0–18)
BILIRUB UR QL: NEGATIVE
BUN BLD-MCNC: 52 MG/DL (ref 9–23)
BUN/CREAT SERPL: 31.3 (ref 10–20)
CALCIUM BLD-MCNC: 9.4 MG/DL (ref 8.7–10.4)
CHLORIDE SERPL-SCNC: 111 MMOL/L (ref 98–112)
CLARITY UR: CLEAR
CO2 SERPL-SCNC: 22 MMOL/L (ref 21–32)
CREAT BLD-MCNC: 1.66 MG/DL
EGFRCR SERPLBLD CKD-EPI 2021: 44 ML/MIN/1.73M2 (ref 60–?)
FASTING STATUS PATIENT QL REPORTED: NO
GLUCOSE BLD-MCNC: 117 MG/DL (ref 70–99)
GLUCOSE UR-MCNC: NORMAL MG/DL
KETONES UR-MCNC: NEGATIVE MG/DL
LEUKOCYTE ESTERASE UR QL STRIP.AUTO: NEGATIVE
NITRITE UR QL STRIP.AUTO: NEGATIVE
OSMOLALITY SERPL CALC.SUM OF ELEC: 309 MOSM/KG (ref 275–295)
PH UR: 5.5 [PH] (ref 5–8)
POTASSIUM SERPL-SCNC: 4.8 MMOL/L (ref 3.5–5.1)
PROT UR-MCNC: NEGATIVE MG/DL
SODIUM SERPL-SCNC: 142 MMOL/L (ref 136–145)
SP GR UR STRIP: 1.01 (ref 1–1.03)
UROBILINOGEN UR STRIP-ACNC: NORMAL

## 2024-05-06 PROCEDURE — 36415 COLL VENOUS BLD VENIPUNCTURE: CPT

## 2024-05-06 PROCEDURE — 81001 URINALYSIS AUTO W/SCOPE: CPT

## 2024-05-06 PROCEDURE — 80048 BASIC METABOLIC PNL TOTAL CA: CPT

## 2024-05-06 PROCEDURE — 99214 OFFICE O/P EST MOD 30 MIN: CPT | Performed by: INTERNAL MEDICINE

## 2024-05-06 RX ORDER — TACROLIMUS 1 MG/1
3 TABLET, EXTENDED RELEASE ORAL DAILY
COMMUNITY

## 2024-05-06 RX ORDER — MELATONIN
1000 DAILY
COMMUNITY

## 2024-05-06 RX ORDER — FLUTICASONE PROPIONATE 50 MCG
SPRAY, SUSPENSION (ML) NASAL AS NEEDED
COMMUNITY

## 2024-05-06 RX ORDER — FEXOFENADINE HCL 180 MG/1
180 TABLET ORAL DAILY
COMMUNITY

## 2024-05-06 RX ORDER — TACROLIMUS 4 MG/1
1 TABLET, EXTENDED RELEASE ORAL EVERY MORNING
COMMUNITY

## 2024-05-06 RX ORDER — SEMAGLUTIDE 1.34 MG/ML
0.5 INJECTION, SOLUTION SUBCUTANEOUS WEEKLY
COMMUNITY
Start: 2024-01-23

## 2024-05-06 NOTE — TELEPHONE ENCOUNTER
To  - patient was seen in UC for Arrythmia /Palpitations- urine culture was sent and patient on Keflex - has appointment today with

## 2024-05-06 NOTE — PROGRESS NOTES
I called the patient who answered and confirmed his name and date of birth.  We discussed his results.  He states he does feel overall improved.  However, he has chronic vague symptoms and it is hard to tell if these are related or chronic.  He is on his way to see his primary care physician for follow-up.  I explained that I discussed his case with our infectious disease specialist Dr. Alfred, and given the patient has a history of kidney transplant and is immunocompromised, it is very important he follows with his primary care physician or go to the ER with any concerning signs or symptoms.  Per Dr. Alfred the organism could be contaminant but also can lead to bacteremia.  I discussed ER precautions with the patient.  However, he is on his way to see his primary care physician today.  I encouraged him to maintain this appointment for reassessment and for further recommendations based off his primary care physician's assessment.  I encouraged him to discuss our conversation and his results with his primary care physician for further recommendations.  He agrees with this plan.    Kayleen Claros, DO

## 2024-05-06 NOTE — PROGRESS NOTES
Rolly Sanchez is a 69 year old male  Chief Complaint   Patient presents with    Urgent Care F/u     Dx: Sinus tachycardia; Acute cystitis without hematuria.      Reports continued elevated  today am. Reports feeling fatigue, winded, SOB.   Upcoming with Dr. Gonzalez ([facility]) in September. However, pt plans to switch back to previous cardiologist, Dr. Gil.          HPI:   Rolly Sanchez is a 69 year old male who presents for TCM.    Mowed the lawn last Wednesday and felt more tired than normal after. The next day he noticed his HR up around 100 and on Friday his wife urged him to seek care.    Went to  on 5/3. CXR unremarkable, labs stable, trop and d-dimer negative, EKG with sinus tachycardia, 1st degree AVB. Diagnosed with UTI and has been on cephalexin since. He was instructed to hold chlorthalidone over the weekend.    Checked HR this morning before metoprolol dose and it was around 112. Admits to lower bp lately and occasional orthostatic dizziness. Denies other sx including headache, cough, n/v/d, dysuria, hematuria, melena, CP, dyspnea, rashes, new/worsening sinus congestion.    Wt Readings from Last 6 Encounters:   05/06/24 283 lb (128.4 kg)   03/25/24 294 lb (133.4 kg)   02/13/24 295 lb 9.6 oz (134.1 kg)   02/12/24 293 lb (132.9 kg)   01/16/24 297 lb (134.7 kg)   09/22/23 293 lb 6.4 oz (133.1 kg)     Body mass index is 38.38 kg/m².     Current Outpatient Medications   Medication Sig Dispense Refill    Tacrolimus ER (ENVARSUS XR) 4 MG Oral Tablet 24 Hr Take 1 tablet by mouth every morning.      Tacrolimus ER (ENVARSUS XR) 1 MG Oral Tablet 24 Hr Take 3 tablets (3 mg total) by mouth daily.      fexofenadine 180 MG Oral Tab Take 1 tablet (180 mg total) by mouth daily.      cyanocobalamin 1000 MCG Oral Tab Take 1 tablet (1,000 mcg total) by mouth daily.      NON FORMULARY MAGNESIUM + PROTEIN 1 daily      semaglutide (OZEMPIC, 0.25 OR 0.5 MG/DOSE,) 2 MG/1.5ML Subcutaneous Solution Pen-injector Inject 0.5 mg into  the skin once a week.      Probiotic Product (PROBIOTIC-10 OR) Take 1 capsule by mouth daily.      fluticasone propionate 50 MCG/ACT Nasal Suspension by Each Nare route as needed for Rhinitis.      cephalexin 500 MG Oral Cap Take 1 capsule (500 mg total) by mouth 2 (two) times daily for 7 days. 14 capsule 0    glycopyrrolate 1 MG Oral Tab Take 1 tablet (1 mg total) by mouth 3 (three) times daily. 90 tablet 1    montelukast 10 MG Oral Tab Take 1 tablet (10 mg total) by mouth nightly. 30 tablet 3    metoprolol tartrate 100 MG Oral Tab Take 1 tablet (100 mg total) by mouth 2 (two) times daily.      finasteride 5 MG Oral Tab Take 1 tablet (5 mg total) by mouth daily. 90 tablet 3    predniSONE 10 MG Oral Tab Take 1 tablet (10 mg total) by mouth daily.      ELIQUIS 5 MG Oral Tab Take 1 tablet (5 mg total) by mouth 2 (two) times daily.      lisinopril 10 MG Oral Tab Take 1 tablet (10 mg total) by mouth daily.      amLODIPine 10 MG Oral Tab Take 1 tablet (10 mg total) by mouth daily.      ergocalciferol 1.25 MG (67386 UT) Oral Cap Take 1 capsule (50,000 Units total) by mouth every 7 days.      Syringe/Needle, Disp, (BD LUER-PILY SYRINGE) 23G X 1\" 3 ML Does not apply Misc USE FOR B12 INJECTIONS AS DIRECTED 12 each 0    sulfamethoxazole-trimethoprim -160 MG Oral Tab per tablet Takes Mondays and Thursdays per transplant clinic  0    tamsulosin 0.4 MG Oral Cap Take 1 capsule (0.4 mg total) by mouth daily. 90 capsule 3    atorvastatin 40 MG Oral Tab Take 1 tablet (40 mg total) by mouth nightly.      HUMULIN R U-500, CONCENTRATED, 500 UNIT/ML Subcutaneous Solution 250 units/day via Insulin pump      PORFIRIO CONTOUR NEXT TEST In Vitro Strip       chlorthalidone 25 MG Oral Tab Take 1 tablet (25 mg total) by mouth daily. (Patient not taking: Reported on 5/6/2024)        Past Medical History:    Adenomatous polyp    cscopy Dr. Singh 2010-misael. polyp, divertic, int hemrds    Allergic rhinitis    Asthma (HCC)    BPH (benign  prostatic hyperplasia)    Cancer (HCC)    Carcinoma in situ of colon    cecum-R hemicolectomy Dr. Lugo    Chronic kidney disease, stage 5 (HCC)    Peritoneal dialysis started 2018 Dr Dexter    Diabetes (HCC)    Diabetes mellitus (HCC)    Dialysis patient (HCC)    Peritoneal dialysis before  kidney transplant    Diverticulosis    Gallstone    Gallstone seen on US liver     Hepatic steatosis    US liver     High blood pressure    High cholesterol    Hyperlipemia    IBS (irritable bowel syndrome)    Kidney transplant recipient (HCC)    Kidney transplant performed 12/15/2022 at [facility] ( donor kidney transplant).    Microalbuminuria    Migraines    hx in grade school    Neuropathy    diabetic, jorge hands and feet    Obesity    Pancytopenia (HCC)    Dr. Ureña--hematologist    Peritoneal dialysis status (HCC)    Dr Dexter    Pernicious anemia    Platelets decreased (HCC)    Psoriasis-eczema overlap condition    Renal disorder    CKD with proteinuria--Dr. 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    Viral disease    Hx spinal virus    Visual impairment    glasses      Past Surgical History:   Procedure Laterality Date    Adenoidectomy  1965 estimate    Same time as tonsils removed    Appendectomy      Colectomy Right 2010    hemicolectomy-Dr Resendez    Colonoscopy      Dr Singh    Colonoscopy  10/2014    polyp    Colonoscopy  2019    Colonoscopy N/A 2019    Procedure: COLONOSCOPY;  Surgeon: Blade Singh MD;  Location: [facility] ENDOSCOPY    Cystouretro w/stone remove Bilateral 2022    Cystoscopy, bilateral retrograde pyelogram, bilateral ureteroscopy, manipulation/removal of bilateral blood clots/stones, ureteral  stent removal.    Hernia surgery      ventral hernia-at time of colon surgery    Other surgical history  2010    Colon Resection    Peritoneal dialysis  2018    Dr Croft placed PD catheter 2018. started PD 2018 Dr Dexter    Tonsillectomy      T & A    Transplantation of kidney  2022    At [facility]      Family History   Problem Relation Age of Onset    Diabetes Mother     Obesity Mother     Renal Disease Mother         ESRD    Other (Other) Mother     Hypertension Father     Obesity Father     Heart Attack Father     Stroke Father         TIA    Skin cancer Father     Heart Disorder Father     Other (Other) Father     Other (Brain bleed) Father 82         in his sleep-hx brain bleed    Diabetes Sister     Hypertension Brother       Social History:  Social History     Socioeconomic History    Marital status:     Number of children: 2   Occupational History    Occupation:    Tobacco Use    Smoking status: Former     Current packs/day: 0.00     Average packs/day: 0.5 packs/day for 9.0 years (4.5 ttl pk-yrs)     Types: Cigarettes     Start date: 1973     Quit date: 1982     Years since quittin.2    Smokeless tobacco: Never    Tobacco comments:     smokes cigars occasionally   Vaping Use    Vaping status: Never Used   Substance and Sexual Activity    Alcohol use: Not Currently     Alcohol/week: 1.0 standard drink of alcohol    Drug use: No   Other Topics Concern    Caffeine Concern Yes     Comment: 3 cups of coffee per day    Pt has a pacemaker No    Pt has a defibrillator No    Reaction to local anesthetic No     Social Determinants of Health      Received from [facility], [facility]    Social Connections    Received from [facility], [facility]    Housing Stability           REVIEW OF SYSTEMS:   GENERAL: feels well otherwise  LUNGS: denies shortness of breath, cough or  wheezing  CARDIOVASCULAR: denies chest pain or pressure or palpitations  GI: denies abdominal pain, N/V, diarrhea  NEURO: denies headaches  SKIN: denies rashes    EXAM:   BP 92/54 (BP Location: Right arm, Patient Position: Sitting, Cuff Size: large)   Pulse 73   Temp 97.6 °F (36.4 °C) (Oral)   Ht 6' (1.829 m)   Wt 283 lb (128.4 kg)   SpO2 99%   BMI 38.38 kg/m²     GENERAL: well developed, well nourished, in no apparent distress  LUNGS: clear to auscultation b/l, no w/r/r  CARDIO: RRR, normal S1S2, no m/r/g  GI: soft, NT, ND, NABS, no HSM  NEURO: A&O x 3, moves all 4 extremities normally      ASSESSMENT AND PLAN:   Rolly Sanchez is a 69 year old male who presents for TCM.    Acute cystitis without hematuria  - continue cephalexin for now  - Urinalysis with Culture Reflex; Future  - Basic Metabolic Panel (8) [E]; Future    Hypotension  - Current regimen:   - chlorthalidone (held since 5/3 per )   - lisinopril 10 mg    - amlodipine 10 mg    - metoprolol 100 mg bid  - advised continue to monitor at home daily, pending results of bmp will hold additional medication for sbp <100 and close f/u in 1-2 weeks    RTC in 1-2 weeks or sooner PRN.    For E/M code - 30 minutes spent reviewing performing chart review, obtaining a history, performing a physical exam, reviewing the assessment/plan, placing orders, and completing documentation.     Macey Hastings DO  5/6/2024  11:36 AM

## 2024-05-09 ENCOUNTER — OFFICE VISIT (OUTPATIENT)
Dept: OTOLARYNGOLOGY | Facility: CLINIC | Age: 69
End: 2024-05-09
Payer: MEDICARE

## 2024-05-09 DIAGNOSIS — R09.82 POSTNASAL DISCHARGE: Primary | ICD-10-CM

## 2024-05-09 PROCEDURE — 99213 OFFICE O/P EST LOW 20 MIN: CPT | Performed by: OTOLARYNGOLOGY

## 2024-05-09 NOTE — PROGRESS NOTES
Rolly Sanchez is a 69 year old male.    Chief Complaint   Patient presents with    Follow - Up     Patient is here due to postnasal drip follow up. Reports improvement in symptoms.        HISTORY OF PRESENT ILLNESS  Constant runny nose for multiple years feels a running down the back of his throat all the time causing him throat discomfort with constant throat clearing and cough.  No nasal mucopurulence.  He is currently on Allegra and fluticasone.  No other significant signs, symptoms or complaints.  Does not tolerate Sudafed due to underlying blood pressure issues.     24 last visit he had been using Allegra and fluticasone and I added glycopyrrolate and montelukast.  Really feels that the montelukast is what is causing him to feel 100% better at this time.  No further postnasal drip generally rarely has a little bit of drippage which has not been bothersome to him.  Here to discuss further management      Social History     Socioeconomic History    Marital status:     Number of children: 2   Occupational History    Occupation:    Tobacco Use    Smoking status: Former     Current packs/day: 0.00     Average packs/day: 0.5 packs/day for 9.0 years (4.5 ttl pk-yrs)     Types: Cigarettes     Start date: 1973     Quit date: 1982     Years since quittin.2    Smokeless tobacco: Never    Tobacco comments:     smokes cigars occasionally   Vaping Use    Vaping status: Never Used   Substance and Sexual Activity    Alcohol use: Not Currently     Alcohol/week: 1.0 standard drink of alcohol    Drug use: No   Other Topics Concern    Caffeine Concern Yes     Comment: 3 cups of coffee per day    Pt has a pacemaker No    Pt has a defibrillator No    Reaction to local anesthetic No       Family History   Problem Relation Age of Onset    Diabetes Mother     Obesity Mother     Renal Disease Mother         ESRD    Other (Other) Mother     Hypertension Father     Obesity Father     Heart Attack Father      Stroke Father         TIA    Skin cancer Father     Heart Disorder Father     Other (Other) Father     Other (Brain bleed) Father 82         in his sleep-hx brain bleed    Diabetes Sister     Hypertension Brother        Past Medical History:    Adenomatous polyp    cscopy Dr. Singh -misael. polyp, divertic, int hemrds    Allergic rhinitis    Asthma (HCC)    BPH (benign prostatic hyperplasia)    Cancer (HCC)    Carcinoma in situ of colon    cecum-R hemicolectomy Dr. Lugo    Chronic kidney disease, stage 5 (HCC)    Peritoneal dialysis started 2018 Dr Dexter    Diabetes (HCC)    Diabetes mellitus (HCC)    Dialysis patient (HCC)    Peritoneal dialysis before  kidney transplant    Diverticulosis    Gallstone    Gallstone seen on US liver     Hepatic steatosis    US liver     High blood pressure    High cholesterol    Hyperlipemia    IBS (irritable bowel syndrome)    Kidney transplant recipient (HCC)    Kidney transplant performed 12/15/2022 at [facility] ( donor kidney transplant).    Microalbuminuria    Migraines    hx in grade school    Neuropathy    diabetic, jorge hands and feet    Obesity    Pancytopenia (HCC)    Dr. Ureña--hematologist    Peritoneal dialysis status ([facility])    Dr Dexter    Pernicious anemia    Platelets decreased (HCC)    Psoriasis-eczema overlap condition    Renal disorder    CKD with proteinuria--Dr. 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    Viral disease    Hx spinal virus    Visual impairment    glasses       Past Surgical History:   Procedure Laterality Date    Adenoidectomy  1965 estimate    Same time as tonsils removed    Appendectomy      Colectomy Right 2010    hemicolectomy-Dr Resendez     Colonoscopy  2010    Dr Singh    Colonoscopy  10/2014    polyp    Colonoscopy  12/2019    Colonoscopy N/A 12/24/2019    Procedure: COLONOSCOPY;  Surgeon: Blade Singh MD;  Location: [facility] ENDOSCOPY    Cystouretro w/stone remove Bilateral 04/22/2022    Cystoscopy, bilateral retrograde pyelogram, bilateral ureteroscopy, manipulation/removal of bilateral blood clots/stones, ureteral stent removal.    Hernia surgery  2010    ventral hernia-at time of colon surgery    Other surgical history  11/1/2010    Colon Resection    Peritoneal dialysis  07/2018    Dr Croft placed PD catheter 6/2018. started PD 7/2018 Dr Dexter    Tonsillectomy      T & A    Transplantation of kidney  12/2022    At [facility]         REVIEW OF SYSTEMS    System Neg/Pos Details   Constitutional Negative Fatigue, fever and weight loss.   ENMT Negative Drooling.   Eyes Negative Blurred vision and vision changes.   Respiratory Negative Dyspnea and wheezing.   Cardio Negative Chest pain, irregular heartbeat/palpitations and syncope.   GI Negative Abdominal pain and diarrhea.   Endocrine Negative Cold intolerance and heat intolerance.   Neuro Negative Tremors.   Psych Negative Anxiety and depression.   Integumentary Negative Frequent skin infections, pigment change and rash.   Hema/Lymph Negative Easy bleeding and easy bruising.           PHYSICAL EXAM    There were no vitals taken for this visit.       Constitutional Normal Overall appearance - Normal.   Psychiatric Normal Orientation - Oriented to time, place, person & situation. Appropriate mood and affect.   Neck Exam Normal Inspection - Normal. Palpation - Normal. Parotid gland - Normal. Thyroid gland - Normal.   Eyes Normal Conjunctiva - Right: Normal, Left: Normal. Pupil - Right: Normal, Left: Normal. Fundus - Right: Normal, Left: Normal.   Neurological Normal Memory - Normal. Cranial nerves - Cranial nerves II through XII grossly intact.   Head/Face Normal Facial features - Normal. Eyebrows -  Normal. Skull - Normal.        Nasopharynx Normal External nose - Normal. Lips/teeth/gums - Normal. Tonsils - Normal. Oropharynx - Normal.   Ears Normal Inspection - Right: Normal, Left: Normal. Canal - Right: Normal, Left: Normal. TM - Right: Normal, Left: Normal.   Skin Normal Inspection - Normal.        Lymph Detail Normal Submental. Submandibular. Anterior cervical. Posterior cervical. Supraclavicular.        Nose/Mouth/Throat Normal External nose - Normal. Lips/teeth/gums - Normal. Tonsils - Normal. Oropharynx - Normal.   Nose/Mouth/Throat Normal Nares - Right: Normal Left: Normal. Septum -Normal  Turbinates - Right: Normal, Left: Normal.       Current Outpatient Medications:     Tacrolimus ER (ENVARSUS XR) 4 MG Oral Tablet 24 Hr, Take 1 tablet by mouth every morning., Disp: , Rfl:     Tacrolimus ER (ENVARSUS XR) 1 MG Oral Tablet 24 Hr, Take 3 tablets (3 mg total) by mouth daily., Disp: , Rfl:     fexofenadine 180 MG Oral Tab, Take 1 tablet (180 mg total) by mouth daily., Disp: , Rfl:     cyanocobalamin 1000 MCG Oral Tab, Take 1 tablet (1,000 mcg total) by mouth daily., Disp: , Rfl:     NON FORMULARY, MAGNESIUM + PROTEIN 1 daily, Disp: , Rfl:     semaglutide (OZEMPIC, 0.25 OR 0.5 MG/DOSE,) 2 MG/1.5ML Subcutaneous Solution Pen-injector, Inject 0.5 mg into the skin once a week., Disp: , Rfl:     Probiotic Product (PROBIOTIC-10 OR), Take 1 capsule by mouth daily., Disp: , Rfl:     fluticasone propionate 50 MCG/ACT Nasal Suspension, by Each Nare route as needed for Rhinitis., Disp: , Rfl:     cephalexin 500 MG Oral Cap, Take 1 capsule (500 mg total) by mouth 2 (two) times daily for 7 days., Disp: 14 capsule, Rfl: 0    glycopyrrolate 1 MG Oral Tab, Take 1 tablet (1 mg total) by mouth 3 (three) times daily., Disp: 90 tablet, Rfl: 1    montelukast 10 MG Oral Tab, Take 1 tablet (10 mg total) by mouth nightly., Disp: 30 tablet, Rfl: 3    metoprolol tartrate 100 MG Oral Tab, Take 1 tablet (100 mg total) by mouth 2 (two)  times daily., Disp: , Rfl:     finasteride 5 MG Oral Tab, Take 1 tablet (5 mg total) by mouth daily., Disp: 90 tablet, Rfl: 3    predniSONE 10 MG Oral Tab, Take 1 tablet (10 mg total) by mouth daily., Disp: , Rfl:     ELIQUIS 5 MG Oral Tab, Take 1 tablet (5 mg total) by mouth 2 (two) times daily., Disp: , Rfl:     chlorthalidone 25 MG Oral Tab, Take 1 tablet (25 mg total) by mouth daily., Disp: , Rfl:     lisinopril 10 MG Oral Tab, Take 1 tablet (10 mg total) by mouth daily., Disp: , Rfl:     amLODIPine 10 MG Oral Tab, Take 1 tablet (10 mg total) by mouth daily., Disp: , Rfl:     ergocalciferol 1.25 MG (29735 UT) Oral Cap, Take 1 capsule (50,000 Units total) by mouth every 7 days., Disp: , Rfl:     Syringe/Needle, Disp, (BD LUER-PILY SYRINGE) 23G X 1\" 3 ML Does not apply Misc, USE FOR B12 INJECTIONS AS DIRECTED, Disp: 12 each, Rfl: 0    sulfamethoxazole-trimethoprim -160 MG Oral Tab per tablet, Takes Mondays and Thursdays per transplant clinic, Disp: , Rfl: 0    tamsulosin 0.4 MG Oral Cap, Take 1 capsule (0.4 mg total) by mouth daily., Disp: 90 capsule, Rfl: 3    atorvastatin 40 MG Oral Tab, Take 1 tablet (40 mg total) by mouth nightly., Disp: , Rfl:     HUMULIN R U-500, CONCENTRATED, 500 UNIT/ML Subcutaneous Solution, 250 units/day via Insulin pump, Disp: , Rfl:     PORFIRIO CONTOUR NEXT TEST In Vitro Strip, , Disp: , Rfl:   ASSESSMENT AND PLAN    1. Postnasal discharge  Continue present resolution of his symptoms after starting glycopyrrolate and Singulair.  Continue all meds for now and I have asked him to wean off the glycopyrrolate 1 tablet at that time as she feels that the montelukast is actually what is helping him.  Return to see me in 2 to 3 months to discuss further weaning of medications.        This note was prepared using Dragon [employer] voice recognition dictation software. As a result errors may occur. When identified these errors have been corrected. While every attempt is made to correct errors  during dictation discrepancies may still exist    Angel Olivas MD    5/9/2024    11:25 AM

## 2024-05-15 ENCOUNTER — OFFICE VISIT (OUTPATIENT)
Dept: PHYSICAL THERAPY | Facility: HOSPITAL | Age: 69
End: 2024-05-15
Attending: INTERNAL MEDICINE
Payer: MEDICARE

## 2024-05-15 DIAGNOSIS — G62.9 NEUROPATHY: ICD-10-CM

## 2024-05-15 DIAGNOSIS — R29.818 DIFFICULTY BALANCING: Primary | ICD-10-CM

## 2024-05-15 PROCEDURE — 97162 PT EVAL MOD COMPLEX 30 MIN: CPT

## 2024-05-15 NOTE — PROGRESS NOTES
NEUROLOGICAL PHYSICAL THERAPY EVALUATION:   Referring Physician: Dr. Hastings  Diagnosis: imbalance, malaise, difficulty walking      Date of Onset: per HPI Date of Service: 5/15/2024     PATIENT SUMMARY   Rolly Sanchez is a 69 year old y/o male who presents to therapy today with reports of imbalance   History of current condition: Pt reports issues with balance for past 5 years \"signal from feet are delayed\". Unable to catch self with step to recover from loss of balance. Reduced feeling of feet. Hx of kidney transplant Dec 2022. +neuropathy for past 10 years (confirmed on EMG). +DM- reports glucose is well controlled overall. + retinopathy. He reports starting to feeling more physically well after transplant and that he now feels ready to address the balance issues. He has declined neuropathy medications. No prior PT for balance- referred by PCP.     Current functional limitations include difficulty negotiating stairs, general sense of imbalance especially when walking on uneven surfaces. Impaired endurance. Difficulty with fine motor tasks and dexterity.   Falls: 2 falls in past 6 months. Fell getting out of a tub -slipped on tile. Last fall 3 months ago.   Social History: Lives at home with wife in a house. 2-3 FRANK (no rail). Tri-level home -7 steps to each level (rail). +basement (7 steps to access with rail). Drives. No formal exercise.   Pt describes pain level:  denies pain.   Prior level of function moving with better balance and energy.  Pt goals include to improve confidence with balance during mobility task.   Past medical history was reviewed with Rolly. Significant findings include   Past Medical History:    Adenomatous polyp    cscopy Dr. Singh 2010-misael. polyp, divertic, int hemrds    Allergic rhinitis    Asthma (HCC)    BPH (benign prostatic hyperplasia)    Cancer (HCC)    Carcinoma in situ of colon    cecum-R hemicolectomy Dr. Lugo    Chronic kidney disease, stage 5 (HCC)    Peritoneal dialysis  started 2018 Dr Dexter    Diabetes (HCC)    Diabetes mellitus (HCC)    Dialysis patient (HCC)    Peritoneal dialysis before  kidney transplant    Diverticulosis    Gallstone    Gallstone seen on US liver     Hepatic steatosis    US liver     High blood pressure    High cholesterol    Hyperlipemia    IBS (irritable bowel syndrome)    Kidney transplant recipient (HCC)    Kidney transplant performed 12/15/2022 at [facility] ( donor kidney transplant).    Microalbuminuria    Migraines    hx in grade school    Neuropathy    diabetic, jorge hands and feet    Obesity    Pancytopenia (HCC)    Dr. Ureña--hematologist    Peritoneal dialysis status ([facility])    Dr Dexter    Pernicious anemia    Platelets decreased (HCC)    Psoriasis-eczema overlap condition    Renal disorder    CKD with proteinuria--Dr. 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 Of note pt has a complex medical hx including recent kidney transplant in . +neuropathy. Reports issues with fine motor/dexterity tasks due to numbness/tingling in B hands- would benefit from OT evaluation to address these deficits. Pt with difficulty performing visual scanning during ambulation with +postural instability. Also requires UE support to perform stairs safely. SLS more difficult on L compared to R likely contributing to difficulty with stairs. 6MWT to be completed next session in order to assess tolerance to activity. Pt verbalizes understanding of material taught today and is in  agreement with plan. In agreement with functional outcome measures and clinical rationale, this evaluation involved Moderate Complexity decision making due to 3+ personal factors/comorbidities, 4+ body structures involved/activity limitations, and evolving symptoms including  declining functional status with +hx of falls .       Pt. would benefit from skilled Physical Therapy to address the above impairments to improve gait, balance and tolerance to activity in order to facilitate improved ability to engage in baseline level of activity and maintain current level of independence safely.     Precautions: Fall Risk and hx of kidney transplant      OBJECTIVE:   Observation/Posture: pleasant 70 y/o; NAD.   Sensation: numbness and tingling feet up to level of ankle B and B hands up to level of wrist     Strength:   Strength (-/5)    R L   Hip       Flexion 4+ 4+   Knee       Flexion (seated) 5 5     Extension 5 5   Foot / Ankle        DF 5 5     PF (Seated) 5 5       Postural Control:  Romberg EO: 30 seconds, EC 30 seconds (postural sway)   Tandem EO (UE support into position, arms at sides) R foot back 30 sec, L foot back 3 seconds  SLS EO (hands hovering over countertop): R 10 seconds, L 4 seconds       Functional Mobility:  5x Sit to Stand: 13.5 seconds     Gait speed: 0.98 m/s no AD  Gait deviations: wider ANMOL, limited visual scanning      Timed Up and Go (AD, time): 9.3 sec no AD   Fall Risk: no  4 Square Step Test: 14.6 seconds     Fall Risk: no    Functional Gait Assessment   Item Description Score (0 worst, 3 best)    ___2___1. Gait Level Surface-  Time: ____6.2___seconds  ___3___2. Change in gait speed  ___2___3. Gait with horizontal head turns   ___2___4. Gait with vertical head turns  ___3___5. Gait and pivot turn  ___3___6. Step over obstacle  ___1___7. Gait with narrow base of support-  # of steps___6_____  ___1___8. Gait with eyes closed-  Time: __10.7___seconds  ___3___9. Ambulating backward  ___2___10.  Steps    TOTAL SCORE: ___22___/30    (less than 22/30 = fall risk)        Today’s Treatment and Response: Pt education provided on exam findings and POC. Advised in walking/exercise program -multiple short bouts throughout day to total at least 25 minutes. Formal HEP to be initiated next session.     Charges: PT Eval x1      Total Timed Treatment: 40 min     Total Treatment Time: 40 min         PLAN OF CARE:    Goals to be met within POC or 90 days:  Pt to be independent in HEP  Pt will ambulate outdoors including uneven surfaces independently without LOB.   Pt will negotiate 2 steps without UE support, independently in order to improve level of safety at home.     Frequency / Duration: Patient will be seen for 1 x/week or a total of 5 visits over a 90 day period.  Treatment will include: Balance Training, Gait Training,  Therapeutic Exercises; Neuromuscular Re-education; Therapeutic Activity; Patient education; Home exercise program instruction.      Education or treatment limitation: None  Rehab Potential:good    Patient was advised of these findings, precautions, and treatment options and has agreed to actively participate in planning and for this course of care.    Thank you for your referral.  If you have any questions, please contact me at Dept: [phone]    Sincerely,  ÓSCAR Siddiqi PT      Electronically signed by therapist: Sylvie Castro PT, NCS    Physician's certification required: Yes  I certify the need for these services furnished under this plan of treatment and while under my care.    X___________________________________________________ Date____________________    Certification From: 5/15/2024  To:8/13/2024        21st Century Cures Act Notice to Patient: Medical documents like this are made available to patients in the interest of transparency. However, be advised this is a medical document and it is intended as krrx-ub-[id number] communication between your medical providers. This medical  document may contain abbreviations, assessments, medical data, and results or other terms that are unfamiliar. Medical documents are intended to carry relevant information, facts as evident, and the clinical opinion of the practitioner. As such, this medical document may be written in language that appears blunt or direct. You are encouraged to contact your medical provider and/or [facility] Patient Experience if you have any questions about this medical document.

## 2024-05-20 ENCOUNTER — APPOINTMENT (OUTPATIENT)
Dept: PHYSICAL THERAPY | Facility: HOSPITAL | Age: 69
End: 2024-05-20
Attending: INTERNAL MEDICINE
Payer: MEDICARE

## 2024-05-22 ENCOUNTER — OFFICE VISIT (OUTPATIENT)
Dept: PHYSICAL THERAPY | Facility: HOSPITAL | Age: 69
End: 2024-05-22
Attending: INTERNAL MEDICINE
Payer: MEDICARE

## 2024-05-22 PROCEDURE — 97110 THERAPEUTIC EXERCISES: CPT

## 2024-05-22 PROCEDURE — 97112 NEUROMUSCULAR REEDUCATION: CPT

## 2024-05-22 NOTE — PROGRESS NOTES
Diagnosis:  imbalance, malaise, difficulty walking  Visit (# authorized):  5 per POC (Medicare)                        Referring Physician: Darling    Precautions: at risk of falls, hx of kidney transplant     Medication changes since last visit?:  No    Subjective: No new information to report.     Objective:    Date  5/22          Visit Number  2          NMR x          Ther EX x          Ther Act           Gait Training           CRM            Manual             Additional Treatment Information:    Therapeutic Exercise (28 mins):     6 minute walk test  Distance walked:1085 ft no AD  Total time walked: 6 mins  Number of rest breaks: 0  RPE at end of test: 16    Age matched norms:  60-69 yrs:  M: 1876 ft  F: 1765 ft  70-79 yrs:  M: 1729 ft  F: 1545 ft  80-89 yrs:  M: 1368 ft  F: 1286 ft    Seated    Hip ABD green TB x20    LE diagonals x10 B -demo with return demo     Standing BUE support    B heel raise x15   Glut set 5 sec hold x20     NMR (10 mins):   Tap ups 6 in step x10 B - demo with return demo  Romberg EC 30 seconds   Ambulation with head turns 40 ft x2 ea horizontal and vertical -demo with return demo       Patient Education:  HEP initiated, see pt instructions.       Assessment:   Pt demo's understanding of exercises provided for home. Pt able to complete 6MWT but with Hard to very hard exertion level. Distance walked below what is considered normal for aged matched norms.       Goals to be met within POC or 90 days:  Pt to be independent in HEP  Pt will ambulate outdoors including uneven surfaces independently without LOB.   Pt will negotiate 2 steps without UE support, independently in order to improve level of safety at home.   Pt will complete 6MWY with RPE 13 or less in order to reflect improved tolerance to functional ambulation tasks.     Plan: in future sessions: nu-step, foam, obstacle negotiation, rebounder     Charges: 2 TE, 1 NMR       Total Timed Treatment: 38 min  Total Treatment Time: 38  min      21st Century Cures Act Notice to Patient: Medical documents like this are made available to patients in the interest of transparency. However, be advised this is a medical document and it is intended as kuqz-ae-dpha communication between your medical providers. This medical document may contain abbreviations, assessments, medical data, and results or other terms that are unfamiliar. Medical documents are intended to carry relevant information, facts as evident, and the clinical opinion of the practitioner. As such, this medical document may be written in language that appears blunt or direct. You are encouraged to contact your medical provider and/or [facility] Patient Experience if you have any questions about this medical document.

## 2024-05-22 NOTE — PATIENT INSTRUCTIONS
Do these exercises daily     Strengthening exercises    Sit up tall in chair. Tie green band around thighs just above knees. Separate knees and hold position for a couple seconds then slowly relax. Do this 20 times.     Sit up tall in chair. Lift one leg up and across as much as you can before lowering foot back to floor. Move slow and controlled. Do this 10 times with one leg and 10 times with the other.     Stand up tall at countertop and hold on with both hands. Rise up onto your toes, then slowly lower. Do this 15 times.     Stand up tall at countertop and hold on with both hands. Tighten buns for 5 seconds, then relax. Do this 20 times. You should only feel this in your buns and not in your back or down the back of your thigh. If you do feel it in either of these places, squeeze less hard.     Balance exercises     Stand at the bottom of your steps. Without holding on tap toes onto first step. Return foot to floor, pause and then repeat. Do this 10 times with one foot and 10 times with the other. Make sure to not drag your feet.     Stand with your bed behind you for safety (do not lean against it).  Stand tall and place feet next to each other so that they are touching.  Cross arms, close your eyes.  Balance for 30 seconds, paying attention to the feeling of your feet on the floor.    Walk and turn your head side to side to look at the walls as if window shopping.  Turn head every 4-5 steps and try to maintain a straight path.  Do length of walkway twice.  Repeat with up and down head turns.

## 2024-05-28 ENCOUNTER — APPOINTMENT (OUTPATIENT)
Dept: PHYSICAL THERAPY | Facility: HOSPITAL | Age: 69
End: 2024-05-28
Attending: INTERNAL MEDICINE
Payer: MEDICARE

## 2024-06-10 ENCOUNTER — APPOINTMENT (OUTPATIENT)
Dept: PHYSICAL THERAPY | Facility: HOSPITAL | Age: 69
End: 2024-06-10
Attending: INTERNAL MEDICINE
Payer: MEDICARE

## 2024-06-12 ENCOUNTER — OFFICE VISIT (OUTPATIENT)
Dept: PHYSICAL THERAPY | Facility: HOSPITAL | Age: 69
End: 2024-06-12
Attending: INTERNAL MEDICINE
Payer: MEDICARE

## 2024-06-12 PROCEDURE — 97112 NEUROMUSCULAR REEDUCATION: CPT

## 2024-06-12 PROCEDURE — 97110 THERAPEUTIC EXERCISES: CPT

## 2024-06-12 RX ORDER — MONTELUKAST SODIUM 10 MG/1
10 TABLET ORAL NIGHTLY
Qty: 90 TABLET | Refills: 0 | Status: SHIPPED | OUTPATIENT
Start: 2024-06-12

## 2024-06-12 NOTE — PROGRESS NOTES
Diagnosis:  imbalance, malaise, difficulty walking  Visit (# authorized):  5 per POC (Medicare)                        Referring Physician: Darling    Precautions: at risk of falls, hx of kidney transplant     Medication changes since last visit?:  No    Subjective: Reports compliance to HEP, \"It is getting easier\".     Objective:    Date  5/22 6/12         Visit Number  2 3         NMR x x         Ther EX x x         Ther Act           Gait Training           CRM            Manual             Additional Treatment Information:    Therapeutic Exercise (25 mins):   Nu-step level 3 x10 mins  Standing on foam-   B heel raise 2x15, BUE support    Hip flexion 2x10 B, 1 UE support   Seated    Hip ABD isometrics with belt 5 sec hold x15   Hip ADD isometrics with ball 5 sec hold x15     NMR (15 mins):   Ambulation weaving around cones (10) - 4 bouts   Ambulation stepping over sticks (8) - 4 bouts   Rebounder 4#   Pt selected ANMOL x15    Step stance x10 ea foot in front         Patient Education:  Encouraged continued performance of HEP.        Assessment:  Frequent rest breaks provided. +LUNDBERG but improves/resolved with seated rest. Good postural stability throughout.       Plan: update HEP as appropriate next session in future sessions:  continue foam, continue obstacle negotiation, dual manual tasking    Charges: 2 TE, 1 NMR       Total Timed Treatment: 40 min  Total Treatment Time: 42 min      21st Century Cures Act Notice to Patient: Medical documents like this are made available to patients in the interest of transparency. However, be advised this is a medical document and it is intended as apas-pf-kmcl communication between your medical providers. This medical document may contain abbreviations, assessments, medical data, and results or other terms that are unfamiliar. Medical documents are intended to carry relevant information, facts as evident, and the clinical opinion of the practitioner. As such, this medical document  may be written in language that appears blunt or direct. You are encouraged to contact your medical provider and/or [facility] Patient Experience if you have any questions about this medical document.

## 2024-06-17 ENCOUNTER — APPOINTMENT (OUTPATIENT)
Dept: PHYSICAL THERAPY | Facility: HOSPITAL | Age: 69
End: 2024-06-17
Attending: INTERNAL MEDICINE
Payer: MEDICARE

## 2024-06-18 ENCOUNTER — TELEPHONE (OUTPATIENT)
Dept: NEPHROLOGY | Facility: CLINIC | Age: 69
End: 2024-06-18

## 2024-06-18 ENCOUNTER — LAB ENCOUNTER (OUTPATIENT)
Dept: LAB | Facility: HOSPITAL | Age: 69
End: 2024-06-18
Attending: INTERNAL MEDICINE

## 2024-06-18 DIAGNOSIS — R78.81 BACTEREMIA: ICD-10-CM

## 2024-06-18 DIAGNOSIS — D70.8 OTHER NEUTROPENIA (HCC): ICD-10-CM

## 2024-06-18 DIAGNOSIS — E55.9 VITAMIN D DEFICIENCY DISEASE: ICD-10-CM

## 2024-06-18 DIAGNOSIS — E83.42 HYPOMAGNESEMIA: ICD-10-CM

## 2024-06-18 DIAGNOSIS — R80.9 PROTEINURIA: ICD-10-CM

## 2024-06-18 DIAGNOSIS — Z94.0 KIDNEY TRANSPLANTED (HCC): ICD-10-CM

## 2024-06-18 DIAGNOSIS — E11.22 TYPE 2 DIABETES MELLITUS WITH CHRONIC KIDNEY DISEASE, WITH LONG-TERM CURRENT USE OF INSULIN, UNSPECIFIED CKD STAGE (HCC): ICD-10-CM

## 2024-06-18 DIAGNOSIS — N39.0 URINARY TRACT INFECTION, SITE NOT SPECIFIED: ICD-10-CM

## 2024-06-18 DIAGNOSIS — B25.9 CYTOMEGALOVIRAL DISEASE (HCC): ICD-10-CM

## 2024-06-18 DIAGNOSIS — Z12.5 SCREENING FOR MALIGNANT NEOPLASM OF PROSTATE: ICD-10-CM

## 2024-06-18 DIAGNOSIS — Z79.4 TYPE 2 DIABETES MELLITUS WITH CHRONIC KIDNEY DISEASE, WITH LONG-TERM CURRENT USE OF INSULIN, UNSPECIFIED CKD STAGE (HCC): ICD-10-CM

## 2024-06-18 DIAGNOSIS — E78.5 HYPERLIPIDEMIA: ICD-10-CM

## 2024-06-18 DIAGNOSIS — E53.8 VITAMIN B12 DEFICIENCY: ICD-10-CM

## 2024-06-18 LAB
ANION GAP SERPL CALC-SCNC: 7 MMOL/L (ref 0–18)
BASOPHILS # BLD AUTO: 0.02 X10(3) UL (ref 0–0.2)
BASOPHILS NFR BLD AUTO: 0.3 %
BILIRUB UR QL: NEGATIVE
BUN BLD-MCNC: 32 MG/DL (ref 9–23)
BUN/CREAT SERPL: 20.9 (ref 10–20)
CALCIUM BLD-MCNC: 9.1 MG/DL (ref 8.7–10.4)
CHLORIDE SERPL-SCNC: 113 MMOL/L (ref 98–112)
CLARITY UR: CLEAR
CO2 SERPL-SCNC: 22 MMOL/L (ref 21–32)
COLOR UR: COLORLESS
CREAT BLD-MCNC: 1.53 MG/DL
CREAT UR-SCNC: 49.8 MG/DL
DEPRECATED RDW RBC AUTO: 49.1 FL (ref 35.1–46.3)
EGFRCR SERPLBLD CKD-EPI 2021: 49 ML/MIN/1.73M2 (ref 60–?)
EOSINOPHIL # BLD AUTO: 0.03 X10(3) UL (ref 0–0.7)
EOSINOPHIL NFR BLD AUTO: 0.5 %
ERYTHROCYTE [DISTWIDTH] IN BLOOD BY AUTOMATED COUNT: 14 % (ref 11–15)
FASTING STATUS PATIENT QL REPORTED: YES
GLUCOSE BLD-MCNC: 83 MG/DL (ref 70–99)
GLUCOSE UR-MCNC: NORMAL MG/DL
HCT VFR BLD AUTO: 36.2 %
HGB BLD-MCNC: 12.2 G/DL
HGB UR QL STRIP.AUTO: NEGATIVE
IMM GRANULOCYTES # BLD AUTO: 0.14 X10(3) UL (ref 0–1)
IMM GRANULOCYTES NFR BLD: 2.3 %
KETONES UR-MCNC: NEGATIVE MG/DL
LEUKOCYTE ESTERASE UR QL STRIP.AUTO: NEGATIVE
LYMPHOCYTES # BLD AUTO: 1.98 X10(3) UL (ref 1–4)
LYMPHOCYTES NFR BLD AUTO: 33.1 %
MAGNESIUM SERPL-MCNC: 1.8 MG/DL (ref 1.6–2.6)
MCH RBC QN AUTO: 32.2 PG (ref 26–34)
MCHC RBC AUTO-ENTMCNC: 33.7 G/DL (ref 31–37)
MCV RBC AUTO: 95.5 FL
MICROALBUMIN UR-MCNC: 0.6 MG/DL
MICROALBUMIN/CREAT 24H UR-RTO: 12 UG/MG (ref ?–30)
MONOCYTES # BLD AUTO: 0.51 X10(3) UL (ref 0.1–1)
MONOCYTES NFR BLD AUTO: 8.5 %
NEUTROPHILS # BLD AUTO: 3.31 X10 (3) UL (ref 1.5–7.7)
NEUTROPHILS # BLD AUTO: 3.31 X10(3) UL (ref 1.5–7.7)
NEUTROPHILS NFR BLD AUTO: 55.3 %
NITRITE UR QL STRIP.AUTO: NEGATIVE
OSMOLALITY SERPL CALC.SUM OF ELEC: 300 MOSM/KG (ref 275–295)
PH UR: 6 [PH] (ref 5–8)
PHOSPHATE SERPL-MCNC: 4.4 MG/DL (ref 2.4–5.1)
PLATELET # BLD AUTO: 117 10(3)UL (ref 150–450)
PLATELETS.RETICULATED NFR BLD AUTO: 5.2 % (ref 0–7)
POTASSIUM SERPL-SCNC: 4.6 MMOL/L (ref 3.5–5.1)
PROT UR-MCNC: <6 MG/DL (ref ?–14)
PROT UR-MCNC: NEGATIVE MG/DL
RBC # BLD AUTO: 3.79 X10(6)UL
SODIUM SERPL-SCNC: 142 MMOL/L (ref 136–145)
SP GR UR STRIP: 1.01 (ref 1–1.03)
UROBILINOGEN UR STRIP-ACNC: NORMAL
WBC # BLD AUTO: 6 X10(3) UL (ref 4–11)

## 2024-06-18 PROCEDURE — 84156 ASSAY OF PROTEIN URINE: CPT

## 2024-06-18 PROCEDURE — 80048 BASIC METABOLIC PNL TOTAL CA: CPT

## 2024-06-18 PROCEDURE — 85025 COMPLETE CBC W/AUTO DIFF WBC: CPT

## 2024-06-18 PROCEDURE — 82043 UR ALBUMIN QUANTITATIVE: CPT

## 2024-06-18 PROCEDURE — 81003 URINALYSIS AUTO W/O SCOPE: CPT

## 2024-06-18 PROCEDURE — 87076 CULTURE ANAEROBE IDENT EACH: CPT

## 2024-06-18 PROCEDURE — 84100 ASSAY OF PHOSPHORUS: CPT

## 2024-06-18 PROCEDURE — 82570 ASSAY OF URINE CREATININE: CPT

## 2024-06-18 PROCEDURE — 87086 URINE CULTURE/COLONY COUNT: CPT

## 2024-06-18 PROCEDURE — 83735 ASSAY OF MAGNESIUM: CPT

## 2024-06-18 PROCEDURE — 36415 COLL VENOUS BLD VENIPUNCTURE: CPT

## 2024-06-18 PROCEDURE — 80197 ASSAY OF TACROLIMUS: CPT

## 2024-06-18 NOTE — TELEPHONE ENCOUNTER
----- Message from HELLEN DE LUNA sent at 6/18/2024 12:42 PM CDT -----  Can you please let him know:    Bert Lofton! I got your las and kidney numbers look good.  I also reviewed your visit with Dr Finn in May,  I saw they resumed the bicarb tab and had you discontinue with chlorthalidone for BP control.  I would like to see you back in 2/2025!  Visit for that day are not open yet, but will likely open by Sept.  Thanks! Have a good summer

## 2024-06-19 ENCOUNTER — OFFICE VISIT (OUTPATIENT)
Dept: PHYSICAL THERAPY | Facility: HOSPITAL | Age: 69
End: 2024-06-19
Attending: INTERNAL MEDICINE
Payer: MEDICARE

## 2024-06-19 LAB
CMV DNA DETECT, QN LOG: NOT DETECTED {LOG_IU}/ML
CMV DNA DETECT, QN: NOT DETECTED [IU]/ML

## 2024-06-19 PROCEDURE — 97112 NEUROMUSCULAR REEDUCATION: CPT

## 2024-06-19 PROCEDURE — 97110 THERAPEUTIC EXERCISES: CPT

## 2024-06-19 NOTE — PATIENT INSTRUCTIONS
Do these exercises daily     Strengthening exercises    Sit up tall in chair. Tie black band around thighs just above knees. Separate knees and hold position for a couple seconds then slowly relax. Do this 20 times.     Sit up tall in chair. Lift one leg up and across as much as you can before lowering foot back to floor. Move slow and controlled. Do this 10 times with one leg and 10 times with the other.     Stand up tall at countertop and hold on with both hands. Rise up onto your toes, then slowly lower. Do this 15 times.     Stand tall at the countertop and hold on lightly.  Kick one leg forward, out to the side and backward, keeping leg straight, and balancing.  Do 8 times in each direction, then repeat on the other leg.      Balance exercises     Stand next to bed or countertop for safety. Take a step forward with one foot. Keep feet in this position. Cross arms and balance with eyes closed for 30 seconds. Then practice with the other foot forward. To progress this either step foot forward more and/or place feet closer together width wise.     Stand next to bed or countertop for safety. Shift weight to one foot as you march the other leg up a small amount. Return foot to floor and repeat with the other leg. Alternate until you have done 10 on each leg, slow and coordinated. Incorporate lifting opposite arm of the leg you are marching with in order to increase challenge.     Place a cane/broom/etc on floor near countertop. Without holding on step over item then step backwards over item. Do not kick item as you do this. Do this 10 times over and back.

## 2024-06-19 NOTE — PROGRESS NOTES
Diagnosis:  imbalance, malaise, difficulty walking  Visit (# authorized):  5 per POC (Medicare)                        Referring Physician: Darling    Precautions: at risk of falls, hx of kidney transplant     Medication changes since last visit?:  No    Subjective: Reports feeling well overall. Noting improvements in mobility and balance.     Objective:    Date  5/22 6/12 6/19        Visit Number  2 3 4        NMR x x x        Ther EX x x x        Ther Act           Gait Training           CRM            Manual             Additional Treatment Information:    Therapeutic Exercise (30 mins):   Nu-step level 4 x12 mins  3 kicks 1 UE support x8 B- demo with return demo   Seated black TB hip ABD x20  Verbal review of previously provided exercises, pt provided information re: recumbent stepper/bike      NMR (8 mins):   Step stance EC 30 sec ea foot in front, pt educated on self progression of exercise  Slow alt march no UE support -demo with return demo- x10 B   Obstacle negotiation A<>P over stick x10     Patient Education:  HEP updated, see pt instructions.       Assessment:  Frequent rest breaks provided. +LUNDBERG but improves/resolved with seated rest. Demo's and verbalizes understanding of material taught today.       Plan: reassess     Charges: 2 TE, 1 NMR       Total Timed Treatment: 38 min  Total Treatment Time: 42 min      21st Century Cures Act Notice to Patient: Medical documents like this are made available to patients in the interest of transparency. However, be advised this is a medical document and it is intended as nfrd-yg-rnrd communication between your medical providers. This medical document may contain abbreviations, assessments, medical data, and results or other terms that are unfamiliar. Medical documents are intended to carry relevant information, facts as evident, and the clinical opinion of the practitioner. As such, this medical document may be written in language that appears blunt or direct. You  are encouraged to contact your medical provider and/or [facility] Patient Experience if you have any questions about this medical document.

## 2024-06-20 LAB — TACROLIMUS LVL: 7.3 NG/ML

## 2024-06-24 ENCOUNTER — APPOINTMENT (OUTPATIENT)
Dept: PHYSICAL THERAPY | Facility: HOSPITAL | Age: 69
End: 2024-06-24
Attending: INTERNAL MEDICINE
Payer: MEDICARE

## 2024-06-24 ENCOUNTER — TELEPHONE (OUTPATIENT)
Dept: NEPHROLOGY | Facility: CLINIC | Age: 69
End: 2024-06-24

## 2024-06-24 NOTE — TELEPHONE ENCOUNTER
Dr Dexter informed patient of note below  verified ,verbalized understanding reported doing fine no symptoms of frequency or urgency,pain,chills,had positive last month had antibiotic it was cleared.not taking any antibiotic this time.

## 2024-06-24 NOTE — TELEPHONE ENCOUNTER
----- Message from HELLEN DE LUNA sent at 6/24/2024  9:34 AM CDT -----  Could you please let Rolly know that the urine came back showing that he had a urinary tract infection.  These were ordered by [facility].  I want to confirm that they gave him some sort of treatment for it?  (An antibiotic )    If not I will order it

## 2024-06-26 ENCOUNTER — OFFICE VISIT (OUTPATIENT)
Dept: PHYSICAL THERAPY | Facility: HOSPITAL | Age: 69
End: 2024-06-26
Attending: INTERNAL MEDICINE
Payer: MEDICARE

## 2024-06-26 DIAGNOSIS — G62.9 NEUROPATHY: Primary | ICD-10-CM

## 2024-06-26 PROCEDURE — 97112 NEUROMUSCULAR REEDUCATION: CPT

## 2024-06-26 NOTE — PROGRESS NOTES
Discharge Summary    Referring Provider: Darling    Diagnosis:  imbalance, malaise, difficulty walking    Pt has attended 5 visits in Physical Therapy.     Subjective: Pt reports improvements in balance with PT. Some improvement in stair negotiation without UE support. Mild improvement in endurance.     Pain: 0/10    Assessment: Pt demonstrates improvement in postural stability and quality of gait. Improvement in SSWS noted. Able to negotiate steps without UE support though does require increased physical effort to complete. All goals have been met and pt is to be discharged at this time. Educated on rationale for recommendation to participate in medical fitness program- he verbalized understanding.     Objective:   *values from initial evaluation documented in parenthesis below    Hip flexion MMT: 4+/5 B (unchanged)    Ambulation outdoors including visual scanning -[facility]       Functional Gait Assessment   Item Description Score (0 worst, 3 best)    ___2___1. Gait Level Surface-  Time: ____5.9 (6.2)___seconds  ___3___2. Change in gait speed  ___3___3. Gait with horizontal head turns   ___3___4. Gait with vertical head turns  ___3___5. Gait and pivot turn  ___3___6. Step over obstacle  ___0___7. Gait with narrow base of support-  # of steps__2____  ___2___8. Gait with eyes closed-  Time: __8.8___seconds  ___3___9. Ambulating backward  ___3___10. Steps    TOTAL SCORE: ___25 (22)___/30    (less than 22/30 = fall risk)    SSWS: 1.03 m/s (0.98 m/s) no AD      Goals to be met within POC or 90 days:  Pt to be independent in HEP Goal met   Pt will ambulate outdoors including uneven surfaces independently without LOB. Goal met   Pt will negotiate 2 steps without UE support, independently in order to improve level of safety at home. Goal met         Charges: 2 NMR     Total timed treatment: 23 mins  Total treatment time: 24 mins      Plan: Discharge    Patient was advised of these findings, precautions, and  treatment options and has agreed to actively participate in planning and for this course of care.    Thank you for your referral. If you have any questions, please contact me at Dept: [phone]    Sincerely,    Sylvie Hurley PT, NCS    Electronically signed by therapist: Sylvie Hurley PT      21st Century Cures Act Notice to Patient: Medical documents like this are made available to patients in the interest of transparency. However, be advised this is a medical document and it is intended as sxwi-tp-jzoy communication between your medical providers. This medical document may contain abbreviations, assessments, medical data, and results or other terms that are unfamiliar. Medical documents are intended to carry relevant information, facts as evident, and the clinical opinion of the practitioner. As such, this medical document may be written in language that appears blunt or direct. You are encouraged to contact your medical provider and/or [facility] Patient Experience if you have any questions about this medical document.

## 2024-07-05 NOTE — TELEPHONE ENCOUNTER
This refill request is being sent to the provider for the following reason:  []Patient has not had an appointment within the past 12 months but has made an appointment on: ___  [x]Medication is not within protocol - Glycopyrrolate  []Patient did not complete follow up recommendations  []Other: ___       Last seen 5/9/24

## 2024-07-10 NOTE — TELEPHONE ENCOUNTER
This refill request is being sent to the provider for the following reason:  []Patient has not had an appointment within the past 12 months but has made an appointment on: ___  [x]Medication is not within protocol  [x]Patient did not complete follow up recommendations: Wean off glycopyrrolate   [x]Other: Patient last seen on 5/9/24

## 2024-07-15 RX ORDER — GLYCOPYRROLATE 1 MG/1
1 TABLET ORAL 3 TIMES DAILY
Qty: 90 TABLET | Refills: 1 | Status: SHIPPED | OUTPATIENT
Start: 2024-07-15

## 2024-07-15 RX ORDER — GLYCOPYRROLATE 1 MG/1
1 TABLET ORAL 3 TIMES DAILY
Qty: 90 TABLET | Refills: 0 | Status: SHIPPED | OUTPATIENT
Start: 2024-07-15

## 2024-07-15 NOTE — TELEPHONE ENCOUNTER
[facility] not dispensing Glycopyrrolate, patient asked that it be sent to the [place] on [address].

## 2024-07-15 NOTE — TELEPHONE ENCOUNTER
Refill request sent to pharmacy on 7/15/24 for Glycopyrrolate, last OV on 5/9/24, will refill one month supply, was told on 5/9/24 to start weaning off of medication.

## 2024-07-19 ENCOUNTER — TELEPHONE (OUTPATIENT)
Dept: INTERNAL MEDICINE CLINIC | Facility: CLINIC | Age: 69
End: 2024-07-19

## 2024-07-19 NOTE — TELEPHONE ENCOUNTER
Paxlovid contraindicated with tacrolimus. Called patient, advised notifying transplant team. Sx mild runny nose and PND, denies productive cough, fevers, dyspnea, CP. Advised conservative measures and to call if worsening sx. All questions were answered.

## 2024-07-19 NOTE — TELEPHONE ENCOUNTER
Spoke to Rolly  He tested positive for COVID this morning  Last night before bed developed runny nose, post nasal drip, sore throat, sporadic cough  Was exposed to family member at his house who recently tested positive    Denies fever. Temp this morning 97.9  Denies chest pain.  Reports some shortness of breath at baseline. No changes to his baseline at this time.    Pt reports he had COVID once before. Took Paxlovid at that time.    Pt with history of kidney transplant and on immunosuppressants     To Dr Hastings to please advise  [facility]      Notified patient that we will route this message to the doctor and see what their recommendations would be. In the meantime, if anything worsens, they were advised to call back or seek emergent evaluation.

## 2024-07-25 ENCOUNTER — LAB ENCOUNTER (OUTPATIENT)
Dept: LAB | Facility: HOSPITAL | Age: 69
End: 2024-07-25
Attending: INTERNAL MEDICINE
Payer: MEDICARE

## 2024-07-25 ENCOUNTER — OFFICE VISIT (OUTPATIENT)
Dept: OTOLARYNGOLOGY | Facility: CLINIC | Age: 69
End: 2024-07-25
Payer: MEDICARE

## 2024-07-25 DIAGNOSIS — D70.8 OTHER NEUTROPENIA (HCC): ICD-10-CM

## 2024-07-25 DIAGNOSIS — Z12.5 SCREENING FOR MALIGNANT NEOPLASM OF PROSTATE: ICD-10-CM

## 2024-07-25 DIAGNOSIS — E78.5 HYPERLIPIDEMIA: ICD-10-CM

## 2024-07-25 DIAGNOSIS — E83.42 HYPOMAGNESEMIA: ICD-10-CM

## 2024-07-25 DIAGNOSIS — Z94.0 KIDNEY TRANSPLANTED (HCC): ICD-10-CM

## 2024-07-25 DIAGNOSIS — B25.9 CYTOMEGALOVIRAL DISEASE (HCC): ICD-10-CM

## 2024-07-25 DIAGNOSIS — R09.82 POSTNASAL DISCHARGE: Primary | ICD-10-CM

## 2024-07-25 DIAGNOSIS — E11.22 TYPE 2 DIABETES MELLITUS WITH CHRONIC KIDNEY DISEASE, WITH LONG-TERM CURRENT USE OF INSULIN, UNSPECIFIED CKD STAGE (HCC): ICD-10-CM

## 2024-07-25 DIAGNOSIS — E53.8 VITAMIN B12 DEFICIENCY: ICD-10-CM

## 2024-07-25 DIAGNOSIS — R78.81 BACTEREMIA: ICD-10-CM

## 2024-07-25 DIAGNOSIS — E55.9 VITAMIN D DEFICIENCY DISEASE: ICD-10-CM

## 2024-07-25 DIAGNOSIS — R80.9 PROTEINURIA: ICD-10-CM

## 2024-07-25 DIAGNOSIS — N39.0 URINARY TRACT INFECTION, SITE NOT SPECIFIED: ICD-10-CM

## 2024-07-25 DIAGNOSIS — Z79.4 TYPE 2 DIABETES MELLITUS WITH CHRONIC KIDNEY DISEASE, WITH LONG-TERM CURRENT USE OF INSULIN, UNSPECIFIED CKD STAGE (HCC): ICD-10-CM

## 2024-07-25 LAB
ANION GAP SERPL CALC-SCNC: 5 MMOL/L (ref 0–18)
BASOPHILS # BLD AUTO: 0.02 X10(3) UL (ref 0–0.2)
BASOPHILS NFR BLD AUTO: 0.4 %
BILIRUB UR QL: NEGATIVE
BUN BLD-MCNC: 41 MG/DL (ref 9–23)
BUN/CREAT SERPL: 24 (ref 10–20)
CALCIUM BLD-MCNC: 9.2 MG/DL (ref 8.7–10.4)
CHLORIDE SERPL-SCNC: 113 MMOL/L (ref 98–112)
CLARITY UR: CLEAR
CO2 SERPL-SCNC: 23 MMOL/L (ref 21–32)
COLOR UR: YELLOW
CREAT BLD-MCNC: 1.71 MG/DL
CREAT UR-SCNC: 84.5 MG/DL
DEPRECATED RDW RBC AUTO: 49.5 FL (ref 35.1–46.3)
EGFRCR SERPLBLD CKD-EPI 2021: 43 ML/MIN/1.73M2 (ref 60–?)
EOSINOPHIL # BLD AUTO: 0.03 X10(3) UL (ref 0–0.7)
EOSINOPHIL NFR BLD AUTO: 0.6 %
ERYTHROCYTE [DISTWIDTH] IN BLOOD BY AUTOMATED COUNT: 14 % (ref 11–15)
FASTING STATUS PATIENT QL REPORTED: YES
GLUCOSE BLD-MCNC: 82 MG/DL (ref 70–99)
GLUCOSE UR-MCNC: NEGATIVE MG/DL
HCT VFR BLD AUTO: 36.5 %
HGB BLD-MCNC: 12.1 G/DL
HGB UR QL STRIP.AUTO: NEGATIVE
IMM GRANULOCYTES # BLD AUTO: 0.07 X10(3) UL (ref 0–1)
IMM GRANULOCYTES NFR BLD: 1.3 %
KETONES UR-MCNC: NEGATIVE MG/DL
LEUKOCYTE ESTERASE UR QL STRIP.AUTO: NEGATIVE
LYMPHOCYTES # BLD AUTO: 2.1 X10(3) UL (ref 1–4)
LYMPHOCYTES NFR BLD AUTO: 38.5 %
MAGNESIUM SERPL-MCNC: 1.9 MG/DL (ref 1.6–2.6)
MCH RBC QN AUTO: 31.6 PG (ref 26–34)
MCHC RBC AUTO-ENTMCNC: 33.2 G/DL (ref 31–37)
MCV RBC AUTO: 95.3 FL
MICROALBUMIN UR-MCNC: 1 MG/DL
MICROALBUMIN/CREAT 24H UR-RTO: 11.8 UG/MG (ref ?–30)
MONOCYTES # BLD AUTO: 0.47 X10(3) UL (ref 0.1–1)
MONOCYTES NFR BLD AUTO: 8.6 %
NEUTROPHILS # BLD AUTO: 2.76 X10 (3) UL (ref 1.5–7.7)
NEUTROPHILS # BLD AUTO: 2.76 X10(3) UL (ref 1.5–7.7)
NEUTROPHILS NFR BLD AUTO: 50.6 %
NITRITE UR QL STRIP.AUTO: NEGATIVE
OSMOLALITY SERPL CALC.SUM OF ELEC: 301 MOSM/KG (ref 275–295)
PH UR: 5.5 [PH] (ref 5–8)
PHOSPHATE SERPL-MCNC: 4.9 MG/DL (ref 2.4–5.1)
PLATELET # BLD AUTO: 128 10(3)UL (ref 150–450)
PLATELETS.RETICULATED NFR BLD AUTO: 5.7 % (ref 0–7)
POTASSIUM SERPL-SCNC: 5.1 MMOL/L (ref 3.5–5.1)
PROT UR-MCNC: 7.2 MG/DL (ref ?–14)
PROT UR-MCNC: NEGATIVE MG/DL
RBC # BLD AUTO: 3.83 X10(6)UL
SODIUM SERPL-SCNC: 141 MMOL/L (ref 136–145)
SP GR UR STRIP: 1.01 (ref 1–1.03)
UROBILINOGEN UR STRIP-ACNC: 0.2
WBC # BLD AUTO: 5.5 X10(3) UL (ref 4–11)

## 2024-07-25 PROCEDURE — 82570 ASSAY OF URINE CREATININE: CPT

## 2024-07-25 PROCEDURE — 80048 BASIC METABOLIC PNL TOTAL CA: CPT

## 2024-07-25 PROCEDURE — 81003 URINALYSIS AUTO W/O SCOPE: CPT

## 2024-07-25 PROCEDURE — 84100 ASSAY OF PHOSPHORUS: CPT

## 2024-07-25 PROCEDURE — 87086 URINE CULTURE/COLONY COUNT: CPT

## 2024-07-25 PROCEDURE — 99213 OFFICE O/P EST LOW 20 MIN: CPT | Performed by: OTOLARYNGOLOGY

## 2024-07-25 PROCEDURE — 83735 ASSAY OF MAGNESIUM: CPT

## 2024-07-25 PROCEDURE — 85025 COMPLETE CBC W/AUTO DIFF WBC: CPT

## 2024-07-25 PROCEDURE — 84156 ASSAY OF PROTEIN URINE: CPT

## 2024-07-25 PROCEDURE — 80197 ASSAY OF TACROLIMUS: CPT

## 2024-07-25 PROCEDURE — 82043 UR ALBUMIN QUANTITATIVE: CPT

## 2024-07-25 PROCEDURE — 36415 COLL VENOUS BLD VENIPUNCTURE: CPT

## 2024-07-25 NOTE — PROGRESS NOTES
Rolly Sanchez is a 69 year old male.    Chief Complaint   Patient presents with    Follow - Up     Patient is here due to postnasal drip follow up. Reports improvement in symptoms.        HISTORY OF PRESENT ILLNESS  Constant runny nose for multiple years feels a running down the back of his throat all the time causing him throat discomfort with constant throat clearing and cough.  No nasal mucopurulence.  He is currently on Allegra and fluticasone.  No other significant signs, symptoms or complaints.  Does not tolerate Sudafed due to underlying blood pressure issues.     24 last visit he had been using Allegra and fluticasone and I added glycopyrrolate and montelukast.  Really feels that the montelukast is what is causing him to feel 100% better at this time.  No further postnasal drip generally rarely has a little bit of drippage which has not been bothersome to him.  Here to discuss further management    [id number] he had been doing quite well up until he unfortunately developed COVID which she caught from a family reunion.  Very congested but states it was very manageable because he was taking all of his allergy meds.  Currently on glycopyrrolate twice daily as well as montelukast loratadine and fluticasone.  Seems to be getting good control of things but states that since having COVID he has been a bit more congested and having more runny nose.    Social History     Socioeconomic History    Marital status:     Number of children: 2   Occupational History    Occupation:    Tobacco Use    Smoking status: Former     Current packs/day: 0.00     Average packs/day: 0.5 packs/day for 9.0 years (4.5 ttl pk-yrs)     Types: Cigarettes     Start date: 1973     Quit date: 1982     Years since quittin.4    Smokeless tobacco: Never    Tobacco comments:     smokes cigars occasionally   Vaping Use    Vaping status: Never Used   Substance and Sexual Activity    Alcohol use: Not Currently      Alcohol/week: 1.0 standard drink of alcohol    Drug use: No   Other Topics Concern    Caffeine Concern Yes     Comment: 3 cups of coffee per day    Pt has a pacemaker No    Pt has a defibrillator No    Reaction to local anesthetic No       Family History   Problem Relation Age of Onset    Diabetes Mother     Obesity Mother     Renal Disease Mother         ESRD    Other (Other) Mother     Hypertension Father     Obesity Father     Heart Attack Father     Stroke Father         TIA    Skin cancer Father     Heart Disorder Father     Other (Other) Father     Other (Brain bleed) Father 82         in his sleep-hx brain bleed    Diabetes Sister     Hypertension Brother        Past Medical History:    Adenomatous polyp    cscopy Dr. Singh -misael. polyp, divertic, int hemrds    Allergic rhinitis    Asthma (HCC)    BPH (benign prostatic hyperplasia)    Cancer (HCC)    Carcinoma in situ of colon    cecum-R hemicolectomy Dr. Lugo    Chronic kidney disease, stage 5 (HCC)    Peritoneal dialysis started 2018 Dr Dexter    Diabetes ([facility])    Diabetes mellitus (HCC)    Dialysis patient (HCC)    Peritoneal dialysis before  kidney transplant    Diverticulosis    Gallstone    Gallstone seen on US liver     Hepatic steatosis    US liver     High blood pressure    High cholesterol    Hyperlipemia    IBS (irritable bowel syndrome)    Kidney transplant recipient (HCC)    Kidney transplant performed 12/15/2022 at [facility] ( donor kidney transplant).    Microalbuminuria    Migraines    hx in grade school    Neuropathy    diabetic, jorge hands and feet    Obesity    Pancytopenia ([facility])    Dr. Ureña--hematologist    Peritoneal dialysis status ([facility])    Dr Dexter    Pernicious anemia    Platelets decreased ([facility])    Psoriasis-eczema overlap condition    Renal disorder    CKD with proteinuria--Dr. 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    Viral disease    Hx spinal virus    Visual impairment    glasses       Past Surgical History:   Procedure Laterality Date    Adenoidectomy  1965 estimate    Same time as tonsils removed    Appendectomy      Colectomy Right 2010    hemicolectomy-Dr Resendez    Colonoscopy  2010    Dr Singh    Colonoscopy  10/2014    polyp    Colonoscopy  12/2019    Colonoscopy N/A 12/24/2019    Procedure: COLONOSCOPY;  Surgeon: Blade Singh MD;  Location: [facility] ENDOSCOPY    Cystouretro w/stone remove Bilateral 04/22/2022    Cystoscopy, bilateral retrograde pyelogram, bilateral ureteroscopy, manipulation/removal of bilateral blood clots/stones, ureteral stent removal.    Hernia surgery  2010    ventral hernia-at time of colon surgery    Other surgical history  11/1/2010    Colon Resection    Peritoneal dialysis  07/2018    Dr Croft placed PD catheter 6/2018. started PD 7/2018 Dr Dexter    Tonsillectomy      T & A    Transplantation of kidney  12/2022    At [facility]         REVIEW OF SYSTEMS    System Neg/Pos Details   Constitutional Negative Fatigue, fever and weight loss.   ENMT Negative Drooling.   Eyes Negative Blurred vision and vision changes.   Respiratory Negative Dyspnea and wheezing.   Cardio Negative Chest pain, irregular heartbeat/palpitations and syncope.   GI Negative Abdominal pain and diarrhea.   Endocrine Negative Cold intolerance and heat intolerance.   Neuro Negative Tremors.   Psych Negative Anxiety and depression.   Integumentary Negative Frequent skin infections, pigment change and rash.   Hema/Lymph Negative Easy bleeding and easy bruising.           PHYSICAL EXAM    There were no vitals taken for this visit.       Constitutional Normal Overall appearance - Normal.   Psychiatric Normal Orientation - Oriented to  time, place, person & situation. Appropriate mood and affect.   Neck Exam Normal Inspection - Normal. Palpation - Normal. Parotid gland - Normal. Thyroid gland - Normal.   Eyes Normal Conjunctiva - Right: Normal, Left: Normal. Pupil - Right: Normal, Left: Normal. Fundus - Right: Normal, Left: Normal.   Neurological Normal Memory - Normal. Cranial nerves - Cranial nerves II through XII grossly intact.   Head/Face Normal Facial features - Normal. Eyebrows - Normal. Skull - Normal.        Nasopharynx Normal External nose - Normal. Lips/teeth/gums - Normal. Tonsils - Normal. Oropharynx - Normal.   Ears Normal Inspection - Right: Normal, Left: Normal. Canal - Right: Normal, Left: Normal. TM - Right: Normal, Left: Normal.   Skin Normal Inspection - Normal.        Lymph Detail Normal Submental. Submandibular. Anterior cervical. Posterior cervical. Supraclavicular.        Nose/Mouth/Throat Normal External nose - Normal. Lips/teeth/gums - Normal. Tonsils - Normal. Oropharynx - Normal.   Nose/Mouth/Throat Normal Nares - Right: Normal Left: Normal. Septum -Normal  Turbinates - Right: Normal, Left: Normal.  Post nasaldischarge with very congested nasal mucosa bilaterally       Current Outpatient Medications:     GLYCOPYRROLATE 1 MG Oral Tab, TAKE ONE TABLET BY MOUTH THREE TIMES DAILY, Disp: 90 tablet, Rfl: 0    glycopyrrolate 1 MG Oral Tab, Take 1 tablet (1 mg total) by mouth 3 (three) times daily., Disp: 90 tablet, Rfl: 1    montelukast 10 MG Oral Tab, TAKE 1 TABLET BY MOUTH NIGHTLY, Disp: 90 tablet, Rfl: 0    Tacrolimus ER (ENVARSUS XR) 4 MG Oral Tablet 24 Hr, Take 1 tablet by mouth every morning., Disp: , Rfl:     Tacrolimus ER (ENVARSUS XR) 1 MG Oral Tablet 24 Hr, Take 3 tablets (3 mg total) by mouth daily., Disp: , Rfl:     fexofenadine 180 MG Oral Tab, Take 1 tablet (180 mg total) by mouth daily., Disp: , Rfl:     cyanocobalamin 1000 MCG Oral Tab, Take 1 tablet (1,000 mcg total) by mouth daily., Disp: , Rfl:     NON  FORMULARY, MAGNESIUM + PROTEIN 1 daily, Disp: , Rfl:     semaglutide (OZEMPIC, 0.25 OR 0.5 MG/DOSE,) 2 MG/1.5ML Subcutaneous Solution Pen-injector, Inject 0.5 mg into the skin once a week., Disp: , Rfl:     Probiotic Product (PROBIOTIC-10 OR), Take 1 capsule by mouth daily., Disp: , Rfl:     fluticasone propionate 50 MCG/ACT Nasal Suspension, by Each Nare route as needed for Rhinitis., Disp: , Rfl:     metoprolol tartrate 100 MG Oral Tab, Take 1 tablet (100 mg total) by mouth 2 (two) times daily., Disp: , Rfl:     finasteride 5 MG Oral Tab, Take 1 tablet (5 mg total) by mouth daily., Disp: 90 tablet, Rfl: 3    predniSONE 10 MG Oral Tab, Take 1 tablet (10 mg total) by mouth daily., Disp: , Rfl:     ELIQUIS 5 MG Oral Tab, Take 1 tablet (5 mg total) by mouth 2 (two) times daily., Disp: , Rfl:     chlorthalidone 25 MG Oral Tab, Take 1 tablet (25 mg total) by mouth daily., Disp: , Rfl:     lisinopril 10 MG Oral Tab, Take 1 tablet (10 mg total) by mouth daily., Disp: , Rfl:     amLODIPine 10 MG Oral Tab, Take 1 tablet (10 mg total) by mouth daily., Disp: , Rfl:     ergocalciferol 1.25 MG (99803 UT) Oral Cap, Take 1 capsule (50,000 Units total) by mouth every 7 days., Disp: , Rfl:     Syringe/Needle, Disp, (BD LUER-PILY SYRINGE) 23G X 1\" 3 ML Does not apply Misc, USE FOR B12 INJECTIONS AS DIRECTED, Disp: 12 each, Rfl: 0    sulfamethoxazole-trimethoprim -160 MG Oral Tab per tablet, Takes Mondays and Thursdays per transplant clinic, Disp: , Rfl: 0    tamsulosin 0.4 MG Oral Cap, Take 1 capsule (0.4 mg total) by mouth daily., Disp: 90 capsule, Rfl: 3    atorvastatin 40 MG Oral Tab, Take 1 tablet (40 mg total) by mouth nightly., Disp: , Rfl:     HUMULIN R U-500, CONCENTRATED, 500 UNIT/ML Subcutaneous Solution, 250 units/day via Insulin pump, Disp: , Rfl:     PORFIRIO CONTOUR NEXT TEST In Vitro Strip, , Disp: , Rfl:   ASSESSMENT AND PLAN    1. Postnasal discharge  Overall doing better with his medications.  I have asked him to  increase glycopyrrolate up to 3 times a day if he feels that it will help with his postnasal discharge and then to back down after he resolves his COVID infection.  Continue loratadine montelukast and fluticasone return to see me in 1 month to discuss weaning meds further.        This note was prepared using Dragon [employer] voice recognition dictation software. As a result errors may occur. When identified these errors have been corrected. While every attempt is made to correct errors during dictation discrepancies may still exist    Angel Olivas MD    7/25/2024    10:40 AM

## 2024-07-26 ENCOUNTER — TELEPHONE (OUTPATIENT)
Dept: NEPHROLOGY | Facility: CLINIC | Age: 69
End: 2024-07-26

## 2024-07-26 LAB
CMV DNA DETECT, QN LOG: NOT DETECTED {LOG_IU}/ML
CMV DNA DETECT, QN: NOT DETECTED [IU]/ML

## 2024-07-26 NOTE — TELEPHONE ENCOUNTER
----- Message from HELLEN DE LUNA sent at 7/25/2024  5:46 PM CDT -----  Please let michelle know that I got the labs that Dr jaimes ordered.  I believe he wants me to see michelle 12 mon after his last visit - can he make an appt for Jan 2025?

## 2024-07-29 LAB — TACROLIMUS LVL: 5 NG/ML

## 2024-07-31 ENCOUNTER — TELEPHONE (OUTPATIENT)
Dept: SURGERY | Facility: CLINIC | Age: 69
End: 2024-07-31

## 2024-07-31 DIAGNOSIS — N40.1 BENIGN PROSTATIC HYPERPLASIA WITH LOWER URINARY TRACT SYMPTOMS, SYMPTOM DETAILS UNSPECIFIED: ICD-10-CM

## 2024-07-31 RX ORDER — FINASTERIDE 5 MG/1
5 TABLET, FILM COATED ORAL DAILY
Qty: 90 TABLET | Refills: 3 | Status: SHIPPED | OUTPATIENT
Start: 2024-07-31

## 2024-07-31 NOTE — TELEPHONE ENCOUNTER
Refill per protocol.    Benign Prostatic Hypertrophy Medications      Protocol Criteria:  Appointment scheduled in the past 12 months or in the next 2 months  If patients is between the ages of 50 and 70 then PSA done within the last 2 years and is either  Less than or equal to 4.0 ng/ml  Or if greater than 4.0 there is no significant increase (>0.5 ng/ml) in PSA over the last 2 years    Recent Outpatient Visits              6 days ago Postnasal discharge    [facility] Angel Olivas MD    Office Visit    1 month ago [facility] Sylvie Hurley, PT    Office Visit    1 month ago     [facility] Sylvie Hurley, PT    Office Visit    1 month ago     [facility] Sylvie Hurley, PT    Office Visit    2 months ago     [facility]    Office Visit          Future Appointments         Provider Department Appt Notes    In 3 weeks Angel Olivas MD [facility] 1 mths    In 1 month Macey Hastings DO [facility], [address], [place] [facility]/ last phys 9/2023 bt    In 5 months Franklin Eastman, PAZiC [facility] 1 year   with  AD  Per  Zh    In 5 months Samantha Dexter MD [facility] follow up    In 6 months Perez Lemus MD [facility] 1 year follow up                PSA:  No results found for: \"PSA\"    PSA Screen:    Lab Results   Component Value Date    PSAS 0.73 04/19/2024    PSAS 1.80 09/11/2021    PSAS 1.83 12/09/2019

## 2024-08-19 ENCOUNTER — LAB ENCOUNTER (OUTPATIENT)
Dept: LAB | Facility: HOSPITAL | Age: 69
End: 2024-08-19
Attending: INTERNAL MEDICINE
Payer: MEDICARE

## 2024-08-19 DIAGNOSIS — B25.9 CYTOMEGALOVIRAL DISEASE (HCC): ICD-10-CM

## 2024-08-19 DIAGNOSIS — E55.9 VITAMIN D DEFICIENCY DISEASE: ICD-10-CM

## 2024-08-19 DIAGNOSIS — R78.81 BACTEREMIA: ICD-10-CM

## 2024-08-19 DIAGNOSIS — E53.8 VITAMIN B12 DEFICIENCY: ICD-10-CM

## 2024-08-19 DIAGNOSIS — Z12.5 SCREENING FOR MALIGNANT NEOPLASM OF PROSTATE: ICD-10-CM

## 2024-08-19 DIAGNOSIS — E78.5 HYPERLIPIDEMIA: ICD-10-CM

## 2024-08-19 DIAGNOSIS — Z94.0 KIDNEY TRANSPLANTED (HCC): ICD-10-CM

## 2024-08-19 DIAGNOSIS — D70.8 OTHER NEUTROPENIA (HCC): ICD-10-CM

## 2024-08-19 DIAGNOSIS — Z79.4 TYPE 2 DIABETES MELLITUS WITH CHRONIC KIDNEY DISEASE, WITH LONG-TERM CURRENT USE OF INSULIN, UNSPECIFIED CKD STAGE (HCC): ICD-10-CM

## 2024-08-19 DIAGNOSIS — E83.42 HYPOMAGNESEMIA: ICD-10-CM

## 2024-08-19 DIAGNOSIS — N39.0 URINARY TRACT INFECTION, SITE NOT SPECIFIED: ICD-10-CM

## 2024-08-19 DIAGNOSIS — E11.22 TYPE 2 DIABETES MELLITUS WITH CHRONIC KIDNEY DISEASE, WITH LONG-TERM CURRENT USE OF INSULIN, UNSPECIFIED CKD STAGE (HCC): ICD-10-CM

## 2024-08-19 DIAGNOSIS — R80.9 PROTEINURIA: ICD-10-CM

## 2024-08-19 LAB
ANION GAP SERPL CALC-SCNC: 7 MMOL/L (ref 0–18)
BASOPHILS # BLD AUTO: 0.02 X10(3) UL (ref 0–0.2)
BASOPHILS NFR BLD AUTO: 0.4 %
BILIRUB UR QL: NEGATIVE
BUN BLD-MCNC: 36 MG/DL (ref 9–23)
BUN/CREAT SERPL: 25.4 (ref 10–20)
CALCIUM BLD-MCNC: 9.9 MG/DL (ref 8.7–10.4)
CHLORIDE SERPL-SCNC: 112 MMOL/L (ref 98–112)
CLARITY UR: CLEAR
CO2 SERPL-SCNC: 24 MMOL/L (ref 21–32)
CREAT BLD-MCNC: 1.42 MG/DL
CREAT UR-SCNC: 67.5 MG/DL
DEPRECATED RDW RBC AUTO: 48.8 FL (ref 35.1–46.3)
EGFRCR SERPLBLD CKD-EPI 2021: 53 ML/MIN/1.73M2 (ref 60–?)
EOSINOPHIL # BLD AUTO: 0.06 X10(3) UL (ref 0–0.7)
EOSINOPHIL NFR BLD AUTO: 1.2 %
ERYTHROCYTE [DISTWIDTH] IN BLOOD BY AUTOMATED COUNT: 14.3 % (ref 11–15)
FASTING STATUS PATIENT QL REPORTED: YES
GLUCOSE BLD-MCNC: 95 MG/DL (ref 70–99)
GLUCOSE UR-MCNC: 70 MG/DL
HCT VFR BLD AUTO: 35.8 %
HGB BLD-MCNC: 12 G/DL
HGB UR QL STRIP.AUTO: NEGATIVE
IMM GRANULOCYTES # BLD AUTO: 0.1 X10(3) UL (ref 0–1)
IMM GRANULOCYTES NFR BLD: 1.9 %
KETONES UR-MCNC: NEGATIVE MG/DL
LEUKOCYTE ESTERASE UR QL STRIP.AUTO: NEGATIVE
LYMPHOCYTES # BLD AUTO: 1.73 X10(3) UL (ref 1–4)
LYMPHOCYTES NFR BLD AUTO: 33.4 %
MAGNESIUM SERPL-MCNC: 1.7 MG/DL (ref 1.6–2.6)
MCH RBC QN AUTO: 31.3 PG (ref 26–34)
MCHC RBC AUTO-ENTMCNC: 33.5 G/DL (ref 31–37)
MCV RBC AUTO: 93.2 FL
MICROALBUMIN UR-MCNC: 1.3 MG/DL
MICROALBUMIN/CREAT 24H UR-RTO: 19.3 UG/MG (ref ?–30)
MONOCYTES # BLD AUTO: 0.48 X10(3) UL (ref 0.1–1)
MONOCYTES NFR BLD AUTO: 9.3 %
NEUTROPHILS # BLD AUTO: 2.79 X10 (3) UL (ref 1.5–7.7)
NEUTROPHILS # BLD AUTO: 2.79 X10(3) UL (ref 1.5–7.7)
NEUTROPHILS NFR BLD AUTO: 53.8 %
NITRITE UR QL STRIP.AUTO: NEGATIVE
OSMOLALITY SERPL CALC.SUM OF ELEC: 304 MOSM/KG (ref 275–295)
PH UR: 5.5 [PH] (ref 5–8)
PHOSPHATE SERPL-MCNC: 4.5 MG/DL (ref 2.4–5.1)
PLATELET # BLD AUTO: 100 10(3)UL (ref 150–450)
PLATELETS.RETICULATED NFR BLD AUTO: 4.5 % (ref 0–7)
POTASSIUM SERPL-SCNC: 4.8 MMOL/L (ref 3.5–5.1)
PROT UR-MCNC: 13.1 MG/DL (ref ?–14)
PROT UR-MCNC: NEGATIVE MG/DL
RBC # BLD AUTO: 3.84 X10(6)UL
SODIUM SERPL-SCNC: 143 MMOL/L (ref 136–145)
SP GR UR STRIP: 1.01 (ref 1–1.03)
UROBILINOGEN UR STRIP-ACNC: NORMAL
WBC # BLD AUTO: 5.2 X10(3) UL (ref 4–11)

## 2024-08-19 PROCEDURE — 82570 ASSAY OF URINE CREATININE: CPT

## 2024-08-19 PROCEDURE — 82043 UR ALBUMIN QUANTITATIVE: CPT

## 2024-08-19 PROCEDURE — 85025 COMPLETE CBC W/AUTO DIFF WBC: CPT

## 2024-08-19 PROCEDURE — 83735 ASSAY OF MAGNESIUM: CPT

## 2024-08-19 PROCEDURE — 84100 ASSAY OF PHOSPHORUS: CPT

## 2024-08-19 PROCEDURE — 80197 ASSAY OF TACROLIMUS: CPT

## 2024-08-19 PROCEDURE — 87086 URINE CULTURE/COLONY COUNT: CPT

## 2024-08-19 PROCEDURE — 36415 COLL VENOUS BLD VENIPUNCTURE: CPT

## 2024-08-19 PROCEDURE — 84156 ASSAY OF PROTEIN URINE: CPT

## 2024-08-19 PROCEDURE — 80048 BASIC METABOLIC PNL TOTAL CA: CPT

## 2024-08-19 PROCEDURE — 81003 URINALYSIS AUTO W/O SCOPE: CPT

## 2024-08-21 LAB
CMV DNA DETECT, QN LOG: NOT DETECTED {LOG_IU}/ML
CMV DNA DETECT, QN: NOT DETECTED [IU]/ML
TACROLIMUS LVL: 4.2 NG/ML

## 2024-08-22 ENCOUNTER — TELEPHONE (OUTPATIENT)
Dept: NEPHROLOGY | Facility: CLINIC | Age: 69
End: 2024-08-22

## 2024-08-22 ENCOUNTER — OFFICE VISIT (OUTPATIENT)
Dept: OTOLARYNGOLOGY | Facility: CLINIC | Age: 69
End: 2024-08-22
Payer: MEDICARE

## 2024-08-22 DIAGNOSIS — R09.82 POSTNASAL DISCHARGE: Primary | ICD-10-CM

## 2024-08-22 PROCEDURE — 99213 OFFICE O/P EST LOW 20 MIN: CPT | Performed by: OTOLARYNGOLOGY

## 2024-08-22 RX ORDER — FENOFIBRATE 48 MG/1
TABLET, COATED ORAL
COMMUNITY
Start: 2024-08-01

## 2024-08-22 NOTE — TELEPHONE ENCOUNTER
Dr. Mamadou Lofton wanted to share this information: Dr. Finn placed monthly standing lab/urine orders    Related test results; has video visit today with Dr. Finn, has appt 1/23/24

## 2024-08-22 NOTE — TELEPHONE ENCOUNTER
----- Message from HELLEN DE LUNA sent at 8/22/2024  9:33 AM CDT -----  Please let Rolly know that I received all of the test results done by Dr. Finn and everything seems to be stable.  Kidney function is looking good with these labs with a GFR of 53.    I will see him in January.  If he has any issues feel free to contact us

## 2024-08-22 NOTE — PROGRESS NOTES
Rolly Sanchez is a 69 year old male.    Chief Complaint   Patient presents with    Follow - Up     Patient is here for follow up 1 month post nasal discharge.patient reports improvement.       HISTORY OF PRESENT ILLNESS  Constant runny nose for multiple years feels a running down the back of his throat all the time causing him throat discomfort with constant throat clearing and cough.  No nasal mucopurulence.  He is currently on Allegra and fluticasone.  No other significant signs, symptoms or complaints.  Does not tolerate Sudafed due to underlying blood pressure issues.     5/9/24 last visit he had been using Allegra and fluticasone and I added glycopyrrolate and montelukast.  Really feels that the montelukast is what is causing him to feel 100% better at this time.  No further postnasal drip generally rarely has a little bit of drippage which has not been bothersome to him.  Here to discuss further management     [address] he had been doing quite well up until he unfortunately developed COVID which she caught from a family reunion.  Very congested but states it was very manageable because he was taking all of his allergy meds.  Currently on glycopyrrolate twice daily as well as montelukast loratadine and fluticasone.  Seems to be getting good control of things but states that since having COVID he has been a bit more congested and having more runny nose.    8/22/24 he has been using glycopyrrolate twice daily as well as montelukast loratadine and fluticasone.  Last visit a month ago I asked him to trial 3 times a day but this makes him too dry.  Feels about 70% better now with the changes in weather since I last saw him.  Bothered by drippage primarily at night    Social History     Socioeconomic History    Marital status:     Number of children: 2   Occupational History    Occupation:    Tobacco Use    Smoking status: Former     Current packs/day: 0.00     Average packs/day: 0.5 packs/day for 9.0  years (4.5 ttl pk-yrs)     Types: Cigarettes     Start date: 1973     Quit date: 1982     Years since quittin.5    Smokeless tobacco: Never    Tobacco comments:     smokes cigars occasionally   Vaping Use    Vaping status: Never Used   Substance and Sexual Activity    Alcohol use: Not Currently     Alcohol/week: 1.0 standard drink of alcohol    Drug use: No   Other Topics Concern    Caffeine Concern Yes     Comment: 3 cups of coffee per day    Pt has a pacemaker No    Pt has a defibrillator No    Reaction to local anesthetic No       Family History   Problem Relation Age of Onset    Diabetes Mother     Obesity Mother     Renal Disease Mother         ESRD    Other (Other) Mother     Hypertension Father     Obesity Father     Heart Attack Father     Stroke Father         TIA    Skin cancer Father     Heart Disorder Father     Other (Other) Father     Other (Brain bleed) Father 82         in his sleep-hx brain bleed    Diabetes Sister     Hypertension Brother        Past Medical History:    Adenomatous polyp    cscopy Dr. Singh -misael. polyp, divertic, int hemrds    Allergic rhinitis    Asthma (HCC)    BPH (benign prostatic hyperplasia)    Cancer (HCC)    Carcinoma in situ of colon    cecum-R hemicolectomy Dr. Lugo    Chronic kidney disease, stage 5 (HCC)    Peritoneal dialysis started 2018 Dr Dexter    Diabetes (HCC)    Diabetes mellitus (HCC)    Dialysis patient (HCC)    Peritoneal dialysis before  kidney transplant    Diverticulosis    Gallstone    Gallstone seen on US liver     Hepatic steatosis    US liver     High blood pressure    High cholesterol    Hyperlipemia    IBS (irritable bowel syndrome)    Kidney transplant recipient (HCC)    Kidney transplant performed 12/15/2022 at [facility] ( donor kidney transplant).    Microalbuminuria    Migraines    hx in grade school    Neuropathy    diabetic, jorge hands and feet    Obesity    Pancytopenia (HCC)      Niranjan--hematologist    Peritoneal dialysis status (HCC)    Dr Dexter    Pernicious anemia    Platelets decreased (HCC)    Psoriasis-eczema overlap condition    Renal disorder    CKD with proteinuria--Dr. 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    Viral disease    Hx spinal virus    Visual impairment    glasses       Past Surgical History:   Procedure Laterality Date    Adenoidectomy  1965 estimate    Same time as tonsils removed    Appendectomy      Colectomy Right 2010    hemicolectomy-Dr Resendez    Colonoscopy  2010    Dr Singh    Colonoscopy  10/2014    polyp    Colonoscopy  12/2019    Colonoscopy N/A 12/24/2019    Procedure: COLONOSCOPY;  Surgeon: Blade Singh MD;  Location: [facility] ENDOSCOPY    Cystouretro w/stone remove Bilateral 04/22/2022    Cystoscopy, bilateral retrograde pyelogram, bilateral ureteroscopy, manipulation/removal of bilateral blood clots/stones, ureteral stent removal.    Hernia surgery  2010    ventral hernia-at time of colon surgery    Other surgical history  11/1/2010    Colon Resection    Peritoneal dialysis  07/2018    Dr Croft placed PD catheter 6/2018. started PD 7/2018 Dr Dexter    Tonsillectomy      T & A    Transplantation of kidney  12/2022    At [facility]         REVIEW OF SYSTEMS    System Neg/Pos Details   Constitutional Negative Fatigue, fever and weight loss.   ENMT Negative Drooling.   Eyes Negative Blurred vision and vision changes.   Respiratory Negative Dyspnea and wheezing.   Cardio Negative Chest pain, irregular heartbeat/palpitations and syncope.   GI Negative Abdominal pain and diarrhea.   Endocrine Negative Cold intolerance and heat intolerance.   Neuro Negative Tremors.   Psych Negative Anxiety  and depression.   Integumentary Negative Frequent skin infections, pigment change and rash.   Hema/Lymph Negative Easy bleeding and easy bruising.           PHYSICAL EXAM    There were no vitals taken for this visit.       Constitutional Normal Overall appearance - Normal.   Psychiatric Normal Orientation - Oriented to time, place, person & situation. Appropriate mood and affect.   Neck Exam Normal Inspection - Normal. Palpation - Normal. Parotid gland - Normal. Thyroid gland - Normal.   Eyes Normal Conjunctiva - Right: Normal, Left: Normal. Pupil - Right: Normal, Left: Normal. Fundus - Right: Normal, Left: Normal.   Neurological Normal Memory - Normal. Cranial nerves - Cranial nerves II through XII grossly intact.   Head/Face Normal Facial features - Normal. Eyebrows - Normal. Skull - Normal.        Nasopharynx Normal External nose - Normal. Lips/teeth/gums - Normal. Tonsils - Normal. Oropharynx - Normal.   Ears Normal Inspection - Right: Normal, Left: Normal. Canal - Right: Normal, Left: Normal. TM - Right: Normal, Left: Normal.   Skin Normal Inspection - Normal.        Lymph Detail Normal Submental. Submandibular. Anterior cervical. Posterior cervical. Supraclavicular.        Nose/Mouth/Throat Normal External nose - Normal. Lips/teeth/gums - Normal. Tonsils - Normal. Oropharynx - Normal.   Nose/Mouth/Throat Normal Nares - Right: Normal Left: Normal. Septum -Normal  Turbinates - Right: Normal, Left: Normal.       Current Outpatient Medications:     fenofibrate 48 MG Oral Tab, , Disp: , Rfl:     finasteride 5 MG Oral Tab, Take 1 tablet (5 mg total) by mouth daily., Disp: 90 tablet, Rfl: 3    GLYCOPYRROLATE 1 MG Oral Tab, TAKE ONE TABLET BY MOUTH THREE TIMES DAILY, Disp: 90 tablet, Rfl: 0    montelukast 10 MG Oral Tab, TAKE 1 TABLET BY MOUTH NIGHTLY, Disp: 90 tablet, Rfl: 0    Tacrolimus ER (ENVARSUS XR) 4 MG Oral Tablet 24 Hr, Take 1 tablet by mouth every morning., Disp: , Rfl:     Tacrolimus ER (ENVARSUS XR) 1 MG  Oral Tablet 24 Hr, Take 3 tablets (3 mg total) by mouth daily., Disp: , Rfl:     fexofenadine 180 MG Oral Tab, Take 1 tablet (180 mg total) by mouth daily., Disp: , Rfl:     cyanocobalamin 1000 MCG Oral Tab, Take 1 tablet (1,000 mcg total) by mouth daily., Disp: , Rfl:     NON FORMULARY, MAGNESIUM + PROTEIN 1 daily, Disp: , Rfl:     semaglutide (OZEMPIC, 0.25 OR 0.5 MG/DOSE,) 2 MG/1.5ML Subcutaneous Solution Pen-injector, Inject 0.5 mg into the skin once a week., Disp: , Rfl:     Probiotic Product (PROBIOTIC-10 OR), Take 1 capsule by mouth daily., Disp: , Rfl:     fluticasone propionate 50 MCG/ACT Nasal Suspension, by Each Nare route as needed for Rhinitis., Disp: , Rfl:     metoprolol tartrate 100 MG Oral Tab, Take 1 tablet (100 mg total) by mouth 2 (two) times daily., Disp: , Rfl:     predniSONE 10 MG Oral Tab, Take 1 tablet (10 mg total) by mouth daily., Disp: , Rfl:     ELIQUIS 5 MG Oral Tab, Take 1 tablet (5 mg total) by mouth 2 (two) times daily., Disp: , Rfl:     chlorthalidone 25 MG Oral Tab, Take 1 tablet (25 mg total) by mouth daily., Disp: , Rfl:     lisinopril 10 MG Oral Tab, Take 1 tablet (10 mg total) by mouth daily., Disp: , Rfl:     amLODIPine 10 MG Oral Tab, Take 1 tablet (10 mg total) by mouth daily., Disp: , Rfl:     ergocalciferol 1.25 MG (44376 UT) Oral Cap, Take 1 capsule (50,000 Units total) by mouth every 7 days., Disp: , Rfl:     Syringe/Needle, Disp, (BD LUER-PILY SYRINGE) 23G X 1\" 3 ML Does not apply Misc, USE FOR B12 INJECTIONS AS DIRECTED, Disp: 12 each, Rfl: 0    sulfamethoxazole-trimethoprim -160 MG Oral Tab per tablet, Takes Mondays and Thursdays per transplant clinic, Disp: , Rfl: 0    tamsulosin 0.4 MG Oral Cap, Take 1 capsule (0.4 mg total) by mouth daily., Disp: 90 capsule, Rfl: 3    atorvastatin 40 MG Oral Tab, Take 1 tablet (40 mg total) by mouth nightly., Disp: , Rfl:     HUMULIN R U-500, CONCENTRATED, 500 UNIT/ML Subcutaneous Solution, 250 units/day via Insulin pump,  Disp: , Rfl:     PORFIRIO CONTOUR NEXT TEST In Vitro Strip, , Disp: , Rfl:   ASSESSMENT AND PLAN    1. Postnasal discharge  70-80% better with glycopyrrolate twice daily.  We discussed having him use half a tablet before bedtime around dinnertime to see if this helps with some of his postnasal drip at night.  He will consider this and he will return to see me on appearing basis or in 1 year for refills on his meds.        This note was prepared using Dragon [employer] voice recognition dictation software. As a result errors may occur. When identified these errors have been corrected. While every attempt is made to correct errors during dictation discrepancies may still exist    Angel Olivas MD    8/22/2024    11:44 AM

## 2024-08-23 RX ORDER — MONTELUKAST SODIUM 10 MG/1
10 TABLET ORAL NIGHTLY
Qty: 90 TABLET | Refills: 3 | Status: SHIPPED | OUTPATIENT
Start: 2024-08-23

## 2024-08-26 ENCOUNTER — LAB ENCOUNTER (OUTPATIENT)
Dept: LAB | Facility: HOSPITAL | Age: 69
End: 2024-08-26
Attending: INTERNAL MEDICINE
Payer: MEDICARE

## 2024-08-26 DIAGNOSIS — E11.22 TYPE 2 DIABETES MELLITUS WITH CHRONIC KIDNEY DISEASE ON CHRONIC DIALYSIS, WITH LONG-TERM CURRENT USE OF INSULIN (HCC): ICD-10-CM

## 2024-08-26 DIAGNOSIS — Z79.4 TYPE 2 DIABETES MELLITUS WITH CHRONIC KIDNEY DISEASE ON CHRONIC DIALYSIS, WITH LONG-TERM CURRENT USE OF INSULIN (HCC): ICD-10-CM

## 2024-08-26 DIAGNOSIS — N18.6 TYPE 2 DIABETES MELLITUS WITH CHRONIC KIDNEY DISEASE ON CHRONIC DIALYSIS, WITH LONG-TERM CURRENT USE OF INSULIN (HCC): ICD-10-CM

## 2024-08-26 DIAGNOSIS — Z99.2 TYPE 2 DIABETES MELLITUS WITH CHRONIC KIDNEY DISEASE ON CHRONIC DIALYSIS, WITH LONG-TERM CURRENT USE OF INSULIN (HCC): ICD-10-CM

## 2024-08-26 DIAGNOSIS — E11.9 DIABETES MELLITUS (HCC): Primary | ICD-10-CM

## 2024-08-26 LAB
ALBUMIN SERPL-MCNC: 4 G/DL (ref 3.2–4.8)
ALBUMIN/GLOB SERPL: 2.1 {RATIO} (ref 1–2)
ALP LIVER SERPL-CCNC: 32 U/L
ALT SERPL-CCNC: 22 U/L
ANION GAP SERPL CALC-SCNC: 6 MMOL/L (ref 0–18)
AST SERPL-CCNC: 17 U/L (ref ?–34)
BILIRUB SERPL-MCNC: 0.6 MG/DL (ref 0.2–1.1)
BUN BLD-MCNC: 30 MG/DL (ref 9–23)
BUN/CREAT SERPL: 19.7 (ref 10–20)
CALCIUM BLD-MCNC: 9.2 MG/DL (ref 8.7–10.4)
CHLORIDE SERPL-SCNC: 110 MMOL/L (ref 98–112)
CHOLEST SERPL-MCNC: 93 MG/DL (ref ?–200)
CO2 SERPL-SCNC: 25 MMOL/L (ref 21–32)
CREAT BLD-MCNC: 1.52 MG/DL
EGFRCR SERPLBLD CKD-EPI 2021: 49 ML/MIN/1.73M2 (ref 60–?)
EST. AVERAGE GLUCOSE BLD GHB EST-MCNC: 128 MG/DL (ref 68–126)
FASTING PATIENT LIPID ANSWER: YES
FASTING STATUS PATIENT QL REPORTED: YES
GLOBULIN PLAS-MCNC: 1.9 G/DL (ref 2–3.5)
GLUCOSE BLD-MCNC: 100 MG/DL (ref 70–99)
HBA1C MFR BLD: 6.1 % (ref ?–5.7)
HDLC SERPL-MCNC: 40 MG/DL (ref 40–59)
LDLC SERPL CALC-MCNC: 30 MG/DL (ref ?–100)
NONHDLC SERPL-MCNC: 53 MG/DL (ref ?–130)
OSMOLALITY SERPL CALC.SUM OF ELEC: 298 MOSM/KG (ref 275–295)
POTASSIUM SERPL-SCNC: 4.8 MMOL/L (ref 3.5–5.1)
PROT SERPL-MCNC: 5.9 G/DL (ref 5.7–8.2)
SODIUM SERPL-SCNC: 141 MMOL/L (ref 136–145)
TRIGL SERPL-MCNC: 128 MG/DL (ref 30–149)
VLDLC SERPL CALC-MCNC: 17 MG/DL (ref 0–30)

## 2024-08-26 PROCEDURE — 36415 COLL VENOUS BLD VENIPUNCTURE: CPT

## 2024-08-26 PROCEDURE — 80061 LIPID PANEL: CPT

## 2024-08-26 PROCEDURE — 83036 HEMOGLOBIN GLYCOSYLATED A1C: CPT

## 2024-08-26 PROCEDURE — 80053 COMPREHEN METABOLIC PANEL: CPT

## 2024-09-10 ENCOUNTER — LAB ENCOUNTER (OUTPATIENT)
Dept: LAB | Facility: HOSPITAL | Age: 69
End: 2024-09-10
Attending: INTERNAL MEDICINE
Payer: MEDICARE

## 2024-09-10 DIAGNOSIS — D70.8 OTHER NEUTROPENIA (HCC): ICD-10-CM

## 2024-09-10 DIAGNOSIS — E83.42 HYPOMAGNESEMIA: ICD-10-CM

## 2024-09-10 DIAGNOSIS — Z12.5 SCREENING FOR MALIGNANT NEOPLASM OF PROSTATE: ICD-10-CM

## 2024-09-10 DIAGNOSIS — R78.81 BACTEREMIA: ICD-10-CM

## 2024-09-10 DIAGNOSIS — Z94.0 KIDNEY TRANSPLANTED (HCC): ICD-10-CM

## 2024-09-10 DIAGNOSIS — E55.9 VITAMIN D DEFICIENCY DISEASE: ICD-10-CM

## 2024-09-10 DIAGNOSIS — B25.9 CYTOMEGALOVIRAL DISEASE (HCC): ICD-10-CM

## 2024-09-10 DIAGNOSIS — E53.8 VITAMIN B12 DEFICIENCY: ICD-10-CM

## 2024-09-10 DIAGNOSIS — R80.9 PROTEINURIA: ICD-10-CM

## 2024-09-10 DIAGNOSIS — E11.22 TYPE 2 DIABETES MELLITUS WITH CHRONIC KIDNEY DISEASE, WITH LONG-TERM CURRENT USE OF INSULIN, UNSPECIFIED CKD STAGE (HCC): ICD-10-CM

## 2024-09-10 DIAGNOSIS — Z79.4 TYPE 2 DIABETES MELLITUS WITH CHRONIC KIDNEY DISEASE, WITH LONG-TERM CURRENT USE OF INSULIN, UNSPECIFIED CKD STAGE (HCC): ICD-10-CM

## 2024-09-10 DIAGNOSIS — N39.0 URINARY TRACT INFECTION, SITE NOT SPECIFIED: ICD-10-CM

## 2024-09-10 DIAGNOSIS — E78.5 HYPERLIPIDEMIA: ICD-10-CM

## 2024-09-10 LAB
ANION GAP SERPL CALC-SCNC: 5 MMOL/L (ref 0–18)
BASOPHILS # BLD AUTO: 0.02 X10(3) UL (ref 0–0.2)
BASOPHILS NFR BLD AUTO: 0.3 %
BILIRUB UR QL: NEGATIVE
BUN BLD-MCNC: 31 MG/DL (ref 9–23)
BUN/CREAT SERPL: 19.1 (ref 10–20)
CALCIUM BLD-MCNC: 9.5 MG/DL (ref 8.7–10.4)
CHLORIDE SERPL-SCNC: 110 MMOL/L (ref 98–112)
CLARITY UR: CLEAR
CO2 SERPL-SCNC: 25 MMOL/L (ref 21–32)
COLOR UR: COLORLESS
CREAT BLD-MCNC: 1.62 MG/DL
CREAT UR-SCNC: 49.1 MG/DL
DEPRECATED RDW RBC AUTO: 48.9 FL (ref 35.1–46.3)
EGFRCR SERPLBLD CKD-EPI 2021: 46 ML/MIN/1.73M2 (ref 60–?)
EOSINOPHIL # BLD AUTO: 0.03 X10(3) UL (ref 0–0.7)
EOSINOPHIL NFR BLD AUTO: 0.5 %
ERYTHROCYTE [DISTWIDTH] IN BLOOD BY AUTOMATED COUNT: 14.1 % (ref 11–15)
FASTING STATUS PATIENT QL REPORTED: YES
GLUCOSE BLD-MCNC: 146 MG/DL (ref 70–99)
GLUCOSE UR-MCNC: 50 MG/DL
HCT VFR BLD AUTO: 37.6 %
HGB BLD-MCNC: 12.6 G/DL
IMM GRANULOCYTES # BLD AUTO: 0.09 X10(3) UL (ref 0–1)
IMM GRANULOCYTES NFR BLD: 1.5 %
KETONES UR-MCNC: NEGATIVE MG/DL
LEUKOCYTE ESTERASE UR QL STRIP.AUTO: NEGATIVE
LYMPHOCYTES # BLD AUTO: 1.94 X10(3) UL (ref 1–4)
LYMPHOCYTES NFR BLD AUTO: 31.3 %
MAGNESIUM SERPL-MCNC: 1.9 MG/DL (ref 1.6–2.6)
MCH RBC QN AUTO: 31.6 PG (ref 26–34)
MCHC RBC AUTO-ENTMCNC: 33.5 G/DL (ref 31–37)
MCV RBC AUTO: 94.2 FL
MICROALBUMIN UR-MCNC: <0.3 MG/DL
MONOCYTES # BLD AUTO: 0.53 X10(3) UL (ref 0.1–1)
MONOCYTES NFR BLD AUTO: 8.6 %
NEUTROPHILS # BLD AUTO: 3.58 X10 (3) UL (ref 1.5–7.7)
NEUTROPHILS # BLD AUTO: 3.58 X10(3) UL (ref 1.5–7.7)
NEUTROPHILS NFR BLD AUTO: 57.8 %
NITRITE UR QL STRIP.AUTO: NEGATIVE
OSMOLALITY SERPL CALC.SUM OF ELEC: 299 MOSM/KG (ref 275–295)
PH UR: 6.5 [PH] (ref 5–8)
PHOSPHATE SERPL-MCNC: 3.8 MG/DL (ref 2.4–5.1)
PLATELET # BLD AUTO: 126 10(3)UL (ref 150–450)
PLATELETS.RETICULATED NFR BLD AUTO: 5.1 % (ref 0–7)
POTASSIUM SERPL-SCNC: 5.2 MMOL/L (ref 3.5–5.1)
PROT UR-MCNC: <6 MG/DL (ref ?–14)
PROT UR-MCNC: NEGATIVE MG/DL
RBC # BLD AUTO: 3.99 X10(6)UL
SODIUM SERPL-SCNC: 140 MMOL/L (ref 136–145)
SP GR UR STRIP: 1.01 (ref 1–1.03)
UROBILINOGEN UR STRIP-ACNC: NORMAL
WBC # BLD AUTO: 6.2 X10(3) UL (ref 4–11)

## 2024-09-10 PROCEDURE — 83735 ASSAY OF MAGNESIUM: CPT

## 2024-09-10 PROCEDURE — 36415 COLL VENOUS BLD VENIPUNCTURE: CPT

## 2024-09-10 PROCEDURE — 81001 URINALYSIS AUTO W/SCOPE: CPT

## 2024-09-10 PROCEDURE — 85025 COMPLETE CBC W/AUTO DIFF WBC: CPT

## 2024-09-10 PROCEDURE — 87086 URINE CULTURE/COLONY COUNT: CPT

## 2024-09-10 PROCEDURE — 82043 UR ALBUMIN QUANTITATIVE: CPT

## 2024-09-10 PROCEDURE — 84100 ASSAY OF PHOSPHORUS: CPT

## 2024-09-10 PROCEDURE — 80197 ASSAY OF TACROLIMUS: CPT

## 2024-09-10 PROCEDURE — 82570 ASSAY OF URINE CREATININE: CPT

## 2024-09-10 PROCEDURE — 84156 ASSAY OF PROTEIN URINE: CPT

## 2024-09-10 PROCEDURE — 80048 BASIC METABOLIC PNL TOTAL CA: CPT

## 2024-09-11 ENCOUNTER — PATIENT MESSAGE (OUTPATIENT)
Dept: OTOLARYNGOLOGY | Facility: CLINIC | Age: 69
End: 2024-09-11

## 2024-09-11 LAB
CMV DNA DETECT, QN LOG: NOT DETECTED {LOG_IU}/ML
CMV DNA DETECT, QN: NOT DETECTED [IU]/ML

## 2024-09-12 LAB — TACROLIMUS LVL: 9.2 NG/ML

## 2024-09-13 RX ORDER — GLYCOPYRROLATE 1 MG/1
1 TABLET ORAL 3 TIMES DAILY
Qty: 90 TABLET | Refills: 1 | Status: SHIPPED | OUTPATIENT
Start: 2024-09-13

## 2024-09-13 NOTE — TELEPHONE ENCOUNTER
From: Rolly GERBER Suits  To: Angel Olivas  Sent: 9/11/2024 5:12 PM CDT  Subject: Refill of Gylocopyrolate    I would like to change the pharmacy for Gylocopyrolate renewal to [facility]. [address], [address] [place] [address]. Fax: [phone] [employer] has been taking 2 weeks to refill and only have 30 day supply     Thank Rolly [phone]

## 2024-09-13 NOTE — TELEPHONE ENCOUNTER
Changed patient's pharmacy to [facility] per his request and sent the refill. Ok per Dr. Olivas.

## 2024-09-19 ENCOUNTER — TELEPHONE (OUTPATIENT)
Dept: OTOLARYNGOLOGY | Facility: CLINIC | Age: 69
End: 2024-09-19

## 2024-09-19 NOTE — TELEPHONE ENCOUNTER
[employer]/login    [email or web address]      Current Outpatient Medications:     glycopyrrolate 1 MG Oral Tab, Take 1 tablet (1 mg total) by mouth 3 (three) times daily., Disp: 90 tablet, Rfl: 1

## 2024-09-25 ENCOUNTER — TELEPHONE (OUTPATIENT)
Dept: INTERNAL MEDICINE CLINIC | Facility: CLINIC | Age: 69
End: 2024-09-25

## 2024-09-25 ENCOUNTER — LAB ENCOUNTER (OUTPATIENT)
Dept: LAB | Age: 69
End: 2024-09-25
Attending: INTERNAL MEDICINE
Payer: MEDICARE

## 2024-09-25 ENCOUNTER — OFFICE VISIT (OUTPATIENT)
Dept: INTERNAL MEDICINE CLINIC | Facility: CLINIC | Age: 69
End: 2024-09-25

## 2024-09-25 VITALS
HEIGHT: 72 IN | TEMPERATURE: 98 F | OXYGEN SATURATION: 96 % | WEIGHT: 283.38 LBS | SYSTOLIC BLOOD PRESSURE: 112 MMHG | DIASTOLIC BLOOD PRESSURE: 58 MMHG | BODY MASS INDEX: 38.38 KG/M2 | HEART RATE: 100 BPM

## 2024-09-25 DIAGNOSIS — R30.0 DYSURIA: ICD-10-CM

## 2024-09-25 DIAGNOSIS — Z00.00 MEDICARE ANNUAL WELLNESS VISIT, SUBSEQUENT: ICD-10-CM

## 2024-09-25 DIAGNOSIS — Z00.00 MEDICARE ANNUAL WELLNESS VISIT, SUBSEQUENT: Primary | ICD-10-CM

## 2024-09-25 LAB
BILIRUB UR QL: NEGATIVE
COLOR UR: YELLOW
GLUCOSE UR-MCNC: NORMAL MG/DL
KETONES UR-MCNC: NEGATIVE MG/DL
LEUKOCYTE ESTERASE UR QL STRIP.AUTO: 500
PH UR: 6.5 [PH] (ref 5–8)
PROT UR-MCNC: 70 MG/DL
RBC #/AREA URNS AUTO: >10 /HPF
SP GR UR STRIP: 1.02 (ref 1–1.03)
TSI SER-ACNC: 1.28 MIU/ML (ref 0.55–4.78)
UROBILINOGEN UR STRIP-ACNC: NORMAL
WBC #/AREA URNS AUTO: >50 /HPF

## 2024-09-25 PROCEDURE — 87186 SC STD MICRODIL/AGAR DIL: CPT

## 2024-09-25 PROCEDURE — 87086 URINE CULTURE/COLONY COUNT: CPT

## 2024-09-25 PROCEDURE — 81001 URINALYSIS AUTO W/SCOPE: CPT

## 2024-09-25 PROCEDURE — 84443 ASSAY THYROID STIM HORMONE: CPT

## 2024-09-25 PROCEDURE — 36415 COLL VENOUS BLD VENIPUNCTURE: CPT

## 2024-09-25 PROCEDURE — 87077 CULTURE AEROBIC IDENTIFY: CPT

## 2024-09-25 RX ORDER — CEPHALEXIN 500 MG/1
500 CAPSULE ORAL 2 TIMES DAILY
Qty: 14 CAPSULE | Refills: 0 | Status: SHIPPED | OUTPATIENT
Start: 2024-09-25 | End: 2024-09-30

## 2024-09-25 NOTE — PROGRESS NOTES
Subjective:   Rolly Sanchez is a 69 year old male who presents for a Subsequent Annual Wellness visit (Pt already had Initial Annual Wellness) and scheduled follow up of multiple significant but stable problems.     LOV 5/6/24.    Since, he has been doing okay.     C/o stye L lower eyelid x1 week. Using warm compresses with some improvement. Denies f/c.     C/o dysuria since Monday. Denies hematuria, f/c, flank pain.    History/Other:   Fall Risk Assessment:   He has been screened for Falls and is High Risk. Fall Prevention information provided to patient in After Visit Summary.    Do you feel unsteady when standing or walking?: No  Do you worry about falling?: Yes  Have you fallen in the past year?: Yes  How many times have you fallen?: 2  Were you injured?: No   Cognitive Assessment:   He had a completely normal cognitive assessment - see flowsheet entries     Functional Ability/Status:   Rolly Sanchez has some abnormal functions as listed below:  He has difficulties Affording Meds based on screening of functional status. He has Vision problems based on screening of functional status.     Depression Screening (PHQ):  PHQ-2 SCORE: 0  , done 9/25/2024   Last [place] Suicide Screening on 9/25/2024 was No Risk.     5 minutes spent screening and counseling for depression    Advanced Directives:   He does NOT have a Living Will. [Do you have a living will?: No]  He does NOT have a Power of  for Health Care. [Do you have a healthcare power of ?: No]  Discussed Advance Care Planning with patient (and family/surrogate if present). Standard forms made available to patient in After Visit Summary.      Patient Active Problem List   Diagnosis    Allergic rhinitis    Carcinoma in situ of colon    Diabetes mellitus type 2, insulin dependent (HCC)    Hyperlipidemia    Essential hypertension    Pancytopenia (HCC)    Proteinuria    Obstructive sleep apnea    Elevated coronary artery calcium score    Stage 5 chronic  kidney disease on peritoneal dialysis     Vitamin B12 deficiency    Microscopic hematuria    Kidney transplant recipient (HCC)     Allergies:  He is allergic to merbromin, mercury, and merthilate [thimerosal].    Current Medications:  Outpatient Medications Marked as Taking for the 9/25/24 encounter (Office Visit) with Macey Hastings, DO   Medication Sig    glycopyrrolate 1 MG Oral Tab Take 1 tablet (1 mg total) by mouth 3 (three) times daily.    MONTELUKAST 10 MG Oral Tab TAKE 1 TABLET BY MOUTH NIGHTLY    fenofibrate 48 MG Oral Tab     finasteride 5 MG Oral Tab Take 1 tablet (5 mg total) by mouth daily.    Tacrolimus ER (ENVARSUS XR) 1 MG Oral Tablet 24 Hr Take 3 tablets (3 mg total) by mouth daily.    fexofenadine 180 MG Oral Tab Take 1 tablet (180 mg total) by mouth daily.    cyanocobalamin 1000 MCG Oral Tab Take 1 tablet (1,000 mcg total) by mouth daily.    NON FORMULARY MAGNESIUM + PROTEIN 1 daily    semaglutide (OZEMPIC, 0.25 OR 0.5 MG/DOSE,) 2 MG/1.5ML Subcutaneous Solution Pen-injector Inject 1 mg into the skin once a week.    Probiotic Product (PROBIOTIC-10 OR) Take 1 capsule by mouth daily.    fluticasone propionate 50 MCG/ACT Nasal Suspension by Each Nare route as needed for Rhinitis.    metoprolol tartrate 100 MG Oral Tab Take 1 tablet (100 mg total) by mouth 2 (two) times daily.    predniSONE 10 MG Oral Tab Take 1 tablet (10 mg total) by mouth daily.    ELIQUIS 5 MG Oral Tab Take 1 tablet (5 mg total) by mouth 2 (two) times daily.    amLODIPine 10 MG Oral Tab Take 1 tablet (10 mg total) by mouth daily.    ergocalciferol 1.25 MG (64625 UT) Oral Cap Take 1 capsule (50,000 Units total) by mouth every 7 days.    Syringe/Needle, Disp, (BD LUER-PILY SYRINGE) 23G X 1\" 3 ML Does not apply Misc USE FOR B12 INJECTIONS AS DIRECTED    sulfamethoxazole-trimethoprim -160 MG Oral Tab per tablet Takes Mondays and Thursdays per transplant clinic    tamsulosin 0.4 MG Oral Cap Take 1 capsule (0.4 mg total) by  mouth daily.    atorvastatin 40 MG Oral Tab Take 1 tablet (40 mg total) by mouth nightly.    HUMULIN R U-500, CONCENTRATED, 500 UNIT/ML Subcutaneous Solution 250 units/day via Insulin pump    PORFIRIO CONTOUR NEXT TEST In Vitro Strip        Medical History:  He  has a past medical history of Adenomatous polyp (2010), Allergic rhinitis, Asthma ([facility]), BPH (benign prostatic hyperplasia), Cancer ([facility]) (11/2010), Carcinoma in situ of colon (2010), Chronic kidney disease, stage 5 ([facility]), Diabetes ([facility]) (01/1980), Diabetes mellitus ([facility]), Dialysis patient ([facility]), Diverticulosis, Gallstone (2009), Hepatic steatosis (2009), High blood pressure, High cholesterol, Hyperlipemia, IBS (irritable bowel syndrome), Kidney transplant recipient ([facility]) (2022), Microalbuminuria (2005), Migraines, Neuropathy, Obesity (10/1984), Pancytopenia ([facility]) (2009), Peritoneal dialysis status ([facility]) (07/2018), Pernicious anemia (2008), Platelets decreased ([facility]), Psoriasis-eczema overlap condition, Renal disorder, Retinopathy (2015), S/P cardiac cath (03/10/2021), Sleep apnea (11/1987), Splenomegaly (2009), Transient paralysis (1964), Viral disease, and Visual impairment.  Surgical History:  He  has a past surgical history that includes appendectomy; tonsillectomy; colonoscopy (2010); hernia surgery (2010); Colectomy (Right, 2010); Peritoneal dialysis (07/2018); colonoscopy (10/2014); colonoscopy (12/2019); colonoscopy (N/A, 12/24/2019); cystouretro w/stone remove (Bilateral, 04/22/2022); transplantation of kidney (12/2022); other surgical history (11/1/2010); and adenoidectomy (1965 estimate).   Family History:  His family history includes Brain bleed (age of onset: 82) in his father; Diabetes in his mother and sister; Heart Attack in his father; Heart Disorder in his father; Hypertension in his brother and father; Obesity in his father and mother; Other in his father and mother; Renal Disease in his mother; Skin cancer in his father; Stroke in his  father.  Social History:  He  reports that he quit smoking about 42 years ago. His smoking use included cigarettes. He started smoking about 51 years ago. He has a 4.5 pack-year smoking history. He has never used smokeless tobacco. He reports that he does not currently use alcohol after a past usage of about 1.0 standard drink of alcohol per week. He reports that he does not use drugs.    Tobacco:  He smoked tobacco in the past but quit greater than 12 months ago.  Social History     Tobacco Use   Smoking Status Former    Current packs/day: 0.00    Average packs/day: 0.5 packs/day for 9.0 years (4.5 ttl pk-yrs)    Types: Cigarettes    Start date: 1973    Quit date: 1982    Years since quittin.6   Smokeless Tobacco Never   Tobacco Comments    smokes cigars occasionally        CAGE Alcohol Screen:   CAGE screening score of 0 on 2024, showing low risk of alcohol abuse.      Patient Care Team:  Macey Hastings DO as PCP - General  Blade Singh MD (GASTROENTEROLOGY)    Review of Systems  GENERAL: feels well otherwise  SKIN: denies any unusual skin lesions  EYES: denies blurred vision or double vision, + L eye lower lid stye  HEENT: denies nasal congestion, sinus pain or ST  LUNGS: denies shortness of breath with exertion  CARDIOVASCULAR: denies chest pain on exertion  GI: denies abdominal pain, denies heartburn  : + dysuria  MUSCULOSKELETAL: denies back pain  NEURO: denies headaches  PSYCHE: denies depression    Objective:   Physical Exam  General Appearance:  Alert, cooperative, no distress, appears stated age   Head:  Normocephalic, without obvious abnormality, atraumatic   Eyes:  PERRL, conjunctiva/corneas clear, EOM's intact, both eyes   Ears:  Normal TM's and external ear canals, both ears   Nose: Nares normal, septum midline, mucosa normal, no drainage or sinus tenderness   Throat: Lips, mucosa, and tongue normal; teeth and gums normal   Neck: Supple, symmetrical, trachea midline, no  adenopathy, thyroid: not enlarged, symmetric, no tenderness/mass/nodules, no carotid bruit or JVD       Lungs:   Clear to auscultation bilaterally, respirations unlabored   Chest Wall:  No tenderness or deformity   Heart:  Regular rate and rhythm, S1, S2 normal, no murmur, rub or gallop   Abdomen:   Soft, non-tender, bowel sounds active all four quadrants,  no masses, no organomegaly           Extremities: Extremities normal, atraumatic, no cyanosis or edema   Pulses: 2+ and symmetric   Skin: Skin color, texture, turgor normal, no rashes or lesions   Lymph nodes: Cervical, supraclavicular nodes normal   Neurologic: Normal     /58   Pulse 100   Temp 98.2 °F (36.8 °C)   Ht 6' (1.829 m)   Wt 283 lb 6.4 oz (128.5 kg)   SpO2 96%   BMI 38.44 kg/m²  Estimated body mass index is 38.44 kg/m² as calculated from the following:    Height as of this encounter: 6' (1.829 m).    Weight as of this encounter: 283 lb 6.4 oz (128.5 kg).    Medicare Hearing Assessment:   Hearing Screening    Screening Method: Whisper Test  Whisper Test Result: Pass               Assessment & Plan:   Rolly Sanchez is a 69 year old male who presents for a Medicare Assessment.     Medicare annual wellness visit, subsequent (Primary)  -     Urinalysis with Culture Reflex; Future; Expected date: 09/25/2024  -     TSH W Reflex To Free T4; Future; Expected date: 09/25/2024    Dysuria  -     Urinalysis with Culture Reflex; Future; Expected date: 09/25/2024    L lower eyelid hordeolum  - recommend ophtho eval    PND  - seen by ENT  - on glycopyrrolate bid, montelukast     Difficulty balancing  Neuropathy  - declined gabapentin  - s/p PT w/improvement     Kidney transplant recipient c/b CMV viremia  - s/p DDKT 12/15/2022 at [facility]    - Previously on PD w/Dr. Dexter prior to transplant  - Follows with Dr. 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 Dexter     DM2 with retinopathy  - Endocrinologist: Dr. Lainez, next appt 10/26/23  - Most recent hemoglobin A1c 6.1% on 8/26/24  - Current regimen:  - insulin pump - humalog U500   - ozempic 0.5 mg weekly  - Complications:               - CV: atorvastatin 40 mg qhs               - Nephropathy: microalb/cr ratio 8/19/24 wnl               - Neuropathy: yes, declines medications               - Retinopathy: yes, +NPDR, sees Dr. Joya last exam 9/16/24  - Foot exam: sees podiatry Dr. Hagan q3 months. Has therapeutic shoes from [employer]. Hx of R foot ulcer 2/2019 tx at wound center.      Hypertension   - Controlled on current regimen:  - amlodipine 10 mg daily  - metoprolol 100 mg bid  - lisinopril 10 mg daily     Hyperlipidemia  - Cardiologist: Dr. Cueto at [facility]/Dr. Gil  - s/p card cath 3/10/21   - On atorvastatin 40 at bedtime   - Since kidney transplant in 12/2022, no longer takes fish oil per tx clinic.  - LDL 36  on 7/18/23    - Off genfibrozil and niacin since 4/2021     Paroxysmal atrial fibrillation  - EP: Dr. Emilie Gonzalez, [facility]  - [id number] score: at least 4 (HTN, Age x1, DM, CAD)  - AC: Eliquis  - Rate control: metoprolol 100 mg bid  - Rhythm control: none, monitoring per smart watch     Obesity  - declined referral to bariatric clinic, Dr. Garcia  - recommend diet and exercise  - ozempic as above     BPH  Hx Prostatitis 8/2018  - US kidneys bladder 9/7/18: no hydroneph, 7 mm kidney stone non obst.  ml    - Maty Guardadoer APRN rx tamsulosin 0.4 mg daily  - Follows with Dr. Panda Garcia, started finasteride 3/27/23              - tamsulosin 0.4 mg daily              - finasteride 5 mg daily     AMELIE  - CPAP rx per Dr. Lemus     Non-obstructive CAD  - Elev calcium heart score 312 in circumflex 2016.   - Echo 7/2/16-[facility]  - Dr. Wadas 9/2016 - nuc stress test nl.   -  3/2018-small rev area in apex - Dr. Wadas recom avoid strenuous activity.   - Dr. Gil 10/12/20 - stress test with reversible apical defect in 2018 and if to have renal transplant, then would proceed with angiogram  - s/p card cath 3/10/21 [facility] (transplant eval) w/Dr. Cueto - nonobstructive CAD.   - Follows w Dr. Gil.   - TTE 10/14/21 LVEFF 65%, 12/5/22 LVEF 55%  - On AC, metoprolol 100 mg bid     Microscopic hematuria  - for abnormal CT scan, kidney  - Had cystoscopy with jorge retrogrades/L ureteroscopy, jorge stents 3/4/22 and  jorge ureteroscopy, stents removal 4/22/2022 -Dr. Garcia.  - findings of free-floating blood clot/stone in right renal pelvis and left upper and midpole calyces. Path w/proteinaceous material consistent with peripheral blood elements.  - Dr. Garcia felt no further evaluation needed.     Epidermoid cyst of skin, left groin  - s/p I & D in March 2021 Dr. Croft     Anemia of CKD  Chronic pancytopenia  - used to follow with w/Dr. Ureña for pancytopenia.   - Hx splenomegaly  - On B12 injections now oral b12  - Message from Dr. Molina 9/16/20 about seeing a hematologist for platelet count 86 previously 111 in dialysis clinic -> Saw Dr. Pavon 1/21/21 - mild splenomegaly, she recom monitoring       Healthcare Maintenance  Cancer Screenings:  - Colon: diverticulosis w/unremarkable ileocolonic anastomosis on 12/24/19 w/Dr. Singh, next at 5-10 years. Hx R hemicolectomy 11/2010 for c-i-s colon. Reminded pt to call GI as 5 years will be 12/2024, pt verbalized understanding.     Vaccines:  - Tdap: 12/1/15  - Shingles: shingrix x2, vostavax 9/2017  - Pneumonia: pmvx 3/16/22, prevnar 10/28/14  - Flu: UTD  - COVID-19: UTD  - RSV: plans to obtain     Misc:  - AAA: no mention of AAA on [facility] CT A/P 10/6/20, 2/23/23   - PSA: 0.73 on 4/19/24  - Advanced directives: discussed, wife Suzan is medical power       RTC in 3-4 months or sooner PRN.    Overall 60 minutes was spent on this encounter. 45  minutes spent reviewing, providing care coordination, and documentation of the acute/chronic conditions as listed above. 15 minutes was spent reviewing elements of a AWV and providing age appropriate screenings.     The patient indicates understanding of these issues and agrees to the plan.  Reinforced healthy diet, lifestyle, and exercise.      No follow-ups on file.     Macey Hastings DO, 9/25/2024     Supplementary Documentation:   General Health:  In the past six months, have you lost more than 10 pounds without trying?: 2 - No  Has your appetite been poor?: No  Type of Diet: Diabetic;Low Salt  How does the patient maintain a good energy level?: Daily Walks  How would you describe your daily physical activity?: Light  How would you describe your current health state?: Fair  How do you maintain positive mental well-being?: Social Interaction  On a scale of 0 to 10, with 0 being no pain and 10 being severe pain, what is your pain level?: 0 - (None)  In the past six months, have you experienced urine leakage?: 0-No  At any time do you feel concerned for the safety/well-being of yourself and/or your children, in your home or elsewhere?: No  Have you had any immunizations at another office such as Influenza, Hepatitis B, Tetanus, or Pneumococcal?: No    Health Maintenance   Topic Date Due    Diabetes Care Dilated Eye Exam  11/02/2021    COVID-19 Vaccine (6 - 2023-24 season) 09/01/2024    Annual Physical  09/22/2024    Colorectal Cancer Screening  12/24/2024    Influenza Vaccine (1) 10/01/2024    Diabetes Care Foot Exam  12/13/2024    Diabetes Care A1C  02/26/2025    Diabetes Care: GFR  09/10/2025    Diabetes Care: Microalb/Creat Ratio  09/10/2025    PSA  04/19/2026    Annual Depression Screening  Completed    Fall Risk Screening (Annual)  Completed    Pneumococcal Vaccine: 65+ Years  Completed    Zoster Vaccines  Completed

## 2024-09-25 NOTE — TELEPHONE ENCOUNTER
To nursing staff, please relay the following to Rolly Sanchez:    TSH wnl.    UA c/w UTI. Rx empiric cephalexin sent to pt's pharmacy, 500 mg bid x7 days. Will adjust PRN pending ucx results.    Thank you!

## 2024-09-25 NOTE — TELEPHONE ENCOUNTER
Patient called and relayed Dr Hastings's message. Patient verbalized understanding with no further questions noted.

## 2024-09-26 NOTE — TELEPHONE ENCOUNTER
Completed Cover My Meds and uploaded office notes to support the use of Glycopyrrolate, request was sent to [employer], awaiting approval/denial    Glycopyrrolate was denied coverage by [employer], can do an appeal if patient wants to. Will let the patient and Dr. Olivas know.

## 2024-09-27 ENCOUNTER — HOSPITAL ENCOUNTER (INPATIENT)
Facility: HOSPITAL | Age: 69
LOS: 3 days | Discharge: HOME HEALTH | End: 2024-09-30
Attending: STUDENT IN AN ORGANIZED HEALTH CARE EDUCATION/TRAINING PROGRAM | Admitting: INTERNAL MEDICINE
Payer: MEDICARE

## 2024-09-27 DIAGNOSIS — N30.00 ACUTE CYSTITIS WITHOUT HEMATURIA: Primary | ICD-10-CM

## 2024-09-27 PROBLEM — E86.1 HYPOTENSION DUE TO HYPOVOLEMIA: Status: ACTIVE | Noted: 2024-09-27

## 2024-09-27 PROBLEM — Z94.0 TRANSPLANTED KIDNEY (HCC): Status: ACTIVE | Noted: 2023-02-07

## 2024-09-27 PROBLEM — N17.9 ACUTE KIDNEY INJURY: Status: ACTIVE | Noted: 2024-09-27

## 2024-09-27 PROBLEM — N39.0 UTI (URINARY TRACT INFECTION): Status: ACTIVE | Noted: 2024-09-27

## 2024-09-27 PROBLEM — N17.9 ACUTE KIDNEY INJURY (HCC): Status: ACTIVE | Noted: 2024-09-27

## 2024-09-27 LAB
ANION GAP SERPL CALC-SCNC: 7 MMOL/L (ref 0–18)
BASOPHILS # BLD AUTO: 0.01 X10(3) UL (ref 0–0.2)
BASOPHILS NFR BLD AUTO: 0.1 %
BUN BLD-MCNC: 39 MG/DL (ref 9–23)
BUN/CREAT SERPL: 20.1 (ref 10–20)
CALCIUM BLD-MCNC: 9.3 MG/DL (ref 8.7–10.4)
CHLORIDE SERPL-SCNC: 109 MMOL/L (ref 98–112)
CO2 SERPL-SCNC: 23 MMOL/L (ref 21–32)
CREAT BLD-MCNC: 1.94 MG/DL
DEPRECATED RDW RBC AUTO: 47.8 FL (ref 35.1–46.3)
EGFRCR SERPLBLD CKD-EPI 2021: 37 ML/MIN/1.73M2 (ref 60–?)
EOSINOPHIL # BLD AUTO: 0.01 X10(3) UL (ref 0–0.7)
EOSINOPHIL NFR BLD AUTO: 0.1 %
ERYTHROCYTE [DISTWIDTH] IN BLOOD BY AUTOMATED COUNT: 14.1 % (ref 11–15)
GLUCOSE BLD-MCNC: 160 MG/DL (ref 70–99)
GLUCOSE BLDC GLUCOMTR-MCNC: 132 MG/DL (ref 70–99)
HCT VFR BLD AUTO: 34.2 %
HGB BLD-MCNC: 11.8 G/DL
IMM GRANULOCYTES # BLD AUTO: 0.07 X10(3) UL (ref 0–1)
IMM GRANULOCYTES NFR BLD: 0.9 %
LYMPHOCYTES # BLD AUTO: 1.59 X10(3) UL (ref 1–4)
LYMPHOCYTES NFR BLD AUTO: 19.9 %
MCH RBC QN AUTO: 31.9 PG (ref 26–34)
MCHC RBC AUTO-ENTMCNC: 34.5 G/DL (ref 31–37)
MCV RBC AUTO: 92.4 FL
MONOCYTES # BLD AUTO: 0.54 X10(3) UL (ref 0.1–1)
MONOCYTES NFR BLD AUTO: 6.8 %
NEUTROPHILS # BLD AUTO: 5.77 X10 (3) UL (ref 1.5–7.7)
NEUTROPHILS # BLD AUTO: 5.77 X10(3) UL (ref 1.5–7.7)
NEUTROPHILS NFR BLD AUTO: 72.2 %
OSMOLALITY SERPL CALC.SUM OF ELEC: 301 MOSM/KG (ref 275–295)
PLATELET # BLD AUTO: 131 10(3)UL (ref 150–450)
PLATELETS.RETICULATED NFR BLD AUTO: 5.1 % (ref 0–7)
POTASSIUM SERPL-SCNC: 4.9 MMOL/L (ref 3.5–5.1)
RBC # BLD AUTO: 3.7 X10(6)UL
SODIUM SERPL-SCNC: 139 MMOL/L (ref 136–145)
WBC # BLD AUTO: 8 X10(3) UL (ref 4–11)

## 2024-09-27 PROCEDURE — 99283 EMERGENCY DEPT VISIT LOW MDM: CPT | Performed by: INTERNAL MEDICINE

## 2024-09-27 RX ORDER — FINASTERIDE 5 MG/1
5 TABLET, FILM COATED ORAL DAILY
Status: DISCONTINUED | OUTPATIENT
Start: 2024-09-27 | End: 2024-09-30

## 2024-09-27 RX ORDER — SULFAMETHOXAZOLE/TRIMETHOPRIM 800-160 MG
1 TABLET ORAL
Status: DISCONTINUED | OUTPATIENT
Start: 2024-09-30 | End: 2024-09-30

## 2024-09-27 RX ORDER — ACETAMINOPHEN 325 MG/1
650 TABLET ORAL EVERY 4 HOURS PRN
Status: DISCONTINUED | OUTPATIENT
Start: 2024-09-27 | End: 2024-09-30

## 2024-09-27 RX ORDER — SODIUM CHLORIDE 9 MG/ML
INJECTION, SOLUTION INTRAVENOUS CONTINUOUS
Status: DISCONTINUED | OUTPATIENT
Start: 2024-09-27 | End: 2024-09-29

## 2024-09-27 RX ORDER — POLYETHYLENE GLYCOL 3350 17 G/17G
17 POWDER, FOR SOLUTION ORAL DAILY PRN
Status: DISCONTINUED | OUTPATIENT
Start: 2024-09-27 | End: 2024-09-30

## 2024-09-27 RX ORDER — METOCLOPRAMIDE HYDROCHLORIDE 5 MG/ML
5 INJECTION INTRAMUSCULAR; INTRAVENOUS EVERY 8 HOURS PRN
Status: DISCONTINUED | OUTPATIENT
Start: 2024-09-27 | End: 2024-09-30

## 2024-09-27 RX ORDER — BISACODYL 10 MG
10 SUPPOSITORY, RECTAL RECTAL
Status: DISCONTINUED | OUTPATIENT
Start: 2024-09-27 | End: 2024-09-30

## 2024-09-27 RX ORDER — TAMSULOSIN HYDROCHLORIDE 0.4 MG/1
0.4 CAPSULE ORAL DAILY
Status: DISCONTINUED | OUTPATIENT
Start: 2024-09-28 | End: 2024-09-30

## 2024-09-27 RX ORDER — SODIUM PHOSPHATE, DIBASIC AND SODIUM PHOSPHATE, MONOBASIC 7; 19 G/230ML; G/230ML
1 ENEMA RECTAL ONCE AS NEEDED
Status: DISCONTINUED | OUTPATIENT
Start: 2024-09-27 | End: 2024-09-27

## 2024-09-27 RX ORDER — HYDROCODONE BITARTRATE AND ACETAMINOPHEN 5; 325 MG/1; MG/1
1 TABLET ORAL EVERY 4 HOURS PRN
Status: DISCONTINUED | OUTPATIENT
Start: 2024-09-27 | End: 2024-09-30

## 2024-09-27 RX ORDER — PREDNISONE 10 MG/1
10 TABLET ORAL DAILY
Status: DISCONTINUED | OUTPATIENT
Start: 2024-09-28 | End: 2024-09-30

## 2024-09-27 RX ORDER — ATORVASTATIN CALCIUM 40 MG/1
40 TABLET, FILM COATED ORAL NIGHTLY
Status: DISCONTINUED | OUTPATIENT
Start: 2024-09-27 | End: 2024-09-30

## 2024-09-27 RX ORDER — HYDROCODONE BITARTRATE AND ACETAMINOPHEN 5; 325 MG/1; MG/1
2 TABLET ORAL EVERY 4 HOURS PRN
Status: DISCONTINUED | OUTPATIENT
Start: 2024-09-27 | End: 2024-09-30

## 2024-09-27 RX ORDER — SENNOSIDES 8.6 MG
17.2 TABLET ORAL NIGHTLY PRN
Status: DISCONTINUED | OUTPATIENT
Start: 2024-09-27 | End: 2024-09-30

## 2024-09-27 RX ORDER — SODIUM CHLORIDE 9 MG/ML
INJECTION, SOLUTION INTRAVENOUS ONCE
Status: COMPLETED | OUTPATIENT
Start: 2024-09-27 | End: 2024-09-29

## 2024-09-27 RX ORDER — ONDANSETRON 2 MG/ML
4 INJECTION INTRAMUSCULAR; INTRAVENOUS EVERY 6 HOURS PRN
Status: DISCONTINUED | OUTPATIENT
Start: 2024-09-27 | End: 2024-09-30

## 2024-09-27 NOTE — TELEPHONE ENCOUNTER
Dr. Olivas, I had submitted office visit notes etc for a prior authorization for the Glycopyrrolate, patient's insurance has sent us a denial. I also informed the patient.  Do you want him to try something else or suggest paying for it over the counter?

## 2024-09-27 NOTE — TELEPHONE ENCOUNTER
Was able to speak to Dr. Hoang at [facility] transplant [facility] who is taking call for Dr. Finn.    I explained that I was considering using levofloxacin.    I explained the situation where there is a interaction with Levaquin and take loneliness with prolonged QT.  Patient does have a cardiac history    I explained that Rolly does have some dysuria and he did feel feverish although his recent temperature was 97.1    He was able to see the sensitivities.    Clinical information I gave him he recommended IV antibiotics he indicated he would probably benefit from 5 to 7 days of IV antibiotics.     He recommended against levofloxacin because of the \"intermediate\" nature of the sensitivity.    He thought we could use ceftazidime or Zosyn    I then spoke to Rolly.  I discussed the above.  He verbalized understanding and agrees to go there hospital emergency room at [place].  I did offer him to go to the [place] emergency room as  offered but Rolly wishes to stay in the area.    Therefore he will go to the emergency room.  Be admitted.  Then a decision regarding antibiotics can be made according to his clinical status and renal function.    I spoke to the emergency room and preregistered him and explained the reason for emergency room visit and subsequent admission for IV antibiotics.    ( BETTY Corrales.  Thank you.  Dennys.)

## 2024-09-27 NOTE — TELEPHONE ENCOUNTER
When I received this message I reviewed Rolly's recent clinical information.  I reviewed Dr. Hastings's note from September 25, 2024.  Patient is a kidney transplant patient.  Chronic kidney disease.  On immunosuppressant therapy.  BMP done September 10 showed BUN 31 and creatinine 1.62.  GFR 46.    I spoke to Dr. Dexter his nephrologist.  Reviewed sensitivities.  Intermediate sensitivity to Cipro and levofloxacin.    I did calculate creatinine clearance according to his age weight and creatinine which comes up to 78    After communication with Dr. Dexter I thought levofloxacin 500 mg every day for 5 days would be appropriate.  Even though intermediate.  The other antibiotics to be given IV.    However Dr. Dexter and i thought I should check with Dr. Finn and so I did call [facility] transplant coordinator and reviewed clinical situation with him.  He was going to reach out to Dr. Finn for his recommendation.  There is a note in the chart that he reached out to Dr. Finn 1:18 PM    As of 3 PM I have not heard back yet.    I spoke to Rolly about this.  He does have dysuria.  He thinks he has a fever.  I told him to take his temperature and he have it available when I call back at 4:00.  Verbalized understanding.    Will wait another hour or so and then call patient and start him on Levaquin.  Treatment can be adjusted if Dr. Finn his other recommendations.

## 2024-09-27 NOTE — TELEPHONE ENCOUNTER
To Dr Sandoval---final urine culture has now resulted  Can you please review in Dr Hastings's absence   Patient on Cephalexin

## 2024-09-28 ENCOUNTER — APPOINTMENT (OUTPATIENT)
Dept: ULTRASOUND IMAGING | Facility: HOSPITAL | Age: 69
End: 2024-09-28
Attending: INTERNAL MEDICINE
Payer: MEDICARE

## 2024-09-28 LAB
ALBUMIN SERPL-MCNC: 3.6 G/DL (ref 3.2–4.8)
ALBUMIN/GLOB SERPL: 1.7 {RATIO} (ref 1–2)
ALP LIVER SERPL-CCNC: 34 U/L
ALT SERPL-CCNC: 17 U/L
ANION GAP SERPL CALC-SCNC: 5 MMOL/L (ref 0–18)
AST SERPL-CCNC: 22 U/L (ref ?–34)
BILIRUB SERPL-MCNC: 0.8 MG/DL (ref 0.2–1.1)
BUN BLD-MCNC: 39 MG/DL (ref 9–23)
BUN/CREAT SERPL: 21 (ref 10–20)
C DIFF TOX B STL QL: NEGATIVE
CALCIUM BLD-MCNC: 8.8 MG/DL (ref 8.7–10.4)
CHLORIDE SERPL-SCNC: 112 MMOL/L (ref 98–112)
CO2 SERPL-SCNC: 24 MMOL/L (ref 21–32)
CREAT BLD-MCNC: 1.86 MG/DL
DEPRECATED RDW RBC AUTO: 49.4 FL (ref 35.1–46.3)
EGFRCR SERPLBLD CKD-EPI 2021: 39 ML/MIN/1.73M2 (ref 60–?)
ERYTHROCYTE [DISTWIDTH] IN BLOOD BY AUTOMATED COUNT: 14.1 % (ref 11–15)
GLOBULIN PLAS-MCNC: 2.1 G/DL (ref 2–3.5)
GLUCOSE BLD-MCNC: 118 MG/DL (ref 70–99)
GLUCOSE BLDC GLUCOMTR-MCNC: 103 MG/DL (ref 70–99)
GLUCOSE BLDC GLUCOMTR-MCNC: 117 MG/DL (ref 70–99)
GLUCOSE BLDC GLUCOMTR-MCNC: 155 MG/DL (ref 70–99)
HCT VFR BLD AUTO: 32 %
HGB BLD-MCNC: 10.7 G/DL
MAGNESIUM SERPL-MCNC: 2.1 MG/DL (ref 1.6–2.6)
MCH RBC QN AUTO: 31.7 PG (ref 26–34)
MCHC RBC AUTO-ENTMCNC: 33.4 G/DL (ref 31–37)
MCV RBC AUTO: 94.7 FL
OSMOLALITY SERPL CALC.SUM OF ELEC: 302 MOSM/KG (ref 275–295)
PHOSPHATE SERPL-MCNC: 3.7 MG/DL (ref 2.4–5.1)
PLATELET # BLD AUTO: 115 10(3)UL (ref 150–450)
PLATELETS.RETICULATED NFR BLD AUTO: 5.7 % (ref 0–7)
POTASSIUM SERPL-SCNC: 4.2 MMOL/L (ref 3.5–5.1)
PROT SERPL-MCNC: 5.7 G/DL (ref 5.7–8.2)
RBC # BLD AUTO: 3.38 X10(6)UL
SODIUM SERPL-SCNC: 141 MMOL/L (ref 136–145)
WBC # BLD AUTO: 6.3 X10(3) UL (ref 4–11)

## 2024-09-28 PROCEDURE — 76776 US EXAM K TRANSPL W/DOPPLER: CPT | Performed by: INTERNAL MEDICINE

## 2024-09-28 PROCEDURE — 99233 SBSQ HOSP IP/OBS HIGH 50: CPT | Performed by: HOSPITALIST

## 2024-09-28 PROCEDURE — 99223 1ST HOSP IP/OBS HIGH 75: CPT | Performed by: INTERNAL MEDICINE

## 2024-09-28 RX ORDER — METOPROLOL TARTRATE 100 MG
100 TABLET ORAL 2 TIMES DAILY
Status: DISCONTINUED | OUTPATIENT
Start: 2024-09-28 | End: 2024-09-30

## 2024-09-28 RX ORDER — NICOTINE POLACRILEX 4 MG
30 LOZENGE BUCCAL
Status: DISCONTINUED | OUTPATIENT
Start: 2024-09-28 | End: 2024-09-30

## 2024-09-28 RX ORDER — MAGNESIUM OXIDE 400 MG/1
400 TABLET ORAL DAILY
Status: DISCONTINUED | OUTPATIENT
Start: 2024-09-28 | End: 2024-09-30

## 2024-09-28 RX ORDER — NICOTINE POLACRILEX 4 MG
15 LOZENGE BUCCAL
Status: DISCONTINUED | OUTPATIENT
Start: 2024-09-28 | End: 2024-09-30

## 2024-09-28 RX ORDER — DEXTROSE MONOHYDRATE 25 G/50ML
50 INJECTION, SOLUTION INTRAVENOUS
Status: DISCONTINUED | OUTPATIENT
Start: 2024-09-28 | End: 2024-09-30

## 2024-09-28 RX ORDER — SODIUM BICARBONATE 650 MG/1
650 TABLET ORAL 2 TIMES DAILY
COMMUNITY

## 2024-09-28 RX ORDER — SODIUM BICARBONATE 650 MG/1
650 TABLET ORAL 2 TIMES DAILY
Status: DISCONTINUED | OUTPATIENT
Start: 2024-09-28 | End: 2024-09-30

## 2024-09-28 RX ORDER — LISINOPRIL 10 MG/1
10 TABLET ORAL DAILY
COMMUNITY
Start: 2024-09-04

## 2024-09-28 NOTE — PLAN OF CARE
Problem: Patient Centered Care  Goal: Patient preferences are identified and integrated in the patient's plan of care  Description: Interventions:  - Provide timely, complete, and accurate information to patient/family  - Incorporate patient and family knowledge, values, beliefs, and cultural backgrounds into the planning and delivery of care  - Encourage patient/family to participate in care and decision-making at the level they choose  - Honor patient and family perspectives and choices  Outcome: Progressing     Problem: Diabetes/Glucose Control  Goal: Glucose maintained within prescribed range  Description: INTERVENTIONS:  - Monitor Blood Glucose as ordered  - Assess for signs and symptoms of hyperglycemia and hypoglycemia  - Administer ordered medications to maintain glucose within target range  - Assess barriers to adequate nutritional intake and initiate nutrition consult as needed  - Instruct patient on self management of diabetes  Outcome: Progressing     Problem: CARDIOVASCULAR - ADULT  Goal: Absence of cardiac arrhythmias or at baseline  Description: INTERVENTIONS:  - Continuous cardiac monitoring, monitor vital signs, obtain 12 lead EKG if indicated  - Evaluate effectiveness of antiarrhythmic and heart rate control medications as ordered  - Initiate emergency measures for life threatening arrhythmias  - Monitor electrolytes and administer replacement therapy as ordered  Outcome: Progressing     Problem: RESPIRATORY - ADULT  Goal: Achieves optimal ventilation and oxygenation  Description: INTERVENTIONS:  - Assess for changes in respiratory status  - Assess for changes in mentation and behavior  - Position to facilitate oxygenation and minimize respiratory effort  - Oxygen supplementation based on oxygen saturation or ABGs  - Provide Smoking Cessation handout, if applicable  - Encourage broncho-pulmonary hygiene including cough, deep breathe, Incentive Spirometry  - Assess the need for suctioning and  perform as needed  - Assess and instruct to report SOB or any respiratory difficulty  - Respiratory Therapy support as indicated  - Manage/alleviate anxiety  - Monitor for signs/symptoms of CO2 retention  Outcome: Progressing     Problem: SKIN/TISSUE INTEGRITY - ADULT  Goal: Skin integrity remains intact  Description: INTERVENTIONS  - Assess and document risk factors for pressure ulcer development  - Assess and document skin integrity  - Monitor for areas of redness and/or skin breakdown  - Initiate interventions, skin care algorithm/standards of care as needed  Outcome: Progressing     Problem: MUSCULOSKELETAL - ADULT  Goal: Return mobility to safest level of function  Description: INTERVENTIONS:  - Assess patient stability and activity tolerance for standing, transferring and ambulating w/ or w/o assistive devices  - Assist with transfers and ambulation using safe patient handling equipment as needed  - Ensure adequate protection for wounds/incisions during mobilization  - Obtain PT/OT consults as needed  - Advance activity as appropriate  - Communicate ordered activity level and limitations with patient/family  Outcome: Progressing     Problem: PAIN - ADULT  Goal: Verbalizes/displays adequate comfort level or patient's stated pain goal  Description: INTERVENTIONS:  - Encourage pt to monitor pain and request assistance  - Assess pain using appropriate pain scale  - Administer analgesics based on type and severity of pain and evaluate response  - Implement non-pharmacological measures as appropriate and evaluate response  - Consider cultural and social influences on pain and pain management  - Manage/alleviate anxiety  - Utilize distraction and/or relaxation techniques  - Monitor for opioid side effects  - Notify MD/LIP if interventions unsuccessful or patient reports new pain  - Anticipate increased pain with activity and pre-medicate as appropriate  Outcome: Progressing     Problem: RISK FOR INFECTION -  ADULT  Goal: Absence of fever/infection during anticipated neutropenic period  Description: INTERVENTIONS  - Monitor WBC  - Administer growth factors as ordered  - Implement neutropenic guidelines  Outcome: Progressing     Problem: SAFETY ADULT - FALL  Goal: Free from fall injury  Description: INTERVENTIONS:  - Assess pt frequently for physical needs  - Identify cognitive and physical deficits and behaviors that affect risk of falls.  - [place] fall precautions as indicated by assessment.  - Educate pt/family on patient safety including physical limitations  - Instruct pt to call for assistance with activity based on assessment  - Modify environment to reduce risk of injury  - Provide assistive devices as appropriate  - Consider OT/PT consult to assist with strengthening/mobility  - Encourage toileting schedule  Outcome: Progressing     Problem: DISCHARGE PLANNING  Goal: Discharge to home or other facility with appropriate resources  Description: INTERVENTIONS:  - Identify barriers to discharge w/pt and caregiver  - Include patient/family/discharge partner in discharge planning  - Arrange for needed discharge resources and transportation as appropriate  - Identify discharge learning needs (meds, wound care, etc)  - Arrange for interpreters to assist at discharge as needed  - Consider post-discharge preferences of patient/family/discharge partner  - Complete POLST form as appropriate  - Assess patient's ability to be responsible for managing their own health  - Refer to Case Management Department for coordinating discharge planning if the patient needs post-hospital services based on physician/LIP order or complex needs related to functional status, cognitive ability or social support system  Outcome: Progressing

## 2024-09-28 NOTE — CONSULTS
[facility]  part of [facility]    Report of Endocrinology Consultation  [facility]    Rolly Sanchez Patient Status:  Inpatient    1955 MRN [id number]   Location [facility] 5SW/SE Attending Carolyn Bocanegra MD   Hosp Day # 1 PCP Macey Hastings DO     Date of Admission:  2024  Date of Consult:  2024    Reason for Consultation:  Management of insulin pump in diabetic    History of Present Illness:  Rolly Sanchez is a a(n) 69 year old male with morbid obesity HTN HLD atrial fibrillation S/P kidney transplant with CKD and Type 2 DM with use of a Medtronic insulin pump with Guardian sensor use with good control at home.  He is admitted with Pseudomonas aeruginosa UTI.  His BS are under decent control running mostly in the mid 100s.  He has insulin in his insulin pump with enough to last thru tomorrow late afternoon      History:  Past Medical History:    Adenomatous polyp    cscopy Dr. Singh -misael. polyp, divertic, int hemrds    Allergic rhinitis    Asthma (HCC)    BPH (benign prostatic hyperplasia)    Cancer (HCC)    Carcinoma in situ of colon    cecum-R hemicolectomy Dr. Lugo    Diabetes (HCC)    Diabetes mellitus (HCC)    Diverticulosis    Gallstone    Gallstone seen on US liver     Hepatic steatosis    US liver     High blood pressure    Hyperlipemia    IBS (irritable bowel syndrome)    Kidney transplant recipient (HCC)    Kidney transplant performed 12/15/2022 at [facility] ( donor kidney transplant).    Microalbuminuria    Migraines    hx in grade school    Neuropathy    diabetic, jorge hands and feet    Obesity    Pancytopenia ([facility])    Dr. 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    Viral disease    Hx spinal virus    Visual impairment    glasses     Past Surgical History:   Procedure Laterality Date    Adenoidectomy  1965 estimate    Same time as tonsils removed    Appendectomy      Colectomy Right 2010    hemicolectomy-Dr Resendez    Colonoscopy      Dr Singh    Colonoscopy  10/2014    polyp    Colonoscopy  2019    Colonoscopy N/A 2019    Procedure: COLONOSCOPY;  Surgeon: Blade Singh MD;  Location: [facility] ENDOSCOPY    Cystouretro w/stone remove Bilateral 2022    Cystoscopy, bilateral retrograde pyelogram, bilateral ureteroscopy, manipulation/removal of bilateral blood clots/stones, ureteral stent removal.    Hernia surgery      ventral hernia-at time of colon surgery    Other surgical history  2010    Colon Resection    Peritoneal dialysis  2018    Dr Croft placed PD catheter 2018. started PD 2018 Dr Dexter    Tonsillectomy      T & A    Transplantation of kidney  2022    At [facility]     Family History   Problem Relation Age of Onset    Diabetes Mother     Obesity Mother     Renal Disease Mother         ESRD    Other (Other) Mother     Hypertension Father     Obesity Father     Heart Attack Father     Stroke Father         TIA    Skin cancer Father     Heart Disorder Father     Other (Other) Father     Other (Brain bleed) Father 82         in his sleep-hx brain bleed    Diabetes Sister     Hypertension Brother       reports that he quit smoking about 42 years ago. His smoking use included cigarettes. He started smoking about 51 years ago. He has a 4.5 pack-year smoking history. He has never used smokeless tobacco. He reports that he does not currently use alcohol after a past usage of about 1.0 standard drink of alcohol per week. He reports that he does not use  drugs.    Allergies:  Allergies   Allergen Reactions    Merbromin RASH and SWELLING    Mercury SWELLING and OTHER (SEE COMMENTS)     Blisters,itching,swelling    Merthilate [Thimerosal] RASH and SWELLING    Quinolones OTHER (SEE COMMENTS)     Rolly is on tacrolimus as of September 27th, 2024.  Quinolones contraindicated with tacrolimus because of possibility of prolonged QT syndrome.       Medications:    Current Facility-Administered Medications:     glucose (Dex4) 15 GM/59ML oral liquid 15 g, 15 g, Oral, Q15 Min PRN **OR** glucose (Glutose) 40% oral gel 15 g, 15 g, Oral, Q15 Min PRN **OR** glucose-vitamin C (Dex-4) chewable tab 4 tablet, 4 tablet, Oral, Q15 Min PRN **OR** dextrose 50% injection 50 mL, 50 mL, Intravenous, Q15 Min PRN **OR** glucose (Dex4) 15 GM/59ML oral liquid 30 g, 30 g, Oral, Q15 Min PRN **OR** glucose (Glutose) 40% oral gel 30 g, 30 g, Oral, Q15 Min PRN **OR** glucose-vitamin C (Dex-4) chewable tab 8 tablet, 8 tablet, Oral, Q15 Min PRN    sodium bicarbonate tab 650 mg, 650 mg, Oral, BID    metoprolol tartrate (Lopressor) tab 100 mg, 100 mg, Oral, BID    insulin via Insulin Pump, , Subcutaneous, TID AC and HS    [Held by provider] semaglutide (Ozempic) injection 1 mg, 1 mg, Subcutaneous, Q Tuesday    magnesium oxide (Mag-Ox) tab 400 mg, 400 mg, Oral, Daily    ceFEPIme (Maxipime) 1 g in sodium chloride 0.9% 100 mL IVPB-MBP, 1 g, Intravenous, Q12H    atorvastatin (Lipitor) tab 40 mg, 40 mg, Oral, Nightly    apixaban (Eliquis) tab 5 mg, 5 mg, Oral, BID    finasteride (Proscar) tab 5 mg, 5 mg, Oral, Daily    predniSONE (Deltasone) tab 10 mg, 10 mg, Oral, Daily    [START ON 9/30/2024] sulfamethoxazole-trimethoprim DS (Bactrim DS) 800-160 MG per tab 1 tablet, 1 tablet, Oral, Once per day on Monday Thursday    Tacrolimus ER TB24 3 mg, 3 tablet, Oral, Daily    tamsulosin (Flomax) cap 0.4 mg, 0.4 mg, Oral, Daily    acetaminophen (Tylenol) tab 650 mg, 650 mg, Oral, Q4H PRN **OR** HYDROcodone-acetaminophen  (Norco) 5-325 MG per tab 1 tablet, 1 tablet, Oral, Q4H PRN **OR** HYDROcodone-acetaminophen (Norco) 5-325 MG per tab 2 tablet, 2 tablet, Oral, Q4H PRN    polyethylene glycol (PEG 3350) (Miralax) 17 g oral packet 17 g, 17 g, Oral, Daily PRN    sennosides (Senokot) tab 17.2 mg, 17.2 mg, Oral, Nightly PRN    bisacodyl (Dulcolax) 10 MG rectal suppository 10 mg, 10 mg, Rectal, Daily PRN    ondansetron (Zofran) 4 MG/2ML injection 4 mg, 4 mg, Intravenous, Q6H PRN    metoclopramide (Reglan) 5 mg/mL injection 5 mg, 5 mg, Intravenous, Q8H PRN    sodium chloride 0.9% infusion, , Intravenous, Continuous    ROS:  Constitutional: negative  Eyes: negative  Ears, nose, mouth, throat, and face: negative  Respiratory: negative  Cardiovascular: negative  Gastrointestinal: negative  Genitourinary:negative  Integument/breast: negative  Hematologic/lymphatic: negative  Musculoskeletal:negative  Neurological: negative  Behavioral/Psych: negative  Endocrine: negative  Allergic/Immunologic: negative    Physical Exam:  Blood pressure 123/90, pulse 112, temperature 97.7 °F (36.5 °C), temperature source Oral, resp. rate 20, height 6' (1.829 m), weight 276 lb 1.6 oz (125.2 kg), SpO2 96%.    General: Alert, orientated x3.  Cooperative.  No apparent distress.  HEENT: EOMI, thyroid non-enlarged   Neck: Supple.  Lungs: Clear bilaterally.  Cardiac: Regular rate and rhythm.  Abdomen:  Bowel sounds present, normoactive.  Nontender.  Extremities:  No lower extremity edema noted, DP +2 b/l     Labs, Imaging and Ancillaries:    Lab Results   Component Value Date    WBC 6.3 09/28/2024    HGB 10.7 09/28/2024    HCT 32.0 09/28/2024    .0 09/28/2024    CREATSERUM 1.86 09/28/2024    BUN 39 09/28/2024     09/28/2024    K 4.2 09/28/2024     09/28/2024    CO2 24.0 09/28/2024     09/28/2024    CA 8.8 09/28/2024    ALB 3.6 09/28/2024    ALKPHO 34 09/28/2024    BILT 0.8 09/28/2024    TP 5.7 09/28/2024    AST 22 09/28/2024    ALT 17  09/28/2024    MG 2.1 09/28/2024    PHOS 3.7 09/28/2024       Recent Labs   Lab 09/27/24  2116 09/28/24  0511   * 118*       US TRNSPL KDN R-T IMG +- DUPLEX DOP STD (CPT=76776)    Result Date: 9/28/2024  CONCLUSION:   Diffusely elevated resistive indices within the arcuate arteries within the transplant kidney.  No tardus parvus waveforms within the intrarenal arteries.  Findings are non-specific and can be seen in the setting of acute tubular necrosis, transplant rejection, and nephrotoxicity.  Recommend close clinical and imaging follow-up.  Elevated velocity within the proximal main renal artery measuring 259 cm/second, with mild tardus parvus waveforms more distally.  Findings can represent a proximal renal artery stenosis.  Recommend close imaging follow-up.  No hydronephrosis or obstructing stones of the transplant kidney.  No peritransplant collections.        Dictated by (CST): Arleen Flores MD on 9/28/2024 at 2:43 PM     Finalized by (CST): Arleen Flores MD on 9/28/2024 at 2:53 PM             Impression and Plan:  Type 2 DM   S/P Kidney transplant  UTI with Pseudomonas aeruginosa  CKD  HTN  Atrial fibrillation  HLD    BS under good control even with infection  Will continue with current Medtronic insulin pump with Guardian sensor as BS well controlled  Appetite is fair, though better today than yesterday  Patient has enough insulin through tomorrow late afternoon --- his wife is planning on bringing supplies and insulin here tomorrow       EDIL VERAS MD

## 2024-09-28 NOTE — ED QUICK NOTES
Orders for admission, patient is aware of plan and ready to go upstairs. Any questions, please call ED RN Carrie FISCHER at extension [phone].     Patient Covid vaccination status: Fully vaccinated     COVID Test Ordered in ED: None    COVID Suspicion at Admission: N/A    Running Infusions:      Mental Status/LOC at time of transport: A&Ox4    Other pertinent information: ambulatory with steady gait  CIWA score: N/A   NIH score:  N/A

## 2024-09-28 NOTE — DIABETES ED
[facility]  Inpatient Diabetes Clinician Note    Rolly GERBER Suits Patient Status:  Inpatient   1955 MRN [id number]  Location [facility] 5SW/SE Attending Carolyn Bocanegra MD  Hosp Day # 1 PCP Macey Hastings,       Patient admitted to hospital with insulin pump and CGM.  Per pump 7 day history- his sugars are in target range 88% of the time.      Insulin Pump Settings: (Retrieved from pump)    Type of Pump:  Medtronic 780g   Type of Insulin:  Humulin Regular U500  Automated Insulin Deliver System: yes    Last site change: 24    Basal: (when in manual mode)  12MN  1 unit/hr    Insulin to Carbohydrate Ratios:   12MN - 20  6am-    16  5pm-    10    Sensitivity:  50    Target Goal(s): 12am- 100-130                             6:30am- 100-110      10pm- 100-130    Active Insulin:  4 hrs      Pt needs to have pump supplies brought in from home as he is overdue for his site change ( he does not have the extended 7 day wear sites.)   Discussed with pt and wife.  Insulin pump and Continuous Glucose agreement signed and in chart.  Pt is alert and orientated and currently able to self-manage insulin pump  Patient able to demonstrate appropriate carb counting skills.         Brandi Verduzco RD  Diabetes Educator  2024

## 2024-09-28 NOTE — ED INITIAL ASSESSMENT (HPI)
Pt reports placed on Cephalexin on Wednesday to UTI. Pt reports he was contacted for admission with IV Abx d/t culture results. Pt reports hx of kidney transplant 12/2022.

## 2024-09-28 NOTE — ED PROVIDER NOTES
Patient Seen in: [facility] Emergency Department      History     Chief Complaint   Patient presents with    Abnormal Result     Stated Complaint: UTI.... Sent by Dr. Sandoval    Subjective:   HPI    69-year-old male with history of ESRD status post DDRT  on and Farxiga's, prednisone, A-fib on Eliquis, IDDM, nonobstructive CAD, presenting for evaluation of urinary symptoms.  Describes several days of urgency, frequency, head UA, and ultimately urine culture showed Pseudomonas aeruginosa with intermediate susceptibility to fluoroquinolones.  Patient's primary care doctor had conversation with [facility] transplant surgeon, who recommended against quinolones given intermediate sensitivity and also risk for QT prolongation given tacrolimus use.  He was sent to the ER for IV antibiotics.  He does admit to poor appetite subjective fevers and chills.  No diarrhea no abdominal pain.  No history of kidney stones.  Sugars have been fluctuating to the 160s as high as 230.    Objective:   Past Medical History:    Adenomatous polyp    cscopy Dr. Singh -misael. polyp, divertic, int hemrds    Allergic rhinitis    Asthma (HCC)    BPH (benign prostatic hyperplasia)    Cancer (HCC)    Carcinoma in situ of colon    cecum-R hemicolectomy Dr. Lugo    Chronic kidney disease, stage 5 (HCC)    Peritoneal dialysis started 2018 Dr Dexter    Diabetes (HCC)    Diabetes mellitus (HCC)    Dialysis patient (HCC)    Peritoneal dialysis before  kidney transplant    Diverticulosis    Gallstone    Gallstone seen on US liver     Hepatic steatosis    US liver     High blood pressure    High cholesterol    Hyperlipemia    IBS (irritable bowel syndrome)    Kidney transplant recipient (HCC)    Kidney transplant performed 12/15/2022 at [facility] ( donor kidney transplant).    Microalbuminuria    Migraines    hx in grade school    Neuropathy    diabetic, jorge hands and feet    Obesity    Pancytopenia (HCC)      Niranjan--hematologist    Peritoneal dialysis status (HCC)    Dr Dexter    Pernicious anemia    Platelets decreased (HCC)    Psoriasis-eczema overlap condition    Renal disorder    CKD with proteinuria--Dr. 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    Viral disease    Hx spinal virus    Visual impairment    glasses              Past Surgical History:   Procedure Laterality Date    Adenoidectomy  1965 estimate    Same time as tonsils removed    Appendectomy      Colectomy Right 2010    hemicolectomy-Dr Resendez    Colonoscopy      Dr Singh    Colonoscopy  10/2014    polyp    Colonoscopy  2019    Colonoscopy N/A 2019    Procedure: COLONOSCOPY;  Surgeon: Blade Singh MD;  Location: [facility] ENDOSCOPY    Cystouretro w/stone remove Bilateral 2022    Cystoscopy, bilateral retrograde pyelogram, bilateral ureteroscopy, manipulation/removal of bilateral blood clots/stones, ureteral stent removal.    Hernia surgery      ventral hernia-at time of colon surgery    Other surgical history  2010    Colon Resection    Peritoneal dialysis  2018    Dr Croft placed PD catheter 2018. started PD 2018 Dr Dexter    Tonsillectomy      T & A    Transplantation of kidney  2022    At [facility]                Social History     Socioeconomic History    Marital status:     Number of children: 2   Occupational History    Occupation:    Tobacco Use    Smoking status: Former     Current packs/day: 0.00     Average packs/day: 0.5 packs/day for 9.0 years (4.5 ttl pk-yrs)     Types: Cigarettes     Start date: 1973     Quit date: 1982     Years since quittin.6    Smokeless tobacco: Never    Tobacco comments:     smokes  cigars occasionally   Vaping Use    Vaping status: Never Used   Substance and Sexual Activity    Alcohol use: Not Currently     Alcohol/week: 1.0 standard drink of alcohol    Drug use: No   Other Topics Concern    Caffeine Concern Yes     Comment: 3 cups of coffee per day    Pt has a pacemaker No    Pt has a defibrillator No    Reaction to local anesthetic No     Social Determinants of Health      [facility] from [facility], [facility]    Social Connections    Received from [facility], [facility]    Housing Stability              Review of Systems    Positive for stated Chief Complaint: Abnormal Result    Other systems are as noted in HPI.  Constitutional and vital signs reviewed.      All other systems reviewed and negative except as noted above.    Physical Exam     ED Triage Vitals [09/27/24 1957]   /64   Pulse 88   Resp 19   Temp 98.5 °F (36.9 °C)   Temp src Oral   SpO2 94 %   O2 Device None (Room air)       Current Vitals:   Vital Signs  BP: 98/67  Pulse: 77  Resp: 19  Temp: 98.5 °F (36.9 °C)  Temp src: Oral  MAP (mmHg): 79    Oxygen Therapy  SpO2: 96 %  O2 Device: None (Room air)            Physical Exam    Constitutional: awake, alert, no sig distress  HENT: mmm, no lesions,  Neck: normal range of motion, no tenderness, supple.  Eyes: PERRL, EOMI, conjunctiva normal, no discharge. Sclera anicteric.  Cardiovascular: rr no murmur  Respiratory: Normal breath sounds, no respiratory distress, no wheezing, no chest tenderness.  GI: Bowel sounds normal, Soft, no tenderness, no masses, no pulsatile masses.  -No tenderness in right lower quadrant about transplanted kidney  : No CVA tenderness.  Skin: Warm, dry, no erythema, no rash.  Musculoskeletal: Intact distal pulses, no edema, no tenderness, no cyanosis, no clubbing. Good range of motion in all major joints. No tenderness to palpation or major deformities noted. Back- No  tenderness.  Neurologic: Alert & oriented x 3, normal motor function, normal sensory function, no focal deficits noted.  Psych: Calm, cooperative, nl affect    ED Course     Labs Reviewed   BASIC METABOLIC PANEL (8) - Abnormal; Notable for the following components:       Result Value    Glucose 160 (*)     BUN 39 (*)     Creatinine 1.94 (*)     BUN/CREA Ratio 20.1 (*)     Calculated Osmolality 301 (*)     eGFR-Cr 37 (*)     All other components within normal limits   CBC WITH DIFFERENTIAL WITH PLATELET - Abnormal; Notable for the following components:    RBC 3.70 (*)     HGB 11.8 (*)     HCT 34.2 (*)     RDW-SD 47.8 (*)     .0 (*)     All other components within normal limits   BLOOD CULTURE   BLOOD CULTURE                      MDM      69-year-old male with history as documented above sent in for IV antibiotics.  On arrival vitals are stable and reassuring.    -No tenderness about transplanted kidney  Reviewed urine culture data, previous hospitalizations, will start on IV Zosyn.  Discussed with hospitalist, endocrinology, nephrology    -Laboratory studies reviewed, WBC 8.  Slight elevation in BUN and creatinine above baseline, given IV fluids.  Does not meet sepsis criteria.   Admission disposition: 9/27/2024 10:02 PM                                        Medical Decision Making      Disposition and Plan     Clinical Impression:  1. Acute cystitis without hematuria         Disposition:  Admit  9/27/2024 10:02 pm    Follow-up:  No follow-up provider specified.  We recommend that you schedule follow up care with a primary care provider within the next three months to obtain basic health screening including reassessment of your blood pressure.      Medications Prescribed:  Current Discharge Medication List                            Hospital Problems       Present on Admission  Date Reviewed: 9/25/2024            ICD-10-CM Noted POA    UTI (urinary tract infection) N39.0 9/27/2024 Unknown

## 2024-09-28 NOTE — PLAN OF CARE
Insulin pump and CGM in place. Fall precautions in place. Call light in reach.     Problem: Patient Centered Care  Goal: Patient preferences are identified and integrated in the patient's plan of care  Description: Interventions:  - What would you like us to know as we care for you? None stated  - Provide timely, complete, and accurate information to patient/family  - Incorporate patient and family knowledge, values, beliefs, and cultural backgrounds into the planning and delivery of care  - Encourage patient/family to participate in care and decision-making at the level they choose  - Honor patient and family perspectives and choices  Outcome: Progressing     Problem: Diabetes/Glucose Control  Goal: Glucose maintained within prescribed range  Description: INTERVENTIONS:  - Monitor Blood Glucose as ordered  - Assess for signs and symptoms of hyperglycemia and hypoglycemia  - Administer ordered medications to maintain glucose within target range  - Assess barriers to adequate nutritional intake and initiate nutrition consult as needed  - Instruct patient on self management of diabetes  Outcome: Progressing     Problem: Patient/Family Goals  Goal: Patient/Family Long Term Goal  Description: Patient's Long Term Goal: free of pain    Interventions:  - monitor pain  - See additional Care Plan goals for specific interventions  Outcome: Progressing  Goal: Patient/Family Short Term Goal  Description: Patient's Short Term Goal: free of falls    Interventions:   - fall precautions  - See additional Care Plan goals for specific interventions  Outcome: Progressing     Problem: CARDIOVASCULAR - ADULT  Goal: Absence of cardiac arrhythmias or at baseline  Description: INTERVENTIONS:  - Continuous cardiac monitoring, monitor vital signs, obtain 12 lead EKG if indicated  - Evaluate effectiveness of antiarrhythmic and heart rate control medications as ordered  - Initiate emergency measures for life threatening arrhythmias  - Monitor  electrolytes and administer replacement therapy as ordered  Outcome: Progressing     Problem: RESPIRATORY - ADULT  Goal: Achieves optimal ventilation and oxygenation  Description: INTERVENTIONS:  - Assess for changes in respiratory status  - Assess for changes in mentation and behavior  - Position to facilitate oxygenation and minimize respiratory effort  - Oxygen supplementation based on oxygen saturation or ABGs  - Provide Smoking Cessation handout, if applicable  - Encourage broncho-pulmonary hygiene including cough, deep breathe, Incentive Spirometry  - Assess the need for suctioning and perform as needed  - Assess and instruct to report SOB or any respiratory difficulty  - Respiratory Therapy support as indicated  - Manage/alleviate anxiety  - Monitor for signs/symptoms of CO2 retention  Outcome: Progressing     Problem: SKIN/TISSUE INTEGRITY - ADULT  Goal: Skin integrity remains intact  Description: INTERVENTIONS  - Assess and document risk factors for pressure ulcer development  - Assess and document skin integrity  - Monitor for areas of redness and/or skin breakdown  - Initiate interventions, skin care algorithm/standards of care as needed  Outcome: Progressing     Problem: MUSCULOSKELETAL - ADULT  Goal: Return mobility to safest level of function  Description: INTERVENTIONS:  - Assess patient stability and activity tolerance for standing, transferring and ambulating w/ or w/o assistive devices  - Assist with transfers and ambulation using safe patient handling equipment as needed  - Ensure adequate protection for wounds/incisions during mobilization  - Obtain PT/OT consults as needed  - Advance activity as appropriate  - Communicate ordered activity level and limitations with patient/family  Outcome: Progressing     Problem: PAIN - ADULT  Goal: Verbalizes/displays adequate comfort level or patient's stated pain goal  Description: INTERVENTIONS:  - Encourage pt to monitor pain and request assistance  -  Assess pain using appropriate pain scale  - Administer analgesics based on type and severity of pain and evaluate response  - Implement non-pharmacological measures as appropriate and evaluate response  - Consider cultural and social influences on pain and pain management  - Manage/alleviate anxiety  - Utilize distraction and/or relaxation techniques  - Monitor for opioid side effects  - Notify MD/LIP if interventions unsuccessful or patient reports new pain  - Anticipate increased pain with activity and pre-medicate as appropriate  Outcome: Progressing     Problem: RISK FOR INFECTION - ADULT  Goal: Absence of fever/infection during anticipated neutropenic period  Description: INTERVENTIONS  - Monitor WBC  - Administer growth factors as ordered  - Implement neutropenic guidelines  Outcome: Progressing     Problem: SAFETY ADULT - FALL  Goal: Free from fall injury  Description: INTERVENTIONS:  - Assess pt frequently for physical needs  - Identify cognitive and physical deficits and behaviors that affect risk of falls.  - [place] fall precautions as indicated by assessment.  - Educate pt/family on patient safety including physical limitations  - Instruct pt to call for assistance with activity based on assessment  - Modify environment to reduce risk of injury  - Provide assistive devices as appropriate  - Consider OT/PT consult to assist with strengthening/mobility  - Encourage toileting schedule  Outcome: Progressing     Problem: DISCHARGE PLANNING  Goal: Discharge to home or other facility with appropriate resources  Description: INTERVENTIONS:  - Identify barriers to discharge w/pt and caregiver  - Include patient/family/discharge partner in discharge planning  - Arrange for needed discharge resources and transportation as appropriate  - Identify discharge learning needs (meds, wound care, etc)  - Arrange for interpreters to assist at discharge as needed  - Consider post-discharge preferences of  patient/family/discharge partner  - Complete POLST form as appropriate  - Assess patient's ability to be responsible for managing their own health  - Refer to Case Management Department for coordinating discharge planning if the patient needs post-hospital services based on physician/LIP order or complex needs related to functional status, cognitive ability or social support system  Outcome: Progressing

## 2024-09-28 NOTE — CONSULTS
[facility]  part of [facility] ID CONSULT NOTE    Rolly Sanchez Patient Status:  Inpatient    1955 MRN [id number]   Location [facility] 5SW/SE Attending Carolyn Bocanegra MD   Hosp Day # 1 PCP Macey Hastings DO       Reason for Consultation:  UTI    ASSESSMENT:    Antibiotics: IV cefepime; (IV zosyn)    # PSAR UTI in a renal transplant  # Renal transplant 12/15/22 at [facility]  # DM, HTN, HLD, AFib on Eliquis  # CKD      PLAN:    -  discontinue IV zosyn  -  start IV cefepime  -  US transplant kidney  -  f/up cx  -  will need midline and IV abx on discharge  -  Follow fever curve, wbc  -  Reviewed labs, micro, imaging reports, available old records  -  d/w patient, staff, Primary    History of Present Illness:  Rolly Sanchez is a a(n) 69 year old male. Patient is a 69-year-old male with a history of diabetes, HTN, HLD, BPH, psoriasis, ESRD status post renal transplant on 12/15/2022 at [facility], A-fib on Eliquis now presents to the hospital on  with dysuria, frequency, urgency starting Monday. Stated on Keflex Wednesday. Recent urine culture from  with Pseudomonas intermediate to Cipro with concern for interaction with tacrolimus and sent to the ED for IV antibiotics.  Started on IV Zosyn.  Afebrile here, normal WBC.  Blood cultures sent and pending.    History:  Past Medical History:    Adenomatous polyp    cscopy Dr. Singh -misael. polyp, divertic, int hemrds    Allergic rhinitis    Asthma (HCC)    BPH (benign prostatic hyperplasia)    Cancer (HCC)    Carcinoma in situ of colon    cecum-R hemicolectomy Dr. Lugo    Diabetes (HCC)    Diabetes mellitus (HCC)    Diverticulosis    Gallstone    Gallstone seen on US liver     Hepatic steatosis    US liver     High blood pressure    Hyperlipemia    IBS (irritable bowel syndrome)    Kidney transplant recipient (HCC)    Kidney transplant performed 12/15/2022 at [facility] ( donor kidney transplant).     Microalbuminuria    Migraines    hx in grade school    Neuropathy    diabetic, jorge hands and feet    Obesity    Pancytopenia (HCC)    Dr. 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 ENDOSCOPY    Cystouretro w/stone remove Bilateral 2022    Cystoscopy, bilateral retrograde pyelogram, bilateral ureteroscopy, manipulation/removal of bilateral blood clots/stones, ureteral stent removal.    Hernia surgery      ventral hernia-at time of colon surgery    Other surgical history  2010    Colon Resection    Peritoneal dialysis  2018    Dr Croft placed PD catheter 2018. started PD 2018 Dr Dexter    Tonsillectomy      T & A    Transplantation of kidney  2022    At [facility]     Family History   Problem Relation Age of Onset    Diabetes Mother     Obesity Mother     Renal Disease Mother         ESRD    Other (Other) Mother     Hypertension Father     Obesity Father     Heart Attack Father     Stroke Father         TIA    Skin cancer Father     Heart Disorder Father     Other (Other) Father     Other (Brain bleed) Father 82         in his  sleep-hx brain bleed    Diabetes Sister     Hypertension Brother       reports that he quit smoking about 42 years ago. His smoking use included cigarettes. He started smoking about 51 years ago. He has a 4.5 pack-year smoking history. He has never used smokeless tobacco. He reports that he does not currently use alcohol after a past usage of about 1.0 standard drink of alcohol per week. He reports that he does not use drugs.    Allergies:  Allergies   Allergen Reactions    Merbromin RASH and SWELLING    Mercury SWELLING and OTHER (SEE COMMENTS)     Blisters,itching,swelling    Merthilate [Thimerosal] RASH and SWELLING    Quinolones OTHER (SEE COMMENTS)     Rolly is on tacrolimus as of September 27th, 2024.  Quinolones contraindicated with tacrolimus because of possibility of prolonged QT syndrome.       Medications:    Current Facility-Administered Medications:     glucose (Dex4) 15 GM/59ML oral liquid 15 g, 15 g, Oral, Q15 Min PRN **OR** glucose (Glutose) 40% oral gel 15 g, 15 g, Oral, Q15 Min PRN **OR** glucose-vitamin C (Dex-4) chewable tab 4 tablet, 4 tablet, Oral, Q15 Min PRN **OR** dextrose 50% injection 50 mL, 50 mL, Intravenous, Q15 Min PRN **OR** glucose (Dex4) 15 GM/59ML oral liquid 30 g, 30 g, Oral, Q15 Min PRN **OR** glucose (Glutose) 40% oral gel 30 g, 30 g, Oral, Q15 Min PRN **OR** glucose-vitamin C (Dex-4) chewable tab 8 tablet, 8 tablet, Oral, Q15 Min PRN    sodium bicarbonate tab 650 mg, 650 mg, Oral, BID    metoprolol tartrate (Lopressor) tab 100 mg, 100 mg, Oral, BID    insulin via Insulin Pump, , Subcutaneous, TID AC and HS    [Held by provider] semaglutide (Ozempic) injection 1 mg, 1 mg, Subcutaneous, Q Tuesday    magnesium oxide (Mag-Ox) tab 400 mg, 400 mg, Oral, Daily    atorvastatin (Lipitor) tab 40 mg, 40 mg, Oral, Nightly    apixaban (Eliquis) tab 5 mg, 5 mg, Oral, BID    finasteride (Proscar) tab 5 mg, 5 mg, Oral, Daily    predniSONE (Deltasone) tab 10 mg, 10 mg, Oral, Daily    [START ON  9/30/2024] sulfamethoxazole-trimethoprim DS (Bactrim DS) 800-160 MG per tab 1 tablet, 1 tablet, Oral, Once per day on Monday Thursday    Tacrolimus ER TB24 3 mg, 3 tablet, Oral, Daily    tamsulosin (Flomax) cap 0.4 mg, 0.4 mg, Oral, Daily    acetaminophen (Tylenol) tab 650 mg, 650 mg, Oral, Q4H PRN **OR** HYDROcodone-acetaminophen (Norco) 5-325 MG per tab 1 tablet, 1 tablet, Oral, Q4H PRN **OR** HYDROcodone-acetaminophen (Norco) 5-325 MG per tab 2 tablet, 2 tablet, Oral, Q4H PRN    polyethylene glycol (PEG 3350) (Miralax) 17 g oral packet 17 g, 17 g, Oral, Daily PRN    sennosides (Senokot) tab 17.2 mg, 17.2 mg, Oral, Nightly PRN    bisacodyl (Dulcolax) 10 MG rectal suppository 10 mg, 10 mg, Rectal, Daily PRN    ondansetron (Zofran) 4 MG/2ML injection 4 mg, 4 mg, Intravenous, Q6H PRN    metoclopramide (Reglan) 5 mg/mL injection 5 mg, 5 mg, Intravenous, Q8H PRN    sodium chloride 0.9% infusion, , Intravenous, Continuous    piperacillin-tazobactam (Zosyn) 3.375 g in dextrose 5% 100 mL IVPB-ADDV, 3.375 g, Intravenous, Q8H    Review of Systems:  Per HPI    Physical Exam:  Vital signs: Blood pressure 119/78, pulse 90, temperature 98.1 °F (36.7 °C), temperature source Oral, resp. rate 20, height 182.9 cm (6'), weight 276 lb 1.6 oz (125.2 kg), SpO2 91%.    General: Alert, oriented, NAD  HEENT: Moist mucous membranes. EOMI  Neck: No lymphadenopathy.  Supple.  Cardiovascular: No chest wall tenderness  Respiratory: Symmetric expansion  Abdomen: Soft, nontender, nondistended. No ttp over RLQ transplant kidney  Musculoskeletal: No edema noted  Integument: No lesions. No erythema.    Laboratory Data: Reviewed in EMR    Microbiology: Reviewed in EMR    Radiology: Reviewed    Thank you for allowing us to participate in the care of this patient. Please do not hesitate to call if you have any questions.     We will continue to follow with you and will make further recommendations based on his progress.    Yung Combs MD    [facility]  [phone]  9/28/2024

## 2024-09-28 NOTE — PROGRESS NOTES
[facility]  [facility]    Progress Note    Rolly Sanchez Patient Status:  Inpatient    1955 MRN [id number]   Location [facility] 5SW/SE Attending Carolyn Bocanegra MD   Hosp Day # 1 PCP Macey Hastings DO     Chief complaint: UTI  Subjective:   Doing ok, mild urinary frequency  No high fevers    We discussed need for IV Abx on discharge and likely staying over weekend to make an arrangements       Objective:   Blood pressure 119/78, pulse 90, temperature 98.1 °F (36.7 °C), temperature source Oral, resp. rate 20, height 6' (1.829 m), weight 276 lb 1.6 oz (125.2 kg), SpO2 91%.    HEENT: conjunctivae/corneas clear. PERRL, EOM's intact.  Neck: no adenopathy, no carotid bruit, no JVD, supple  Pulmonary:  clear to auscultation bilaterally  Cardiovascular: S1, S2 normal, no murmur, click, rub or gallop, regular rate and rhythm  Abdominal: soft, non-tender; bowel sounds normal; no masses,  no organomegaly  Extremities: no cyanosis or edema  Pulses: palpable and symmetric  Skin: No rashes or lesions  Neurologic: Alert and oriented X 3,    Psychiatric: calm, cooperative    Assessment and Plan:   Rolly Sanchez is a 69 year old male with past past medical history of T2DM, HTN, ESRD status post DDKT 12/15/2022 at [facility] (Dr Dexter), A-fib on Eliquis, psoriasis, AMELIE on CPAP, and obesity, who was asked to come to the ED for IV antibiotics after urine culture resulted.     UTI:   Ucx positive for Pseudomonas aeruginosa  Received Zosyn in ED. Will continue Zosyn  -ID consult, d/w Dr. Combs     JAZZMINE:   Baseline Cr 1.5-1.7.   Creatinine 1.94 on admission.  Likely due to relative hypotension.  - hold BP meds.  - normal saline at 100 mL/h.  - check ultrasound of the transplanted kidney.   - Nephrology is already consulted.      S/p DDRT:   He takes Envarsus 3 mg daily and prednisone 10 mg daily.  Will continue.   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   Plus prophylactic  bactrim     HTN:   BP on the lower side.  Will hold meds (lisinopril 10 mg, chlorthalidone 25 mg, amlodipine 10 mg ).      A-fib:   Cont Eliquis and metoprolol    DM2  Home: ozempic, insulin pump  -endocrinology consult     AMELIE:   Continue CPAP      DVT prophylaxis: eliquis     Dispo: pending       Chart reviewed, including current vitals, notes, labs and imaging  Pertinent past medical records reviewed  Labs ordered and medications adjusted as outlined above  Coordinate care with care team/consultants  Discussed with patient and family at bedside results of tests, management plan as outlined above, and the need for ongoing hospitalization  D/w RN     MDM high  severe acute illness/or exacerbation of chronic illness posing a threat to life, IV medications, requiring close monitoring in hospital.       Results:     Lab Results   Component Value Date    WBC 6.3 09/28/2024    HGB 10.7 (L) 09/28/2024    HCT 32.0 (L) 09/28/2024    .0 (L) 09/28/2024    CREATSERUM 1.86 (H) 09/28/2024    BUN 39 (H) 09/28/2024     09/28/2024    K 4.2 09/28/2024     09/28/2024    CO2 24.0 09/28/2024     (H) 09/28/2024    CA 8.8 09/28/2024    ALB 3.6 09/28/2024    ALKPHO 34 (L) 09/28/2024    BILT 0.8 09/28/2024    TP 5.7 09/28/2024    AST 22 09/28/2024    ALT 17 09/28/2024    PTT 27.8 04/08/2022    INR 1.03 04/08/2022    T4F 1.0 01/19/2024    TSH 1.277 09/25/2024    DDIMER 0.49 09/11/2018    ESRML 44 (H) 01/23/2019    CRP 1.2 (H) 01/23/2019    MG 2.1 09/28/2024    PHOS 3.7 09/28/2024    B12 673 04/23/2024       No results found.  EKG 12 Lead    Result Date: 9/28/2024  Sinus rhythm with marked sinus arrythmia Low voltage QRS, consider pulmonary disease, pericardial effusion, or normal variant Borderline ECG When compared with ECG of 03-MAY-2024 14:36, SC interval has decreased       CAROLINA STAFFORD MD  9/28/2024

## 2024-09-28 NOTE — H&P
History and Physical     Rolly Sanchez Patient Status:  Emergency    1955 MRN [id number]   Location [facility] EMERGENCY DEPARTMENT Attending Shahbaz Burton*   Hosp Day # 0 PCP Macey Hastings DO     Chief Complaint: UTI    Subjective:    Rolly Sanchez is a 69 year old male with past past medical history of T2DM, HTN, ESRD status post DDKT 12/15/2022 at [facility] (Dr Dexter), A-fib on Eliquis, psoriasis, AMELIE on CPAP, and obesity, who was asked to come to the ED for IV antibiotics after urine culture resulted.  He reports he had symptoms of dysuria, frequency,  and urgency for 2 days.  Then he had a UA with urine culture on .  He was started on cephalexin, and he had improvement in symptoms.  However he was called today and asked was asked to come to the ED because his urine culture was positive for Pseudomonas aeruginosa with intermediate susceptibility to fluoroquinolones. Patient's PCP discussed with [facility] transplant surgeon, who recommended against quinolones given intermediate sensitivity and also risk for QT prolongation given tacrolimus use.  He complains of fatigue, decreased appetite, fevers and chills.     History/Other:      Past Medical History:  Past Medical History:    Adenomatous polyp    cscopy Dr. Singh 2010-misael. polyp, divertic, int hemrds    Allergic rhinitis    Asthma (HCC)    BPH (benign prostatic hyperplasia)    Cancer (HCC)    Carcinoma in situ of colon    cecum-R hemicolectomy Dr. Lugo    Diabetes mellitus (HCC)    Diverticulosis    Gallstone    Gallstone seen on US liver     Hepatic steatosis    US liver     High blood pressure    Hyperlipemia    IBS (irritable bowel syndrome)    Kidney transplant recipient (HCC)    Kidney transplant performed 12/15/2022 at [facility] ( donor kidney transplant).    Microalbuminuria    Migraines    hx in grade school    Neuropathy    diabetic, jorge hands and feet    Obesity    Pancytopenia (HCC)    Dr. Ureña--hematologist     Peritoneal dialysis status (HCC)    Dr Dexter    Pernicious anemia    Platelets decreased (HCC)    Psoriasis-eczema overlap condition    Renal disorder    CKD with proteinuria--Dr. 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    Viral disease    Hx spinal virus    Visual impairment    glasses        Past Surgical History:   Past Surgical History:   Procedure Laterality Date    Adenoidectomy  1965 estimate    Same time as tonsils removed    Appendectomy      Colectomy Right 2010    hemicolectomy-Dr Resendez    Colonoscopy  2010    Dr Singh    Colonoscopy  10/2014    polyp    Colonoscopy  12/2019    Colonoscopy N/A 12/24/2019    Procedure: COLONOSCOPY;  Surgeon: Blade Singh MD;  Location: [facility] ENDOSCOPY    Cystouretro w/stone remove Bilateral 04/22/2022    Cystoscopy, bilateral retrograde pyelogram, bilateral ureteroscopy, manipulation/removal of bilateral blood clots/stones, ureteral stent removal.    Hernia surgery  2010    ventral hernia-at time of colon surgery    Other surgical history  11/1/2010    Colon Resection    Peritoneal dialysis  07/2018    Dr Croft placed PD catheter 6/2018. started PD 7/2018 Dr Dexter    Tonsillectomy      T & A    Transplantation of kidney  12/2022    At [facility]       Social History:  reports that he quit smoking about 42 years ago. His smoking use included cigarettes. He started smoking about 51 years ago. He has a 4.5 pack-year smoking history. He has never used smokeless tobacco. He reports that he does not currently use alcohol after a past usage of about 1.0 standard drink of alcohol per week. He reports that he does not use drugs.    Family History:   Family History   Problem Relation Age of Onset     Diabetes Mother     Obesity Mother     Renal Disease Mother         ESRD    Other (Other) Mother     Hypertension Father     Obesity Father     Heart Attack Father     Stroke Father         TIA    Skin cancer Father     Heart Disorder Father     Other (Other) Father     Other (Brain bleed) Father 82         in his sleep-hx brain bleed    Diabetes Sister     Hypertension Brother        Allergies:   Allergies   Allergen Reactions    Merbromin RASH and SWELLING    Mercury SWELLING and OTHER (SEE COMMENTS)     Blisters,itching,swelling    Merthilate [Thimerosal] RASH and SWELLING    Quinolones OTHER (SEE COMMENTS)     Rolly is on tacrolimus as of 2024.  Quinolones contraindicated with tacrolimus because of possibility of prolonged QT syndrome.       Medications:    No current facility-administered medications on file prior to encounter.     Current Outpatient Medications on File Prior to Encounter   Medication Sig Dispense Refill    cephALEXin 500 MG Oral Cap Take 1 capsule (500 mg total) by mouth 2 (two) times daily for 7 days. 14 capsule 0    glycopyrrolate 1 MG Oral Tab Take 1 tablet (1 mg total) by mouth 3 (three) times daily. 90 tablet 1    MONTELUKAST 10 MG Oral Tab TAKE 1 TABLET BY MOUTH NIGHTLY 90 tablet 3    fenofibrate 48 MG Oral Tab       finasteride 5 MG Oral Tab Take 1 tablet (5 mg total) by mouth daily. 90 tablet 3    Tacrolimus ER (ENVARSUS XR) 1 MG Oral Tablet 24 Hr Take 3 tablets (3 mg total) by mouth daily.      fexofenadine 180 MG Oral Tab Take 1 tablet (180 mg total) by mouth daily.      cyanocobalamin 1000 MCG Oral Tab Take 1 tablet (1,000 mcg total) by mouth daily.      NON FORMULARY MAGNESIUM + PROTEIN 1 daily      semaglutide (OZEMPIC, 0.25 OR 0.5 MG/DOSE,) 2 MG/1.5ML Subcutaneous Solution Pen-injector Inject 1 mg into the skin once a week.      Probiotic Product (PROBIOTIC-10 OR) Take 1 capsule by mouth daily.      fluticasone propionate 50 MCG/ACT Nasal Suspension by Each  Nare route as needed for Rhinitis.      metoprolol tartrate 100 MG Oral Tab Take 1 tablet (100 mg total) by mouth 2 (two) times daily.      predniSONE 10 MG Oral Tab Take 1 tablet (10 mg total) by mouth daily.      ELIQUIS 5 MG Oral Tab Take 1 tablet (5 mg total) by mouth 2 (two) times daily.      amLODIPine 10 MG Oral Tab Take 1 tablet (10 mg total) by mouth daily.      ergocalciferol 1.25 MG (85473 UT) Oral Cap Take 1 capsule (50,000 Units total) by mouth every 7 days.      Syringe/Needle, Disp, (BD LUER-PILY SYRINGE) 23G X 1\" 3 ML Does not apply Misc USE FOR B12 INJECTIONS AS DIRECTED 12 each 0    sulfamethoxazole-trimethoprim -160 MG Oral Tab per tablet Takes Mondays and Thursdays per transplant clinic  0    tamsulosin 0.4 MG Oral Cap Take 1 capsule (0.4 mg total) by mouth daily. 90 capsule 3    atorvastatin 40 MG Oral Tab Take 1 tablet (40 mg total) by mouth nightly.      HUMULIN R U-500, CONCENTRATED, 500 UNIT/ML Subcutaneous Solution 250 units/day via Insulin pump      [employer] [facility] CONTOUR NEXT TEST In Vitro Strip          Review of Systems:   A comprehensive 14 point review of systems was completed.    Pertinent positives and negatives noted in the HPI.    Objective:     /69   Pulse 94   Temp 98.5 °F (36.9 °C) (Oral)   Resp 19   Ht 6' (1.829 m)   Wt 273 lb (123.8 kg)   SpO2 96%   BMI 37.03 kg/m²   General: No acute distress.  Alert ,         HEENT: Normocephalic atraumatic. Moist mucous membranes. EOM-I. PERRLA. Anicteric.  Neck: No lymphadenopathy. No JVD. No carotid bruits.  Respiratory: Clear to auscultation bilaterally. No wheezes. No rhonchi.  Cardiovascular: S1, S2. Regular rate and rhythm. No murmurs, rubs or gallops. Equal pulses.   Chest and Back: No tenderness or deformity.  Abdomen: Soft, nontender, nondistended.  Positive bowel sounds. No rebound, guarding or organomegaly.  Neurologic: No focal neurological deficits. CNII-XII grossly intact.  Musculoskeletal: Moves all  extremities.  Extremities: No edema or cyanosis.  Integument: No rashes or lesions.   Psychiatric: Appropriate mood and affect.    Results:      Labs:  Labs Last 24 Hours:     Recent Results (from the past 72 hour(s))   Urinalysis with Culture Reflex    Collection Time: 09/25/24 10:43 AM    Specimen: Urine, clean catch   Result Value Ref Range    Urine Color Yellow Yellow    Clarity Urine Turbid (A) Clear    Spec Gravity 1.020 1.005 - 1.030    Glucose Urine Normal Normal mg/dL    Bilirubin Urine Negative Negative    Ketones Urine Negative Negative mg/dL    Blood Urine 3+ (A) Negative    pH Urine 6.5 5.0 - 8.0    Protein Urine 70 (A) Negative mg/dL    Urobilinogen Urine Normal Normal    Nitrite Urine 1+ (A) Negative    Leukocyte Esterase Urine 500 (A) Negative    WBC Urine >50 (A) 0 - 5 /HPF    RBC Urine >10 (A) 0 - 2 /HPF    Bacteria Urine Rare (A) None Seen /HPF    Squamous Epi. Cells Few (A) None Seen /HPF    Renal Tubular Epithelial Cells None Seen None Seen /HPF    Transitional Cells None Seen None Seen /HPF    Yeast Urine None Seen None Seen /HPF   TSH W Reflex To Free T4 [E]    Collection Time: 09/25/24 10:43 AM   Result Value Ref Range    TSH 1.277 0.550 - 4.780 mIU/mL   Urine Culture, Routine    Collection Time: 09/25/24 10:43 AM    Specimen: Urine, clean catch   Result Value Ref Range    Urine Culture >100,000 CFU/ML Pseudomonas aeruginosa (A)        Susceptibility    Pseudomonas aeruginosa -  (no method available)     Cefepime <=2 Sensitive      Ceftazidime 4 Sensitive      Ciprofloxacin 2 Intermediate      Meropenem <=1 Sensitive      Levofloxacin 4 Intermediate      Piperacillin + Tazobactam <=4 Sensitive      Tobramycin <=1 Sensitive    Basic Metabolic Panel (8)    Collection Time: 09/27/24  9:16 PM   Result Value Ref Range    Glucose 160 (H) 70 - 99 mg/dL    Sodium 139 136 - 145 mmol/L    Potassium 4.9 3.5 - 5.1 mmol/L    Chloride 109 98 - 112 mmol/L    CO2 23.0 21.0 - 32.0 mmol/L    Anion Gap 7 0 - 18  mmol/L    BUN 39 (H) 9 - 23 mg/dL    Creatinine 1.94 (H) 0.70 - 1.30 mg/dL    BUN/CREA Ratio 20.1 (H) 10.0 - 20.0    Calcium, Total 9.3 8.7 - 10.4 mg/dL    Calculated Osmolality 301 (H) 275 - 295 mOsm/kg    eGFR-Cr 37 (L) >=60 mL/min/1.73m2   CBC With Differential With Platelet    Collection Time: 09/27/24  9:16 PM   Result Value Ref Range    WBC 8.0 4.0 - 11.0 x10(3) uL    RBC 3.70 (L) 3.80 - 5.80 x10(6)uL    HGB 11.8 (L) 13.0 - 17.5 g/dL    HCT 34.2 (L) 39.0 - 53.0 %    MCV 92.4 80.0 - 100.0 fL    MCH 31.9 26.0 - 34.0 pg    MCHC 34.5 31.0 - 37.0 g/dL    RDW-SD 47.8 (H) 35.1 - 46.3 fL    RDW 14.1 11.0 - 15.0 %    .0 (L) 150.0 - 450.0 10(3)uL    Immature Platelet Fraction 5.1 0.0 - 7.0 %    Neutrophil Absolute Prelim 5.77 1.50 - 7.70 x10 (3) uL    Neutrophil Absolute 5.77 1.50 - 7.70 x10(3) uL    Lymphocyte Absolute 1.59 1.00 - 4.00 x10(3) uL    Monocyte Absolute 0.54 0.10 - 1.00 x10(3) uL    Eosinophil Absolute 0.01 0.00 - 0.70 x10(3) uL    Basophil Absolute 0.01 0.00 - 0.20 x10(3) uL    Immature Granulocyte Absolute 0.07 0.00 - 1.00 x10(3) uL    Neutrophil % 72.2 %    Lymphocyte % 19.9 %    Monocyte % 6.8 %    Eosinophil % 0.1 %    Basophil % 0.1 %    Immature Granulocyte % 0.9 %        Imaging: Imaging data reviewed in Epic.    Assessment & Plan:      Rolly Sanchez is a 69 year old male with past past medical history of T2DM, HTN, ESRD status post DDKT 12/15/2022 at [facility] (Dr Dexter), A-fib on Eliquis, psoriasis, AMELIE on CPAP, and obesity, who was asked to come to the ED for IV antibiotics after urine culture resulted.    UTI:   Ucx positive for Pseudomonas aeruginosa  Received Zosyn in ED. Will continue Zosyn    JAZZMINE:   Baseline Cr 1.5-1.7.   Creatinine 1.94 on admission.  Likely due to relative hypotension.  Will hold BP meds.  Will start normal saline at 100 mL/h.  Will check ultrasound of the transplanted kidney.   Nephrology is already consulted.     S/p DDRT:   He takes Envarsus 3 mg daily and prednisone  10 mg daily.  Will continue.   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     HTN:   BP on the lower side.  Will hold meds.     A-fib:   Cont Eliquis    AMELIE:   Continue CPAP      Quality:  DVT Prophylaxis: Lovenox  CODE status: Full   Mcdonnell:   None  HENRY: TBD    MDM: High Lorie Crystal MD  9/27/2024

## 2024-09-29 LAB
ALBUMIN SERPL-MCNC: 3.5 G/DL (ref 3.2–4.8)
ANION GAP SERPL CALC-SCNC: 7 MMOL/L (ref 0–18)
ATRIAL RATE: 92 BPM
BASOPHILS # BLD AUTO: 0.01 X10(3) UL (ref 0–0.2)
BASOPHILS NFR BLD AUTO: 0.2 %
BUN BLD-MCNC: 36 MG/DL (ref 9–23)
BUN/CREAT SERPL: 22.6 (ref 10–20)
CALCIUM BLD-MCNC: 8.6 MG/DL (ref 8.7–10.4)
CHLORIDE SERPL-SCNC: 112 MMOL/L (ref 98–112)
CO2 SERPL-SCNC: 24 MMOL/L (ref 21–32)
CREAT BLD-MCNC: 1.59 MG/DL
DEPRECATED RDW RBC AUTO: 49.3 FL (ref 35.1–46.3)
EGFRCR SERPLBLD CKD-EPI 2021: 47 ML/MIN/1.73M2 (ref 60–?)
EOSINOPHIL # BLD AUTO: 0.06 X10(3) UL (ref 0–0.7)
EOSINOPHIL NFR BLD AUTO: 1.3 %
ERYTHROCYTE [DISTWIDTH] IN BLOOD BY AUTOMATED COUNT: 13.8 % (ref 11–15)
GLUCOSE BLD-MCNC: 74 MG/DL (ref 70–99)
GLUCOSE BLDC GLUCOMTR-MCNC: 117 MG/DL (ref 70–99)
GLUCOSE BLDC GLUCOMTR-MCNC: 117 MG/DL (ref 70–99)
GLUCOSE BLDC GLUCOMTR-MCNC: 73 MG/DL (ref 70–99)
GLUCOSE BLDC GLUCOMTR-MCNC: 83 MG/DL (ref 70–99)
HCT VFR BLD AUTO: 34 %
HGB BLD-MCNC: 11 G/DL
IMM GRANULOCYTES # BLD AUTO: 0.06 X10(3) UL (ref 0–1)
IMM GRANULOCYTES NFR BLD: 1.3 %
IRON SATN MFR SERPL: 29 %
IRON SERPL-MCNC: 62 UG/DL
LYMPHOCYTES # BLD AUTO: 1.2 X10(3) UL (ref 1–4)
LYMPHOCYTES NFR BLD AUTO: 26.8 %
MAGNESIUM SERPL-MCNC: 1.9 MG/DL (ref 1.6–2.6)
MCH RBC QN AUTO: 31.2 PG (ref 26–34)
MCHC RBC AUTO-ENTMCNC: 32.4 G/DL (ref 31–37)
MCV RBC AUTO: 96.3 FL
MONOCYTES # BLD AUTO: 0.38 X10(3) UL (ref 0.1–1)
MONOCYTES NFR BLD AUTO: 8.5 %
NEUTROPHILS # BLD AUTO: 2.76 X10 (3) UL (ref 1.5–7.7)
NEUTROPHILS # BLD AUTO: 2.76 X10(3) UL (ref 1.5–7.7)
NEUTROPHILS NFR BLD AUTO: 61.9 %
OSMOLALITY SERPL CALC.SUM OF ELEC: 303 MOSM/KG (ref 275–295)
P AXIS: 14 DEGREES
P-R INTERVAL: 184 MS
PHOSPHATE SERPL-MCNC: 3.4 MG/DL (ref 2.4–5.1)
PLATELET # BLD AUTO: 133 10(3)UL (ref 150–450)
POTASSIUM SERPL-SCNC: 4.8 MMOL/L (ref 3.5–5.1)
Q-T INTERVAL: 356 MS
QRS DURATION: 78 MS
QTC CALCULATION (BEZET): 410 MS
R AXIS: -28 DEGREES
RBC # BLD AUTO: 3.53 X10(6)UL
SODIUM SERPL-SCNC: 143 MMOL/L (ref 136–145)
T AXIS: 20 DEGREES
TACROLIMUS STAT: 10.8 MCG/L
TIBC SERPL-MCNC: 213 UG/DL (ref 250–425)
TRANSFERRIN SERPL-MCNC: 143 MG/DL (ref 215–365)
VENTRICULAR RATE: 80 BPM
WBC # BLD AUTO: 4.5 X10(3) UL (ref 4–11)

## 2024-09-29 PROCEDURE — 99233 SBSQ HOSP IP/OBS HIGH 50: CPT | Performed by: HOSPITALIST

## 2024-09-29 PROCEDURE — 99232 SBSQ HOSP IP/OBS MODERATE 35: CPT | Performed by: INTERNAL MEDICINE

## 2024-09-29 NOTE — PROGRESS NOTES
[facility]  part of [facility]    Progress Note    Rolly Sanchez Patient Status:  Inpatient    1955 MRN [id number]   Location [facility] 5SW/SE Attending Carolyn Bocanegra MD   Hosp Day # 2 PCP Macey Hastings DO     Chief complaint: UTI  Subjective:   Doing ok, mild urinary frequency and burning, but better  No high fevers        Objective:   Blood pressure 140/90, pulse 75, temperature 97.9 °F (36.6 °C), temperature source Oral, resp. rate 18, height 6' (1.829 m), weight 275 lb 8 oz (125 kg), SpO2 95%.    HEENT: conjunctivae/corneas clear. PERRL, EOM's intact.  Neck: no adenopathy, no carotid bruit, no JVD, supple  Pulmonary:  clear to auscultation bilaterally  Cardiovascular: S1, S2 normal, no murmur, click, rub or gallop, regular rate and rhythm  Abdominal: soft, non-tender; bowel sounds normal; no masses,  no organomegaly  Extremities: no cyanosis or edema  Pulses: palpable and symmetric  Skin: No rashes or lesions  Neurologic: Alert and oriented X 3,    Psychiatric: calm, cooperative    Assessment and Plan:   Rolly Sanchez is a 69 year old male with past past medical history of T2DM, HTN, ESRD status post DDKT 12/15/2022 at [facility] (Dr Dexter), A-fib on Eliquis, psoriasis, AMELIE on CPAP, and obesity, who was asked to come to the ED for IV antibiotics after urine culture resulted.     UTI in a transplant kidney:   Ucx positive for Pseudomonas aeruginosa  Previous cx +staph not aureus (may), staph epidermidis (April)  -ID consult, d/w Dr. Combs  -initially on zosyn, changed to cefepime per ID  -will need midline and IV abx on discharge      JAZZMINE:   Baseline Cr 1.5-1.7.   Creatinine 1.94 on admission.  Likely due to relative hypotension. Back to baseline now  - hold BP meds initially.  - IVF per nephrology  - check ultrasound of the transplanted kidney.   - Nephrology  consulted, d/w Dr. Bill      S/p DDRT, 12/15/22 at [facility]  :   He takes Envarsus 3 mg daily and prednisone 10 mg  daily.  Will continue.   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   Plus prophylactic bactrim     HTN:   BP on the lower side initially.  Will hold meds (lisinopril 10 mg, chlorthalidone 25 mg, amlodipine 10 mg ).  -back on metoprolol      A-fib:   Cont Eliquis and metoprolol    DM2  Home: ozempic, insulin pump  -endocrinology consulted     AMELIE:   Continue CPAP      DVT prophylaxis: eliquis     Dispo: pending, likely home tomorrow       Chart reviewed, including current vitals, notes, labs and imaging  Pertinent past medical records reviewed  Labs ordered and medications adjusted as outlined above  Coordinate care with care team/consultants  Discussed with patient the results of tests, management plan as outlined above, and the need for ongoing hospitalization  D/w RN     MDM high  severe acute illness/or exacerbation of chronic illness posing a threat to life, IV medications, requiring close monitoring in hospital.       Results:     Lab Results   Component Value Date    WBC 4.5 09/29/2024    HGB 11.0 (L) 09/29/2024    HCT 34.0 (L) 09/29/2024    .0 (L) 09/29/2024    CREATSERUM 1.59 (H) 09/29/2024    BUN 36 (H) 09/29/2024     09/29/2024    K 4.8 09/29/2024     09/29/2024    CO2 24.0 09/29/2024    GLU 74 09/29/2024    CA 8.6 (L) 09/29/2024    ALB 3.5 09/29/2024    ALKPHO 34 (L) 09/28/2024    BILT 0.8 09/28/2024    TP 5.7 09/28/2024    AST 22 09/28/2024    ALT 17 09/28/2024    PTT 27.8 04/08/2022    INR 1.03 04/08/2022    T4F 1.0 01/19/2024    TSH 1.277 09/25/2024    DDIMER 0.49 09/11/2018    ESRML 44 (H) 01/23/2019    CRP 1.2 (H) 01/23/2019    MG 1.9 09/29/2024    PHOS 3.4 09/29/2024    B12 673 04/23/2024       US TRNSPL KDN R-T IMG +- DUPLEX DOP STD (CPT=76776)    Result Date: 9/28/2024  CONCLUSION:   Diffusely elevated resistive indices within the arcuate arteries within the transplant kidney.  No tardus parvus waveforms within the intrarenal arteries.  Findings are  non-specific and can be seen in the setting of acute tubular necrosis, transplant rejection, and nephrotoxicity.  Recommend close clinical and imaging follow-up.  Elevated velocity within the proximal main renal artery measuring 259 cm/second, with mild tardus parvus waveforms more distally.  Findings can represent a proximal renal artery stenosis.  Recommend close imaging follow-up.  No hydronephrosis or obstructing stones of the transplant kidney.  No peritransplant collections.        Dictated by (CST): Arleen Flores MD on 9/28/2024 at 2:43 PM     Finalized by (CST): Arleen Flores MD on 9/28/2024 at 2:53 PM         EKG 12 Lead    Result Date: 9/28/2024  Sinus rhythm with marked sinus arrythmia Low voltage QRS, consider pulmonary disease, pericardial effusion, or normal variant Borderline ECG When compared with ECG of 03-MAY-2024 14:36, DC interval has decreased       CAROLINA STAFFORD MD  9/29/2024

## 2024-09-29 NOTE — SPIRITUAL CARE NOTE
Spiritual Care Visit Note    Patient Name: Rolly Sanchez Date of Spiritual Care Visit: 24   : 1955 Primary Dx: Acute cystitis without hematuria       Referred By: Referral From: Patient    Spiritual Care Taxonomy:    Intended Effects: Helping someone feel comforted    Methods: Accompany someone in their spiritual/Worship practice outside your paradise tradition;Explore presence of God;Explore nature of God;Offer spiritual/Worship support    Interventions: Acknowledge current situation;Active listening;Ask guided questions;Assist with determining decision maker;Identify supportive relationship(s);Share words of hope and inspiration;Provide Worship music;[place]    Visit Type/Summary:     - Spiritual Care: Responded to a request for spiritual care and assessed the patient for spiritual care needs. Offered empathic listening and emotional support. Provided information regarding how to contact Spiritual Care and left a Spiritual Care information card.    Spiritual Care support can be requested via an Epic consult. For urgent/immediate needs, please contact the On Call  at: [place]: ext [phone]       Chaplain Akers

## 2024-09-29 NOTE — SPIRITUAL CARE NOTE
Spiritual Care Visit Note    Patient Name: Rolly Sanchez Date of Spiritual Care Visit: 24   : 1955 Primary Dx: Acute cystitis without hematuria       Referred By: Referral From:     Spiritual Care Taxonomy:    Intended Effects: Demonstrate caring and concern    Methods: Explore cultural values    Interventions: Acknowledge current situation;Active listening;Ask guided questions;Explain  role;Discuss plan of care;Provide hospitality    Visit Type/Summary:     - PoA: Other: Patient did not wish to receive advance directive information at this time. Provided education regarding PoA for Healthcare. Left PoA information with patient for review.  Left PoA information and Spiritual Care contact information.  remains available for follow up.    Spiritual Care support can be requested via an Epic consult. For urgent/immediate needs, please contact the On Call  at: [place]: ext [phone]    Chaplain Resident, Julia Quinn,  PhD

## 2024-09-29 NOTE — PROGRESS NOTES
[facility]  Nephrology Daily Progress Note    Rolly Sanchez  [id number]  69 year old      HPI:   Rolly Sanchez is a 69 year old male.  Overall feeling better.  Appetite and energy better.  Remains afeb.       ROS:     Constitutional:  Negative for decreased activity, fever, irritability and lethargy  Endocrine:  Negative for abnormal sleep patterns, increased activity, polydipsia and polyphagia  Cardiovascular:  Negative for cool extremity and irregular heartbeat/palpitations  Gastrointestinal:  Negative for abdominal pain, constipation, decreased appetite, diarrhea and vomiting  Genitourinary:  Negative for dysuria and hematuria  Hema/Lymph:  Negative for easy bleeding and easy bruising  Integumentary:  Negative for pruritus and rash  Musculoskeletal:  Negative for bone/joint symptoms  Neurological:  Negative for gait disturbance  Psychiatric:  Negative for inappropriate interaction and psychiatric symptoms  Respiratory:  Negative for cough, dyspnea and wheezing      PHYSICAL EXAM:   Temp:  [97.7 °F (36.5 °C)-98 °F (36.7 °C)] 97.9 °F (36.6 °C)  Pulse:  [] 75  Resp:  [18-20] 18  BP: (116-140)/(86-95) 140/90  SpO2:  [95 %-96 %] 95 %  Patient Weight for the past 72 hrs:   Weight   09/27/24 1957 273 lb (123.8 kg)   09/27/24 2337 275 lb 9.6 oz (125 kg)   09/28/24 0440 276 lb 1.6 oz (125.2 kg)   09/29/24 0548 275 lb 8 oz (125 kg)       Constitutional: appears well hydrated alert and responsive no acute distress noted  Neck/Thyroid: neck is supple without adenopathy  Lymphatic: no abnormal cervical, supraclavicular or axillary adenopathy is noted  Respiratory: normal to inspection lungs are clear to auscultation bilaterally normal respiratory effort  Cardiovascular: regular rate and rhythm no murmurs, gallups, or rubs  Abdomen: soft non-tender non-distended no organomegaly noted no masses  Musculoskeletal: full ROM all extremities good strength  no deformities  Extremities: no edema, cyanosis, or  clubbing  Neurological: exam appropriate for age reflexes and motor skills appropriate for age    Labs:  Lab Results   Component Value Date    WBC 4.5 09/29/2024    HGB 11.0 09/29/2024    HCT 34.0 09/29/2024    .0 09/29/2024    CREATSERUM 1.59 09/29/2024    BUN 36 09/29/2024     09/29/2024    K 4.8 09/29/2024     09/29/2024    CO2 24.0 09/29/2024    GLU 74 09/29/2024    CA 8.6 09/29/2024    ALB 3.5 09/29/2024    MG 1.9 09/29/2024    PHOS 3.4 09/29/2024     Recent Labs   Lab 09/27/24 2116 09/28/24  0511 09/29/24  0531   WBC 8.0 6.3 4.5   HGB 11.8* 10.7* 11.0*   HCT 34.2* 32.0* 34.0*   .0* 115.0* 133.0*     Recent Labs   Lab 09/27/24 2116 09/28/24  0511 09/29/24  0531   * 118* 74   BUN 39* 39* 36*   CREATSERUM 1.94* 1.86* 1.59*   CA 9.3 8.8 8.6*   ALB  --  3.6 3.5    141 143   K 4.9 4.2 4.8    112 112   CO2 23.0 24.0 24.0   ALKPHO  --  34*  --    AST  --  22  --    ALT  --  17  --    BILT  --  0.8  --    TP  --  5.7  --    PHOS  --  3.7 3.4       Imaging  US TRNSPL KDN R-T IMG +- DUPLEX DOP STD (CPT=76776)    Result Date: 9/28/2024  CONCLUSION:   Diffusely elevated resistive indices within the arcuate arteries within the transplant kidney.  No tardus parvus waveforms within the intrarenal arteries.  Findings are non-specific and can be seen in the setting of acute tubular necrosis, transplant rejection, and nephrotoxicity.  Recommend close clinical and imaging follow-up.  Elevated velocity within the proximal main renal artery measuring 259 cm/second, with mild tardus parvus waveforms more distally.  Findings can represent a proximal renal artery stenosis.  Recommend close imaging follow-up.  No hydronephrosis or obstructing stones of the transplant kidney.  No peritransplant collections.        Dictated by (CST): Arleen Flores MD on 9/28/2024 at 2:43 PM     Finalized by (CST): Arleen Flores MD on 9/28/2024 at 2:53 PM             Medications:    Current  Facility-Administered Medications:     glucose (Dex4) 15 GM/59ML oral liquid 15 g, 15 g, Oral, Q15 Min PRN **OR** glucose (Glutose) 40% oral gel 15 g, 15 g, Oral, Q15 Min PRN **OR** glucose-vitamin C (Dex-4) chewable tab 4 tablet, 4 tablet, Oral, Q15 Min PRN **OR** dextrose 50% injection 50 mL, 50 mL, Intravenous, Q15 Min PRN **OR** glucose (Dex4) 15 GM/59ML oral liquid 30 g, 30 g, Oral, Q15 Min PRN **OR** glucose (Glutose) 40% oral gel 30 g, 30 g, Oral, Q15 Min PRN **OR** glucose-vitamin C (Dex-4) chewable tab 8 tablet, 8 tablet, Oral, Q15 Min PRN    sodium bicarbonate tab 650 mg, 650 mg, Oral, BID    metoprolol tartrate (Lopressor) tab 100 mg, 100 mg, Oral, BID    insulin via Insulin Pump, , Subcutaneous, TID AC and HS    [Held by provider] semaglutide (Ozempic) injection 1 mg, 1 mg, Subcutaneous, Q Tuesday    magnesium oxide (Mag-Ox) tab 400 mg, 400 mg, Oral, Daily    ceFEPIme (Maxipime) 1 g in sodium chloride 0.9% 100 mL IVPB-MBP, 1 g, Intravenous, Q12H    atorvastatin (Lipitor) tab 40 mg, 40 mg, Oral, Nightly    apixaban (Eliquis) tab 5 mg, 5 mg, Oral, BID    finasteride (Proscar) tab 5 mg, 5 mg, Oral, Daily    predniSONE (Deltasone) tab 10 mg, 10 mg, Oral, Daily    [START ON 9/30/2024] sulfamethoxazole-trimethoprim DS (Bactrim DS) 800-160 MG per tab 1 tablet, 1 tablet, Oral, Once per day on Monday Thursday    Tacrolimus ER TB24 3 mg, 3 tablet, Oral, Daily    tamsulosin (Flomax) cap 0.4 mg, 0.4 mg, Oral, Daily    acetaminophen (Tylenol) tab 650 mg, 650 mg, Oral, Q4H PRN **OR** HYDROcodone-acetaminophen (Norco) 5-325 MG per tab 1 tablet, 1 tablet, Oral, Q4H PRN **OR** HYDROcodone-acetaminophen (Norco) 5-325 MG per tab 2 tablet, 2 tablet, Oral, Q4H PRN    polyethylene glycol (PEG 3350) (Miralax) 17 g oral packet 17 g, 17 g, Oral, Daily PRN    sennosides (Senokot) tab 17.2 mg, 17.2 mg, Oral, Nightly PRN    bisacodyl (Dulcolax) 10 MG rectal suppository 10 mg, 10 mg, Rectal, Daily PRN    ondansetron (Zofran) 4  MG/2ML injection 4 mg, 4 mg, Intravenous, Q6H PRN    metoclopramide (Reglan) 5 mg/mL injection 5 mg, 5 mg, Intravenous, Q8H PRN    sodium chloride 0.9% infusion, , Intravenous, Continuous    Allergies:  Allergies   Allergen Reactions    Merbromin RASH and SWELLING    Mercury SWELLING and OTHER (SEE COMMENTS)     Blisters,itching,swelling    Merthilate [Thimerosal] RASH and SWELLING    Quinolones OTHER (SEE COMMENTS)     Rolly is on tacrolimus as of September 27th, 2024.  Quinolones contraindicated with tacrolimus because of possibility of prolonged QT syndrome.       Input/Output:    Intake/Output Summary (Last 24 hours) at 9/29/2024 1317  Last data filed at 9/29/2024 0900  Gross per 24 hour   Intake 1130 ml   Output 830 ml   Net 300 ml          ASSESSMENT/PLAN:   Assessment   Patient Active Problem List   Diagnosis    Allergic rhinitis    Carcinoma in situ of colon    Diabetes mellitus type 2, insulin dependent (HCC)    Hyperlipidemia    Essential hypertension    Pancytopenia (HCC)    Proteinuria    Obstructive sleep apnea    Elevated coronary artery calcium score    Stage 5 chronic kidney disease on peritoneal dialysis     Vitamin B12 deficiency    Microscopic hematuria    Transplanted kidney (HCC)    UTI (urinary tract infection)    Acute cystitis without hematuria    Acute kidney injury (HCC)    Hypotension due to hypovolemia       Overall better.  Creatinine down to 1.59 which is baseline.  Lytes OK. Tacrolimus level pending.  Will stop IVF.  Hopefully home tomorrow if home IV antibiotics can be set up for Pseudomans UTI..  Discussed with Dr. Bocanegra.              9/29/2024  Parag Bill MD

## 2024-09-29 NOTE — PLAN OF CARE
Call light within reach. Safety precautions in place. Calls appropriately.    Problem: Patient Centered Care  Goal: Patient preferences are identified and integrated in the patient's plan of care  Description: Interventions:  - What would you like us to know as we care for you? Home with wife.  - Provide timely, complete, and accurate information to patient/family  - Incorporate patient and family knowledge, values, beliefs, and cultural backgrounds into the planning and delivery of care  - Encourage patient/family to participate in care and decision-making at the level they choose  - Honor patient and family perspectives and choices  Outcome: Progressing     Problem: Diabetes/Glucose Control  Goal: Glucose maintained within prescribed range  Description: INTERVENTIONS:  - Monitor Blood Glucose as ordered  - Assess for signs and symptoms of hyperglycemia and hypoglycemia  - Administer ordered medications to maintain glucose within target range  - Assess barriers to adequate nutritional intake and initiate nutrition consult as needed  - Instruct patient on self management of diabetes  Outcome: Progressing    Problem: CARDIOVASCULAR - ADULT  Goal: Absence of cardiac arrhythmias or at baseline  Description: INTERVENTIONS:  - Continuous cardiac monitoring, monitor vital signs, obtain 12 lead EKG if indicated  - Evaluate effectiveness of antiarrhythmic and heart rate control medications as ordered  - Initiate emergency measures for life threatening arrhythmias  - Monitor electrolytes and administer replacement therapy as ordered  Outcome: Progressing     Problem: RESPIRATORY - ADULT  Goal: Achieves optimal ventilation and oxygenation  Description: INTERVENTIONS:  - Assess for changes in respiratory status  - Assess for changes in mentation and behavior  - Position to facilitate oxygenation and minimize respiratory effort  - Oxygen supplementation based on oxygen saturation or ABGs  - Provide Smoking Cessation handout, if  applicable  - Encourage broncho-pulmonary hygiene including cough, deep breathe, Incentive Spirometry  - Assess the need for suctioning and perform as needed  - Assess and instruct to report SOB or any respiratory difficulty  - Respiratory Therapy support as indicated  - Manage/alleviate anxiety  - Monitor for signs/symptoms of CO2 retention  Outcome: Progressing     Problem: SKIN/TISSUE INTEGRITY - ADULT  Goal: Skin integrity remains intact  Description: INTERVENTIONS  - Assess and document risk factors for pressure ulcer development  - Assess and document skin integrity  - Monitor for areas of redness and/or skin breakdown  - Initiate interventions, skin care algorithm/standards of care as needed  Outcome: Progressing     Problem: MUSCULOSKELETAL - ADULT  Goal: Return mobility to safest level of function  Description: INTERVENTIONS:  - Assess patient stability and activity tolerance for standing, transferring and ambulating w/ or w/o assistive devices  - Assist with transfers and ambulation using safe patient handling equipment as needed  - Ensure adequate protection for wounds/incisions during mobilization  - Obtain PT/OT consults as needed  - Advance activity as appropriate  - Communicate ordered activity level and limitations with patient/family  Outcome: Progressing     Problem: PAIN - ADULT  Goal: Verbalizes/displays adequate comfort level or patient's stated pain goal  Description: INTERVENTIONS:  - Encourage pt to monitor pain and request assistance  - Assess pain using appropriate pain scale  - Administer analgesics based on type and severity of pain and evaluate response  - Implement non-pharmacological measures as appropriate and evaluate response  - Consider cultural and social influences on pain and pain management  - Manage/alleviate anxiety  - Utilize distraction and/or relaxation techniques  - Monitor for opioid side effects  - Notify MD/LIP if interventions unsuccessful or patient reports new  pain  - Anticipate increased pain with activity and pre-medicate as appropriate  Outcome: Progressing     Problem: RISK FOR INFECTION - ADULT  Goal: Absence of fever/infection during anticipated neutropenic period  Description: INTERVENTIONS  - Monitor WBC  - Administer growth factors as ordered  - Implement neutropenic guidelines  Outcome: Progressing     Problem: SAFETY ADULT - FALL  Goal: Free from fall injury  Description: INTERVENTIONS:  - Assess pt frequently for physical needs  - Identify cognitive and physical deficits and behaviors that affect risk of falls.  - [place] fall precautions as indicated by assessment.  - Educate pt/family on patient safety including physical limitations  - Instruct pt to call for assistance with activity based on assessment  - Modify environment to reduce risk of injury  - Provide assistive devices as appropriate  - Consider OT/PT consult to assist with strengthening/mobility  - Encourage toileting schedule  Outcome: Progressing     Problem: DISCHARGE PLANNING  Goal: Discharge to home or other facility with appropriate resources  Description: INTERVENTIONS:  - Identify barriers to discharge w/pt and caregiver  - Include patient/family/discharge partner in discharge planning  - Arrange for needed discharge resources and transportation as appropriate  - Identify discharge learning needs (meds, wound care, etc)  - Arrange for interpreters to assist at discharge as needed  - Consider post-discharge preferences of patient/family/discharge partner  - Complete POLST form as appropriate  - Assess patient's ability to be responsible for managing their own health  - Refer to Case Management Department for coordinating discharge planning if the patient needs post-hospital services based on physician/LIP order or complex needs related to functional status, cognitive ability or social support system  Outcome: Progressing

## 2024-09-29 NOTE — PROGRESS NOTES
[facility]  part of [facility]    Endocrine Progress Note  [facility]    Rolly Sanchez Patient Status:  Inpatient    1955 MRN [id number]   Location [facility] 5SW/SE Attending Carolyn Bocanegra MD   Hosp Day # 2 PCP Macey Hastings DO       Assessment and Plan:   Type 2 DM   S/P Kidney transplant  UTI with Pseudomonas aeruginosa  CKD  HTN  Atrial fibrillation  HLD       BS under good control even with infection  Will continue with current Medtr[employer] insulin pump with Guardian sensor as BS well controlled  Appetite is fair, though better today than yesterday  Patient has enough insulin through today late afternoon --- his wife is planning on bringing supplies and insulin here today  Ok for home tomorrow if so planned on current insulin settings in his insulin pump            Subjective:  feeling better overall --- appetite good    Medications:    Current Facility-Administered Medications:     glucose (Dex4) 15 GM/59ML oral liquid 15 g, 15 g, Oral, Q15 Min PRN **OR** glucose (Glutose) 40% oral gel 15 g, 15 g, Oral, Q15 Min PRN **OR** glucose-vitamin C (Dex-4) chewable tab 4 tablet, 4 tablet, Oral, Q15 Min PRN **OR** dextrose 50% injection 50 mL, 50 mL, Intravenous, Q15 Min PRN **OR** glucose (Dex4) 15 GM/59ML oral liquid 30 g, 30 g, Oral, Q15 Min PRN **OR** glucose (Glutose) 40% oral gel 30 g, 30 g, Oral, Q15 Min PRN **OR** glucose-vitamin C (Dex-4) chewable tab 8 tablet, 8 tablet, Oral, Q15 Min PRN    sodium bicarbonate tab 650 mg, 650 mg, Oral, BID    metoprolol tartrate (Lopressor) tab 100 mg, 100 mg, Oral, BID    insulin via Insulin Pump, , Subcutaneous, TID AC and HS    [Held by provider] semaglutide (Ozempic) injection 1 mg, 1 mg, Subcutaneous, Q Tuesday    magnesium oxide (Mag-Ox) tab 400 mg, 400 mg, Oral, Daily    ceFEPIme (Maxipime) 1 g in sodium chloride 0.9% 100 mL IVPB-MBP, 1 g, Intravenous, Q12H    atorvastatin (Lipitor) tab 40 mg, 40 mg, Oral,  Nightly    apixaban (Eliquis) tab 5 mg, 5 mg, Oral, BID    finasteride (Proscar) tab 5 mg, 5 mg, Oral, Daily    predniSONE (Deltasone) tab 10 mg, 10 mg, Oral, Daily    [START ON 9/30/2024] sulfamethoxazole-trimethoprim DS (Bactrim DS) 800-160 MG per tab 1 tablet, 1 tablet, Oral, Once per day on Monday Thursday    Tacrolimus ER TB24 3 mg, 3 tablet, Oral, Daily    tamsulosin (Flomax) cap 0.4 mg, 0.4 mg, Oral, Daily    acetaminophen (Tylenol) tab 650 mg, 650 mg, Oral, Q4H PRN **OR** HYDROcodone-acetaminophen (Norco) 5-325 MG per tab 1 tablet, 1 tablet, Oral, Q4H PRN **OR** HYDROcodone-acetaminophen (Norco) 5-325 MG per tab 2 tablet, 2 tablet, Oral, Q4H PRN    polyethylene glycol (PEG 3350) (Miralax) 17 g oral packet 17 g, 17 g, Oral, Daily PRN    sennosides (Senokot) tab 17.2 mg, 17.2 mg, Oral, Nightly PRN    bisacodyl (Dulcolax) 10 MG rectal suppository 10 mg, 10 mg, Rectal, Daily PRN    ondansetron (Zofran) 4 MG/2ML injection 4 mg, 4 mg, Intravenous, Q6H PRN    metoclopramide (Reglan) 5 mg/mL injection 5 mg, 5 mg, Intravenous, Q8H PRN    sodium chloride 0.9% infusion, , Intravenous, Continuous    Objective:   Blood pressure 140/90, pulse 75, temperature 97.9 °F (36.6 °C), temperature source Oral, resp. rate 18, height 6' (1.829 m), weight 275 lb 8 oz (125 kg), SpO2 95%.    Physical Exam:  General appearance: alert, appears stated age and cooperative  Pulmonary:  non-labored respirations  Cardiovascular: regular rate and rhythm  Abdominal: soft, non-distended      Results:     Lab Results   Component Value Date    WBC 4.5 09/29/2024    HGB 11.0 (L) 09/29/2024    HCT 34.0 (L) 09/29/2024    .0 (L) 09/29/2024    CREATSERUM 1.59 (H) 09/29/2024    BUN 36 (H) 09/29/2024     09/29/2024    K 4.8 09/29/2024     09/29/2024    CO2 24.0 09/29/2024    GLU 74 09/29/2024    CA 8.6 (L) 09/29/2024    ALB 3.5 09/29/2024    ALKPHO 34 (L) 09/28/2024    BILT 0.8 09/28/2024    TP 5.7 09/28/2024    AST 22 09/28/2024     ALT 17 09/28/2024    PTT 27.8 04/08/2022    INR 1.03 04/08/2022    T4F 1.0 01/19/2024    TSH 1.277 09/25/2024    DDIMER 0.49 09/11/2018    ESRML 44 (H) 01/23/2019    CRP 1.2 (H) 01/23/2019    MG 1.9 09/29/2024    PHOS 3.4 09/29/2024    B12 673 04/23/2024       US TRNSPL KDN R-T IMG +- DUPLEX DOP STD (CPT=76776)    Result Date: 9/28/2024  CONCLUSION:   Diffusely elevated resistive indices within the arcuate arteries within the transplant kidney.  No tardus parvus waveforms within the intrarenal arteries.  Findings are non-specific and can be seen in the setting of acute tubular necrosis, transplant rejection, and nephrotoxicity.  Recommend close clinical and imaging follow-up.  Elevated velocity within the proximal main renal artery measuring 259 cm/second, with mild tardus parvus waveforms more distally.  Findings can represent a proximal renal artery stenosis.  Recommend close imaging follow-up.  No hydronephrosis or obstructing stones of the transplant kidney.  No peritransplant collections.        Dictated by (CST): Arleen Flores MD on 9/28/2024 at 2:43 PM     Finalized by (CST): Arleen Flores MD on 9/28/2024 at 2:53 PM         EKG 12 Lead    Result Date: 9/28/2024  Sinus rhythm with marked sinus arrythmia Low voltage QRS, consider pulmonary disease, pericardial effusion, or normal variant Borderline ECG When compared with ECG of 03-MAY-2024 14:36, SD interval has decreased    [unfilled]    EDIL VERAS MD  9/29/2024

## 2024-09-29 NOTE — CONSULTS
[facility]    PATIENT'S NAME: PHOENIX BOWLING   ATTENDING PHYSICIAN: Carolyn Bocanegra MD   CONSULTING PHYSICIAN: Parag Bill MD   PATIENT ACCOUNT#:   [id number]    LOCATION:  [address] [place] [address]  MEDICAL RECORD #:   [id number]       YOB: 1955  ADMISSION DATE:       2024      CONSULT DATE:  2024    REPORT OF CONSULTATION      HISTORY OF PRESENT ILLNESS:  The patient is a 69-year-old male with a history of status post  donor kidney transplantation in 2022, history of adult-onset diabetes mellitus, hypertension, paroxysmal atrial fibrillation, obstructive sleep apnea, who on 2024 told his PCP that he was having some mild urinary symptoms including dysuria and hesitancy.  As a result, a urinalysis and urine culture was obtained.  It returned back positive for Pseudomonas aeruginosa.  It was only sensitive to IV antibiotics and was only intermediate sensitive to his ciprofloxacin and Levaquin.  The patient was directed to the emergency room over concerns about the intermediate sensitivities and the fact that there is potential drug interaction between these drugs and tacrolimus.  The patient states he has felt a bit chilled, somewhat anorexic, and a little bit more tired than usual.  Probably not eating and drinking as he normally does, but no obvious major fevers or chills.  He has had 1 previous urinary tract infection earlier this year.  Has history of BPH and has been evaluated by Dr. Garcia from Urology.    PAST MEDICAL HISTORY:  Significant for end-stage renal disease.  He was on peritoneal dialysis for close to 5 years before his kidney transplantation.  Follow up with Dr. Dexter and also Dr. Finn at [facility].  The patient has a history of adult-onset diabetes mellitus, paroxysmal atrial fibrillation, hypertension, obesity, obstructive sleep apnea, and BPH.  He had been on mycophenolate, but that currently is on hold secondary to CMV which has cleared  up.    MEDICATIONS:  Include Eliquis, Lipitor, Proscar, insulin, metoprolol, Zosyn, prednisone 10 mg daily, sodium bicarbonate 650 mg b.i.d., Bactrim DS on  and , tacrolimus ER 3 mg daily, and tamsulosin.      ALLERGIES:  There are there are allergies to mercury, merbromin, thimerosal.    SOCIAL HISTORY:  He lives with his family.  He is a nonsmoker.    FAMILY HISTORY:  His mother had end-stage renal disease and was a diabetic.    REVIEW OF SYSTEMS:  At the present time, patient states he is feeling okay.  No obvious fevers or chills.  Slight urinary tract symptoms.  No chest pain, shortness of breath, or GI symptoms.      PHYSICAL EXAMINATION:    GENERAL:  The patient is awake, alert, and comfortable at rest.  VITAL SIGNS:  Blood pressure 119/78, with a pulse of 90, respirations 20, temperature 98.1, O2 saturation was 91% on room air, and he weighed 276 pounds.  NECK:  Supple without JVD.  LUNGS:  Clear to auscultation and percussion.  HEART:  Regular rate and rhythm with an S4 but no other gallops, murmurs, or rubs.  ABDOMEN:  Soft, flat, nontender.  Transplanted kidney in right lower quadrant.  EXTREMITIES:  No clubbing, cyanosis, or edema.    LABORATORY DATA:  BUN and creatinine last night at 39 and 1.94 respectively.  Today, it is 39 and 1.86 respectively.  Normal electrolytes.  White count 6.3, hemoglobin 10.7.      EKG shows normal sinus rhythm.    IMPRESSION:  The patient is a 69-year-old male now with:    1.   Mild acute renal failure.  The patient's baseline creatinine is 1.5 to 1.8 range.  He has had some diminished oral intake during the last few days.  2.   Urinary tract infection with Pseudomonas aeruginosa.  3.   History of end-stage renal disease, most likely secondary to diabetic nephropathy.  He was previously on peritoneal dialysis and subsequently obtained a  donor kidney in 2022.  Followed by [facility] and Dr. Dexter.  4.   Paroxysmal atrial fibrillation.  5.    Obesity.  6.   Obstructive sleep apnea.  7.   BPH.  8.   Anemia.  Rule out iron deficiency.    PLAN:  Will continue gentle IV hydration.  Patient currently on Zosyn for his Pseudomonas UTI.  Will follow with I's and O's, daily weights, and serial chemistries.  Check iron studies in a.m. tacrolimus level.  Ultrasound of kidney pending.    Dictated By Parag Bill MD  d: 09/28/2024 12:58:07  t: 09/29/2024 01:26:21  [facility] [id number]  [facility]/

## 2024-09-29 NOTE — PLAN OF CARE
Problem: Patient Centered Care  Goal: Patient preferences are identified and integrated in the patient's plan of care  Description: Interventions:  - Provide timely, complete, and accurate information to patient/family  - Incorporate patient and family knowledge, values, beliefs, and cultural backgrounds into the planning and delivery of care  - Encourage patient/family to participate in care and decision-making at the level they choose  - Honor patient and family perspectives and choices  9/29/2024 1338 by Laney Branch RN  Outcome: Progressing  9/29/2024 1338 by Laney Branch RN  Outcome: Progressing     Problem: Diabetes/Glucose Control  Goal: Glucose maintained within prescribed range  Description: INTERVENTIONS:  - Monitor Blood Glucose as ordered  - Assess for signs and symptoms of hyperglycemia and hypoglycemia  - Administer ordered medications to maintain glucose within target range  - Assess barriers to adequate nutritional intake and initiate nutrition consult as needed  - Instruct patient on self management of diabetes  9/29/2024 1338 by Laney Branch RN  Outcome: Progressing  9/29/2024 1338 by Laney Branch RN  Outcome: Progressing        Problem: CARDIOVASCULAR - ADULT  Goal: Absence of cardiac arrhythmias or at baseline  Description: INTERVENTIONS:  - Continuous cardiac monitoring, monitor vital signs, obtain 12 lead EKG if indicated  - Evaluate effectiveness of antiarrhythmic and heart rate control medications as ordered  - Initiate emergency measures for life threatening arrhythmias  - Monitor electrolytes and administer replacement therapy as ordered  9/29/2024 1338 by Laney Branch RN  Outcome: Progressing  9/29/2024 1338 by Laney Branch RN  Outcome: Progressing     Problem: RESPIRATORY - ADULT  Goal: Achieves optimal ventilation and oxygenation  Description: INTERVENTIONS:  - Assess for changes in respiratory  status  - Assess for changes in mentation and behavior  - Position to facilitate oxygenation and minimize respiratory effort  - Oxygen supplementation based on oxygen saturation or ABGs  - Provide Smoking Cessation handout, if applicable  - Encourage broncho-pulmonary hygiene including cough, deep breathe, Incentive Spirometry  - Assess the need for suctioning and perform as needed  - Assess and instruct to report SOB or any respiratory difficulty  - Respiratory Therapy support as indicated  - Manage/alleviate anxiety  - Monitor for signs/symptoms of CO2 retention  9/29/2024 1338 by Laney Branch RN  Outcome: Progressing  9/29/2024 1338 by Laney Branch RN  Outcome: Progressing     Problem: SKIN/TISSUE INTEGRITY - ADULT  Goal: Skin integrity remains intact  Description: INTERVENTIONS  - Assess and document risk factors for pressure ulcer development  - Assess and document skin integrity  - Monitor for areas of redness and/or skin breakdown  - Initiate interventions, skin care algorithm/standards of care as needed  9/29/2024 1338 by Laney Branch RN  Outcome: Progressing  9/29/2024 1338 by Laney Branch RN  Outcome: Progressing     Problem: MUSCULOSKELETAL - ADULT  Goal: Return mobility to safest level of function  Description: INTERVENTIONS:  - Assess patient stability and activity tolerance for standing, transferring and ambulating w/ or w/o assistive devices  - Assist with transfers and ambulation using safe patient handling equipment as needed  - Ensure adequate protection for wounds/incisions during mobilization  - Obtain PT/OT consults as needed  - Advance activity as appropriate  - Communicate ordered activity level and limitations with patient/family  9/29/2024 1338 by Laney Branch RN  Outcome: Progressing  9/29/2024 1338 by Laney Branch RN  Outcome: Progressing     Problem: PAIN - ADULT  Goal: Verbalizes/displays adequate comfort  level or patient's stated pain goal  Description: INTERVENTIONS:  - Encourage pt to monitor pain and request assistance  - Assess pain using appropriate pain scale  - Administer analgesics based on type and severity of pain and evaluate response  - Implement non-pharmacological measures as appropriate and evaluate response  - Consider cultural and social influences on pain and pain management  - Manage/alleviate anxiety  - Utilize distraction and/or relaxation techniques  - Monitor for opioid side effects  - Notify MD/LIP if interventions unsuccessful or patient reports new pain  - Anticipate increased pain with activity and pre-medicate as appropriate  9/29/2024 1338 by Laney Branch RN  Outcome: Progressing  9/29/2024 1338 by Laney Branch RN  Outcome: Progressing     Problem: RISK FOR INFECTION - ADULT  Goal: Absence of fever/infection during anticipated neutropenic period  Description: INTERVENTIONS  - Monitor WBC  - Administer growth factors as ordered  - Implement neutropenic guidelines  9/29/2024 1338 by Laney Branch RN  Outcome: Progressing  9/29/2024 1338 by Laney Branch RN  Outcome: Progressing     Problem: SAFETY ADULT - FALL  Goal: Free from fall injury  Description: INTERVENTIONS:  - Assess pt frequently for physical needs  - Identify cognitive and physical deficits and behaviors that affect risk of falls.  - [place] fall precautions as indicated by assessment.  - Educate pt/family on patient safety including physical limitations  - Instruct pt to call for assistance with activity based on assessment  - Modify environment to reduce risk of injury  - Provide assistive devices as appropriate  - Consider OT/PT consult to assist with strengthening/mobility  - Encourage toileting schedule  9/29/2024 1338 by Laney Branch RN  Outcome: Progressing  9/29/2024 1338 by Laney Branch RN  Outcome: Progressing     Problem: DISCHARGE  PLANNING  Goal: Discharge to home or other facility with appropriate resources  Description: INTERVENTIONS:  - Identify barriers to discharge w/pt and caregiver  - Include patient/family/discharge partner in discharge planning  - Arrange for needed discharge resources and transportation as appropriate  - Identify discharge learning needs (meds, wound care, etc)  - Arrange for interpreters to assist at discharge as needed  - Consider post-discharge preferences of patient/family/discharge partner  - Complete POLST form as appropriate  - Assess patient's ability to be responsible for managing their own health  - Refer to Case Management Department for coordinating discharge planning if the patient needs post-hospital services based on physician/LIP order or complex needs related to functional status, cognitive ability or social support system  9/29/2024 1338 by Laney Branch, RN  Outcome: Progressing  9/29/2024 1338 by Laney Branch, RN  Outcome: Progressing

## 2024-09-30 ENCOUNTER — APPOINTMENT (OUTPATIENT)
Dept: PICC SERVICES | Facility: HOSPITAL | Age: 69
End: 2024-09-30
Attending: PHYSICIAN ASSISTANT
Payer: MEDICARE

## 2024-09-30 VITALS
TEMPERATURE: 98 F | RESPIRATION RATE: 18 BRPM | DIASTOLIC BLOOD PRESSURE: 77 MMHG | BODY MASS INDEX: 37.31 KG/M2 | HEIGHT: 72 IN | OXYGEN SATURATION: 96 % | HEART RATE: 114 BPM | SYSTOLIC BLOOD PRESSURE: 112 MMHG | WEIGHT: 275.5 LBS

## 2024-09-30 PROBLEM — N18.31 STAGE 3A CHRONIC KIDNEY DISEASE (HCC): Status: ACTIVE | Noted: 2024-09-30

## 2024-09-30 LAB
ALBUMIN SERPL-MCNC: 3.7 G/DL (ref 3.2–4.8)
ANION GAP SERPL CALC-SCNC: 8 MMOL/L (ref 0–18)
BASOPHILS # BLD AUTO: 0.03 X10(3) UL (ref 0–0.2)
BASOPHILS NFR BLD AUTO: 0.8 %
BUN BLD-MCNC: 32 MG/DL (ref 9–23)
BUN/CREAT SERPL: 21.5 (ref 10–20)
CALCIUM BLD-MCNC: 8.8 MG/DL (ref 8.7–10.4)
CHLORIDE SERPL-SCNC: 113 MMOL/L (ref 98–112)
CO2 SERPL-SCNC: 23 MMOL/L (ref 21–32)
CREAT BLD-MCNC: 1.49 MG/DL
DEPRECATED RDW RBC AUTO: 47.9 FL (ref 35.1–46.3)
EGFRCR SERPLBLD CKD-EPI 2021: 50 ML/MIN/1.73M2 (ref 60–?)
EOSINOPHIL # BLD AUTO: 0.05 X10(3) UL (ref 0–0.7)
EOSINOPHIL NFR BLD AUTO: 1.3 %
ERYTHROCYTE [DISTWIDTH] IN BLOOD BY AUTOMATED COUNT: 13.5 % (ref 11–15)
GLUCOSE BLD-MCNC: 90 MG/DL (ref 70–99)
GLUCOSE BLDC GLUCOMTR-MCNC: 119 MG/DL (ref 70–99)
GLUCOSE BLDC GLUCOMTR-MCNC: 175 MG/DL (ref 70–99)
GLUCOSE BLDC GLUCOMTR-MCNC: 86 MG/DL (ref 70–99)
HCT VFR BLD AUTO: 34.3 %
HGB BLD-MCNC: 11.1 G/DL
IMM GRANULOCYTES # BLD AUTO: 0.1 X10(3) UL (ref 0–1)
IMM GRANULOCYTES NFR BLD: 2.6 %
LYMPHOCYTES # BLD AUTO: 1.24 X10(3) UL (ref 1–4)
LYMPHOCYTES NFR BLD AUTO: 32.1 %
MAGNESIUM SERPL-MCNC: 1.9 MG/DL (ref 1.6–2.6)
MCH RBC QN AUTO: 30.9 PG (ref 26–34)
MCHC RBC AUTO-ENTMCNC: 32.4 G/DL (ref 31–37)
MCV RBC AUTO: 95.5 FL
MONOCYTES # BLD AUTO: 0.36 X10(3) UL (ref 0.1–1)
MONOCYTES NFR BLD AUTO: 9.3 %
NEUTROPHILS # BLD AUTO: 2.08 X10 (3) UL (ref 1.5–7.7)
NEUTROPHILS # BLD AUTO: 2.08 X10(3) UL (ref 1.5–7.7)
NEUTROPHILS NFR BLD AUTO: 53.9 %
OSMOLALITY SERPL CALC.SUM OF ELEC: 304 MOSM/KG (ref 275–295)
PHOSPHATE SERPL-MCNC: 4 MG/DL (ref 2.4–5.1)
PLATELET # BLD AUTO: 132 10(3)UL (ref 150–450)
POTASSIUM SERPL-SCNC: 4.6 MMOL/L (ref 3.5–5.1)
RBC # BLD AUTO: 3.59 X10(6)UL
SODIUM SERPL-SCNC: 144 MMOL/L (ref 136–145)
WBC # BLD AUTO: 3.9 X10(3) UL (ref 4–11)

## 2024-09-30 PROCEDURE — 99232 SBSQ HOSP IP/OBS MODERATE 35: CPT | Performed by: INTERNAL MEDICINE

## 2024-09-30 PROCEDURE — 99239 HOSP IP/OBS DSCHRG MGMT >30: CPT | Performed by: HOSPITALIST

## 2024-09-30 RX ORDER — GLYCOPYRROLATE 1 MG/1
1 TABLET ORAL 2 TIMES DAILY
Status: SHIPPED | COMMUNITY
Start: 2024-09-30

## 2024-09-30 NOTE — PROGRESS NOTES
INFECTIOUS DISEASE PROGRESS NOTE  [facility]  part of [facility] ID PROGRESS NOTE    Rolly Sanchez Patient Status:  Inpatient    1955 MRN [id number]   Location [facility] 5SW/SE Attending Carolyn Bocanegra MD   Hosp Day # 3 PCP Macey Hastings,      Subjective:  ROS reviewed. Feels better. Afebrile.    ASSESSMENT:    Antibiotics: IV cefepime; (IV zosyn)     # PSAR UTI/pyelonephritis in a renal transplant  # Renal transplant 12/15/22 at [facility]  # DM, HTN, HLD, AFib on Eliquis  # CKD     PLAN:  -  Continue on cefepime to complete two week course on 10/11/24. Midline to be placed.  -  Follow fever curve, wbc.  -  Reviewed labs, micro, imaging reports, available old records.  -  d/w patient, RN.     History of Present Illness:  69-year-old male with a history of diabetes, HTN, HLD, BPH, psoriasis, ESRD status post renal transplant on 12/15/2022 at [facility], A-fib on Eliquis now presents to the hospital on  with dysuria, frequency, urgency starting Monday. Stated on Keflex Wednesday. Recent urine culture from  with Pseudomonas intermediate to Cipro with concern for interaction with tacrolimus and sent to the ED for IV antibiotics.  Started on IV Zosyn.  Afebrile here, normal WBC.  Blood cultures sent and pending.       Physical Exam:  /80 (BP Location: Right arm)   Pulse 92   Temp 98.4 °F (36.9 °C) (Oral)   Resp 16   Ht 6' (1.829 m)   Wt 275 lb 8 oz (125 kg)   SpO2 94%   BMI 37.36 kg/m²     Gen:   Awake, in bed  HEENT:  EOMI, neck supple  CV/lungs:  RRR, CTAB  Abdom:  Soft, obese, no TTP  Skin/extrem:  No rashes, no c/c/e  Lines:  PIV+     Laboratory Data: Reviewed    Microbiology: Reviewed    Radiology: Reviewed      ALIYAH [facility]  [phone]  2024

## 2024-09-30 NOTE — PROGRESS NOTES
[facility]  part of [facility]    Nephrology Progress Note    Chief Complaint   Patient presents with    Abnormal Result       Subjective:   69 year old male, following for JAZZMINE/CKD 3/renal transplant.     Reports feeling well.   Denies fevers/chills.    Review of Systems:   Review of Systems   Constitutional:  Positive for fatigue. Negative for chills and fever.   Respiratory:  Negative for cough and shortness of breath.    Cardiovascular:  Negative for chest pain and leg swelling.   Gastrointestinal:  Negative for abdominal pain, constipation, diarrhea, nausea and vomiting.   Genitourinary:  Negative for difficulty urinating, dysuria and flank pain.       Objective:   Temp:  [97.7 °F (36.5 °C)-98.4 °F (36.9 °C)] 98.4 °F (36.9 °C)  Pulse:  [] 92  Resp:  [16-18] 16  BP: (132-142)/(80-92) 136/80  SpO2:  [94 %-96 %] 94 %  SpO2: 94 %     Intake/Output Summary (Last 24 hours) at 9/30/2024 1357  Last data filed at 9/29/2024 1800  Gross per 24 hour   Intake 240 ml   Output --   Net 240 ml     Wt Readings from Last 3 Encounters:   09/29/24 275 lb 8 oz (125 kg)   09/25/24 283 lb 6.4 oz (128.5 kg)   05/06/24 283 lb (128.4 kg)     Physical Exam  Constitutional:       Appearance: Normal appearance. He is obese.   Cardiovascular:      Rate and Rhythm: Normal rate and regular rhythm.      Heart sounds: Normal heart sounds.   Pulmonary:      Effort: Pulmonary effort is normal.      Breath sounds: Normal breath sounds.   Musculoskeletal:         General: Normal range of motion.   Skin:     General: Skin is warm and dry.   Neurological:      General: No focal deficit present.      Mental Status: He is alert and oriented to person, place, and time.   Psychiatric:         Mood and Affect: Mood normal.         Behavior: Behavior normal.         Medications:  Current Facility-Administered Medications   Medication Dose Route Frequency    glucose (Dex4) 15 GM/59ML oral liquid 15 g  15 g Oral Q15 Min PRN    Or     glucose (Glutose) 40% oral gel 15 g  15 g Oral Q15 Min PRN    Or    glucose-vitamin C (Dex-4) chewable tab 4 tablet  4 tablet Oral Q15 Min PRN    Or    dextrose 50% injection 50 mL  50 mL Intravenous Q15 Min PRN    Or    glucose (Dex4) 15 GM/59ML oral liquid 30 g  30 g Oral Q15 Min PRN    Or    glucose (Glutose) 40% oral gel 30 g  30 g Oral Q15 Min PRN    Or    glucose-vitamin C (Dex-4) chewable tab 8 tablet  8 tablet Oral Q15 Min PRN    sodium bicarbonate tab 650 mg  650 mg Oral BID    metoprolol tartrate (Lopressor) tab 100 mg  100 mg Oral BID    insulin via Insulin Pump   Subcutaneous TID AC and HS    [Held by provider] semaglutide (Ozempic) injection 1 mg  1 mg Subcutaneous Q Tuesday    magnesium oxide (Mag-Ox) tab 400 mg  400 mg Oral Daily    ceFEPIme (Maxipime) 1 g in sodium chloride 0.9% 100 mL IVPB-MBP  1 g Intravenous Q12H    atorvastatin (Lipitor) tab 40 mg  40 mg Oral Nightly    apixaban (Eliquis) tab 5 mg  5 mg Oral BID    finasteride (Proscar) tab 5 mg  5 mg Oral Daily    predniSONE (Deltasone) tab 10 mg  10 mg Oral Daily    sulfamethoxazole-trimethoprim DS (Bactrim DS) 800-160 MG per tab 1 tablet  1 tablet Oral Once per day on Monday Thursday    Tacrolimus ER TB24 3 mg  3 tablet Oral Daily    tamsulosin (Flomax) cap 0.4 mg  0.4 mg Oral Daily    acetaminophen (Tylenol) tab 650 mg  650 mg Oral Q4H PRN    Or    HYDROcodone-acetaminophen (Norco) 5-325 MG per tab 1 tablet  1 tablet Oral Q4H PRN    Or    HYDROcodone-acetaminophen (Norco) 5-325 MG per tab 2 tablet  2 tablet Oral Q4H PRN    polyethylene glycol (PEG 3350) (Miralax) 17 g oral packet 17 g  17 g Oral Daily PRN    sennosides (Senokot) tab 17.2 mg  17.2 mg Oral Nightly PRN    bisacodyl (Dulcolax) 10 MG rectal suppository 10 mg  10 mg Rectal Daily PRN    ondansetron (Zofran) 4 MG/2ML injection 4 mg  4 mg Intravenous Q6H PRN    metoclopramide (Reglan) 5 mg/mL injection 5 mg  5 mg Intravenous Q8H PRN              Results:     Recent Labs   Lab  09/27/24 2116 09/28/24 0511 09/29/24 0531 09/30/24 0523   RBC 3.70* 3.38* 3.53* 3.59*   HGB 11.8* 10.7* 11.0* 11.1*   HCT 34.2* 32.0* 34.0* 34.3*   MCV 92.4 94.7 96.3 95.5   NEPRELIM 5.77  --  2.76 2.08   WBC 8.0 6.3 4.5 3.9*   .0* 115.0* 133.0* 132.0*     Recent Labs   Lab 09/28/24 0511 09/29/24 0531 09/30/24 0523   * 74 90   BUN 39* 36* 32*   CREATSERUM 1.86* 1.59* 1.49*   CA 8.8 8.6* 8.8   ALB 3.6 3.5 3.7    143 144   K 4.2 4.8 4.6    112 113*   CO2 24.0 24.0 23.0   ALKPHO 34*  --   --    AST 22  --   --    ALT 17  --   --    BILT 0.8  --   --    TP 5.7  --   --      PTT   Date Value Ref Range Status   04/08/2022 27.8 23.3 - 35.6 seconds Final     INR   Date Value Ref Range Status   04/08/2022 1.03 0.80 - 1.20 Final     Comment:     Only the INR (not the PT value) should be utilized for   the monitoring of oral anticoagulant therapy.     Recommended therapeutic ranges for anticoagulant therapy are   as follows:   2.0 - 3.0 All indications except for mechanical prosthetic   cardiac valves.     2.5 - 3.5 Mechanical prosthetic cardiac valves.       No results for input(s): \"BNP\" in the last 168 hours.  Recent Labs   Lab 09/28/24 0511 09/29/24 0531 09/30/24 0523   MG 2.1 1.9 1.9   PHOS 3.7 3.4 4.0        Recent Labs   Lab 09/28/24 0511 09/29/24 0531 09/30/24 0523   PHOS 3.7 3.4 4.0   ALB 3.6 3.5 3.7       US TRNSPL KDN R-T IMG +- DUPLEX DOP STD (CPT=76776)    Result Date: 9/28/2024  CONCLUSION:   Diffusely elevated resistive indices within the arcuate arteries within the transplant kidney.  No tardus parvus waveforms within the intrarenal arteries.  Findings are non-specific and can be seen in the setting of acute tubular necrosis, transplant rejection, and nephrotoxicity.  Recommend close clinical and imaging follow-up.  Elevated velocity within the proximal main renal artery measuring 259 cm/second, with mild tardus parvus waveforms more distally.  Findings can represent a  proximal renal artery stenosis.  Recommend close imaging follow-up.  No hydronephrosis or obstructing stones of the transplant kidney.  No peritransplant collections.        Dictated by (CST): Arleen Flores MD on 9/28/2024 at 2:43 PM     Finalized by (CST): Arleen Flores MD on 9/28/2024 at 2:53 PM               Assessment and Plan:     69 year old male with past past medical history of T2DM, HTN, ESRD status post DDKT 12/15/2022 at [facility] (Dr Dexter), CKD 3a, A-fib on Eliquis, psoriasis, AMELIE on CPAP, and obesity, who was asked to come to the ED for IV antibiotics after urine culture resulted.    JAZZMINE on CKD 3a:   Likely due to relative hypotension.  Cr 1.49 today, at baseline.     UTI:   Ucx positive for Pseudomonas aeruginosa  Per ID, to continue cefepime to complete a two week course.     S/p DDRT:   He takes Envarsus 3 mg daily and prednisone 10 mg daily. Will continue at [place].      HTN:   BP has improved. Can restart lisinopril. Told patient if blood pressure starts to increase then can restart amlodipine as well    Okay to DC today with IV antibiotics per ID.  He will follow-up with Dr. Dexter outpatient      Lorie Crystal MD  9/30/2024

## 2024-09-30 NOTE — DISCHARGE INSTRUCTIONS
CHECK YOUR BLOOD PRESSURE DAILY AND WHEN NOT FEELING WELL,   WRITE IT DOWN TO CREATE A LOG AND BRING TO YOUR PCP FOR REVIEW    Once your blood pressure is consistently elevated above 140, restart amlodipine    IF ANY QUESTIONS OR CONCERNS, CALL YOUR PCP FOR ADVICE        Change midline dressing on 10/7    Sometimes managing your health at home requires assistance.  The [facility] team has recognized your preference to use [facility].  They can be reached by phone at [phone].  The fax number for your reference is [phone].  A representative from the home health agency will contact you or your family to schedule your first visit.       [facility] (antibiotic/supplies)    [address]  [place] [address]    Phone: [phone]  Fax: [phone]

## 2024-09-30 NOTE — CM/SW NOTE
09/30/24 1000   CM/SW Referral Data   Referral Source Social Work (self-referral)   Reason for Referral Discharge planning   Informant Patient;EMR;Clinical Staff Member   Medical Hx   Does patient have an established PCP? Yes  (Dr. Macey Hastings)   Significant Past Medical/Mental Health Hx AMELIE;DMll   Patient Info   Advanced directives? No   Patient's Current Mental Status at Time of Assessment Alert;Oriented   Patient's Home Environment House   Patient lives with Spouse/Significant other   Patient Status Prior to Admission   Independent with ADLs and Mobility Yes   Services in place prior to admission DME/Supplies at home   DME Provider/Supplier [facility]   Type of DME/Supplies CPAP   Discharge Needs   Anticipated D/C needs Home health care;Infusion care     SW self-referred to this case for discharge planning.    Patient admitted for UTI- will need course of IV abx at discharge.  [facility] following.    SW met with patient bedside to explain infusion options.    Patient would prefer HHC at this time- SW explained need fo remain homebound during course of infusion.  Patient v/u.    Patient informed he will likely have a co-pay for drug and supplied- this will be disclosed when Med D benefits checked.    DAVE sent infusion referrals in A[place].  Final script uploaded.  Midline placed/info uploaded.    DAVE confirmed with liatejas Ely from [facility] OOP cost is $220 for 11 days.  Flush orders entered.  [facility] rsvd.    DAVE sent HHC referrals in Aidin.  Face to face certification for IV abx uploaded to referral.    Patient has no preference for HHC- agreeable to [facility].  SW notified [facility] liaison Lili who confirmed they can accept.    PLAN: DC home w/[facility] and [facility] pending med clear    / to remain available for support and/or discharge planning.     Brooklyn Lucio MSW, LSW [id number]

## 2024-09-30 NOTE — PROGRESS NOTES
[facility]  part of [facility]    Endocrine Progress Note  [facility]    Rolly Sanchez Patient Status:  Inpatient    1955 MRN [id number]   Location [facility] 5SW/SE Attending Carolyn Bocanegra MD   Hosp Day # 3 PCP Macey Hastings DO       Assessment:     Type 2 DM   S/P Kidney transplant  UTI with Pseudomonas aeruginosa  CKD  HTN  Atrial fibrillation  HLD    Plan:    Discharging to home today  Glucose remains well-controlled  Patient will continue present pump settings  Okay to discharge from endocrine standpoint          Subjective:    Medications:    Current Facility-Administered Medications:     ceFEPIme (Maxipime) 1 g in sodium chloride 0.9% 100 mL IVPB-MBP, 1 g, Intravenous, Once    glucose (Dex4) 15 GM/59ML oral liquid 15 g, 15 g, Oral, Q15 Min PRN **OR** glucose (Glutose) 40% oral gel 15 g, 15 g, Oral, Q15 Min PRN **OR** glucose-vitamin C (Dex-4) chewable tab 4 tablet, 4 tablet, Oral, Q15 Min PRN **OR** dextrose 50% injection 50 mL, 50 mL, Intravenous, Q15 Min PRN **OR** glucose (Dex4) 15 GM/59ML oral liquid 30 g, 30 g, Oral, Q15 Min PRN **OR** glucose (Glutose) 40% oral gel 30 g, 30 g, Oral, Q15 Min PRN **OR** glucose-vitamin C (Dex-4) chewable tab 8 tablet, 8 tablet, Oral, Q15 Min PRN    sodium bicarbonate tab 650 mg, 650 mg, Oral, BID    metoprolol tartrate (Lopressor) tab 100 mg, 100 mg, Oral, BID    insulin via Insulin Pump, , Subcutaneous, TID AC and HS    [Held by provider] semaglutide (Ozempic) injection 1 mg, 1 mg, Subcutaneous, Q Tuesday    magnesium oxide (Mag-Ox) tab 400 mg, 400 mg, Oral, Daily    ceFEPIme (Maxipime) 1 g in sodium chloride 0.9% 100 mL IVPB-MBP, 1 g, Intravenous, Q12H    atorvastatin (Lipitor) tab 40 mg, 40 mg, Oral, Nightly    apixaban (Eliquis) tab 5 mg, 5 mg, Oral, BID    finasteride (Proscar) tab 5 mg, 5 mg, Oral, Daily    predniSONE (Deltasone) tab 10 mg, 10 mg, Oral, Daily    sulfamethoxazole-trimethoprim DS (Bactrim DS)  800-160 MG per tab 1 tablet, 1 tablet, Oral, Once per day on Monday Thursday    Tacrolimus ER TB24 3 mg, 3 tablet, Oral, Daily    tamsulosin (Flomax) cap 0.4 mg, 0.4 mg, Oral, Daily    acetaminophen (Tylenol) tab 650 mg, 650 mg, Oral, Q4H PRN **OR** HYDROcodone-acetaminophen (Norco) 5-325 MG per tab 1 tablet, 1 tablet, Oral, Q4H PRN **OR** HYDROcodone-acetaminophen (Norco) 5-325 MG per tab 2 tablet, 2 tablet, Oral, Q4H PRN    polyethylene glycol (PEG 3350) (Miralax) 17 g oral packet 17 g, 17 g, Oral, Daily PRN    sennosides (Senokot) tab 17.2 mg, 17.2 mg, Oral, Nightly PRN    bisacodyl (Dulcolax) 10 MG rectal suppository 10 mg, 10 mg, Rectal, Daily PRN    ondansetron (Zofran) 4 MG/2ML injection 4 mg, 4 mg, Intravenous, Q6H PRN    metoclopramide (Reglan) 5 mg/mL injection 5 mg, 5 mg, Intravenous, Q8H PRN    Objective:   Blood pressure 112/77, pulse 114, temperature 98 °F (36.7 °C), temperature source Oral, resp. rate 18, height 6' (1.829 m), weight 275 lb 8 oz (125 kg), SpO2 96%.    Physical Exam:  General appearance: alert, appears stated age and cooperative  Neck: supple,no thyromegaly  Pulmonary:  clear to auscultation bilaterally  Cardiovascular: S1, S2 normal, no murmur, click, rub or gallop, regular rate and rhythm, no S3 or S4  Abdominal: soft, non-tender; bowel sounds normal; no masses,  no organomegaly  Extremities: no cyanosis or edema, pulses 2+ and symmetric  Skin: Skin color, texture, turgor normal. No rashes or lesions  Neurologic: Alert and oriented X 3, no focal deficit    Results:     Lab Results   Component Value Date    WBC 3.9 (L) 09/30/2024    HGB 11.1 (L) 09/30/2024    HCT 34.3 (L) 09/30/2024    .0 (L) 09/30/2024    CREATSERUM 1.49 (H) 09/30/2024    BUN 32 (H) 09/30/2024     09/30/2024    K 4.6 09/30/2024     (H) 09/30/2024    CO2 23.0 09/30/2024    GLU 90 09/30/2024    CA 8.8 09/30/2024    ALB 3.7 09/30/2024    ALKPHO 34 (L) 09/28/2024    BILT 0.8 09/28/2024    TP 5.7  09/28/2024    AST 22 09/28/2024    ALT 17 09/28/2024    PTT 27.8 04/08/2022    INR 1.03 04/08/2022    T4F 1.0 01/19/2024    TSH 1.277 09/25/2024    DDIMER 0.49 09/11/2018    ESRML 44 (H) 01/23/2019    CRP 1.2 (H) 01/23/2019    MG 1.9 09/30/2024    PHOS 4.0 09/30/2024    B12 673 04/23/2024       No results found.           Domingo Lainez MD  9/30/2024

## 2024-09-30 NOTE — PLAN OF CARE
Call light within reach. Safety precautions in place. Calls appropriately.    Problem: Patient Centered Care  Goal: Patient preferences are identified and integrated in the patient's plan of care  Description: Interventions:  - What would you like us to know as we care for you? Home with wife.  - Provide timely, complete, and accurate information to patient/family  - Incorporate patient and family knowledge, values, beliefs, and cultural backgrounds into the planning and delivery of care  - Encourage patient/family to participate in care and decision-making at the level they choose  - Honor patient and family perspectives and choices  Outcome: Progressing     Problem: Diabetes/Glucose Control  Goal: Glucose maintained within prescribed range  Description: INTERVENTIONS:  - Monitor Blood Glucose as ordered  - Assess for signs and symptoms of hyperglycemia and hypoglycemia  - Administer ordered medications to maintain glucose within target range  - Assess barriers to adequate nutritional intake and initiate nutrition consult as needed  - Instruct patient on self management of diabetes  Outcome: Progressing     Problem: CARDIOVASCULAR - ADULT  Goal: Absence of cardiac arrhythmias or at baseline  Description: INTERVENTIONS:  - Continuous cardiac monitoring, monitor vital signs, obtain 12 lead EKG if indicated  - Evaluate effectiveness of antiarrhythmic and heart rate control medications as ordered  - Initiate emergency measures for life threatening arrhythmias  - Monitor electrolytes and administer replacement therapy as ordered  Outcome: Progressing     Problem: RESPIRATORY - ADULT  Goal: Achieves optimal ventilation and oxygenation  Description: INTERVENTIONS:  - Assess for changes in respiratory status  - Assess for changes in mentation and behavior  - Position to facilitate oxygenation and minimize respiratory effort  - Oxygen supplementation based on oxygen saturation or ABGs  - Provide Smoking Cessation handout,  if applicable  - Encourage broncho-pulmonary hygiene including cough, deep breathe, Incentive Spirometry  - Assess the need for suctioning and perform as needed  - Assess and instruct to report SOB or any respiratory difficulty  - Respiratory Therapy support as indicated  - Manage/alleviate anxiety  - Monitor for signs/symptoms of CO2 retention  Outcome: Progressing     Problem: SKIN/TISSUE INTEGRITY - ADULT  Goal: Skin integrity remains intact  Description: INTERVENTIONS  - Assess and document risk factors for pressure ulcer development  - Assess and document skin integrity  - Monitor for areas of redness and/or skin breakdown  - Initiate interventions, skin care algorithm/standards of care as needed  Outcome: Progressing     Problem: MUSCULOSKELETAL - ADULT  Goal: Return mobility to safest level of function  Description: INTERVENTIONS:  - Assess patient stability and activity tolerance for standing, transferring and ambulating w/ or w/o assistive devices  - Assist with transfers and ambulation using safe patient handling equipment as needed  - Ensure adequate protection for wounds/incisions during mobilization  - Obtain PT/OT consults as needed  - Advance activity as appropriate  - Communicate ordered activity level and limitations with patient/family  Outcome: Progressing     Problem: PAIN - ADULT  Goal: Verbalizes/displays adequate comfort level or patient's stated pain goal  Description: INTERVENTIONS:  - Encourage pt to monitor pain and request assistance  - Assess pain using appropriate pain scale  - Administer analgesics based on type and severity of pain and evaluate response  - Implement non-pharmacological measures as appropriate and evaluate response  - Consider cultural and social influences on pain and pain management  - Manage/alleviate anxiety  - Utilize distraction and/or relaxation techniques  - Monitor for opioid side effects  - Notify MD/LIP if interventions unsuccessful or patient reports new  pain  - Anticipate increased pain with activity and pre-medicate as appropriate  Outcome: Progressing     Problem: RISK FOR INFECTION - ADULT  Goal: Absence of fever/infection during anticipated neutropenic period  Description: INTERVENTIONS  - Monitor WBC  - Administer growth factors as ordered  - Implement neutropenic guidelines  Outcome: Progressing     Problem: SAFETY ADULT - FALL  Goal: Free from fall injury  Description: INTERVENTIONS:  - Assess pt frequently for physical needs  - Identify cognitive and physical deficits and behaviors that affect risk of falls.  - [place] fall precautions as indicated by assessment.  - Educate pt/family on patient safety including physical limitations  - Instruct pt to call for assistance with activity based on assessment  - Modify environment to reduce risk of injury  - Provide assistive devices as appropriate  - Consider OT/PT consult to assist with strengthening/mobility  - Encourage toileting schedule  Outcome: Progressing     Problem: DISCHARGE PLANNING  Goal: Discharge to home or other facility with appropriate resources  Description: INTERVENTIONS:  - Identify barriers to discharge w/pt and caregiver  - Include patient/family/discharge partner in discharge planning  - Arrange for needed discharge resources and transportation as appropriate  - Identify discharge learning needs (meds, wound care, etc)  - Arrange for interpreters to assist at discharge as needed  - Consider post-discharge preferences of patient/family/discharge partner  - Complete POLST form as appropriate  - Assess patient's ability to be responsible for managing their own health  - Refer to Case Management Department for coordinating discharge planning if the patient needs post-hospital services based on physician/LIP order or complex needs related to functional status, cognitive ability or social support system  Outcome: Progressing

## 2024-09-30 NOTE — TELEPHONE ENCOUNTER
The drug is generic and should be very expensive this is an issue with his insurance.  Please help him purchase it through [employer].

## 2024-09-30 NOTE — PLAN OF CARE
IV antibiotics will be delivered to pt's home tonight. Home health will not be out until tomorrow morning. Pt can be discharged after evening dose tonight.     Problem: Patient Centered Care  Goal: Patient preferences are identified and integrated in the patient's plan of care  Description: Interventions:  - What would you like us to know as we care for you?   - Provide timely, complete, and accurate information to patient/family  - Incorporate patient and family knowledge, values, beliefs, and cultural backgrounds into the planning and delivery of care  - Encourage patient/family to participate in care and decision-making at the level they choose  - Honor patient and family perspectives and choices  Outcome: Adequate for Discharge     Problem: Diabetes/Glucose Control  Goal: Glucose maintained within prescribed range  Description: INTERVENTIONS:  - Monitor Blood Glucose as ordered  - Assess for signs and symptoms of hyperglycemia and hypoglycemia  - Administer ordered medications to maintain glucose within target range  - Assess barriers to adequate nutritional intake and initiate nutrition consult as needed  - Instruct patient on self management of diabetes  Outcome: Adequate for Discharge     Problem: Patient/Family Goals  Goal: Patient/Family Long Term Goal  Description: Patient's Long Term Goal:     Interventions:  -   - See additional Care Plan goals for specific interventions  Outcome: Adequate for Discharge  Goal: Patient/Family Short Term Goal  Description: Patient's Short Term Goal:     Interventions:   -   - See additional Care Plan goals for specific interventions  Outcome: Adequate for Discharge     Problem: CARDIOVASCULAR - ADULT  Goal: Absence of cardiac arrhythmias or at baseline  Description: INTERVENTIONS:  - Continuous cardiac monitoring, monitor vital signs, obtain 12 lead EKG if indicated  - Evaluate effectiveness of antiarrhythmic and heart rate control medications as ordered  - Initiate  emergency measures for life threatening arrhythmias  - Monitor electrolytes and administer replacement therapy as ordered  Outcome: Adequate for Discharge     Problem: RESPIRATORY - ADULT  Goal: Achieves optimal ventilation and oxygenation  Description: INTERVENTIONS:  - Assess for changes in respiratory status  - Assess for changes in mentation and behavior  - Position to facilitate oxygenation and minimize respiratory effort  - Oxygen supplementation based on oxygen saturation or ABGs  - Provide Smoking Cessation handout, if applicable  - Encourage broncho-pulmonary hygiene including cough, deep breathe, Incentive Spirometry  - Assess the need for suctioning and perform as needed  - Assess and instruct to report SOB or any respiratory difficulty  - Respiratory Therapy support as indicated  - Manage/alleviate anxiety  - Monitor for signs/symptoms of CO2 retention  Outcome: Adequate for Discharge     Problem: SKIN/TISSUE INTEGRITY - ADULT  Goal: Skin integrity remains intact  Description: INTERVENTIONS  - Assess and document risk factors for pressure ulcer development  - Assess and document skin integrity  - Monitor for areas of redness and/or skin breakdown  - Initiate interventions, skin care algorithm/standards of care as needed  Outcome: Adequate for Discharge     Problem: MUSCULOSKELETAL - ADULT  Goal: Return mobility to safest level of function  Description: INTERVENTIONS:  - Assess patient stability and activity tolerance for standing, transferring and ambulating w/ or w/o assistive devices  - Assist with transfers and ambulation using safe patient handling equipment as needed  - Ensure adequate protection for wounds/incisions during mobilization  - Obtain PT/OT consults as needed  - Advance activity as appropriate  - Communicate ordered activity level and limitations with patient/family  Outcome: Adequate for Discharge     Problem: PAIN - ADULT  Goal: Verbalizes/displays adequate comfort level or patient's  stated pain goal  Description: INTERVENTIONS:  - Encourage pt to monitor pain and request assistance  - Assess pain using appropriate pain scale  - Administer analgesics based on type and severity of pain and evaluate response  - Implement non-pharmacological measures as appropriate and evaluate response  - Consider cultural and social influences on pain and pain management  - Manage/alleviate anxiety  - Utilize distraction and/or relaxation techniques  - Monitor for opioid side effects  - Notify MD/LIP if interventions unsuccessful or patient reports new pain  - Anticipate increased pain with activity and pre-medicate as appropriate  Outcome: Adequate for Discharge     Problem: RISK FOR INFECTION - ADULT  Goal: Absence of fever/infection during anticipated neutropenic period  Description: INTERVENTIONS  - Monitor WBC  - Administer growth factors as ordered  - Implement neutropenic guidelines  Outcome: Adequate for Discharge     Problem: SAFETY ADULT - FALL  Goal: Free from fall injury  Description: INTERVENTIONS:  - Assess pt frequently for physical needs  - Identify cognitive and physical deficits and behaviors that affect risk of falls.  - [place] fall precautions as indicated by assessment.  - Educate pt/family on patient safety including physical limitations  - Instruct pt to call for assistance with activity based on assessment  - Modify environment to reduce risk of injury  - Provide assistive devices as appropriate  - Consider OT/PT consult to assist with strengthening/mobility  - Encourage toileting schedule  Outcome: Adequate for Discharge     Problem: DISCHARGE PLANNING  Goal: Discharge to home or other facility with appropriate resources  Description: INTERVENTIONS:  - Identify barriers to discharge w/pt and caregiver  - Include patient/family/discharge partner in discharge planning  - Arrange for needed discharge resources and transportation as appropriate  - Identify discharge learning needs (meds,  wound care, etc)  - Arrange for interpreters to assist at discharge as needed  - Consider post-discharge preferences of patient/family/discharge partner  - Complete POLST form as appropriate  - Assess patient's ability to be responsible for managing their own health  - Refer to Case Management Department for coordinating discharge planning if the patient needs post-hospital services based on physician/LIP order or complex needs related to functional status, cognitive ability or social support system  Outcome: Adequate for Discharge

## 2024-09-30 NOTE — HOME CARE LIAISON
[facility]. Spoke w/ patient who is agreeable with [facility]. Contact information placed on AVS.

## 2024-09-30 NOTE — CM/SW NOTE
09/30/24 1300   Discharge disposition   Expected discharge disposition Home-Health   Post Acute Care Provider [facility]   DME/Infusion Providers [facility]   Discharge transportation Private car     SW confirmed with Dr. Bocanegra and RN Ai who stated pt is medically ready for discharge today.  Discharge order placed.    HH updates attached them via Aidin to [facility] .  Liaison Lili [facility]   and liaison Jhoana from [facility] made aware of discharge through secure chat/ Aidin Messages.    DAVE confirmed that [facility] /[facility] contact information added to AVS.    PLAN: DC home with [facility] and [facility]    Brooklyn Lucio MSW, LSW [phone]

## 2024-10-01 ENCOUNTER — TELEPHONE (OUTPATIENT)
Dept: INTERNAL MEDICINE CLINIC | Facility: CLINIC | Age: 69
End: 2024-10-01

## 2024-10-01 NOTE — DISCHARGE SUMMARY
[facility]     Rolly Sanchez Patient Status:  Inpatient    1955 MRN [id number]   Location [facility] 5SW/SE Attending No att. providers found   Hosp Day # 3 PCP Macey Hastings DO     Date of Admission: 2024  Date of Discharge: 2024  Discharge Disposition: Home Health Care Services    Admitting Chief Complaint:   Acute cystitis without hematuria [N30.00]    PCP: Macey Hastings DO    Discharge Diagnosis:   UTI in a transplant kidney:   Ucx positive for Pseudomonas aeruginosa     JAZZMINE:   H/o renal transplant      HTN:   A-fib:   DM2  AMELIE:           History of Present Illness:   Per Dr. Crystal  Rolly Sanchez is a 69 year old male with past past medical history of T2DM, HTN, ESRD status post DDKT 12/15/2022 at [facility] (Dr Dexter), A-fib on Eliquis, psoriasis, AMELIE on CPAP, and obesity, who was asked to come to the ED for IV antibiotics after urine culture resulted.  He reports he had symptoms of dysuria, frequency,  and urgency for 2 days.  Then he had a UA with urine culture on .  He was started on cephalexin, and he had improvement in symptoms.  However he was called today and asked was asked to come to the ED because his urine culture was positive for Pseudomonas aeruginosa with intermediate susceptibility to fluoroquinolones. Patient's PCP discussed with [facility] transplant surgeon, who recommended against quinolones given intermediate sensitivity and also risk for QT prolongation given tacrolimus use.  He complains of fatigue, decreased appetite, fevers and chills.          Brief Synopsis:   UTI in a transplant kidney:   Ucx positive for Pseudomonas aeruginosa  Previous cx +staph not aureus (may), staph epidermidis (April)  -ID consult, d/w Dr. Combs  -initially on zosyn, changed to cefepime per ID-->home on cefepime to complete two week course on 10/11/24. Midline to be placed.          JAZZMINE:   Baseline Cr 1.5-1.7.   Creatinine 1.94 on admission.  Likely due to  relative hypotension. Back to baseline now  - hold BP meds initially-->restart gradually.  - s/p IVF per nephrology  - check ultrasound of the transplanted kidney.   - per Nephrology--> BP has improved. Can restart lisinopril. Told patient if blood pressure starts to increase then can restart amlodipine as well       S/p DDRT, 12/15/22 at [facility]  :   He takes Envarsus 3 mg daily and prednisone 10 mg daily.  Will continue.   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   Plus prophylactic bactrim     HTN:   BP on the lower side initially.  Will hold meds (lisinopril 10 mg, amlodipine 10 mg. Pt was already NOT taking  chlorthalidone 25 mg since previous admission in May ).  -restarted gradually as above     A-fib:   Cont Eliquis and metoprolol     DM2  Home: ozempic, insulin pump  -endocrinology consulted     AMELIE:   Continue CPAP        DVT prophylaxis: eliquis     Dispo: home, f/u Dr. Dexter           Discharge Medication List:     Discharge Medications        START taking these medications        Instructions Prescription details   ceFEPIme 1 g in sodium chloride 100 mL      Inject 1 g into the vein every 12 (twelve) hours for 11 days. R70 Weekly CBC/diff, CMP Fax labs to Dr. Cali [facility]   Stop taking on: October 11, 2024  Quantity: 1 each  Refills: 0            CHANGE how you take these medications        Instructions Prescription details   Fexofenadine HCl 30 MG Tbdp  Commonly known as: ALLEGRA ODT  What changed: Another medication with the same name was removed. Continue taking this medication, and follow the directions you see here.      Take 6 tablets (180 mg total) by mouth daily.   Refills: 0     lisinopril 10 MG Tabs  Commonly known as: Zestril  What changed: Another medication with the same name was removed. Continue taking this medication, and follow the directions you see here.      Take 1 tablet (10 mg total) by mouth daily.   Refills: 0            CONTINUE taking these medications         Instructions Prescription details   atorvastatin 40 MG Tabs  Commonly known as: Lipitor      Take 1 tablet (40 mg total) by mouth nightly.   Refills: 0     Ehsan Contour Next Test Strp       Refills: 0     BD Luer-Nayan Syringe 23G X 1\" 3 ML Misc  Generic drug: Syringe/Needle (Disp)      USE FOR B12 INJECTIONS AS DIRECTED   Quantity: 12 each  Refills: 0     cyanocobalamin 1000 MCG Tabs  Commonly known as: Vitamin B12      Take 1 tablet (1,000 mcg total) by mouth daily.   Refills: 0     Eliquis 5 MG Tabs  Generic drug: apixaban      Take 1 tablet (5 mg total) by mouth 2 (two) times daily.   Refills: 0     Envarsus XR 1 MG Tb24  Generic drug: Tacrolimus ER      Take 3 tablets (3 mg total) by mouth daily.   Refills: 0     ergocalciferol 1.25 MG (40499 UT) Caps  Commonly known as: Vitamin D2      Take 1 capsule (50,000 Units total) by mouth every 7 days.   Refills: 0     fenofibrate 48 MG Tabs  Commonly known as: Tricor       Refills: 0     finasteride 5 MG Tabs  Commonly known as: Proscar      Take 1 tablet (5 mg total) by mouth daily.   Quantity: 90 tablet  Refills: 3     fluticasone propionate 50 MCG/ACT Susp  Commonly known as: Flonase      by Each Nare route as needed for Rhinitis.   Refills: 0     glycopyrrolate 1 MG Tabs  Commonly known as: Robinul      Take 1 tablet (1 mg total) by mouth 2 (two) times daily.   Refills: 0     HumuLIN R U-500 (CONCENTRATED) 500 UNIT/ML Soln  Generic drug: insulin regular human (conc)      250 units/day via Insulin pump   Refills: 0     metoprolol tartrate 100 MG Tabs  Commonly known as: Lopressor      Take 1 tablet (100 mg total) by mouth 2 (two) times daily.   Refills: 0     montelukast 10 MG Tabs  Commonly known as: Singulair      TAKE 1 TABLET BY MOUTH NIGHTLY   Quantity: 90 tablet  Refills: 3     NON FORMULARY      Take 266 mg by mouth in the morning, at noon, in the evening, and at bedtime. MAGNESIUM + PROTEIN 1 daily    Patient takes 4 times a day at 0900, 1200, 1500, 2200    Refills: 0     Ozempic (0.25 or 0.5 MG/DOSE) 2 MG/1.5ML Sopn  Generic drug: semaglutide      Inject 1 mg into the skin once a week.   Refills: 0     predniSONE 10 MG Tabs  Commonly known as: Deltasone      Take 1 tablet (10 mg total) by mouth daily.   Refills: 0     PROBIOTIC-10 OR      Take 1 capsule by mouth daily.   Refills: 0     sodium bicarbonate 650 MG Tabs      Take 1 tablet (650 mg total) by mouth 2 (two) times daily. Takes at 0900 and 2200   Refills: 0     sulfamethoxazole-trimethoprim -160 MG Tabs per tablet  Commonly known as: Bactrim DS      Takes Mondays and Thursdays per transplant clinic   Refills: 0     tamsulosin 0.4 MG Caps  Commonly known as: Flomax      Take 1 capsule (0.4 mg total) by mouth daily.   Quantity: 90 capsule  Refills: 3            STOP taking these medications      amLODIPine 10 MG Tabs  Commonly known as: Norvasc        cephALEXin 500 MG Caps  Commonly known as: Keflex        chlorthalidone 25 MG Tabs  Commonly known as: [place]                  Where to Get Your Medications        Please  your prescriptions at the location directed by your doctor or nurse    Bring a paper prescription for each of these medications  ceFEPIme 1 g in sodium chloride 100 mL         Follow-up appointment:   Samantha Dexter MD  [address]  [facility] [address]  [facility] [id number] [phone]    Schedule an appointment as soon as possible for a visit in 2 week(s)      Macey Hastings DO  [address] [facility] [id number] [phone]    Schedule an appointment as soon as possible for a visit in 1 week(s)  post hospitalization check up (tele visit ok)    Yung Combs MD  [address]  [facility] [address]  [facility] [address]  [phone]    Follow up      Kiko Strange MD  [address] [facility] [address]  [place] [address] [phone]    Follow up        Vital signs:  Temp:  [98 °F (36.7 °C)] 98 °F (36.7 °C)  Pulse:  [] 114  Resp:  [18] 18  BP: (112)/(77) 112/77  SpO2:   [96 %] 96 %    Physical Exam:    General: No acute distress.   Respiratory: Clear to auscultation bilaterally. No wheezes. No rhonchi.  Cardiovascular: S1, S2. Regular rate and rhythm. No murmurs, rubs or gallops.   Abdomen: Soft, nontender, nondistended.  Positive bowel sounds. No rebound or guarding.  Neurologic: No focal neurological deficits.   Musculoskeletal: Moves all extremities.  Extremities: No edema.  -----------------------------------------------------------------------------------------------  PATIENT DISCHARGE INSTRUCTIONS: See electronic chart    I d/w pt  the available results, management plan, medications changes, and discharge instructions including follow ups as outlined above.   Scripts sent to pharmacy.      Hospital Discharge Diagnoses:  UTI    Lace+ Score: 49  59-90 High Risk  29-58 Medium Risk  0-28   Low Risk.    TCM Follow-Up Recommendation:  LACE 29-58: Moderate Risk of readmission after discharge from the hospital.        CAROLINA STAFFORD MD 10/1/2024    Time spent:  > 30 minutes

## 2024-10-01 NOTE — TELEPHONE ENCOUNTER
Kaylie/ Haile called with fyi for Dr Hastings    Patient started Home Health Nursing services today

## 2024-10-02 ENCOUNTER — TELEPHONE (OUTPATIENT)
Dept: INTERNAL MEDICINE CLINIC | Facility: CLINIC | Age: 69
End: 2024-10-02

## 2024-10-02 NOTE — TELEPHONE ENCOUNTER
Received faxed request for Surgical Clearance for Cataract Extraction from [facility].     Right Eye - 10/29/24  Left Eye - 11/12/24    Forms placed in Dr. Hastings's mailbox

## 2024-10-04 ENCOUNTER — LAB ENCOUNTER (OUTPATIENT)
Dept: LAB | Facility: HOSPITAL | Age: 69
End: 2024-10-04
Attending: INTERNAL MEDICINE
Payer: MEDICARE

## 2024-10-04 ENCOUNTER — PATIENT OUTREACH (OUTPATIENT)
Dept: CASE MANAGEMENT | Age: 69
End: 2024-10-04

## 2024-10-04 DIAGNOSIS — R80.9 PROTEINURIA: ICD-10-CM

## 2024-10-04 DIAGNOSIS — E83.42 HYPOMAGNESEMIA: ICD-10-CM

## 2024-10-04 DIAGNOSIS — R78.81 BACTEREMIA: ICD-10-CM

## 2024-10-04 DIAGNOSIS — E55.9 VITAMIN D DEFICIENCY DISEASE: ICD-10-CM

## 2024-10-04 DIAGNOSIS — E11.22 TYPE 2 DIABETES MELLITUS WITH CHRONIC KIDNEY DISEASE, WITH LONG-TERM CURRENT USE OF INSULIN, UNSPECIFIED CKD STAGE (HCC): ICD-10-CM

## 2024-10-04 DIAGNOSIS — Z12.5 SCREENING FOR MALIGNANT NEOPLASM OF PROSTATE: ICD-10-CM

## 2024-10-04 DIAGNOSIS — E53.8 VITAMIN B12 DEFICIENCY: ICD-10-CM

## 2024-10-04 DIAGNOSIS — D70.8 OTHER NEUTROPENIA (HCC): ICD-10-CM

## 2024-10-04 DIAGNOSIS — N39.0 URINARY TRACT INFECTION, SITE NOT SPECIFIED: ICD-10-CM

## 2024-10-04 DIAGNOSIS — B25.9 CYTOMEGALOVIRAL DISEASE (HCC): ICD-10-CM

## 2024-10-04 DIAGNOSIS — Z94.0 KIDNEY TRANSPLANTED (HCC): ICD-10-CM

## 2024-10-04 DIAGNOSIS — Z79.4 TYPE 2 DIABETES MELLITUS WITH CHRONIC KIDNEY DISEASE, WITH LONG-TERM CURRENT USE OF INSULIN, UNSPECIFIED CKD STAGE (HCC): ICD-10-CM

## 2024-10-04 DIAGNOSIS — E78.5 HYPERLIPIDEMIA: ICD-10-CM

## 2024-10-04 LAB
ANION GAP SERPL CALC-SCNC: 3 MMOL/L (ref 0–18)
BASOPHILS # BLD: 0.06 X10(3) UL (ref 0–0.2)
BASOPHILS NFR BLD: 1 %
BILIRUB UR QL: NEGATIVE
BUN BLD-MCNC: 32 MG/DL (ref 9–23)
BUN/CREAT SERPL: 20.5 (ref 10–20)
CALCIUM BLD-MCNC: 9.8 MG/DL (ref 8.7–10.4)
CHLORIDE SERPL-SCNC: 112 MMOL/L (ref 98–112)
CLARITY UR: CLEAR
CO2 SERPL-SCNC: 27 MMOL/L (ref 21–32)
CREAT BLD-MCNC: 1.56 MG/DL
CREAT UR-SCNC: 82.9 MG/DL
DEPRECATED RDW RBC AUTO: 50.4 FL (ref 35.1–46.3)
EGFRCR SERPLBLD CKD-EPI 2021: 48 ML/MIN/1.73M2 (ref 60–?)
EOSINOPHIL # BLD: 0 X10(3) UL (ref 0–0.7)
EOSINOPHIL NFR BLD: 0 %
ERYTHROCYTE [DISTWIDTH] IN BLOOD BY AUTOMATED COUNT: 14.2 % (ref 11–15)
FASTING STATUS PATIENT QL REPORTED: YES
GLUCOSE BLD-MCNC: 130 MG/DL (ref 70–99)
GLUCOSE UR-MCNC: NORMAL MG/DL
HCT VFR BLD AUTO: 36.3 %
HGB BLD-MCNC: 11.5 G/DL
HGB UR QL STRIP.AUTO: NEGATIVE
KETONES UR-MCNC: NEGATIVE MG/DL
LEUKOCYTE ESTERASE UR QL STRIP.AUTO: 75
LYMPHOCYTES NFR BLD: 1.3 X10(3) UL (ref 1–4)
LYMPHOCYTES NFR BLD: 22 %
MAGNESIUM SERPL-MCNC: 1.9 MG/DL (ref 1.6–2.6)
MCH RBC QN AUTO: 30.7 PG (ref 26–34)
MCHC RBC AUTO-ENTMCNC: 31.7 G/DL (ref 31–37)
MCV RBC AUTO: 97.1 FL
METAMYELOCYTES # BLD: 0.12 X10(3) UL
METAMYELOCYTES NFR BLD: 2 %
MICROALBUMIN UR-MCNC: 1.9 MG/DL
MICROALBUMIN/CREAT 24H UR-RTO: 22.9 UG/MG (ref ?–30)
MONOCYTES # BLD: 0.3 X10(3) UL (ref 0.1–1)
MONOCYTES NFR BLD: 5 %
MORPHOLOGY: NORMAL
NEUTROPHILS # BLD AUTO: 3.02 X10 (3) UL (ref 1.5–7.7)
NEUTROPHILS NFR BLD: 67 %
NEUTS BAND NFR BLD: 3 %
NEUTS HYPERSEG # BLD: 4.13 X10(3) UL (ref 1.5–7.7)
NITRITE UR QL STRIP.AUTO: NEGATIVE
OSMOLALITY SERPL CALC.SUM OF ELEC: 303 MOSM/KG (ref 275–295)
PH UR: 6 [PH] (ref 5–8)
PHOSPHATE SERPL-MCNC: 4.4 MG/DL (ref 2.4–5.1)
PLATELET # BLD AUTO: 169 10(3)UL (ref 150–450)
PLATELET MORPHOLOGY: NORMAL
POTASSIUM SERPL-SCNC: 5.6 MMOL/L (ref 3.5–5.1)
PROT UR-MCNC: 19.2 MG/DL (ref ?–14)
PROT UR-MCNC: NEGATIVE MG/DL
RBC # BLD AUTO: 3.74 X10(6)UL
SODIUM SERPL-SCNC: 142 MMOL/L (ref 136–145)
SP GR UR STRIP: 1.02 (ref 1–1.03)
TOTAL CELLS COUNTED BLD: 100
UROBILINOGEN UR STRIP-ACNC: NORMAL
WBC # BLD AUTO: 5.9 X10(3) UL (ref 4–11)

## 2024-10-04 PROCEDURE — 36415 COLL VENOUS BLD VENIPUNCTURE: CPT

## 2024-10-04 PROCEDURE — 87086 URINE CULTURE/COLONY COUNT: CPT

## 2024-10-04 PROCEDURE — 84100 ASSAY OF PHOSPHORUS: CPT

## 2024-10-04 PROCEDURE — 85025 COMPLETE CBC W/AUTO DIFF WBC: CPT

## 2024-10-04 PROCEDURE — 80048 BASIC METABOLIC PNL TOTAL CA: CPT

## 2024-10-04 PROCEDURE — 85007 BL SMEAR W/DIFF WBC COUNT: CPT

## 2024-10-04 PROCEDURE — 82043 UR ALBUMIN QUANTITATIVE: CPT

## 2024-10-04 PROCEDURE — 85027 COMPLETE CBC AUTOMATED: CPT

## 2024-10-04 PROCEDURE — 84156 ASSAY OF PROTEIN URINE: CPT

## 2024-10-04 PROCEDURE — 81001 URINALYSIS AUTO W/SCOPE: CPT

## 2024-10-04 PROCEDURE — 80197 ASSAY OF TACROLIMUS: CPT

## 2024-10-04 PROCEDURE — 83735 ASSAY OF MAGNESIUM: CPT

## 2024-10-04 PROCEDURE — 82570 ASSAY OF URINE CREATININE: CPT

## 2024-10-04 NOTE — PROGRESS NOTES
TCM request (discharged 09/30)    Dr Macey Hastings  Internal Medicine  [facility]  [address] [place] [id number]  [phone]  Pt declined sooner apt; pt advised he has an existing apt Wed 10/16 @1:30pm & will be specialist prior to appointment  Closing encounter

## 2024-10-07 ENCOUNTER — LAB REQUISITION (OUTPATIENT)
Dept: LAB | Facility: HOSPITAL | Age: 69
End: 2024-10-07
Payer: MEDICARE

## 2024-10-07 ENCOUNTER — OFFICE VISIT (OUTPATIENT)
Facility: CLINIC | Age: 69
End: 2024-10-07
Payer: MEDICARE

## 2024-10-07 VITALS
WEIGHT: 279 LBS | DIASTOLIC BLOOD PRESSURE: 67 MMHG | HEART RATE: 87 BPM | BODY MASS INDEX: 38 KG/M2 | SYSTOLIC BLOOD PRESSURE: 121 MMHG

## 2024-10-07 DIAGNOSIS — N30.00 ACUTE CYSTITIS WITHOUT HEMATURIA: ICD-10-CM

## 2024-10-07 DIAGNOSIS — Z94.0 TRANSPLANTED KIDNEY (HCC): ICD-10-CM

## 2024-10-07 DIAGNOSIS — I12.9 HYPERTENSIVE KIDNEY DISEASE WITH STAGE 3A CHRONIC KIDNEY DISEASE (HCC): ICD-10-CM

## 2024-10-07 DIAGNOSIS — N18.31 HYPERTENSIVE KIDNEY DISEASE WITH STAGE 3A CHRONIC KIDNEY DISEASE (HCC): ICD-10-CM

## 2024-10-07 DIAGNOSIS — N18.31 STAGE 3A CHRONIC KIDNEY DISEASE (HCC): Primary | ICD-10-CM

## 2024-10-07 DIAGNOSIS — E11.21 DIABETIC GLOMERULOPATHY (HCC): ICD-10-CM

## 2024-10-07 LAB
ALBUMIN SERPL-MCNC: 4.2 G/DL (ref 3.2–4.8)
ALBUMIN/GLOB SERPL: 2.1 {RATIO} (ref 1–2)
ALP LIVER SERPL-CCNC: 41 U/L
ALT SERPL-CCNC: 28 U/L
ANION GAP SERPL CALC-SCNC: 5 MMOL/L (ref 0–18)
AST SERPL-CCNC: 22 U/L (ref ?–34)
BASOPHILS # BLD AUTO: 0.04 X10(3) UL (ref 0–0.2)
BASOPHILS NFR BLD AUTO: 0.6 %
BILIRUB SERPL-MCNC: 0.6 MG/DL (ref 0.2–1.1)
BUN BLD-MCNC: 27 MG/DL (ref 9–23)
BUN/CREAT SERPL: 18.2 (ref 10–20)
CALCIUM BLD-MCNC: 9.6 MG/DL (ref 8.7–10.4)
CHLORIDE SERPL-SCNC: 108 MMOL/L (ref 98–112)
CMV DNA DETECT, QN LOG: NOT DETECTED {LOG_IU}/ML
CMV DNA DETECT, QN: NOT DETECTED [IU]/ML
CO2 SERPL-SCNC: 26 MMOL/L (ref 21–32)
CREAT BLD-MCNC: 1.48 MG/DL
DEPRECATED RDW RBC AUTO: 49.3 FL (ref 35.1–46.3)
EGFRCR SERPLBLD CKD-EPI 2021: 51 ML/MIN/1.73M2 (ref 60–?)
EOSINOPHIL # BLD AUTO: 0.02 X10(3) UL (ref 0–0.7)
EOSINOPHIL NFR BLD AUTO: 0.3 %
ERYTHROCYTE [DISTWIDTH] IN BLOOD BY AUTOMATED COUNT: 14 % (ref 11–15)
GLOBULIN PLAS-MCNC: 2 G/DL (ref 2–3.5)
GLUCOSE BLD-MCNC: 125 MG/DL (ref 70–99)
HCT VFR BLD AUTO: 37.5 %
HGB BLD-MCNC: 12.1 G/DL
IMM GRANULOCYTES # BLD AUTO: 0.21 X10(3) UL (ref 0–1)
IMM GRANULOCYTES NFR BLD: 3 %
LYMPHOCYTES # BLD AUTO: 1.23 X10(3) UL (ref 1–4)
LYMPHOCYTES NFR BLD AUTO: 17.6 %
MCH RBC QN AUTO: 30.7 PG (ref 26–34)
MCHC RBC AUTO-ENTMCNC: 32.3 G/DL (ref 31–37)
MCV RBC AUTO: 95.2 FL
MONOCYTES # BLD AUTO: 0.34 X10(3) UL (ref 0.1–1)
MONOCYTES NFR BLD AUTO: 4.9 %
NEUTROPHILS # BLD AUTO: 5.16 X10 (3) UL (ref 1.5–7.7)
NEUTROPHILS # BLD AUTO: 5.16 X10(3) UL (ref 1.5–7.7)
NEUTROPHILS NFR BLD AUTO: 73.6 %
OSMOLALITY SERPL CALC.SUM OF ELEC: 295 MOSM/KG (ref 275–295)
PLATELET # BLD AUTO: 168 10(3)UL (ref 150–450)
POTASSIUM SERPL-SCNC: 5 MMOL/L (ref 3.5–5.1)
PROT SERPL-MCNC: 6.2 G/DL (ref 5.7–8.2)
RBC # BLD AUTO: 3.94 X10(6)UL
SODIUM SERPL-SCNC: 139 MMOL/L (ref 136–145)
WBC # BLD AUTO: 7 X10(3) UL (ref 4–11)

## 2024-10-07 PROCEDURE — 80053 COMPREHEN METABOLIC PANEL: CPT | Performed by: INTERNAL MEDICINE

## 2024-10-07 PROCEDURE — 85025 COMPLETE CBC W/AUTO DIFF WBC: CPT | Performed by: INTERNAL MEDICINE

## 2024-10-07 PROCEDURE — 99214 OFFICE O/P EST MOD 30 MIN: CPT | Performed by: INTERNAL MEDICINE

## 2024-10-07 NOTE — PROGRESS NOTES
Progress Note     Rolly Sanchez    Here for follow up   Was hospitalized for pseudomonal UTI    He is on cefepime for 2 weeks, will complete his course this .    In August dropped Envarsus to 2 mg due to COVID  Went to 4 mg 24  Now on 3 mg   Has a tacrolimus level pending 10/4    Will see ID on Thurs 10/10    HISTORY:  Past Medical History:    Adenomatous polyp    cscopy Dr. Singh -misael. polyp, divertic, int hemrds    Allergic rhinitis    Asthma (HCC)    BPH (benign prostatic hyperplasia)    Cancer (HCC)    Carcinoma in situ of colon    cecum-R hemicolectomy Dr. Lugo    Diabetes (HCC)    Diabetes mellitus (HCC)    Diverticulosis    Gallstone    Gallstone seen on US liver     Hepatic steatosis    US liver     High blood pressure    Hyperlipemia    IBS (irritable bowel syndrome)    Kidney transplant recipient (HCC)    Kidney transplant performed 12/15/2022 at [facility] ( donor kidney transplant).    Microalbuminuria    Migraines    hx in grade school    Neuropathy    diabetic, jorge hands and feet    Obesity    Pancytopenia ([facility])    Dr. 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    Viral disease    Hx spinal virus    Visual impairment    glasses      Past Surgical History:   Procedure Laterality Date    Adenoidectomy  1965 estimate    Same time as tonsils removed    Appendectomy      Colectomy Right 2010    hemicolectomy-Dr Resendez    Colonoscopy      Dr Singh    Colonoscopy  10/2014    polyp    Colonoscopy  2019    Colonoscopy N/A 2019     Procedure: COLONOSCOPY;  Surgeon: Blade Singh MD;  Location: [facility] ENDOSCOPY    Cystouretro w/stone remove Bilateral 04/22/2022    Cystoscopy, bilateral retrograde pyelogram, bilateral ureteroscopy, manipulation/removal of bilateral blood clots/stones, ureteral stent removal.    Hernia surgery  2010    ventral hernia-at time of colon surgery    Other surgical history  11/1/2010    Colon Resection    Peritoneal dialysis  07/2018    Dr Croft placed PD catheter 6/2018. started PD 7/2018 Dr Dexter    Tonsillectomy      T & A    Transplantation of kidney  12/2022    At [facility]           Medications (Active prior to today's visit):  Current Outpatient Medications   Medication Sig Dispense Refill    ceFEPIme 1 g in sodium chloride 100 mL Inject 1 g into the vein every 12 (twelve) hours for 11 days. R70 Weekly CBC/diff, CMP Fax labs to Dr. Karely WHEAT 1 each 0    glycopyrrolate 1 MG Oral Tab Take 1 tablet (1 mg total) by mouth 2 (two) times daily.      lisinopril 10 MG Oral Tab Take 1 tablet (10 mg total) by mouth daily.      Fexofenadine HCl 30 MG Oral Tablet Dispersible Take 6 tablets (180 mg total) by mouth daily.      sodium bicarbonate 650 MG Oral Tab Take 1 tablet (650 mg total) by mouth 2 (two) times daily. Takes at 0900 and 2200      MONTELUKAST 10 MG Oral Tab TAKE 1 TABLET BY MOUTH NIGHTLY 90 tablet 3    fenofibrate 48 MG Oral Tab       finasteride 5 MG Oral Tab Take 1 tablet (5 mg total) by mouth daily. 90 tablet 3    Tacrolimus ER (ENVARSUS XR) 1 MG Oral Tablet 24 Hr Take 3 tablets (3 mg total) by mouth daily.      cyanocobalamin 1000 MCG Oral Tab Take 1 tablet (1,000 mcg total) by mouth daily.      NON FORMULARY Take 266 mg by mouth in the morning, at noon, in the evening, and at bedtime. MAGNESIUM + PROTEIN 1 daily    Patient takes 4 times a day at 0900, 1200, 1500, 2200      semaglutide (OZEMPIC, 0.25 OR 0.5 MG/DOSE,) 2 MG/1.5ML Subcutaneous Solution Pen-injector Inject 1 mg into the skin once a week.       Probiotic Product (PROBIOTIC-10 OR) Take 1 capsule by mouth daily.      fluticasone propionate 50 MCG/ACT Nasal Suspension by Each Nare route as needed for Rhinitis.      metoprolol tartrate 100 MG Oral Tab Take 1 tablet (100 mg total) by mouth 2 (two) times daily.      predniSONE 10 MG Oral Tab Take 1 tablet (10 mg total) by mouth daily.      ELIQUIS 5 MG Oral Tab Take 1 tablet (5 mg total) by mouth 2 (two) times daily.      ergocalciferol 1.25 MG (49761 UT) Oral Cap Take 1 capsule (50,000 Units total) by mouth every 7 days.      sulfamethoxazole-trimethoprim -160 MG Oral Tab per tablet Takes Mondays and Thursdays per transplant clinic  0    tamsulosin 0.4 MG Oral Cap Take 1 capsule (0.4 mg total) by mouth daily. 90 capsule 3    atorvastatin 40 MG Oral Tab Take 1 tablet (40 mg total) by mouth nightly.      HUMULIN R U-500, CONCENTRATED, 500 UNIT/ML Subcutaneous Solution 250 units/day via Insulin pump      Syringe/Needle, Disp, (BD LUER-PILY SYRINGE) 23G X 1\" 3 ML Does not apply Misc USE FOR B12 INJECTIONS AS DIRECTED 12 each 0    PORFIRIO CONTOUR NEXT TEST In Vitro Strip          Allergies:  Allergies   Allergen Reactions    Merbromin RASH and SWELLING    Mercury SWELLING and OTHER (SEE COMMENTS)     Blisters,itching,swelling    Merthilate [Thimerosal] RASH and SWELLING    Quinolones OTHER (SEE COMMENTS)     Rolly is on tacrolimus as of September 27th, 2024.  Quinolones contraindicated with tacrolimus because of possibility of prolonged QT syndrome.         ROS:     Constitutional:  Negative for decreased activity, fever, irritability and lethargy  ENMT:  Negative for ear drainage, hearing loss and nasal drainage  Eyes:  Negative for eye discharge and vision loss  Cardiovascular:  Negative for chest pain, sob  Respiratory:  Negative for cough, dyspnea and wheezing  Gastrointestinal:  Negative for abdominal pain, constipation  Genitourinary:  Negative for dysuria and hematuria  Endocrine:  Negative for abnormal  sleep patterns  Hema/Lymph:  Negative for easy bleeding and easy bruising  Integumentary:  Negative for pruritus and rash  Musculoskeletal:  Negative for bone/joint symptoms  Neurological:  Negative for gait disturbance  Psychiatric:  Negative for inappropriate interaction and psychiatric symptoms      Vitals:    10/07/24 1140   BP: 121/67   Pulse: 87       PHYSICAL EXAM:   Constitutional: appears well hydrated alert and responsive   Head/Face: normocephalic  Eyes/Vision: normal extraocular motion is intact  Nose/Mouth/Throat:mucous membranes are moist   Neck/Thyroid: neck is supple without adenopathy  Lymphatic: no abnormal cervical, supraclavicular adenopathy is noted  Respiratory:  lungs are clear to auscultation bilaterally  Cardiovascular: regular rate and rhythm   Abdomen: soft, non-tender, non-distended, BS normal  Skin/Hair: no unusual rashes present, no abnormal bruising noted  Musculoskeletal: no deformities  Extremities: no edema  Neurological:  Grossly normal       Lab Results   Component Value Date     (H) 10/04/2024     10/04/2024    K 5.6 (H) 10/04/2024     10/04/2024    CO2 27.0 10/04/2024    ANIONGAP 3 10/04/2024    BUN 32 (H) 10/04/2024    CREATSERUM 1.56 (H) 10/04/2024    CA 9.8 10/04/2024    OSMOCALC 303 (H) 10/04/2024    EGFRCR 48 (L) 10/04/2024    ALB 3.7 09/30/2024    PHOS 4.4 10/04/2024         ASSESSMENT/PLAN:   Assessment   1. Stage 3a chronic kidney disease (HCC)  GFR is stable.  Continue to monitor.    We talked today about his high potassium level which may be related to a higher than normal enforces level and or lisinopril.  Will monitor it.  He is watching potassium in his diet    2. Transplanted kidney (HCC)  He is on inversus 3 mg.  Await his level from October 4.    3. Diabetic glomerulopathy (HCC)  He is on insulin    4. Hypertensive kidney disease with stage 3a chronic kidney disease (HCC)  Blood pressure is stable on metoprolol, lisinopril    5.   Pyelonephritis  Completing a 2-week course of cefepime  He will see Dr. Combs  this week           Orders This Visit:  No orders of the defined types were placed in this encounter.      Meds This Visit:  Requested Prescriptions      No prescriptions requested or ordered in this encounter       Imaging & Referrals:  None     10/7/2024   HELLEN DE LUNA MD    No follow-ups on file.

## 2024-10-08 ENCOUNTER — OFFICE VISIT (OUTPATIENT)
Dept: INTERNAL MEDICINE CLINIC | Facility: CLINIC | Age: 69
End: 2024-10-08
Payer: MEDICARE

## 2024-10-08 VITALS
SYSTOLIC BLOOD PRESSURE: 102 MMHG | HEART RATE: 95 BPM | WEIGHT: 276 LBS | TEMPERATURE: 98 F | HEIGHT: 72 IN | DIASTOLIC BLOOD PRESSURE: 60 MMHG | BODY MASS INDEX: 37.38 KG/M2 | OXYGEN SATURATION: 98 %

## 2024-10-08 DIAGNOSIS — Z01.818 PRE-OP EXAM: Primary | ICD-10-CM

## 2024-10-08 LAB — TACROLIMUS LVL: 6.7 NG/ML

## 2024-10-08 PROCEDURE — 99214 OFFICE O/P EST MOD 30 MIN: CPT | Performed by: INTERNAL MEDICINE

## 2024-10-08 NOTE — PROGRESS NOTES
Rolly Sanchez is a 69 year old male  Chief Complaint   Patient presents with    Hospital F/U     Discharged 9/30; acute cystitis   [facility], RN. PICC ARMAND--IV antibiotic     Surgical/procedure Clearance     Cataract surgery at [facility]   Right Eye - 10/29/24  Left Eye - 11/12/24          HPI:   Rolly Sanchez is a 69 year old male who presents for a pre-op visit.    Pt scheduled for cataract surgery (R eye 10/29), (L eye 11/12) w/Dr. Jose Eric under local anesthesia.    Denies hx MI, CVA/TIA, CHF, +CKD s/p DDRT 12/15/22, +IDDM.    Is able to ambulate >2 flights of stairs without CP or dyspnea.    Denies CP, palpitations, dyspnea, orthopnea, PND, LE edema, dizziness, syncope.    Denies f/c, headaches, sinus congestion, cough, ab pain, n/v/d, dysuria, rashes.    Wt Readings from Last 6 Encounters:   10/08/24 276 lb (125.2 kg)   10/07/24 279 lb (126.6 kg)   09/29/24 275 lb 8 oz (125 kg)   09/25/24 283 lb 6.4 oz (128.5 kg)   05/06/24 283 lb (128.4 kg)   03/25/24 294 lb (133.4 kg)     Body mass index is 37.43 kg/m².     Current Outpatient Medications   Medication Sig Dispense Refill    ceFEPIme 1 g in sodium chloride 100 mL Inject 1 g into the vein every 12 (twelve) hours for 11 days. R70 Weekly CBC/diff, CMP Fax labs to Dr. Karely WHEAT 1 each 0    glycopyrrolate 1 MG Oral Tab Take 1 tablet (1 mg total) by mouth 2 (two) times daily.      lisinopril 10 MG Oral Tab Take 1 tablet (10 mg total) by mouth daily.      Fexofenadine HCl 30 MG Oral Tablet Dispersible Take 6 tablets (180 mg total) by mouth daily.      sodium bicarbonate 650 MG Oral Tab Take 1 tablet (650 mg total) by mouth 2 (two) times daily. Takes at 0900 and 2200      MONTELUKAST 10 MG Oral Tab TAKE 1 TABLET BY MOUTH NIGHTLY 90 tablet 3    fenofibrate 48 MG Oral Tab Take 1 tablet (48 mg total) by mouth daily.      finasteride 5 MG Oral Tab Take 1 tablet (5 mg total) by mouth daily. 90 tablet 3    Tacrolimus ER (ENVARSUS XR) 1 MG Oral Tablet  24 Hr Take 3 tablets (3 mg total) by mouth daily.      cyanocobalamin 1000 MCG Oral Tab Take 1 tablet (1,000 mcg total) by mouth daily.      NON FORMULARY Take 266 mg by mouth in the morning, at noon, in the evening, and at bedtime. MAGNESIUM + PROTEIN 1 daily    Patient takes 4 times a day at 0900, 1200, 1500, 2200      semaglutide (OZEMPIC, 0.25 OR 0.5 MG/DOSE,) 2 MG/1.5ML Subcutaneous Solution Pen-injector Inject 1 mg into the skin once a week.      Probiotic Product (PROBIOTIC-10 OR) Take 1 capsule by mouth daily.      fluticasone propionate 50 MCG/ACT Nasal Suspension by Each Nare route as needed for Rhinitis.      metoprolol tartrate 100 MG Oral Tab Take 1 tablet (100 mg total) by mouth 2 (two) times daily.      predniSONE 10 MG Oral Tab Take 1 tablet (10 mg total) by mouth daily.      ELIQUIS 5 MG Oral Tab Take 1 tablet (5 mg total) by mouth 2 (two) times daily.      ergocalciferol 1.25 MG (06614 UT) Oral Cap Take 1 capsule (50,000 Units total) by mouth every 7 days.      Syringe/Needle, Disp, (BD LUER-PILY SYRINGE) 23G X 1\" 3 ML Does not apply Misc USE FOR B12 INJECTIONS AS DIRECTED 12 each 0    sulfamethoxazole-trimethoprim -160 MG Oral Tab per tablet Takes Mondays and Thursdays per transplant clinic  0    tamsulosin 0.4 MG Oral Cap Take 1 capsule (0.4 mg total) by mouth daily. 90 capsule 3    atorvastatin 40 MG Oral Tab Take 1 tablet (40 mg total) by mouth nightly.      HUMULIN R U-500, CONCENTRATED, 500 UNIT/ML Subcutaneous Solution 250 units/day via Insulin pump      PORFIRIO CONTOUR NEXT TEST In Vitro Strip         Past Medical History:    Adenomatous polyp    cscopy Dr. Singh 2010-misael. polyp, divertic, int hemrds    Allergic rhinitis    Asthma (HCC)    BPH (benign prostatic hyperplasia)    Cancer (HCC)    Carcinoma in situ of colon    cecum-R hemicolectomy Dr. Lugo    Diabetes (HCC)    Diabetes mellitus (HCC)    Diverticulosis    Gallstone    Gallstone seen on US liver 11/09    Hepatic  steatosis    US liver 2009    High blood pressure    Hyperlipemia    IBS (irritable bowel syndrome)    Kidney transplant recipient (HCC)    Kidney transplant performed 12/15/2022 at [facility] ( donor kidney transplant).    Microalbuminuria    Migraines    hx in grade school    Neuropathy    diabetic, jorge hands and feet    Obesity    Pancytopenia (HCC)    Dr. 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    Viral disease    Hx spinal virus    Visual impairment    glasses      Past Surgical History:   Procedure Laterality Date    Adenoidectomy  1965 estimate    Same time as tonsils removed    Appendectomy      Colectomy Right 2010    hemicolectomy-Dr Resendez    Colonoscopy      Dr Singh    Colonoscopy  10/2014    polyp    Colonoscopy  2019    Colonoscopy N/A 2019    Procedure: COLONOSCOPY;  Surgeon: Blade Singh MD;  Location: [facility] ENDOSCOPY    Cystouretro w/stone remove Bilateral 2022    Cystoscopy, bilateral retrograde pyelogram, bilateral ureteroscopy, manipulation/removal of bilateral blood clots/stones, ureteral stent removal.    Hernia surgery      ventral hernia-at time of colon surgery    Other surgical history  2010    Colon Resection    Peritoneal dialysis  2018    Dr Croft placed PD catheter 2018. started PD 2018 Dr Dexter    Tonsillectomy      T & A    Transplantation of kidney  2022    At [facility]      Family History   Problem Relation Age of Onset    Diabetes Mother     Obesity Mother     Renal Disease Mother         ESRD    Other (Other) Mother      Hypertension Father     Obesity Father     Heart Attack Father     Stroke Father         TIA    Skin cancer Father     Heart Disorder Father     Other (Other) Father     Other (Brain bleed) Father 82         in his sleep-hx brain bleed    Diabetes Sister     Hypertension Brother       Social History:  Social History     Socioeconomic History    Marital status:     Number of children: 2   Occupational History    Occupation:    Tobacco Use    Smoking status: Former     Current packs/day: 0.00     Average packs/day: 0.5 packs/day for 9.0 years (4.5 ttl pk-yrs)     Types: Cigarettes     Start date: 1973     Quit date: 1982     Years since quittin.6    Smokeless tobacco: Never    Tobacco comments:     smokes cigars occasionally   Vaping Use    Vaping status: Never Used   Substance and Sexual Activity    Alcohol use: Not Currently     Alcohol/week: 1.0 standard drink of alcohol    Drug use: No   Other Topics Concern    Caffeine Concern Yes     Comment: 3 cups of coffee per day    Pt has a pacemaker No    Pt has a defibrillator No    Reaction to local anesthetic No     Social Determinants of Health     Food Insecurity: No Food Insecurity (2024)    Food Insecurity     Food Insecurity: Never true   Transportation Needs: No Transportation Needs (2024)    Transportation Needs     Lack of Transportation: No    [facility]    Social Connections   Housing Stability: Low Risk  (2024)    Housing Stability     Housing Instability: No           REVIEW OF SYSTEMS:   GENERAL: feels well otherwise, denies f/c  HEENT: denies nasal congestion, sinus pain, sore throat  LUNGS: denies shortness of breath, cough  CARDIOVASCULAR: denies chest pain or pressure or palpitations  GI: denies abdominal pain, N/V, diarrhea  : denies dysuria  NEURO: denies headaches, dizziness or focal weakness  SKIN: denies lesions, rashes or  wounds    EXAM:   /60 (BP Location: Left arm, Patient Position: Sitting, Cuff Size: large)   Pulse 95   Temp 98.2 °F (36.8 °C) (Oral)   Ht 6' (1.829 m)   Wt 276 lb (125.2 kg)   SpO2 98%   BMI 37.43 kg/m²     GENERAL: well developed, well nourished, in no apparent distress  NECK: supple, no carotid bruits  LUNGS: clear to auscultation b/l, no w/r/r  CARDIO: RRR, normal S1S2, no m/r/g  EXTREMITIES: no significant c/c/e, +2 DP pulses bilaterally  NEURO: A&O x 3, moves all 4 extremities normally      ASSESSMENT AND PLAN:   Rolly Sanchez is a 69 year old male who presents for a pre-op visit.    Pre-op exam  - RCRI score 2, Class III Risk for a low risk procedure  - most recent cbc, cmp 10/7 acceptable  - recent EKG acceptable  - stress test 9/17/24 with cardiologist: stress EKG normal, nuclear w/\"small fixed perfusion abnormality of mild intensity in apical segment\" \"suggestive of low to moderate risk for future cardiovascular events\"  - pt states he is able to achieve >4 METs w/o CP or dyspnea, thus no further testing is indicated prior to proposed surgery from a medical standpoint.   - I advised patient to inform his transplant team of the surgery, patient verbalized understanding and agreed to plan.    RTC as previously scheduled or sooner PRN.    For E/M code - 30 minutes spent reviewing performing chart review, obtaining a history, performing a physical exam, reviewing the assessment/plan, placing orders, and completing documentation.     Macey Hastings DO  10/8/2024  12:24 PM

## 2024-10-09 ENCOUNTER — TELEPHONE (OUTPATIENT)
Dept: NEPHROLOGY | Facility: CLINIC | Age: 69
End: 2024-10-09

## 2024-10-09 ENCOUNTER — TELEPHONE (OUTPATIENT)
Dept: INTERNAL MEDICINE CLINIC | Facility: CLINIC | Age: 69
End: 2024-10-09

## 2024-10-09 NOTE — TELEPHONE ENCOUNTER
Faxed pre-op paperwork and Dr Chavez office visit note as requested with fax confirmation received.  Copy to scan and copy in pre-op hold folder

## 2024-10-09 NOTE — TELEPHONE ENCOUNTER
To RN staff: please fax office visit note from 10/8 to [phone]. ([facility] surgery scheduling department) Pre-op paperwork in outbox for reference. Thank you!

## 2024-10-09 NOTE — TELEPHONE ENCOUNTER
----- Message from HELLEN DE LUNA sent at 10/9/2024  8:38 AM CDT -----  Please call Rolly and let him know that his tacrolimus level is better 6.7.  The results are available for [facility] 2 view though I wanted to let him know.  Thanks

## 2024-10-16 ENCOUNTER — IMMUNIZATION (OUTPATIENT)
Dept: LAB | Age: 69
End: 2024-10-16
Attending: EMERGENCY MEDICINE
Payer: MEDICARE

## 2024-10-16 DIAGNOSIS — Z23 NEED FOR VACCINATION: Primary | ICD-10-CM

## 2024-10-16 PROCEDURE — 90662 IIV NO PRSV INCREASED AG IM: CPT

## 2024-10-16 PROCEDURE — 90480 ADMN SARSCOV2 VAC 1/ONLY CMP: CPT

## 2024-10-16 PROCEDURE — 90471 IMMUNIZATION ADMIN: CPT

## 2024-11-08 ENCOUNTER — LAB ENCOUNTER (OUTPATIENT)
Dept: LAB | Facility: HOSPITAL | Age: 69
End: 2024-11-08
Attending: INTERNAL MEDICINE
Payer: MEDICARE

## 2024-11-08 DIAGNOSIS — Z12.5 SCREENING FOR MALIGNANT NEOPLASM OF PROSTATE: ICD-10-CM

## 2024-11-08 DIAGNOSIS — E53.8 VITAMIN B12 DEFICIENCY: ICD-10-CM

## 2024-11-08 DIAGNOSIS — R78.81 BACTEREMIA: ICD-10-CM

## 2024-11-08 DIAGNOSIS — Z79.4 TYPE 2 DIABETES MELLITUS WITH CHRONIC KIDNEY DISEASE, WITH LONG-TERM CURRENT USE OF INSULIN, UNSPECIFIED CKD STAGE (HCC): ICD-10-CM

## 2024-11-08 DIAGNOSIS — R80.9 PROTEINURIA: ICD-10-CM

## 2024-11-08 DIAGNOSIS — E11.22 TYPE 2 DIABETES MELLITUS WITH CHRONIC KIDNEY DISEASE, WITH LONG-TERM CURRENT USE OF INSULIN, UNSPECIFIED CKD STAGE (HCC): ICD-10-CM

## 2024-11-08 DIAGNOSIS — B25.9 CYTOMEGALOVIRAL DISEASE (HCC): ICD-10-CM

## 2024-11-08 DIAGNOSIS — E78.5 HYPERLIPIDEMIA: ICD-10-CM

## 2024-11-08 DIAGNOSIS — Z94.0 KIDNEY TRANSPLANTED (HCC): ICD-10-CM

## 2024-11-08 DIAGNOSIS — E55.9 VITAMIN D DEFICIENCY DISEASE: ICD-10-CM

## 2024-11-08 DIAGNOSIS — E83.42 HYPOMAGNESEMIA: ICD-10-CM

## 2024-11-08 DIAGNOSIS — D70.8 OTHER NEUTROPENIA (HCC): ICD-10-CM

## 2024-11-08 DIAGNOSIS — N39.0 URINARY TRACT INFECTION, SITE NOT SPECIFIED: ICD-10-CM

## 2024-11-08 LAB
ANION GAP SERPL CALC-SCNC: 8 MMOL/L (ref 0–18)
BASOPHILS # BLD AUTO: 0.03 X10(3) UL (ref 0–0.2)
BASOPHILS NFR BLD AUTO: 0.6 %
BILIRUB UR QL: NEGATIVE
BUN BLD-MCNC: 40 MG/DL (ref 9–23)
BUN/CREAT SERPL: 23.8 (ref 10–20)
CALCIUM BLD-MCNC: 9.8 MG/DL (ref 8.7–10.4)
CHLORIDE SERPL-SCNC: 112 MMOL/L (ref 98–112)
CLARITY UR: CLEAR
CO2 SERPL-SCNC: 23 MMOL/L (ref 21–32)
CREAT BLD-MCNC: 1.68 MG/DL
CREAT UR-SCNC: 61.6 MG/DL
DEPRECATED RDW RBC AUTO: 47.7 FL (ref 35.1–46.3)
EGFRCR SERPLBLD CKD-EPI 2021: 44 ML/MIN/1.73M2 (ref 60–?)
EOSINOPHIL # BLD AUTO: 0.04 X10(3) UL (ref 0–0.7)
EOSINOPHIL NFR BLD AUTO: 0.8 %
ERYTHROCYTE [DISTWIDTH] IN BLOOD BY AUTOMATED COUNT: 13.6 % (ref 11–15)
FASTING STATUS PATIENT QL REPORTED: YES
GLUCOSE BLD-MCNC: 104 MG/DL (ref 70–99)
GLUCOSE UR-MCNC: NORMAL MG/DL
HCT VFR BLD AUTO: 33.9 %
HGB BLD-MCNC: 11.4 G/DL
HGB UR QL STRIP.AUTO: NEGATIVE
IMM GRANULOCYTES # BLD AUTO: 0.2 X10(3) UL (ref 0–1)
IMM GRANULOCYTES NFR BLD: 3.9 %
KETONES UR-MCNC: NEGATIVE MG/DL
LEUKOCYTE ESTERASE UR QL STRIP.AUTO: 500
LYMPHOCYTES # BLD AUTO: 1.55 X10(3) UL (ref 1–4)
LYMPHOCYTES NFR BLD AUTO: 30 %
MAGNESIUM SERPL-MCNC: 1.8 MG/DL (ref 1.6–2.6)
MCH RBC QN AUTO: 32 PG (ref 26–34)
MCHC RBC AUTO-ENTMCNC: 33.6 G/DL (ref 31–37)
MCV RBC AUTO: 95.2 FL
MICROALBUMIN UR-MCNC: 6 MG/DL
MICROALBUMIN/CREAT 24H UR-RTO: 97.4 UG/MG (ref ?–30)
MONOCYTES # BLD AUTO: 0.5 X10(3) UL (ref 0.1–1)
MONOCYTES NFR BLD AUTO: 9.7 %
NEUTROPHILS # BLD AUTO: 2.84 X10 (3) UL (ref 1.5–7.7)
NEUTROPHILS # BLD AUTO: 2.84 X10(3) UL (ref 1.5–7.7)
NEUTROPHILS NFR BLD AUTO: 55 %
NITRITE UR QL STRIP.AUTO: NEGATIVE
OSMOLALITY SERPL CALC.SUM OF ELEC: 306 MOSM/KG (ref 275–295)
PH UR: 6 [PH] (ref 5–8)
PHOSPHATE SERPL-MCNC: 4.9 MG/DL (ref 2.4–5.1)
PLATELET # BLD AUTO: 167 10(3)UL (ref 150–450)
POTASSIUM SERPL-SCNC: 4.7 MMOL/L (ref 3.5–5.1)
PROT UR-MCNC: 21.5 MG/DL (ref ?–14)
PROT UR-MCNC: NEGATIVE MG/DL
RBC # BLD AUTO: 3.56 X10(6)UL
SODIUM SERPL-SCNC: 143 MMOL/L (ref 136–145)
SP GR UR STRIP: 1.01 (ref 1–1.03)
UROBILINOGEN UR STRIP-ACNC: NORMAL
WBC # BLD AUTO: 5.2 X10(3) UL (ref 4–11)
WBC #/AREA URNS AUTO: >50 /HPF

## 2024-11-08 PROCEDURE — 82570 ASSAY OF URINE CREATININE: CPT

## 2024-11-08 PROCEDURE — 80048 BASIC METABOLIC PNL TOTAL CA: CPT

## 2024-11-08 PROCEDURE — 82043 UR ALBUMIN QUANTITATIVE: CPT

## 2024-11-08 PROCEDURE — 83735 ASSAY OF MAGNESIUM: CPT

## 2024-11-08 PROCEDURE — 81001 URINALYSIS AUTO W/SCOPE: CPT

## 2024-11-08 PROCEDURE — 80197 ASSAY OF TACROLIMUS: CPT

## 2024-11-08 PROCEDURE — 84156 ASSAY OF PROTEIN URINE: CPT

## 2024-11-08 PROCEDURE — 84100 ASSAY OF PHOSPHORUS: CPT

## 2024-11-08 PROCEDURE — 85025 COMPLETE CBC W/AUTO DIFF WBC: CPT

## 2024-11-08 PROCEDURE — 36415 COLL VENOUS BLD VENIPUNCTURE: CPT

## 2024-11-11 LAB
CMV DNA DETECT, QN LOG: NOT DETECTED {LOG_IU}/ML
CMV DNA DETECT, QN: NOT DETECTED [IU]/ML
TACROLIMUS LVL: 7.7 NG/ML

## 2024-11-12 ENCOUNTER — TELEPHONE (OUTPATIENT)
Dept: NEPHROLOGY | Facility: CLINIC | Age: 69
End: 2024-11-12

## 2024-11-12 NOTE — TELEPHONE ENCOUNTER
Dr Dexter spoke to patient about the note below reported pt already called [facility] transplant and waiting for the return call,will call for update.

## 2024-11-12 NOTE — TELEPHONE ENCOUNTER
----- Message from HELLEN DE LUNA sent at 11/11/2024  4:34 PM CST -----  Called the patient and discussed his urinalysis with him.  He still has white blood cells in his urine.    He just had a course of IV antibiotics for pseudomonal urinary tract infection.  I advised him to please call [facility] transplant and see what they would like to do.    Could you please call Rolly tomorrow on November 12 to see if he is heard back from them

## 2024-11-14 ENCOUNTER — TELEPHONE (OUTPATIENT)
Facility: CLINIC | Age: 69
End: 2024-11-14

## 2024-11-14 NOTE — TELEPHONE ENCOUNTER
Patient states he has not made contact with Dr. Finn and not sure what to do please follow up requesting to speak to Dr. Dexter or Rn please call

## 2024-11-15 NOTE — TELEPHONE ENCOUNTER
Dr Dexter patient reported did not heard back from  [facility] transplant Dr Finn's office  yet will try to call again today ,asking what is the next step.

## 2024-11-15 NOTE — TELEPHONE ENCOUNTER
I spoke with infectious disease, Dr. Combs.  Patient is asymptomatic and we do not feel that this is a true acute infection.    I left a message for Rolly.  While he does need to follow-up with [facility] transplant, I did not feel that he needs to come in for antibiotics or antimicrobial treatment related to a urinary tract infection.

## 2024-11-26 ENCOUNTER — LAB ENCOUNTER (OUTPATIENT)
Dept: LAB | Facility: HOSPITAL | Age: 69
End: 2024-11-26
Attending: INTERNAL MEDICINE
Payer: MEDICARE

## 2024-11-26 ENCOUNTER — TELEPHONE (OUTPATIENT)
Dept: NEPHROLOGY | Facility: CLINIC | Age: 69
End: 2024-11-26

## 2024-11-26 DIAGNOSIS — N39.0 URINARY TRACT INFECTION, SITE NOT SPECIFIED: ICD-10-CM

## 2024-11-26 DIAGNOSIS — Z79.4 TYPE 2 DIABETES MELLITUS WITH CHRONIC KIDNEY DISEASE, WITH LONG-TERM CURRENT USE OF INSULIN, UNSPECIFIED CKD STAGE (HCC): ICD-10-CM

## 2024-11-26 DIAGNOSIS — E55.9 VITAMIN D DEFICIENCY DISEASE: ICD-10-CM

## 2024-11-26 DIAGNOSIS — E83.42 HYPOMAGNESEMIA: ICD-10-CM

## 2024-11-26 DIAGNOSIS — E11.9 TYPE II DIABETES MELLITUS (HCC): Primary | ICD-10-CM

## 2024-11-26 DIAGNOSIS — B25.9 CYTOMEGALOVIRAL DISEASE (HCC): ICD-10-CM

## 2024-11-26 DIAGNOSIS — E53.8 VITAMIN B12 DEFICIENCY: ICD-10-CM

## 2024-11-26 DIAGNOSIS — E11.22 TYPE 2 DIABETES MELLITUS WITH CHRONIC KIDNEY DISEASE, WITH LONG-TERM CURRENT USE OF INSULIN, UNSPECIFIED CKD STAGE (HCC): ICD-10-CM

## 2024-11-26 DIAGNOSIS — Z94.0 KIDNEY TRANSPLANTED (HCC): ICD-10-CM

## 2024-11-26 DIAGNOSIS — D70.8 OTHER NEUTROPENIA (HCC): ICD-10-CM

## 2024-11-26 DIAGNOSIS — R78.81 BACTEREMIA: ICD-10-CM

## 2024-11-26 DIAGNOSIS — R80.9 PROTEINURIA: ICD-10-CM

## 2024-11-26 DIAGNOSIS — E78.5 HYPERLIPIDEMIA: ICD-10-CM

## 2024-11-26 DIAGNOSIS — Z12.5 SCREENING FOR MALIGNANT NEOPLASM OF PROSTATE: ICD-10-CM

## 2024-11-26 LAB
ALBUMIN SERPL-MCNC: 4.3 G/DL (ref 3.2–4.8)
ALBUMIN/GLOB SERPL: 2.5 {RATIO} (ref 1–2)
ALP LIVER SERPL-CCNC: 38 U/L
ALT SERPL-CCNC: 13 U/L
ANION GAP SERPL CALC-SCNC: 10 MMOL/L (ref 0–18)
AST SERPL-CCNC: 20 U/L (ref ?–34)
BASOPHILS # BLD AUTO: 0.02 X10(3) UL (ref 0–0.2)
BASOPHILS NFR BLD AUTO: 0.5 %
BILIRUB SERPL-MCNC: 0.4 MG/DL (ref 0.2–1.1)
BILIRUB UR QL: NEGATIVE
BUN BLD-MCNC: 45 MG/DL (ref 9–23)
BUN/CREAT SERPL: 27.1 (ref 10–20)
CALCIUM BLD-MCNC: 9.4 MG/DL (ref 8.7–10.4)
CHLORIDE SERPL-SCNC: 112 MMOL/L (ref 98–112)
CHOLEST SERPL-MCNC: 83 MG/DL (ref ?–200)
CO2 SERPL-SCNC: 20 MMOL/L (ref 21–32)
COLOR UR: YELLOW
CREAT BLD-MCNC: 1.66 MG/DL
CREAT UR-SCNC: 79.5 MG/DL
DEPRECATED RDW RBC AUTO: 49.1 FL (ref 35.1–46.3)
EGFRCR SERPLBLD CKD-EPI 2021: 44 ML/MIN/1.73M2 (ref 60–?)
EOSINOPHIL # BLD AUTO: 0.05 X10(3) UL (ref 0–0.7)
EOSINOPHIL NFR BLD AUTO: 1.2 %
ERYTHROCYTE [DISTWIDTH] IN BLOOD BY AUTOMATED COUNT: 14.2 % (ref 11–15)
EST. AVERAGE GLUCOSE BLD GHB EST-MCNC: 128 MG/DL (ref 68–126)
FASTING PATIENT LIPID ANSWER: YES
FASTING STATUS PATIENT QL REPORTED: YES
GLOBULIN PLAS-MCNC: 1.7 G/DL (ref 2–3.5)
GLUCOSE BLD-MCNC: 77 MG/DL (ref 70–99)
GLUCOSE UR-MCNC: NORMAL MG/DL
HBA1C MFR BLD: 6.1 % (ref ?–5.7)
HCT VFR BLD AUTO: 35.2 %
HDLC SERPL-MCNC: 33 MG/DL (ref 40–59)
HGB BLD-MCNC: 11.4 G/DL
IMM GRANULOCYTES # BLD AUTO: 0.08 X10(3) UL (ref 0–1)
IMM GRANULOCYTES NFR BLD: 1.9 %
KETONES UR-MCNC: NEGATIVE MG/DL
LDLC SERPL CALC-MCNC: 26 MG/DL (ref ?–100)
LEUKOCYTE ESTERASE UR QL STRIP.AUTO: 500
LYMPHOCYTES # BLD AUTO: 1.67 X10(3) UL (ref 1–4)
LYMPHOCYTES NFR BLD AUTO: 39.9 %
MAGNESIUM SERPL-MCNC: 2.1 MG/DL (ref 1.6–2.6)
MCH RBC QN AUTO: 30.6 PG (ref 26–34)
MCHC RBC AUTO-ENTMCNC: 32.4 G/DL (ref 31–37)
MCV RBC AUTO: 94.6 FL
MICROALBUMIN UR-MCNC: 9.8 MG/DL
MICROALBUMIN/CREAT 24H UR-RTO: 123.3 UG/MG (ref ?–30)
MONOCYTES # BLD AUTO: 0.51 X10(3) UL (ref 0.1–1)
MONOCYTES NFR BLD AUTO: 12.2 %
NEUTROPHILS # BLD AUTO: 1.86 X10 (3) UL (ref 1.5–7.7)
NEUTROPHILS # BLD AUTO: 1.86 X10(3) UL (ref 1.5–7.7)
NEUTROPHILS NFR BLD AUTO: 44.3 %
NITRITE UR QL STRIP.AUTO: NEGATIVE
NONHDLC SERPL-MCNC: 50 MG/DL (ref ?–130)
OSMOLALITY SERPL CALC.SUM OF ELEC: 304 MOSM/KG (ref 275–295)
PH UR: 6 [PH] (ref 5–8)
PHOSPHATE SERPL-MCNC: 4.4 MG/DL (ref 2.4–5.1)
PLATELET # BLD AUTO: 115 10(3)UL (ref 150–450)
POTASSIUM SERPL-SCNC: 4.9 MMOL/L (ref 3.5–5.1)
PROT SERPL-MCNC: 6 G/DL (ref 5.7–8.2)
PROT UR-MCNC: 20 MG/DL
PROT UR-MCNC: 30.7 MG/DL (ref ?–14)
RBC # BLD AUTO: 3.72 X10(6)UL
RBC #/AREA URNS AUTO: >10 /HPF
SODIUM SERPL-SCNC: 142 MMOL/L (ref 136–145)
SP GR UR STRIP: 1.02 (ref 1–1.03)
TRIGL SERPL-MCNC: 139 MG/DL (ref 30–149)
UROBILINOGEN UR STRIP-ACNC: NORMAL
VLDLC SERPL CALC-MCNC: 18 MG/DL (ref 0–30)
WBC # BLD AUTO: 4.2 X10(3) UL (ref 4–11)
WBC #/AREA URNS AUTO: >50 /HPF
WBC CLUMPS UR QL AUTO: PRESENT /HPF

## 2024-11-26 PROCEDURE — 82570 ASSAY OF URINE CREATININE: CPT

## 2024-11-26 PROCEDURE — 80197 ASSAY OF TACROLIMUS: CPT

## 2024-11-26 PROCEDURE — 80061 LIPID PANEL: CPT

## 2024-11-26 PROCEDURE — 83735 ASSAY OF MAGNESIUM: CPT

## 2024-11-26 PROCEDURE — 81001 URINALYSIS AUTO W/SCOPE: CPT

## 2024-11-26 PROCEDURE — 84100 ASSAY OF PHOSPHORUS: CPT

## 2024-11-26 PROCEDURE — 83036 HEMOGLOBIN GLYCOSYLATED A1C: CPT

## 2024-11-26 PROCEDURE — 80053 COMPREHEN METABOLIC PANEL: CPT

## 2024-11-26 PROCEDURE — 85025 COMPLETE CBC W/AUTO DIFF WBC: CPT

## 2024-11-26 PROCEDURE — 82043 UR ALBUMIN QUANTITATIVE: CPT

## 2024-11-26 PROCEDURE — 36415 COLL VENOUS BLD VENIPUNCTURE: CPT

## 2024-11-26 PROCEDURE — 84156 ASSAY OF PROTEIN URINE: CPT

## 2024-11-26 NOTE — TELEPHONE ENCOUNTER
Dr Dexter informed patient of note below  and last name  verified ,verbalized understanding reported was able to talk to transplant team and was advised the they agreed of your advised that no need to take any antibiotic.stated is doing fine.

## 2024-11-26 NOTE — TELEPHONE ENCOUNTER
----- Message from HELLEN DE LUNA sent at 11/26/2024  3:10 PM CST -----  Pls let pt know that I got the labs he did.  Was he able to speak to someone at transplant about the urine?  As I menetioned before - Infectious disease said there is no infeciton

## 2024-11-29 LAB — TACROLIMUS LVL: 6.5 NG/ML

## 2024-12-02 LAB
CMV DNA DETECT, QN LOG: NOT DETECTED {LOG_IU}/ML
CMV DNA DETECT, QN: NOT DETECTED [IU]/ML

## 2024-12-23 ENCOUNTER — LAB ENCOUNTER (OUTPATIENT)
Dept: LAB | Facility: HOSPITAL | Age: 69
End: 2024-12-23
Attending: INTERNAL MEDICINE
Payer: MEDICARE

## 2024-12-23 DIAGNOSIS — D70.8 OTHER NEUTROPENIA (HCC): ICD-10-CM

## 2024-12-23 DIAGNOSIS — N39.0 URINARY TRACT INFECTION, SITE NOT SPECIFIED: ICD-10-CM

## 2024-12-23 DIAGNOSIS — R78.81 BACTEREMIA: ICD-10-CM

## 2024-12-23 DIAGNOSIS — Z79.4 TYPE 2 DIABETES MELLITUS WITH CHRONIC KIDNEY DISEASE, WITH LONG-TERM CURRENT USE OF INSULIN, UNSPECIFIED CKD STAGE (HCC): ICD-10-CM

## 2024-12-23 DIAGNOSIS — B25.9 CYTOMEGALOVIRAL DISEASE (HCC): ICD-10-CM

## 2024-12-23 DIAGNOSIS — E53.8 VITAMIN B12 DEFICIENCY: ICD-10-CM

## 2024-12-23 DIAGNOSIS — E55.9 VITAMIN D DEFICIENCY DISEASE: ICD-10-CM

## 2024-12-23 DIAGNOSIS — E11.22 TYPE 2 DIABETES MELLITUS WITH CHRONIC KIDNEY DISEASE, WITH LONG-TERM CURRENT USE OF INSULIN, UNSPECIFIED CKD STAGE (HCC): ICD-10-CM

## 2024-12-23 DIAGNOSIS — Z94.0 KIDNEY TRANSPLANTED (HCC): ICD-10-CM

## 2024-12-23 DIAGNOSIS — E78.5 HYPERLIPIDEMIA: ICD-10-CM

## 2024-12-23 DIAGNOSIS — E83.42 HYPOMAGNESEMIA: ICD-10-CM

## 2024-12-23 DIAGNOSIS — Z12.5 SCREENING FOR MALIGNANT NEOPLASM OF PROSTATE: ICD-10-CM

## 2024-12-23 DIAGNOSIS — R80.9 PROTEINURIA: ICD-10-CM

## 2024-12-23 LAB
ANION GAP SERPL CALC-SCNC: 8 MMOL/L (ref 0–18)
BASOPHILS # BLD AUTO: 0.02 X10(3) UL (ref 0–0.2)
BASOPHILS NFR BLD AUTO: 0.4 %
BILIRUB UR QL: NEGATIVE
BUN BLD-MCNC: 33 MG/DL (ref 9–23)
BUN/CREAT SERPL: 22.4 (ref 10–20)
CALCIUM BLD-MCNC: 9 MG/DL (ref 8.7–10.4)
CHLORIDE SERPL-SCNC: 110 MMOL/L (ref 98–112)
CO2 SERPL-SCNC: 24 MMOL/L (ref 21–32)
COLOR UR: YELLOW
CREAT BLD-MCNC: 1.47 MG/DL
CREAT UR-SCNC: 84 MG/DL
DEPRECATED RDW RBC AUTO: 45.8 FL (ref 35.1–46.3)
EGFRCR SERPLBLD CKD-EPI 2021: 51 ML/MIN/1.73M2 (ref 60–?)
EOSINOPHIL # BLD AUTO: 0.05 X10(3) UL (ref 0–0.7)
EOSINOPHIL NFR BLD AUTO: 0.9 %
ERYTHROCYTE [DISTWIDTH] IN BLOOD BY AUTOMATED COUNT: 13.6 % (ref 11–15)
FASTING STATUS PATIENT QL REPORTED: YES
GLUCOSE BLD-MCNC: 109 MG/DL (ref 70–99)
GLUCOSE UR-MCNC: NORMAL MG/DL
HCT VFR BLD AUTO: 34.2 %
HGB BLD-MCNC: 11.6 G/DL
IMM GRANULOCYTES # BLD AUTO: 0.22 X10(3) UL (ref 0–1)
IMM GRANULOCYTES NFR BLD: 4 %
KETONES UR-MCNC: NEGATIVE MG/DL
LEUKOCYTE ESTERASE UR QL STRIP.AUTO: 500
LYMPHOCYTES # BLD AUTO: 1.49 X10(3) UL (ref 1–4)
LYMPHOCYTES NFR BLD AUTO: 27.1 %
MAGNESIUM SERPL-MCNC: 1.7 MG/DL (ref 1.6–2.6)
MCH RBC QN AUTO: 31 PG (ref 26–34)
MCHC RBC AUTO-ENTMCNC: 33.9 G/DL (ref 31–37)
MCV RBC AUTO: 91.4 FL
MICROALBUMIN UR-MCNC: 6.2 MG/DL
MICROALBUMIN/CREAT 24H UR-RTO: 73.8 UG/MG (ref ?–30)
MONOCYTES # BLD AUTO: 0.73 X10(3) UL (ref 0.1–1)
MONOCYTES NFR BLD AUTO: 13.3 %
NEUTROPHILS # BLD AUTO: 2.99 X10 (3) UL (ref 1.5–7.7)
NEUTROPHILS # BLD AUTO: 2.99 X10(3) UL (ref 1.5–7.7)
NEUTROPHILS NFR BLD AUTO: 54.3 %
OSMOLALITY SERPL CALC.SUM OF ELEC: 302 MOSM/KG (ref 275–295)
PH UR: 6.5 [PH] (ref 5–8)
PHOSPHATE SERPL-MCNC: 3.6 MG/DL (ref 2.4–5.1)
PLATELET # BLD AUTO: 146 10(3)UL (ref 150–450)
POTASSIUM SERPL-SCNC: 4.6 MMOL/L (ref 3.5–5.1)
PROT UR-MCNC: 19 MG/DL (ref ?–14)
RBC # BLD AUTO: 3.74 X10(6)UL
RBC #/AREA URNS AUTO: >10 /HPF
SODIUM SERPL-SCNC: 142 MMOL/L (ref 136–145)
SP GR UR STRIP: 1.01 (ref 1–1.03)
UROBILINOGEN UR STRIP-ACNC: NORMAL
WBC # BLD AUTO: 5.5 X10(3) UL (ref 4–11)
WBC #/AREA URNS AUTO: >50 /HPF

## 2024-12-23 PROCEDURE — 36415 COLL VENOUS BLD VENIPUNCTURE: CPT

## 2024-12-23 PROCEDURE — 84156 ASSAY OF PROTEIN URINE: CPT

## 2024-12-23 PROCEDURE — 82570 ASSAY OF URINE CREATININE: CPT

## 2024-12-23 PROCEDURE — 85025 COMPLETE CBC W/AUTO DIFF WBC: CPT

## 2024-12-23 PROCEDURE — 80048 BASIC METABOLIC PNL TOTAL CA: CPT

## 2024-12-23 PROCEDURE — 82043 UR ALBUMIN QUANTITATIVE: CPT

## 2024-12-23 PROCEDURE — 81001 URINALYSIS AUTO W/SCOPE: CPT

## 2024-12-23 PROCEDURE — 87086 URINE CULTURE/COLONY COUNT: CPT

## 2024-12-23 PROCEDURE — 83735 ASSAY OF MAGNESIUM: CPT

## 2024-12-23 PROCEDURE — 84100 ASSAY OF PHOSPHORUS: CPT

## 2024-12-23 PROCEDURE — 87186 SC STD MICRODIL/AGAR DIL: CPT

## 2024-12-23 PROCEDURE — 80197 ASSAY OF TACROLIMUS: CPT

## 2024-12-23 PROCEDURE — 87077 CULTURE AEROBIC IDENTIFY: CPT

## 2024-12-26 LAB — TACROLIMUS LVL: 6.6 NG/ML

## 2024-12-27 LAB
CMV DNA DETECT, QN LOG: NOT DETECTED {LOG_IU}/ML
CMV DNA DETECT, QN: NOT DETECTED [IU]/ML

## 2025-01-02 RX ORDER — GLYCOPYRROLATE 1 MG/1
1 TABLET ORAL 2 TIMES DAILY
Refills: 0 | Status: CANCELLED | OUTPATIENT
Start: 2025-01-02

## 2025-01-03 NOTE — TELEPHONE ENCOUNTER
Not prescribed by our office     Current refill request refused due to refill is either a duplicate request or has active refills at the pharmacy.  Check previous templates.    Requested Prescriptions     Pending Prescriptions Disp Refills    glycopyrrolate 1 MG Oral Tab  0     Sig: Take 1 tablet (1 mg total) by mouth 2 (two) times daily.

## 2025-01-06 NOTE — TELEPHONE ENCOUNTER
From: Rolly Sanchez  To: Macey Hastings  Sent: 3/29/2024 1:46 PM CDT  Subject: B12 pills     [employer] has the B12 as Over the counter med & don't like to sell without an OTC prescription. So I ordered from [employer] and just need to know if I should take 1/day (1000 mg) or something different.    Thanks Rolly Sanchez    Neurology Botulinum Injection    Subjective   Pat Dorado is an 36 y.o. female here for medical botulinum injection for Chronic migraine without aura without status migrainosus, not intractable [G43.709].     No changes in headaches since last seen. Would like to proceed with Botox.    Botox    Date/Time: 1/6/2025 10:22 AM    Performed by: Sam Vernon DO  Authorized by: Sam Vernon DO    Procedure specific details:      Procedure Note: PREEMPT Botox    Indications: Chronic Migraine    Informed consent was obtained (explaining the procedure and risks and benefits of procedure) from patient: the signed consent form was placed in the medical record.  A time out was completed, verifying correct patient, procedure,site, positioning, and implants or special equipment.    200 units of OnabotulinumtoxinA were reconstituted with saline. Sites of injection were identified via PREEMPT protocol and cleaned with alcohol pads. Using a 30 gauge needle, patient received following injections:    5 units in procerus  5 units in each left and right   10 units divided between 2 sites of L frontalis  10 units divided between 2 sites of R frontalis  20 units divided between 4 sites of L temporalis  20 units divided between 4 sites of R temporalis  15 units divided between 3 sites of L occipitalis  15 units divided between 3 sites of R occipitalis  10 units divided between 2 sites of L paracervical muscles  10 units divided between 2 sites of R paracervical muscles  15 units divided between 3 sites of L trapezius  15 units divided between 3 sites of R trapezius    Additional Sites:  none    Used: 155u  Wasted: 45u    There were no complications. Patient was comfortable and left without complaint.     OnabotulinumtoxinA  Lot #: [id number]  Exp: 4/2027

## 2025-01-07 ENCOUNTER — TELEPHONE (OUTPATIENT)
Dept: OTOLARYNGOLOGY | Facility: CLINIC | Age: 70
End: 2025-01-07

## 2025-01-07 NOTE — TELEPHONE ENCOUNTER
Current Outpatient Medications   Medication Sig Dispense Refill    glycopyrrolate 1 MG Oral Tab Take 1 tablet (1 mg total) by mouth 2 (two) times daily.       Per patient asking for 90 day supply, patient states insurance does not cover medication, will be paying out of pocket. Please send to [facility] pharmacy. Thank you.

## 2025-01-07 NOTE — TELEPHONE ENCOUNTER
Refill request sent to pharmacy on 1/7/25 for Glycopyrrolate, last Office visit on  8/22/24, ok to refill per Dr. Olivas.

## 2025-01-09 ENCOUNTER — LAB REQUISITION (OUTPATIENT)
Dept: LAB | Facility: HOSPITAL | Age: 70
End: 2025-01-09
Payer: MEDICARE

## 2025-01-09 DIAGNOSIS — L97.522 NON-PRESSURE CHRONIC ULCER OF OTHER PART OF LEFT FOOT WITH FAT LAYER EXPOSED (HCC): ICD-10-CM

## 2025-01-09 PROCEDURE — 87070 CULTURE OTHR SPECIMN AEROBIC: CPT | Performed by: PODIATRIST

## 2025-01-09 PROCEDURE — 87077 CULTURE AEROBIC IDENTIFY: CPT | Performed by: PODIATRIST

## 2025-01-09 PROCEDURE — 87075 CULTR BACTERIA EXCEPT BLOOD: CPT | Performed by: PODIATRIST

## 2025-01-09 PROCEDURE — 87205 SMEAR GRAM STAIN: CPT | Performed by: PODIATRIST

## 2025-01-09 NOTE — PROGRESS NOTES
[facility] Urology  Follow-Up Visit    HPI: Rolly Sanchez is a 69 year old male presents for a follow up visit. Patient was last seen on 01/10/2024 by Dr Garcia.     INTERVAL HISTORY: Patient previously seen regarding history of microscopic hematuria in light of end-stage renal disease on peritoneal dialysis. CT urogram 2022 revealed bilateral upper tract filling defects, concerning for blood clots or urothelial neoplasms.    Patient underwent cystoscopy with bilateral retrograde pyelogram, left nephroureteroscopy with biopsy and bilateral ureteral stent insertion.  Findings were consistent with a free-floating blood clot in the left kidney.  Fungal cultures were negative.    Second look cystoscopy with bilateral retrograde pyelogram, bilateral nephrouteroscopy with manipulation and removal of clots in the bilateral kidneys, and bilateral stent removal.  Clinically, no evidence of tumors.  Free-floating blood clots/proteinaceous debris were noted within the bilateral collecting systems.    Again pathology results consistent with proteinaceous material with peripheral blood elements.    He underwent  donor kidney transplant at [facility] on 12/15/2022.  Post kidney transplant allograft and bladder ultrasounds have been revealing an elevated PVR volume in the 140 to 170 mL range.     Patient was on Flomax for BPH.  Maximized medical therapy with the addition of finasteride 3/27/2023.    His IPSS score today is 18 (//5//) with QoL Index Score of 3. Previous IPSS score was down to 9 (/0//3/) with a quality-of-life score of 1.     Bladder scan today reveals a PVR of 146 mL.    States he has had two UTIs in the last year, had a UTI in September that he was hospitalized for. He denies gross hematuria or dysuria.      PSA Screen   Latest Ref Rng <=4.00    2021 1.80    2024 0.73        1. Microscopic Hematuria --> Bilateral upper tract filling defects on CT-Urogram  2. BPH with LUTS  Patient with  history of BPH with LUTS, previously seen by FRANCIS Glover in 2018 and started on Flomax with improvement in symptoms. Current IPSS score of 15 (0/3/3/2/5/) with a quality-of-life score of 3.     Recently hospitalized 10/2021 for fluid retention. Urinalysis revealed microscopic hematuria with >10 RBCs per hpf. Repeat urinalysis 2021 persistent microscopic hematuria revealing 6-10 RBCs per hpf. No UTI as culture was negative.     Urine cytology 2021 was benign.     CT urogram completed 2022 revealing \"filling defects in the right renal pelvis, left upper pole and interpolar renal collecting system may represent blood clots or urothelial neoplasms\".     Patient denies gross hematuria or dysuria. No history of nephrolithiasis or recurrent UTIs. No flank pain.     No family history of prostate cancer. Screening PSA level 2021 normal at 1.1 ng/mL.     Of note he has ESRD and was on peritoneal dialysis since 2018. His nephrologist is Dr. Dexter.  He underwent  donor kidney transplant at [facility] on 12/15/2022.      Patient underwent cystoscopy with bilateral retrograde pyelogram, left nephroureteroscopy with biopsy and bilateral ureteral stent insertion.  Findings were consistent with a free-floating blood clot in the left kidney.  Fungal cultures were negative.    Patient underwent second look cystoscopy with bilateral retrograde pyelogram, bilateral nephrouteroscopy with manipulation and removal of clots in the bilateral kidneys, and bilateral stent removal.  Clinically, no evidence of tumors.  Free-floating blood clots/proteinaceous debris were noted within the bilateral collecting systems.    Again pathology results consistent with proteinaceous material with peripheral blood elements.    - 2023 F/U: Patient underwent kidney transplant 2022.  Post transplant allograft ultrasound revealing elevated PVR in the 100-200 mL range.  IPSS score 16 (2/4/1/1/4/1/3) with a quality-of-life score  of 3.  On Flomax.  .  Monitor and continue Flomax.  Follow-up in 6 to 8 weeks for uroflow.    - 3/2023 F/U: Uroflow + PVR: Voided 61 mL, Qmax 7.1 mL/s, Qave 4.2 mL/s, flow time 14.4 sec, voiding time 27.6 sec, voiding curve shape flattened with multiple low peaks,  mL.  Elected maximizing medical therapy with the addition of finasteride.    - 7/2023 F/U: On finasteride and tamsulosin.  Improved LUTS.  IPSS down to 12 (1/2/2/1/3/1/2) with a quality-of-life score of 2.  PVR 0 ml.  CCM.    - 1/2024 F/U: On Flomax and Proscar.  Improved LUTS.  IPSS 9 (1/1/0/1/3/1/2) with a quality-of-life score of 1.  PVR 97 mL.  CCM.    -01/2025 F/U: On Flomax and Proscar.  Worsened LUTS with 2 UTIs in the past year.  IPSS 18 (2/4/1/5/2/1/2) with a quality-of-life score of 3.   mL.  Increase Flomax to 0.8 mg.      PAST MEDICAL HISTORY: ESRD on peritoneal dialysis since 7/2018, HTN, HLD, DM since 1982, AMELIE, BPH, nonobstructive CAD, colon cancer.     PAST SURGICAL HISTORY: Open right hemicolectomy and ventral hernia repair 2010 Dr. Gamble, PD catheter insertion 6/2018 Dr. Croft, appendectomy, tonsillectomy, renal transplant surgery 12/2022.     SOCIAL HISTORY:  and has 2 children. Former smoker 1 pack/week and quit 1982. He drinks social alcohol. Retired  however currently works as an [employer] .     Reviewed past medical, surgical, family, and social history.  Reviewed med list and allergies.      REVIEW OF SYSTEMS:  Pertinent positives and negatives per HPI. A 10-point ROS was performed and is otherwise negative.       EXAM:  There were no vitals taken for this visit.     Physical Exam  Constitutional:       Appearance: He is well-developed. He is obese.   HENT:      Head: Normocephalic.   Eyes:      General: No scleral icterus.  Cardiovascular:      Rate and Rhythm: Normal rate.   Pulmonary:      Effort: Pulmonary effort is normal.   Genitourinary:     Prostate: Enlarged. Not tender and  no nodules present.      Comments: Difficult to palpate entire prostate due to patient anatomy. No nodules appreciated on area palpated  Skin:     General: Skin is warm and dry.   Neurological:      Mental Status: He is alert and oriented to person, place, and time.   Psychiatric:         Mood and Affect: Mood normal.         Behavior: Behavior normal.       PATHOLOGY:    Non-Gynecologic Cytology                          Case: [id number]      Provider:  Panda Garcia MD       Collected:           03/04/2022    A.  Right renal pelvis; barbotage:   Adequacy: Satisfactory for evaluation  General Category: Negative for high-grade urothelial carcinoma (NHGUC)  Diagnosis:  Reactive urothelial cells present  Numerous neutrophils and neutrophilic debris present     B.  Left renal pelvis; barbotage:  Adequacy: Satisfactory for evaluation  General Category: Negative for high-grade urothelial carcinoma (NHGUC)  Diagnosis:  Reactive urothelial cells present  Numerous neutrophils and neutrophilic debris present     C.  Left renal pelvis; barbotage:  Adequacy: Satisfactory for evaluation  General Category: Negative for high-grade urothelial carcinoma (NHGUC)  Diagnosis:  Rare degenerated urothelial cells present   Numerous neutrophils and neutrophilic debris present      Pathology                                Case: [id number]   Provider:  Panda Garcia MD       Collected:           04/22/2022    A. Blood clot right pelvis:  Aggregate of proteinaceous debris with rare peripheral blood elements.     B. Left kidney stone:   Calculus fragments (see comment).     C. Right kidney stone:   Calculus fragments (see comment).     D. Blood clot left pelvis:  Aggregate of proteinaceous debris with rare peripheral blood elements.      LABS:  See HPI for details.      IMAGING:    CT-UROGRAM (1/17/2022):  1. Filling defects in the right renal pelvis, left upper pole and interpolar renal collecting system may represent blood clots or  urothelial neoplasms.  Urology consultation recommended. 2. Prostate enlargement. 3. Mild splenomegaly. 4. Peritoneal dialysis catheter with tip in the left lower quadrant. 5. Post right hemicolectomy.  Colonic diverticulosis. 6. Generalized atherosclerotic vascular calcification including coronary artery calcification. 7. Mitral annular calcification.       IMPRESSION:  69 year old comorbid male with past medical history as above. Microscopic hematuria evaluation revealing filling defects in the bilateral renal collecting systems, concerning for blood clots or urothelial neoplasms. Voided cytology benign.     Patient underwent cystoscopy with bilateral staged nephro ureteroscopy, biopsy, and washings from the bilateral collecting systems.  Clinical findings consistent with free-floating blood clots/proteinaceous material in the collecting system.  No gross evidence of tumor.    Cytologies have been benign.    Patient status post kidney transplant.  He has mildly elevated PVR volumes on ultrasound following transplant.      Currently on maximal medical therapy with finasteride and tamsulosin.  Worsening LUTS with 2 UTIs in the past year.  Postvoid residual continues to be acceptable.    Discussed with patient.  Discussed continuing current management versus increasing Tamsulosin to 0.8 mg versus considering minimally invasive surgical treatments for BPH.    Patient elects continuing maximal medical therapy for the time being, with an increase in Tamsulosin to 0.8 mg.    Patient verbalized understanding and agrees to the plan of care.  All questions answered.       PLAN:  1.  Increase Tamsulosin to 0.8 mg every day     2.  Continue Finasteride 5 mg daily.     Kidney transplant management per medical and surgical teams at [facility].    RTC for follow-up in 3 months for an updated IPSS score and bladder scan/PVR.      Iqra Prado, SHOSHANA  01/10/2025

## 2025-01-10 ENCOUNTER — OFFICE VISIT (OUTPATIENT)
Dept: SURGERY | Facility: CLINIC | Age: 70
End: 2025-01-10
Payer: MEDICARE

## 2025-01-10 DIAGNOSIS — N40.1 BENIGN PROSTATIC HYPERPLASIA WITH LOWER URINARY TRACT SYMPTOMS, SYMPTOM DETAILS UNSPECIFIED: Primary | ICD-10-CM

## 2025-01-10 PROCEDURE — 99214 OFFICE O/P EST MOD 30 MIN: CPT

## 2025-01-10 RX ORDER — TAMSULOSIN HYDROCHLORIDE 0.4 MG/1
0.8 CAPSULE ORAL DAILY
Qty: 180 CAPSULE | Refills: 3 | Status: SHIPPED | OUTPATIENT
Start: 2025-01-10 | End: 2026-01-05

## 2025-01-10 RX ORDER — FINASTERIDE 5 MG/1
5 TABLET, FILM COATED ORAL DAILY
Qty: 90 TABLET | Refills: 3 | Status: SHIPPED | OUTPATIENT
Start: 2025-01-10

## 2025-01-11 ENCOUNTER — PATIENT MESSAGE (OUTPATIENT)
Dept: OTOLARYNGOLOGY | Facility: CLINIC | Age: 70
End: 2025-01-11

## 2025-01-13 NOTE — TELEPHONE ENCOUNTER
Patient asking for [facility]ill of [facility] Per patient they have not received script yet. See 1/7 telephone encounter and 1/11 patient message. Patient is out of medication.  Please advise.

## 2025-01-14 NOTE — TELEPHONE ENCOUNTER
Message with Pended prescription sent to Dr. Olivas regarding the Glycopyrrolate, patient is out of medication.

## 2025-01-16 ENCOUNTER — PATIENT MESSAGE (OUTPATIENT)
Dept: INTERNAL MEDICINE CLINIC | Facility: CLINIC | Age: 70
End: 2025-01-16

## 2025-01-16 DIAGNOSIS — Z94.0 HISTORY OF KIDNEY TRANSPLANT (HCC): ICD-10-CM

## 2025-01-16 DIAGNOSIS — M85.88 OTHER SPECIFIED DISORDERS OF BONE DENSITY AND STRUCTURE, OTHER SITE: ICD-10-CM

## 2025-01-16 DIAGNOSIS — Z92.25 PERSONAL HISTORY OF IMMUNOSUPRESSION THERAPY: Primary | ICD-10-CM

## 2025-01-16 RX ORDER — GLYCOPYRROLATE 1 MG/1
1 TABLET ORAL 3 TIMES DAILY
Qty: 90 TABLET | Refills: 3 | Status: SHIPPED | OUTPATIENT
Start: 2025-01-16

## 2025-01-17 ENCOUNTER — TELEPHONE (OUTPATIENT)
Dept: OTOLARYNGOLOGY | Facility: CLINIC | Age: 70
End: 2025-01-17

## 2025-01-17 NOTE — TELEPHONE ENCOUNTER
Does he already have an order for the ultrasound retroperitoneum from his transplant doctor? Other orders are placed.

## 2025-01-17 NOTE — TELEPHONE ENCOUNTER
Per Liz there is a possible drug interaction with glycopyrrolate and asking if this is a new prescription for the patient or has he taken it in the past.  Please use Invoice # [id number]

## 2025-01-17 NOTE — TELEPHONE ENCOUNTER
To Dr Hastings-----  Please see mychart message from patient   Is this something you will assist patient with?    -Please advise on who you would refer to for derm  -I pended an order for dexa scan  -I pended an ultrasound order--please review if this is correct

## 2025-01-17 NOTE — TELEPHONE ENCOUNTER
Called patient and relayed DR. Hastings message - yes he has US retroperitoneum from transplant MD   Information for scheduling and dermatologist sent via [employer]

## 2025-01-20 NOTE — TELEPHONE ENCOUNTER
Per Huy pharmacist at [facility]- stated glycopyrrolate contains Bromin salts that are also in Merbromin. Pt has Merbromin listed under allergies as high reaction, rash and swelling. RN routed to Dr. Olivas, as the pt may need a different prescription.

## 2025-01-22 ENCOUNTER — LAB ENCOUNTER (OUTPATIENT)
Dept: LAB | Facility: HOSPITAL | Age: 70
End: 2025-01-22
Attending: INTERNAL MEDICINE
Payer: MEDICARE

## 2025-01-22 DIAGNOSIS — R78.81 BACTEREMIA: ICD-10-CM

## 2025-01-22 DIAGNOSIS — E55.9 VITAMIN D DEFICIENCY DISEASE: ICD-10-CM

## 2025-01-22 DIAGNOSIS — Z94.0 KIDNEY TRANSPLANTED (HCC): ICD-10-CM

## 2025-01-22 DIAGNOSIS — D70.8 OTHER NEUTROPENIA: ICD-10-CM

## 2025-01-22 DIAGNOSIS — Z12.5 SCREENING FOR MALIGNANT NEOPLASM OF PROSTATE: ICD-10-CM

## 2025-01-22 DIAGNOSIS — E11.22 TYPE 2 DIABETES MELLITUS WITH CHRONIC KIDNEY DISEASE, WITH LONG-TERM CURRENT USE OF INSULIN, UNSPECIFIED CKD STAGE (HCC): ICD-10-CM

## 2025-01-22 DIAGNOSIS — E78.5 HYPERLIPIDEMIA: ICD-10-CM

## 2025-01-22 DIAGNOSIS — Z79.4 TYPE 2 DIABETES MELLITUS WITH CHRONIC KIDNEY DISEASE, WITH LONG-TERM CURRENT USE OF INSULIN, UNSPECIFIED CKD STAGE (HCC): ICD-10-CM

## 2025-01-22 DIAGNOSIS — N39.0 URINARY TRACT INFECTION, SITE NOT SPECIFIED: ICD-10-CM

## 2025-01-22 DIAGNOSIS — R80.9 PROTEINURIA: ICD-10-CM

## 2025-01-22 DIAGNOSIS — E83.42 HYPOMAGNESEMIA: ICD-10-CM

## 2025-01-22 DIAGNOSIS — E53.8 VITAMIN B12 DEFICIENCY: ICD-10-CM

## 2025-01-22 DIAGNOSIS — L97.524 NON-PRESSURE CHRONIC ULCER OF OTHER PART OF LEFT FOOT WITH NECROSIS OF BONE (HCC): ICD-10-CM

## 2025-01-22 DIAGNOSIS — B25.9 CYTOMEGALOVIRAL DISEASE (HCC): ICD-10-CM

## 2025-01-22 LAB
ANION GAP SERPL CALC-SCNC: 10 MMOL/L (ref 0–18)
BASOPHILS # BLD AUTO: 0.02 X10(3) UL (ref 0–0.2)
BASOPHILS NFR BLD AUTO: 0.4 %
BILIRUB UR QL: NEGATIVE
BUN BLD-MCNC: 33 MG/DL (ref 9–23)
BUN/CREAT SERPL: 18.5 (ref 10–20)
CALCIUM BLD-MCNC: 9.6 MG/DL (ref 8.7–10.4)
CHLORIDE SERPL-SCNC: 112 MMOL/L (ref 98–112)
CO2 SERPL-SCNC: 25 MMOL/L (ref 21–32)
CREAT BLD-MCNC: 1.78 MG/DL
CREAT UR-SCNC: 107.5 MG/DL
CRP SERPL-MCNC: <0.4 MG/DL (ref ?–1)
DEPRECATED RDW RBC AUTO: 50.6 FL (ref 35.1–46.3)
EGFRCR SERPLBLD CKD-EPI 2021: 41 ML/MIN/1.73M2 (ref 60–?)
EOSINOPHIL # BLD AUTO: 0.06 X10(3) UL (ref 0–0.7)
EOSINOPHIL NFR BLD AUTO: 1.2 %
ERYTHROCYTE [DISTWIDTH] IN BLOOD BY AUTOMATED COUNT: 14.9 % (ref 11–15)
ERYTHROCYTE [SEDIMENTATION RATE] IN BLOOD: 19 MM/HR
FASTING STATUS PATIENT QL REPORTED: YES
GLUCOSE BLD-MCNC: 82 MG/DL (ref 70–99)
GLUCOSE UR-MCNC: NORMAL MG/DL
HCT VFR BLD AUTO: 35.9 %
HGB BLD-MCNC: 11.3 G/DL
HGB UR QL STRIP.AUTO: NEGATIVE
IMM GRANULOCYTES # BLD AUTO: 0.09 X10(3) UL (ref 0–1)
IMM GRANULOCYTES NFR BLD: 1.8 %
KETONES UR-MCNC: NEGATIVE MG/DL
LEUKOCYTE ESTERASE UR QL STRIP.AUTO: 500
LYMPHOCYTES # BLD AUTO: 1.57 X10(3) UL (ref 1–4)
LYMPHOCYTES NFR BLD AUTO: 31.3 %
MAGNESIUM SERPL-MCNC: 1.8 MG/DL (ref 1.6–2.6)
MCH RBC QN AUTO: 29.2 PG (ref 26–34)
MCHC RBC AUTO-ENTMCNC: 31.5 G/DL (ref 31–37)
MCV RBC AUTO: 92.8 FL
MICROALBUMIN UR-MCNC: 4.2 MG/DL
MICROALBUMIN/CREAT 24H UR-RTO: 39.1 UG/MG (ref ?–30)
MONOCYTES # BLD AUTO: 0.46 X10(3) UL (ref 0.1–1)
MONOCYTES NFR BLD AUTO: 9.2 %
NEUTROPHILS # BLD AUTO: 2.82 X10 (3) UL (ref 1.5–7.7)
NEUTROPHILS # BLD AUTO: 2.82 X10(3) UL (ref 1.5–7.7)
NEUTROPHILS NFR BLD AUTO: 56.1 %
NITRITE UR QL STRIP.AUTO: NEGATIVE
OSMOLALITY SERPL CALC.SUM OF ELEC: 310 MOSM/KG (ref 275–295)
PH UR: 6.5 [PH] (ref 5–8)
PHOSPHATE SERPL-MCNC: 4.1 MG/DL (ref 2.4–5.1)
PLATELET # BLD AUTO: 131 10(3)UL (ref 150–450)
POTASSIUM SERPL-SCNC: 4.9 MMOL/L (ref 3.5–5.1)
PROT UR-MCNC: 21.7 MG/DL (ref ?–14)
RBC # BLD AUTO: 3.87 X10(6)UL
SODIUM SERPL-SCNC: 147 MMOL/L (ref 136–145)
SP GR UR STRIP: 1.02 (ref 1–1.03)
UROBILINOGEN UR STRIP-ACNC: NORMAL
WBC # BLD AUTO: 5 X10(3) UL (ref 4–11)
WBC #/AREA URNS AUTO: >50 /HPF

## 2025-01-22 PROCEDURE — 85025 COMPLETE CBC W/AUTO DIFF WBC: CPT

## 2025-01-22 PROCEDURE — 87186 SC STD MICRODIL/AGAR DIL: CPT

## 2025-01-22 PROCEDURE — 87086 URINE CULTURE/COLONY COUNT: CPT

## 2025-01-22 PROCEDURE — 83735 ASSAY OF MAGNESIUM: CPT

## 2025-01-22 PROCEDURE — 82043 UR ALBUMIN QUANTITATIVE: CPT

## 2025-01-22 PROCEDURE — 36415 COLL VENOUS BLD VENIPUNCTURE: CPT

## 2025-01-22 PROCEDURE — 86140 C-REACTIVE PROTEIN: CPT

## 2025-01-22 PROCEDURE — 87077 CULTURE AEROBIC IDENTIFY: CPT

## 2025-01-22 PROCEDURE — 84156 ASSAY OF PROTEIN URINE: CPT

## 2025-01-22 PROCEDURE — 85652 RBC SED RATE AUTOMATED: CPT

## 2025-01-22 PROCEDURE — 82570 ASSAY OF URINE CREATININE: CPT

## 2025-01-22 PROCEDURE — 84100 ASSAY OF PHOSPHORUS: CPT

## 2025-01-22 PROCEDURE — 80048 BASIC METABOLIC PNL TOTAL CA: CPT

## 2025-01-22 PROCEDURE — 81001 URINALYSIS AUTO W/SCOPE: CPT

## 2025-01-22 PROCEDURE — 80197 ASSAY OF TACROLIMUS: CPT

## 2025-01-22 NOTE — TELEPHONE ENCOUNTER
Per patient, has taken this medication, glycopyrrolate for over a year with NO allergic reaction or issues.  Please see other TE regarding this medication, spoke with [facility] to notify them that patient has taken for over year without any issues, mail order pharmacy will fill for patient, patient states was told it should take a week for him to receive.

## 2025-01-23 ENCOUNTER — OFFICE VISIT (OUTPATIENT)
Facility: CLINIC | Age: 70
End: 2025-01-23

## 2025-01-23 VITALS
HEART RATE: 76 BPM | BODY MASS INDEX: 38.74 KG/M2 | HEIGHT: 72 IN | WEIGHT: 286 LBS | DIASTOLIC BLOOD PRESSURE: 76 MMHG | SYSTOLIC BLOOD PRESSURE: 125 MMHG

## 2025-01-23 DIAGNOSIS — E11.21 DIABETIC GLOMERULOPATHY (HCC): ICD-10-CM

## 2025-01-23 DIAGNOSIS — N18.32 HYPERTENSIVE KIDNEY DISEASE WITH STAGE 3B CHRONIC KIDNEY DISEASE (HCC): ICD-10-CM

## 2025-01-23 DIAGNOSIS — N39.0 URINARY TRACT INFECTION WITHOUT HEMATURIA, SITE UNSPECIFIED: ICD-10-CM

## 2025-01-23 DIAGNOSIS — N18.32 STAGE 3B CHRONIC KIDNEY DISEASE (HCC): ICD-10-CM

## 2025-01-23 DIAGNOSIS — Z94.0 TRANSPLANTED KIDNEY (HCC): Primary | ICD-10-CM

## 2025-01-23 DIAGNOSIS — I73.9 PVD (PERIPHERAL VASCULAR DISEASE): ICD-10-CM

## 2025-01-23 DIAGNOSIS — I12.9 HYPERTENSIVE KIDNEY DISEASE WITH STAGE 3B CHRONIC KIDNEY DISEASE (HCC): ICD-10-CM

## 2025-01-23 PROCEDURE — 99214 OFFICE O/P EST MOD 30 MIN: CPT | Performed by: INTERNAL MEDICINE

## 2025-01-23 RX ORDER — AMOXICILLIN AND CLAVULANATE POTASSIUM 500; 125 MG/1; MG/1
1 TABLET, FILM COATED ORAL 2 TIMES DAILY WITH MEALS
COMMUNITY
Start: 2025-01-19

## 2025-01-23 NOTE — PROGRESS NOTES
Progress Note     Rolly GERBER Suits    Here for follow up  Increased dose of tamulosin by urology - feeling better    To see derm given concerns of skin cancer - to see Dr Kennedy Paul - incision around previous PD cath    Needs US kidneys to look for RCC    Needs dexa scan as well    Seeing cardiology for PVD    HISTORY:  Past Medical History:    Adenomatous polyp    cscopy Dr. Singh 2010-misael. polyp, divertic, int hemrds    Allergic rhinitis    Asthma (HCC)    BPH (benign prostatic hyperplasia)    Cancer (HCC)    Carcinoma in situ of colon    cecum-R hemicolectomy Dr. Lugo    Diabetes (HCC)    Diabetes mellitus (HCC)    Diverticulosis    Gallstone    Gallstone seen on US liver     Hepatic steatosis    US liver     High blood pressure    Hyperlipemia    IBS (irritable bowel syndrome)    Kidney transplant recipient (HCC)    Kidney transplant performed 12/15/2022 at [facility] ( donor kidney transplant).    Microalbuminuria    Migraines    hx in grade school    Neuropathy    diabetic, jorge hands and feet    Obesity    Pancytopenia (HCC)    Dr. 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    Viral disease    Hx spinal virus    Visual impairment    glasses      Past Surgical History:   Procedure Laterality Date    Adenoidectomy  1965 estimate    Same time as tonsils removed    Appendectomy      Colectomy Right 2010    hemicolectomy-Dr Resendez    Colonoscopy      Dr Singh    Colonoscopy  10/2014    polyp    Colonoscopy  2019    Colonoscopy N/A 2019    Procedure: COLONOSCOPY;   Addended by: HITESH GODINEZ on: 7/31/2024 01:03 PM     Modules accepted: Orders     Surgeon: Blade Singh MD;  Location: [facility] ENDOSCOPY    Cystouretro w/stone remove Bilateral 04/22/2022    Cystoscopy, bilateral retrograde pyelogram, bilateral ureteroscopy, manipulation/removal of bilateral blood clots/stones, ureteral stent removal.    Hernia surgery  2010    ventral hernia-at time of colon surgery    Other surgical history  11/1/2010    Colon Resection    Peritoneal dialysis  07/2018    Dr Croft placed PD catheter 6/2018. started PD 7/2018 Dr Dexter    Tonsillectomy      T & A    Transplantation of kidney  12/2022    At [facility]           Medications (Active prior to today's visit):  Current Outpatient Medications   Medication Sig Dispense Refill    amoxicillin clavulanate 500-125 MG Oral Tab Take 1 tablet by mouth 2 (two) times daily with meals.      tamsulosin 0.4 MG Oral Cap Take 2 capsules (0.8 mg total) by mouth daily. Take 1/2 hour following the same meal each day 180 capsule 3    finasteride 5 MG Oral Tab Take 1 tablet (5 mg total) by mouth daily. 90 tablet 3    glycopyrrolate 1 MG Oral Tab Take 1 tablet (1 mg total) by mouth 2 (two) times daily.      lisinopril 10 MG Oral Tab Take 1 tablet (10 mg total) by mouth daily.      Fexofenadine HCl 30 MG Oral Tablet Dispersible Take 6 tablets (180 mg total) by mouth daily.      sodium bicarbonate 650 MG Oral Tab Take 2 tablets (1,300 mg total) by mouth 4 (four) times daily. Takes at 0900 and 2200      MONTELUKAST 10 MG Oral Tab TAKE 1 TABLET BY MOUTH NIGHTLY 90 tablet 3    fenofibrate 48 MG Oral Tab Take 1 tablet (48 mg total) by mouth daily.      Tacrolimus ER (ENVARSUS XR) 1 MG Oral Tablet 24 Hr Take 2 tablets (2 mg total) by mouth daily.      cyanocobalamin 1000 MCG Oral Tab Take 1 tablet (1,000 mcg total) by mouth daily.      NON FORMULARY Take 266 mg by mouth in the morning, at noon, in the evening, and at bedtime. MAGNESIUM + PROTEIN 1 daily    Patient takes 4 times a day at 0900, 1200, 1500, 2200      semaglutide (OZEMPIC, 0.25 OR 0.5  MG/DOSE,) 2 MG/1.5ML Subcutaneous Solution Pen-injector Inject 1 mg into the skin once a week.      Probiotic Product (PROBIOTIC-10 OR) Take 1 capsule by mouth daily.      fluticasone propionate 50 MCG/ACT Nasal Suspension by Each Nare route as needed for Rhinitis.      metoprolol tartrate 100 MG Oral Tab Take 1 tablet (100 mg total) by mouth 2 (two) times daily.      predniSONE 10 MG Oral Tab Take 1 tablet (10 mg total) by mouth daily.      ELIQUIS 5 MG Oral Tab Take 1 tablet (5 mg total) by mouth 2 (two) times daily.      ergocalciferol 1.25 MG (87515 UT) Oral Cap Take 1 capsule (50,000 Units total) by mouth every 7 days.      Syringe/Needle, Disp, (BD LUER-PILY SYRINGE) 23G X 1\" 3 ML Does not apply Misc USE FOR B12 INJECTIONS AS DIRECTED 12 each 0    sulfamethoxazole-trimethoprim -160 MG Oral Tab per tablet Takes Mondays and Thursdays per transplant clinic  0    atorvastatin 40 MG Oral Tab Take 1 tablet (40 mg total) by mouth nightly.      HUMULIN R U-500, CONCENTRATED, 500 UNIT/ML Subcutaneous Solution 250 units/day via Insulin pump      PORFIRIO CONTOUR NEXT TEST In Vitro Strip          Allergies:  Allergies[1]      ROS:     Constitutional:  Negative for decreased activity, fever, irritability and lethargy  ENMT:  Negative for ear drainage, hearing loss and nasal drainage  Eyes:  Negative for eye discharge and vision loss  Cardiovascular:  Negative for chest pain, sob  Respiratory:  Negative for cough, dyspnea and wheezing  Gastrointestinal:  Negative for abdominal pain, constipation  Genitourinary:  Negative for dysuria and hematuria  Endocrine:  Negative for abnormal sleep patterns  Hema/Lymph:  Negative for easy bleeding and easy bruising  Integumentary:  Negative for pruritus and rash  Musculoskeletal:  Negative for bone/joint symptoms  Neurological:  Negative for gait disturbance  Psychiatric:  Negative for inappropriate interaction and psychiatric symptoms      Vitals:    01/23/25 1013   BP: 125/76    Pulse: 76       PHYSICAL EXAM:   Constitutional: appears well hydrated alert and responsive   Head/Face: normocephalic  Eyes/Vision: normal extraocular motion is intact  Nose/Mouth/Throat:mucous membranes are moist   Neck/Thyroid: neck is supple without adenopathy  Lymphatic: no abnormal cervical, supraclavicular adenopathy is noted  Respiratory:  lungs are clear to auscultation bilaterally  Cardiovascular: regular rate and rhythm   Abdomen: soft, non-tender, non-distended, BS normal  Skin/Hair: no unusual rashes present, no abnormal bruising noted  Musculoskeletal: no deformities  Extremities: no edema  Neurological:  Grossly normal       Lab Results   Component Value Date    GLU 82 01/22/2025     (H) 01/22/2025    K 4.9 01/22/2025     01/22/2025    CO2 25.0 01/22/2025    ANIONGAP 10 01/22/2025    BUN 33 (H) 01/22/2025    CREATSERUM 1.78 (H) 01/22/2025    CA 9.6 01/22/2025    OSMOCALC 310 (H) 01/22/2025    EGFRCR 41 (L) 01/22/2025    ALB 4.3 11/26/2024    PHOS 4.1 01/22/2025         ASSESSMENT/PLAN:   Assessment   1. Stage 3b chronic kidney disease ([facility])  GFR slightly lower on this labs, with high sodium likely dry    I advised pt to stop bicarb - he will check with Dr Finn    2. Transplanted kidney ([facility])  Continues on prednisone, and tacrolimus    3. Diabetic glomerulopathy ([facility])  Follows with Dr. Lainez, on insulin and Ozempic    4. Hypertensive kidney disease with stage 3b chronic kidney disease ([facility])  Pressure stable on metoprolol and lisinopril    5. Urinary tract infection without hematuria, site unspecified  Discussed the persistent positive pseudomonal UTIs with infectious disease, Dr. Combs  Recommended that we not treat unless the patient is symptomatic.  He will check with [facility] transplant    6. PVD (peripheral vascular disease) ([facility])  Sees cardiology may need a angiogram.  Advised the patient that he is at risk for contrast nephropathy             Orders This Visit:  No  orders of the defined types were placed in this encounter.      Meds This Visit:  Requested Prescriptions      No prescriptions requested or ordered in this encounter       Imaging & Referrals:  None     1/23/2025   HELLEN DE LUNA MD    No follow-ups on file.       [1]   Allergies  Allergen Reactions    Merbromin RASH and SWELLING    Mercury SWELLING and OTHER (SEE COMMENTS)     Blisters,itching,swelling    Merthilate [Thimerosal] RASH and SWELLING    Quinolones OTHER (SEE COMMENTS)     Rolly is on tacrolimus as of September 27th, 2024.  Quinolones contraindicated with tacrolimus because of possibility of prolonged QT syndrome.

## 2025-01-24 LAB
CMV DNA DETECT, QN LOG: NOT DETECTED {LOG_IU}/ML
CMV DNA DETECT, QN: NOT DETECTED [IU]/ML

## 2025-01-25 LAB — TACROLIMUS LVL: 5.8 NG/ML

## 2025-02-04 ENCOUNTER — OFFICE VISIT (OUTPATIENT)
Dept: DERMATOLOGY CLINIC | Facility: CLINIC | Age: 70
End: 2025-02-04
Payer: MEDICARE

## 2025-02-04 DIAGNOSIS — D48.5 NEOPLASM OF UNCERTAIN BEHAVIOR OF SKIN: Primary | ICD-10-CM

## 2025-02-04 DIAGNOSIS — L57.0 MULTIPLE ACTINIC KERATOSES: ICD-10-CM

## 2025-02-04 DIAGNOSIS — D49.2 NEOPLASM OF UNSPECIFIED BEHAVIOR OF BONE, SOFT TISSUE, AND SKIN: ICD-10-CM

## 2025-02-04 DIAGNOSIS — D22.9 MULTIPLE BENIGN NEVI: ICD-10-CM

## 2025-02-04 DIAGNOSIS — L81.4 LENTIGINES: ICD-10-CM

## 2025-02-04 PROCEDURE — 17003 DESTRUCT PREMALG LES 2-14: CPT | Performed by: STUDENT IN AN ORGANIZED HEALTH CARE EDUCATION/TRAINING PROGRAM

## 2025-02-04 PROCEDURE — 11104 PUNCH BX SKIN SINGLE LESION: CPT | Performed by: STUDENT IN AN ORGANIZED HEALTH CARE EDUCATION/TRAINING PROGRAM

## 2025-02-04 PROCEDURE — 88305 TISSUE EXAM BY PATHOLOGIST: CPT | Performed by: STUDENT IN AN ORGANIZED HEALTH CARE EDUCATION/TRAINING PROGRAM

## 2025-02-04 PROCEDURE — 17000 DESTRUCT PREMALG LESION: CPT | Performed by: STUDENT IN AN ORGANIZED HEALTH CARE EDUCATION/TRAINING PROGRAM

## 2025-02-04 PROCEDURE — 99204 OFFICE O/P NEW MOD 45 MIN: CPT | Performed by: STUDENT IN AN ORGANIZED HEALTH CARE EDUCATION/TRAINING PROGRAM

## 2025-02-04 NOTE — PROGRESS NOTES
New patient     Referred by:   Macey Hastings DO ..     CHIEF COMPLAINT: FBSE    HISTORY OF PRESENT ILLNESS: .    1. Lesion  Location: Abdomen   Duration: 2 years  Bleeding, growing, changing?: No  Scaly?:Yes  Itchy?:No    Current treatment: Coconut oil,   Past treatments: Gentamicin    2. Lesion  Location: L hip   Duration: 30 year  Bleeding, growing, changing?: No  Scaly?:No    Itchy?:No    Current treatment: None  Past treatments: None      - No particular lesions of concern.       DERM HISTORY:  History of skin cancer: No  History of melanoma: No    FAMILY HISTORY:  History of melanoma: No    PAST MEDICAL HISTORY:  Past Medical History:    Adenomatous polyp    cscopy Dr. Singh 2010-misael. polyp, divertic, int hemrds    Allergic rhinitis    Asthma (HCC)    BPH (benign prostatic hyperplasia)    Cancer (HCC)    Carcinoma in situ of colon    cecum-R hemicolectomy Dr. Lugo    Diabetes (HCC)    Diabetes mellitus (HCC)    Diverticulosis    Gallstone    Gallstone seen on US liver     Hepatic steatosis    US liver 2009    High blood pressure    Hyperlipemia    IBS (irritable bowel syndrome)    Kidney transplant recipient (HCC)    Kidney transplant performed 12/15/2022 at [facility] ( donor kidney transplant).    Microalbuminuria    Migraines    hx in grade school    Neuropathy    diabetic, jorge hands and feet    Obesity    Pancytopenia (HCC)    Dr. 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   Skin as per HPI    Medications  Current Outpatient Medications   Medication Sig Dispense Refill    amoxicillin clavulanate 500-125 MG Oral Tab Take 1 tablet by mouth 2 (two) times daily with meals.      tamsulosin 0.4 MG Oral Cap Take 2 capsules (0.8 mg total) by mouth daily. Take 1/2 hour following the same meal each day 180 capsule 3    finasteride 5 MG Oral Tab Take 1 tablet (5 mg total) by mouth daily. 90 tablet 3    glycopyrrolate 1 MG Oral Tab Take 1 tablet (1 mg total) by mouth 2 (two) times daily.      lisinopril 10 MG Oral Tab Take 1 tablet (10 mg total) by mouth daily.      Fexofenadine HCl 30 MG Oral Tablet Dispersible Take 6 tablets (180 mg total) by mouth daily.      sodium bicarbonate 650 MG Oral Tab Take 2 tablets (1,300 mg total) by mouth 4 (four) times daily. Takes at 0900 and 2200      MONTELUKAST 10 MG Oral Tab TAKE 1 TABLET BY MOUTH NIGHTLY 90 tablet 3    fenofibrate 48 MG Oral Tab Take 1 tablet (48 mg total) by mouth daily.      Tacrolimus ER (ENVARSUS XR) 1 MG Oral Tablet 24 Hr Take 2 tablets (2 mg total) by mouth daily.      cyanocobalamin 1000 MCG Oral Tab Take 1 tablet (1,000 mcg total) by mouth daily.      NON FORMULARY Take 266 mg by mouth in the morning, at noon, in the evening, and at bedtime. MAGNESIUM + PROTEIN 1 daily    Patient takes 4 times a day at 0900, 1200, 1500, 2200      semaglutide (OZEMPIC, 0.25 OR 0.5 MG/DOSE,) 2 MG/1.5ML Subcutaneous Solution Pen-injector Inject 1 mg into the skin once a week.      Probiotic Product (PROBIOTIC-10 OR) Take 1 capsule by mouth daily.      fluticasone propionate 50 MCG/ACT Nasal Suspension by Each Nare route as needed for Rhinitis.      metoprolol tartrate 100 MG Oral Tab Take 1 tablet (100 mg total) by mouth 2 (two) times daily.      predniSONE 10 MG Oral Tab Take 1 tablet (10 mg total) by mouth daily.      ELIQUIS 5 MG Oral Tab Take 1 tablet (5 mg total) by mouth 2 (two) times daily.       ergocalciferol 1.25 MG (27837 UT) Oral Cap Take 1 capsule (50,000 Units total) by mouth every 7 days.      Syringe/Needle, Disp, (BD LUER-PILY SYRINGE) 23G X 1\" 3 ML Does not apply Misc USE FOR B12 INJECTIONS AS DIRECTED 12 each 0    sulfamethoxazole-trimethoprim -160 MG Oral Tab per tablet Takes Mondays and Thursdays per transplant clinic  0    atorvastatin 40 MG Oral Tab Take 1 tablet (40 mg total) by mouth nightly.      HUMULIN R U-500, CONCENTRATED, 500 UNIT/ML Subcutaneous Solution 250 units/day via Insulin pump      Poolami CONTOUR NEXT TEST In Vitro Strip          PHYSICAL EXAM:  Physician accompanied by chaperone during examination   General: awake, alert, no acute distress  Skin: Skin exam was performed today including the following: head and face, scalp, neck, chest (including breasts and axillae), abdomen, back, bilateral upper extremities, bilateral lower extremities. Pertinent findings include:     - L thigh with brown stuck on papules  - Bilateral arms with puerpera   - L lower abdomen with pink scaly plaque   - Cheeks and forehead rough gritty papules   - Face with stellate brown macules       ASSESSMENT & PLAN:  Pathophysiology of diagnoses discussed with patient.  Therapeutic options reviewed. Risks, benefits, and alternatives discussed with patient. Instructions reviewed at length.    #Lentigines  - Reassurance provided regarding the benign nature of these lesions.    #Seborrheic Keratoses  - Reassured patient regarding benign nature of lesions. No treatment but observation at this time. Follow-up for concerning physical changes or new symptoms.    #Multiple actinic keratoses  - Discussed premalignant etiology and possibility of transformation to SCC  - Recommended cryotherapy today   - Discussed side effects including redness, swelling, crusting, and discolortion after treatment, wound care with soap/water and vaseline   - Recommend sun protection with spf 30 or higher, sun protective clothing  such as wide brimmed hats and long sleeves. Recommend avoiding midday sun (10 am- 3 pm).     - Procedure Note Cryosurgery of pre-malignant lesion(s)  Risks, benefits, alternatives, complications, and personnel required for cryosurgery reviewed with patient. Patient verbalizes understanding and wishes to proceed.   - Cryosurgery performed with Liquid Nitrogen via cryostat spray gun to Actinic Keratosis . 4 lesion(s) treated.   - Patient tolerated well and wound care discussed. Return if lesions fail to fully resolve.    #Neoplasm(s) of uncertain behavior of skin  - Punch biopsy performed at today's visit     Punch biopsy to establish and confirm diagnosis.   Photo taken: Yes    Site(s) prepped with alcohol and anesthetized with 1% Lidocaine HCl with Epinephrine 1:100,000  Hemostasis obtained with 2 absorbable interrupted sutures. 1 specimen(s) from L lower abdomen sent for pathology to r/o scar vs Squamous Cell Carcinoma     EBL: scant  Patient tolerated procedure well and without complication.  Written and verbal wound care instructions provided to patient, understanding verbalized.    Return to clinic:  yearly for FBSE  or sooner if something concerning arises     Kennedy Arroyo MD    By signing my name below, Gurinder ROMERO MA,  attest that this documentation has been prepared under the direction and in the presence of Kennedy Arroyo MD.     Electronically Signed: Gurinder HERNANDEZ MA, 2/4/2025, 8:58 AM.  IKennedy MD,  personally performed the services described in this documentation. All medical record entries made by the scribe were at my direction and in my presence.  I have reviewed the chart and agree that the record reflects my personal performance and is accurate and complete.  Kennedy Arroyo MD, 2/4/2025, 9:13 AM

## 2025-02-05 ENCOUNTER — HOSPITAL ENCOUNTER (OUTPATIENT)
Dept: ULTRASOUND IMAGING | Facility: HOSPITAL | Age: 70
Discharge: HOME | End: 2025-02-05
Payer: MEDICARE

## 2025-02-05 DIAGNOSIS — Z94.0 KIDNEY TRANSPLANTED (HCC): ICD-10-CM

## 2025-02-05 PROCEDURE — 76775 US EXAM ABDO BACK WALL LIM: CPT

## 2025-02-05 RX ORDER — GLYCOPYRROLATE 1 MG/1
1 TABLET ORAL 3 TIMES DAILY
Qty: 90 TABLET | Refills: 0 | OUTPATIENT
Start: 2025-02-05

## 2025-02-06 DIAGNOSIS — D48.5 NEOPLASM OF UNCERTAIN BEHAVIOR OF SKIN: Primary | ICD-10-CM

## 2025-02-07 NOTE — PROGRESS NOTES
Spoke with patient and relayed culture results and recommendations. Pt verbalizes understanding. F/U appt scheduled with DM on 02/18/25. Pt will try to get MRI done before appt and will await further input regarding antibiotic treatment.

## 2025-02-07 NOTE — PROGRESS NOTES
\"Hello - thanks for the message.  I would suggest having the patient follow up with infectious disease for this, as it is out of my realm of understanding how it should be treated.  He has had a series of urinary tract infections with resistance patterns .  Consequently I suggest ID handle this - he has seen Dr Combs of [facility].  Thanks\". Per Samantha Dexter MD

## 2025-02-12 ENCOUNTER — LAB REQUISITION (OUTPATIENT)
Dept: LAB | Facility: HOSPITAL | Age: 70
End: 2025-02-12
Payer: MEDICARE

## 2025-02-12 ENCOUNTER — HOSPITAL ENCOUNTER (OUTPATIENT)
Dept: MRI IMAGING | Age: 70
Discharge: HOME | End: 2025-02-12
Attending: STUDENT IN AN ORGANIZED HEALTH CARE EDUCATION/TRAINING PROGRAM
Payer: MEDICARE

## 2025-02-12 DIAGNOSIS — D48.5 NEOPLASM OF UNCERTAIN BEHAVIOR OF SKIN: ICD-10-CM

## 2025-02-12 DIAGNOSIS — L97.522 NON-PRESSURE CHRONIC ULCER OF OTHER PART OF LEFT FOOT WITH FAT LAYER EXPOSED (HCC): ICD-10-CM

## 2025-02-12 PROCEDURE — 87186 SC STD MICRODIL/AGAR DIL: CPT | Performed by: PODIATRIST

## 2025-02-12 PROCEDURE — 87075 CULTR BACTERIA EXCEPT BLOOD: CPT | Performed by: PODIATRIST

## 2025-02-12 PROCEDURE — 72195 MRI PELVIS W/O DYE: CPT | Performed by: STUDENT IN AN ORGANIZED HEALTH CARE EDUCATION/TRAINING PROGRAM

## 2025-02-12 PROCEDURE — 87205 SMEAR GRAM STAIN: CPT | Performed by: PODIATRIST

## 2025-02-12 PROCEDURE — 74181 MRI ABDOMEN W/O CONTRAST: CPT | Performed by: STUDENT IN AN ORGANIZED HEALTH CARE EDUCATION/TRAINING PROGRAM

## 2025-02-12 PROCEDURE — 87077 CULTURE AEROBIC IDENTIFY: CPT | Performed by: PODIATRIST

## 2025-02-12 PROCEDURE — 87070 CULTURE OTHR SPECIMN AEROBIC: CPT | Performed by: PODIATRIST

## 2025-02-13 ENCOUNTER — OFFICE VISIT (OUTPATIENT)
Dept: PULMONOLOGY | Facility: CLINIC | Age: 70
End: 2025-02-13
Payer: MEDICARE

## 2025-02-13 VITALS
BODY MASS INDEX: 38.69 KG/M2 | DIASTOLIC BLOOD PRESSURE: 65 MMHG | OXYGEN SATURATION: 95 % | SYSTOLIC BLOOD PRESSURE: 102 MMHG | WEIGHT: 285.63 LBS | HEART RATE: 78 BPM | HEIGHT: 72 IN

## 2025-02-13 DIAGNOSIS — G47.33 OBSTRUCTIVE SLEEP APNEA: Primary | ICD-10-CM

## 2025-02-13 PROCEDURE — 99213 OFFICE O/P EST LOW 20 MIN: CPT | Performed by: INTERNAL MEDICINE

## 2025-02-13 NOTE — PROGRESS NOTES
Patient informed of test results and all DM's recommendations. Voiced understanding. Pt has an appt to see Dr. Comsb on 2/27/25. Pt advised to seek ER care should he develop any fever/night sweats. F/U scheduled with DM. DM updated r/e appt with ID on 2/27/25.

## 2025-02-13 NOTE — PROGRESS NOTES
The patient is a 70-year-old male who I know well from prior evaluation comes in now to follow-up his sleep apnea.  He notes today he is doing well.  His average daily usage is 6 hours and 24 minutes and residual events are 2/h.  He is benefiting from ongoing usage.  He recently had biopsy of a skin lesion and there is a 5 cm tract cutaneously beneath the umbilicus at the left abdomen.  He has been evaluated by dermatology.  This is showing 1+ growth of Pseudomonas.  He is not on antibiotic at present.  He is going to call his infectious disease specialist today.    Review of Systems:  Vision normal. Ear nose and throat normal. Bowel normal. Bladder function normal. No depression. No thyroid disease. No lymphatic system concerns.  No rash. Muscles and joints unremarkable. No weight loss no weight gain.    Physical Examination:  Vital signs normal. HEENT examination is unremarkable with pupils equal round and reactive to light and accommodation. Neck without adenopathy, thyromegaly, JVD nor bruit. Lungs clear to auscultation and percussion. Cardiac regular rate and rhythm no murmur. Abdomen nontender, without hepatosplenomegaly and no mass appreciable. Extremities and Musculoskeletal without clubbing cyanosis nor edema, and mobility acceptable. Neurologic grossly intact with symmetric tone and strength and reflex.    Assessment and plan:  1.  Pseudomonal skin infection-follow-up ID and dermatology.  I reviewed his MRI of the abdomen with him.    2.  Lingering cough-minimal and patient does not feel like he needs any further evaluation.  He quit smoking in 1989.    3.  Obstructive sleep apnea-excellent control.    Recommendations: Vigilance with CPAP every night all night, weight loss, avoid alcohol and sedating drug and never drive if sleepy.  See me in 1 year or sooner if needed.

## 2025-02-18 ENCOUNTER — OFFICE VISIT (OUTPATIENT)
Dept: DERMATOLOGY CLINIC | Facility: CLINIC | Age: 70
End: 2025-02-18
Payer: MEDICARE

## 2025-02-18 DIAGNOSIS — D49.2 NEOPLASM OF UNSPECIFIED BEHAVIOR OF BONE, SOFT TISSUE, AND SKIN: Primary | ICD-10-CM

## 2025-02-18 PROCEDURE — 99213 OFFICE O/P EST LOW 20 MIN: CPT | Performed by: STUDENT IN AN ORGANIZED HEALTH CARE EDUCATION/TRAINING PROGRAM

## 2025-02-18 NOTE — PROGRESS NOTES
New patient     Referred by:   Macey Hastings DO ..     CHIEF COMPLAINT: FBSE    HISTORY OF PRESENT ILLNESS: .    1. Lesion  Location: Abdomen   Duration: 2 years  Bleeding, growing, changing?: No  Scaly?:Yes  Itchy?:No    Current treatment: Coconut oil,   Past treatments: Gentamicin    2. Lesion  Location: L hip   Duration: 30 year  Bleeding, growing, changing?: No  Scaly?:No    Itchy?:No    Current treatment: None  Past treatments: None      - No particular lesions of concern.       DERM HISTORY:  History of skin cancer: No  History of melanoma: No    FAMILY HISTORY:  History of melanoma: No    PAST MEDICAL HISTORY:  Past Medical History:    Adenomatous polyp    cscopy Dr. Singh 2010-misael. polyp, divertic, int hemrds    Allergic rhinitis    Asthma (HCC)    BPH (benign prostatic hyperplasia)    Cancer (HCC)    Carcinoma in situ of colon    cecum-R hemicolectomy Dr. Lugo    Diabetes (HCC)    Diabetes mellitus (HCC)    Diverticulosis    Gallstone    Gallstone seen on US liver     Hepatic steatosis    US liver 2009    High blood pressure    Hyperlipemia    IBS (irritable bowel syndrome)    Kidney transplant recipient (HCC)    Kidney transplant performed 12/15/2022 at [facility] ( donor kidney transplant).    Microalbuminuria    Migraines    hx in grade school    Neuropathy    diabetic, jorge hands and feet    Obesity    Pancytopenia (HCC)    Dr. 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   Skin as per HPI    Medications  Current Outpatient Medications   Medication Sig Dispense Refill    amoxicillin clavulanate 500-125 MG Oral Tab Take 1 tablet by mouth 2 (two) times daily with meals.      tamsulosin 0.4 MG Oral Cap Take 2 capsules (0.8 mg total) by mouth daily. Take 1/2 hour following the same meal each day 180 capsule 3    finasteride 5 MG Oral Tab Take 1 tablet (5 mg total) by mouth daily. 90 tablet 3    glycopyrrolate 1 MG Oral Tab Take 1 tablet (1 mg total) by mouth 2 (two) times daily.      lisinopril 10 MG Oral Tab Take 1 tablet (10 mg total) by mouth daily.      Fexofenadine HCl 30 MG Oral Tablet Dispersible Take 6 tablets (180 mg total) by mouth daily.      sodium bicarbonate 650 MG Oral Tab Take 2 tablets (1,300 mg total) by mouth 4 (four) times daily. Takes at 0900 and 2200      MONTELUKAST 10 MG Oral Tab TAKE 1 TABLET BY MOUTH NIGHTLY 90 tablet 3    fenofibrate 48 MG Oral Tab Take 1 tablet (48 mg total) by mouth daily.      Tacrolimus ER (ENVARSUS XR) 1 MG Oral Tablet 24 Hr Take 2 tablets (2 mg total) by mouth daily.      cyanocobalamin 1000 MCG Oral Tab Take 1 tablet (1,000 mcg total) by mouth daily.      NON FORMULARY Take 266 mg by mouth in the morning, at noon, in the evening, and at bedtime. MAGNESIUM + PROTEIN 1 daily    Patient takes 4 times a day at 0900, 1200, 1500, 2200      semaglutide (OZEMPIC, 0.25 OR 0.5 MG/DOSE,) 2 MG/1.5ML Subcutaneous Solution Pen-injector Inject 1 mg into the skin once a week.      Probiotic Product (PROBIOTIC-10 OR) Take 1 capsule by mouth daily.      fluticasone propionate 50 MCG/ACT Nasal Suspension by Each Nare route as needed for Rhinitis.      metoprolol tartrate 100 MG Oral Tab Take 1 tablet (100 mg total) by mouth 2 (two) times daily.      predniSONE 10 MG Oral Tab Take 1 tablet (10 mg total) by mouth daily.      ELIQUIS 5 MG Oral Tab Take 1 tablet (5 mg total) by mouth 2 (two) times daily.       ergocalciferol 1.25 MG (27884 UT) Oral Cap Take 1 capsule (50,000 Units total) by mouth every 7 days.      Syringe/Needle, Disp, (BD LUER-PILY SYRINGE) 23G X 1\" 3 ML Does not apply Misc USE FOR B12 INJECTIONS AS DIRECTED 12 each 0    sulfamethoxazole-trimethoprim -160 MG Oral Tab per tablet Takes Mondays and Thursdays per transplant clinic  0    atorvastatin 40 MG Oral Tab Take 1 tablet (40 mg total) by mouth nightly.      HUMULIN R U-500, CONCENTRATED, 500 UNIT/ML Subcutaneous Solution 250 units/day via Insulin pump      PORFIRIO CONTOUR NEXT TEST In Vitro Strip          PHYSICAL EXAM:  Physician accompanied by chaperone during examination   General: awake, alert, no acute distress  Skin: Skin exam was performed today including the following Abdomen Pertinent findings include:   - L lower abdomen with well healed biopsy site suture removed today     ASSESSMENT & PLAN:  Pathophysiology of diagnoses discussed with patient.  Therapeutic options reviewed. Risks, benefits, and alternatives discussed with patient. Instructions reviewed at length.    #Neoplasm- L lower abdomen MRI shows tract to subcutaneous fat  #Pseudomonas on culture  - Continue to monitor biopsy site. If causing patient any issues in future will recommend excision.   - Recommended eval by ID to make sure he does not need abx for culture findings of serosanguineous drainage from biopsy site.     Return to clinic:  yearly for FBSE  or sooner if something concerning arises     Kennedy Arroyo MD    By signing my name below, Gurinder ROMERO MA,  attest that this documentation has been prepared under the direction and in the presence of Kennedy Arroyo MD.   Electronically Signed: Gurinder HERNANDEZ MA, 2/4/2025, 8:58 AM.    IKennedy MD,  personally performed the services described in this documentation. All medical record entries made by the scribe were at my direction and in my presence.  I have reviewed the chart and agree that the record reflects my  personal performance and is accurate and complete.  Kennedy Arroyo MD, 2/4/2025, 9:13 AM

## 2025-02-19 ENCOUNTER — HOSPITAL ENCOUNTER (OUTPATIENT)
Dept: BONE DENSITY | Facility: HOSPITAL | Age: 70
Discharge: HOME | End: 2025-02-19
Attending: INTERNAL MEDICINE
Payer: MEDICARE

## 2025-02-19 DIAGNOSIS — Z92.25 PERSONAL HISTORY OF IMMUNOSUPRESSION THERAPY: ICD-10-CM

## 2025-02-19 DIAGNOSIS — Z94.0 HISTORY OF KIDNEY TRANSPLANT (HCC): ICD-10-CM

## 2025-02-19 DIAGNOSIS — M85.88 OTHER SPECIFIED DISORDERS OF BONE DENSITY AND STRUCTURE, OTHER SITE: ICD-10-CM

## 2025-02-19 PROCEDURE — 77080 DXA BONE DENSITY AXIAL: CPT | Performed by: INTERNAL MEDICINE

## 2025-02-21 ENCOUNTER — TELEPHONE (OUTPATIENT)
Dept: INTERNAL MEDICINE CLINIC | Facility: CLINIC | Age: 70
End: 2025-02-21

## 2025-02-28 ENCOUNTER — LAB ENCOUNTER (OUTPATIENT)
Dept: LAB | Facility: HOSPITAL | Age: 70
End: 2025-02-28
Payer: MEDICARE

## 2025-02-28 DIAGNOSIS — Z79.4 ENCOUNTER FOR LONG-TERM (CURRENT) USE OF INSULIN (HCC): ICD-10-CM

## 2025-02-28 DIAGNOSIS — E11.22 TYPE 2 DIABETES MELLITUS WITH ESRD (END-STAGE RENAL DISEASE) (HCC): ICD-10-CM

## 2025-02-28 DIAGNOSIS — I25.10 CORONARY ATHEROSCLEROSIS OF NATIVE CORONARY ARTERY: ICD-10-CM

## 2025-02-28 DIAGNOSIS — I48.0 PAROXYSMAL ATRIAL FIBRILLATION (HCC): Primary | ICD-10-CM

## 2025-02-28 DIAGNOSIS — N18.6 TYPE 2 DIABETES MELLITUS WITH ESRD (END-STAGE RENAL DISEASE) (HCC): ICD-10-CM

## 2025-02-28 LAB
ALBUMIN SERPL-MCNC: 3.9 G/DL (ref 3.2–4.8)
ALBUMIN/GLOB SERPL: 2 {RATIO} (ref 1–2)
ALP LIVER SERPL-CCNC: 40 U/L
ALT SERPL-CCNC: 13 U/L
ANION GAP SERPL CALC-SCNC: 8 MMOL/L (ref 0–18)
AST SERPL-CCNC: 13 U/L (ref ?–34)
BASOPHILS # BLD AUTO: 0.02 X10(3) UL (ref 0–0.2)
BASOPHILS NFR BLD AUTO: 0.4 %
BILIRUB SERPL-MCNC: 0.4 MG/DL (ref 0.2–1.1)
BUN BLD-MCNC: 38 MG/DL (ref 9–23)
BUN/CREAT SERPL: 21.6 (ref 10–20)
CALCIUM BLD-MCNC: 8.9 MG/DL (ref 8.7–10.4)
CHLORIDE SERPL-SCNC: 109 MMOL/L (ref 98–112)
CHOLEST SERPL-MCNC: 80 MG/DL (ref ?–200)
CO2 SERPL-SCNC: 23 MMOL/L (ref 21–32)
CREAT BLD-MCNC: 1.76 MG/DL
DEPRECATED RDW RBC AUTO: 48.8 FL (ref 35.1–46.3)
EGFRCR SERPLBLD CKD-EPI 2021: 41 ML/MIN/1.73M2 (ref 60–?)
EOSINOPHIL # BLD AUTO: 0.05 X10(3) UL (ref 0–0.7)
EOSINOPHIL NFR BLD AUTO: 0.9 %
ERYTHROCYTE [DISTWIDTH] IN BLOOD BY AUTOMATED COUNT: 14.8 % (ref 11–15)
EST. AVERAGE GLUCOSE BLD GHB EST-MCNC: 137 MG/DL (ref 68–126)
FASTING PATIENT LIPID ANSWER: YES
FASTING STATUS PATIENT QL REPORTED: YES
GLOBULIN PLAS-MCNC: 2 G/DL (ref 2–3.5)
GLUCOSE BLD-MCNC: 73 MG/DL (ref 70–99)
HBA1C MFR BLD: 6.4 % (ref ?–5.7)
HCT VFR BLD AUTO: 34.3 %
HDLC SERPL-MCNC: 31 MG/DL (ref 40–59)
HGB BLD-MCNC: 11.1 G/DL
IMM GRANULOCYTES # BLD AUTO: 0.07 X10(3) UL (ref 0–1)
IMM GRANULOCYTES NFR BLD: 1.3 %
LDLC SERPL CALC-MCNC: 25 MG/DL (ref ?–100)
LYMPHOCYTES # BLD AUTO: 1.23 X10(3) UL (ref 1–4)
LYMPHOCYTES NFR BLD AUTO: 22.2 %
MCH RBC QN AUTO: 29.1 PG (ref 26–34)
MCHC RBC AUTO-ENTMCNC: 32.4 G/DL (ref 31–37)
MCV RBC AUTO: 89.8 FL
MONOCYTES # BLD AUTO: 0.52 X10(3) UL (ref 0.1–1)
MONOCYTES NFR BLD AUTO: 9.4 %
NEUTROPHILS # BLD AUTO: 3.64 X10 (3) UL (ref 1.5–7.7)
NEUTROPHILS # BLD AUTO: 3.64 X10(3) UL (ref 1.5–7.7)
NEUTROPHILS NFR BLD AUTO: 65.8 %
NONHDLC SERPL-MCNC: 49 MG/DL (ref ?–130)
OSMOLALITY SERPL CALC.SUM OF ELEC: 298 MOSM/KG (ref 275–295)
PLATELET # BLD AUTO: 117 10(3)UL (ref 150–450)
PLATELETS.RETICULATED NFR BLD AUTO: 5.5 % (ref 0–7)
POTASSIUM SERPL-SCNC: 3.9 MMOL/L (ref 3.5–5.1)
PROT SERPL-MCNC: 5.9 G/DL (ref 5.7–8.2)
RBC # BLD AUTO: 3.82 X10(6)UL
SODIUM SERPL-SCNC: 140 MMOL/L (ref 136–145)
TRIGL SERPL-MCNC: 137 MG/DL (ref 30–149)
TSI SER-ACNC: 1.77 UIU/ML (ref 0.55–4.78)
VLDLC SERPL CALC-MCNC: 18 MG/DL (ref 0–30)
WBC # BLD AUTO: 5.5 X10(3) UL (ref 4–11)

## 2025-02-28 PROCEDURE — 84443 ASSAY THYROID STIM HORMONE: CPT

## 2025-02-28 PROCEDURE — 83036 HEMOGLOBIN GLYCOSYLATED A1C: CPT

## 2025-02-28 PROCEDURE — 36415 COLL VENOUS BLD VENIPUNCTURE: CPT

## 2025-02-28 PROCEDURE — 80053 COMPREHEN METABOLIC PANEL: CPT

## 2025-02-28 PROCEDURE — 85025 COMPLETE CBC W/AUTO DIFF WBC: CPT

## 2025-02-28 PROCEDURE — 80061 LIPID PANEL: CPT

## 2025-03-25 ENCOUNTER — OFFICE VISIT (OUTPATIENT)
Dept: INTERNAL MEDICINE CLINIC | Facility: CLINIC | Age: 70
End: 2025-03-25
Payer: MEDICARE

## 2025-03-25 VITALS
HEART RATE: 79 BPM | BODY MASS INDEX: 38.42 KG/M2 | WEIGHT: 283.63 LBS | DIASTOLIC BLOOD PRESSURE: 52 MMHG | HEIGHT: 72 IN | TEMPERATURE: 98 F | OXYGEN SATURATION: 97 % | SYSTOLIC BLOOD PRESSURE: 114 MMHG

## 2025-03-25 DIAGNOSIS — Z12.5 SCREENING FOR MALIGNANT NEOPLASM OF PROSTATE: Primary | ICD-10-CM

## 2025-03-25 PROCEDURE — 99214 OFFICE O/P EST MOD 30 MIN: CPT | Performed by: INTERNAL MEDICINE

## 2025-03-25 RX ORDER — FEXOFENADINE HCL 180 MG/1
180 TABLET ORAL DAILY
COMMUNITY

## 2025-03-25 NOTE — PROGRESS NOTES
Rolly Sanchez is a 70 year old male  Chief Complaint   Patient presents with    Checkup     6 month     HPI:   Rolly Sanchez is a 70 year old male who presents for a 6 month follow up.    LOV 10/8/24.    Since, he has been doing okay.    Pt has an infected toe being treated by podiatry Dr. Hagan.    Pt has blurry spot in vision managed by ophthalmology Dr. Joya.    Had drainage from an abdominal wound, seen by ID no abx tx recommended per pt report.    Wt Readings from Last 6 Encounters:   03/25/25 283 lb 9.6 oz (128.6 kg)   02/13/25 285 lb 9.6 oz (129.5 kg)   01/23/25 286 lb (129.7 kg)   10/08/24 276 lb (125.2 kg)   10/07/24 279 lb (126.6 kg)   09/29/24 275 lb 8 oz (125 kg)     Body mass index is 38.46 kg/m².     Current Outpatient Medications   Medication Sig Dispense Refill    fexofenadine 180 MG Oral Tab Take 1 tablet (180 mg total) by mouth daily.      tamsulosin 0.4 MG Oral Cap Take 2 capsules (0.8 mg total) by mouth daily. Take 1/2 hour following the same meal each day 180 capsule 3    finasteride 5 MG Oral Tab Take 1 tablet (5 mg total) by mouth daily. 90 tablet 3    glycopyrrolate 1 MG Oral Tab Take 1 tablet (1 mg total) by mouth 2 (two) times daily.      lisinopril 10 MG Oral Tab Take 1 tablet (10 mg total) by mouth daily.      sodium bicarbonate 650 MG Oral Tab Take 2 tablets (1,300 mg total) by mouth 4 (four) times daily. Takes at 0900 and 2200      MONTELUKAST 10 MG Oral Tab TAKE 1 TABLET BY MOUTH NIGHTLY 90 tablet 3    fenofibrate 48 MG Oral Tab Take 1 tablet (48 mg total) by mouth daily.      Tacrolimus ER (ENVARSUS XR) 1 MG Oral Tablet 24 Hr Take 2 tablets (2 mg total) by mouth daily.      cyanocobalamin 1000 MCG Oral Tab Take 1 tablet (1,000 mcg total) by mouth daily.      NON FORMULARY Take 266 mg by mouth in the morning, at noon, in the evening, and at bedtime. MAGNESIUM + PROTEIN 1 daily    Patient takes 4 times a day at 0900, 1200, 1500, 2200      semaglutide (OZEMPIC, 0.25 OR 0.5 MG/DOSE,) 2  MG/1.5ML Subcutaneous Solution Pen-injector Inject 1 mg into the skin once a week.      Probiotic Product (PROBIOTIC-10 OR) Take 1 capsule by mouth daily.      fluticasone propionate 50 MCG/ACT Nasal Suspension by Each Nare route as needed for Rhinitis.      metoprolol tartrate 100 MG Oral Tab Take 1 tablet (100 mg total) by mouth 2 (two) times daily.      predniSONE 10 MG Oral Tab Take 1 tablet (10 mg total) by mouth daily.      ELIQUIS 5 MG Oral Tab Take 1 tablet (5 mg total) by mouth 2 (two) times daily.      ergocalciferol 1.25 MG (20158 UT) Oral Cap Take 1 capsule (50,000 Units total) by mouth every 7 days.      Syringe/Needle, Disp, (BD LUER-PILY SYRINGE) 23G X 1\" 3 ML Does not apply Misc USE FOR B12 INJECTIONS AS DIRECTED 12 each 0    sulfamethoxazole-trimethoprim -160 MG Oral Tab per tablet Takes  and  per transplant clinic  0    atorvastatin 40 MG Oral Tab Take 1 tablet (40 mg total) by mouth nightly.      HUMULIN R U-500, CONCENTRATED, 500 UNIT/ML Subcutaneous Solution 250 units/day via Insulin pump      PORFIRIO CONTOUR NEXT TEST In Vitro Strip         Past Medical History:    Adenomatous polyp    cscopy Dr. Singh 2010-misael. polyp, divertic, int hemrds    Allergic rhinitis    Asthma (HCC)    BPH (benign prostatic hyperplasia)    Cancer (HCC)    Carcinoma in situ of colon    cecum-R hemicolectomy Dr. Lugo    Diabetes (HCC)    Diabetes mellitus (HCC)    Diverticulosis    Gallstone    Gallstone seen on US liver     Hepatic steatosis    US liver     High blood pressure    Hyperlipemia    IBS (irritable bowel syndrome)    Kidney transplant recipient (HCC)    Kidney transplant performed 12/15/2022 at [facility] ( donor kidney transplant).    Microalbuminuria    Migraines    hx in grade school    Neuropathy    diabetic, jorge hands and feet    Obesity    Pancytopenia (HCC)    Dr. 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 ENDOSCOPY    Cystouretro w/stone remove Bilateral 2022    Cystoscopy, bilateral retrograde pyelogram, bilateral ureteroscopy, manipulation/removal of bilateral blood clots/stones, ureteral stent removal.    Hernia surgery      ventral hernia-at time of colon surgery    Other surgical history  2010    Colon Resection    Peritoneal dialysis  2018    Dr Croft placed PD catheter 2018. started PD 2018 Dr Dexter    Tonsillectomy      T & A    Transplantation of kidney  2022    At [facility]      Family History   Problem Relation Age of Onset    Diabetes Mother     Obesity Mother     Renal Disease Mother         ESRD    Other (Other) Mother     Hypertension Father     Obesity Father     Heart Attack Father     Stroke Father         TIA    Skin cancer Father     Heart Disorder Father     Other (Other) Father     Other (Brain bleed) Father 82         in his sleep-hx brain bleed    Diabetes Sister     Hypertension Brother       Social History:  Social History     Socioeconomic History    Marital status:     Number of children: 2   Occupational History    Occupation:     Tobacco Use    Smoking status: Former     Current packs/day: 0.00     Average packs/day: 0.5 packs/day for 9.0 years (4.5 ttl pk-yrs)     Types: Cigarettes     Start date: 1973     Quit date: 1982     Years since quittin.0    Smokeless tobacco: Never    Tobacco comments:     smokes cigars occasionally   Vaping Use    Vaping status: Never Used   Substance and Sexual Activity    Alcohol use: Not Currently     Alcohol/week: 1.0 standard drink of alcohol    Drug use: No   Other Topics Concern    Caffeine Concern Yes     Comment: 3 cups of coffee per day    History of tanning No    Outdoor occupation No    Pt has a pacemaker No    Pt has a defibrillator No    Reaction to local anesthetic No     Social Drivers of Health     Food Insecurity: No Food Insecurity (2024)    Food Insecurity     Food Insecurity: Never true   Transportation Needs: No Transportation Needs (2024)    Transportation Needs     Lack of Transportation: No   Housing Stability: Low Risk  (2024)    Housing Stability     Housing Instability: No           REVIEW OF SYSTEMS:   GENERAL: feels well otherwise  EYES: + L eye blurred vision seeing ophthalmology  LUNGS: denies shortness of breath, cough or wheezing  CARDIOVASCULAR: denies chest pain or pressure or palpitations  SKIN: + toe infection seeing podiatry    EXAM:   /52   Pulse 79   Temp 97.9 °F (36.6 °C)   Ht 6' (1.829 m)   Wt 283 lb 9.6 oz (128.6 kg)   SpO2 97%   BMI 38.46 kg/m²     GENERAL: well developed, well nourished, in no apparent distress  NEURO: A&O x 3, moves all 4 extremities normally    ASSESSMENT AND PLAN:   Rolly Sanchez is a 70 year old male who presents for a 6 month follow up.    Screening for malignant neoplasm of prostate  - PSA (Screening) [E]; Future    PND  - seen by ENT  - on glycopyrrolate bid, montelukast     Difficulty balancing  Neuropathy  - declined gabapentin  - s/p PT w/improvement     Kidney transplant recipient c/b CMV  viremia  - s/p DDKT 12/15/2022 at [facility]    - Previously on PD w/Dr. Dexter prior to transplant  - Follows with Dr. 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 Dexter     DM2 with retinopathy  - Endocrinologist: Dr. Lainez, next appt 10/26/23  - Most recent hemoglobin A1c 6.1% on 8/26/24  - Current regimen:  - insulin pump - humalog U500   - ozempic 0.5 mg weekly  - Complications:               - CV: atorvastatin 40 mg qhs               - Nephropathy: microalb/cr ratio 8/19/24 wnl               - Neuropathy: yes, declines medications               - Retinopathy: yes, +NPDR, sees Dr. Joya last exam 9/16/24  - Foot exam: sees podiatry Dr. Hagan q3 months. Has therapeutic shoes from [employer]. Hx of R foot ulcer 2/2019 tx at wound center.      Hypertension   - Controlled on current regimen:  - amlodipine 10 mg daily  - metoprolol 100 mg bid  - lisinopril 10 mg daily     Hyperlipidemia  - Cardiologist: Dr. Cueto at [facility]/Dr. Gil  - s/p card cath 3/10/21   - On atorvastatin 40 at bedtime   - Since kidney transplant in 12/2022, no longer takes fish oil per tx clinic  - off genfibrozil and niacin since 4/2021     Paroxysmal atrial fibrillation  - EP: Dr. Emilie Gonzalez, [facility]   - [id number] score: at least 4 (HTN, Age x1, DM, CAD)  - AC: Eliquis  - Rate control: metoprolol 100 mg bid  - Rhythm control: none, monitoring per smart watch     Obesity  - declined referral to bariatric clinic, Dr. Garcia  - recommend diet and exercise  - pt is on ozempic as above     BPH  Hx prostatitis 8/2018  - US kidneys bladder 9/7/18: no hydroneph, 7 mm kidney stone non obst.  ml    - Maty Guardadoer APRN rx tamsulosin   - Follows with Dr. Panda Garcia, started finasteride 3/27/23              - tamsulosin 0.8 mg  daily              - finasteride 5 mg daily     AMELIE  - CPAP rx per Dr. Lemus     Non-obstructive CAD  - Elev calcium heart score 312 in circumflex 2016.   - Echo 7/2/16-LVH  - Dr. Wadas 9/2016 - nuc stress test nl.   - 3/2018-small rev area in apex - Dr. Wadas recom avoid strenuous activity.   - Dr. Gil 10/12/20 - stress test with reversible apical defect in 2018 and if to have renal transplant, then would proceed with angiogram  - s/p card cath 3/10/21 [facility] (transplant eval) w/Dr. Cueto - nonobstructive CAD.   - Follows w Dr. Gil.   - TTE 10/14/21 LVEFF 65%, 12/5/22 LVEF 55%  - On AC, metoprolol 100 mg bid     Microscopic hematuria  - for abnormal CT scan, kidney  - Had cystoscopy with jorge retrogrades/L ureteroscopy, jorge stents 3/4/22 and  jorge ureteroscopy, stents removal 4/22/2022 -Dr. Garcia.  - findings of free-floating blood clot/stone in right renal pelvis and left upper and midpole calyces. Path w/proteinaceous material consistent with peripheral blood elements.  - Dr. Garcia felt no further evaluation needed.     Epidermoid cyst of skin, left groin  - s/p I & D in March 2021 Dr. Croft     Anemia of CKD  Chronic pancytopenia  - used to follow with w/Dr. Ureña for pancytopenia.   - Hx splenomegaly  - On B12 injections now oral b12  - Message from Dr. Molina 9/16/20 about seeing a hematologist for platelet count 86 previously 111 in dialysis clinic -> Saw Dr. Pavon 1/21/21 - mild splenomegaly, she recom monitoring       Healthcare Maintenance  Cancer Screenings:  - Colon: diverticulosis w/unremarkable ileocolonic anastomosis on 12/24/19 w/Dr. Singh, next at 5-10 years. Hx R hemicolectomy 11/2010 for c-i-s colon. Reminded pt to call GI as 5 years will be 12/2024, pt verbalized understanding.     Vaccines:  - Tdap: 12/1/15  - Shingles: shingrix x2, vostavax 9/2017  - Pneumonia: pmvx 3/16/22, prevnar 10/28/14  - Flu: recommend yearly  - COVID-19: UTD  - RSV: plans to obtain     Misc:  - AAA: no mention  of AAA on [facility] CT A/P 10/6/20, 2/23/23   - PSA: 0.73 on 4/19/24  - DEXA: normal on 2/19/25  - Advanced directives: discussed, wife Suzan is medical power       RTC in 3-4 months or sooner PRN.    For E/M code - 30 minutes spent reviewing performing chart review, obtaining a history, performing a physical exam, reviewing the assessment/plan, placing orders, and completing documentation.     Macey Hastings DO  3/25/2025  10:19 AM

## 2025-04-02 RX ORDER — MONTELUKAST SODIUM 10 MG/1
10 TABLET ORAL NIGHTLY
Qty: 90 TABLET | Refills: 0 | Status: SHIPPED | OUTPATIENT
Start: 2025-04-02

## 2025-04-02 RX ORDER — FENOFIBRATE 48 MG/1
48 TABLET, COATED ORAL DAILY
Qty: 90 TABLET | Refills: 3 | Status: SHIPPED | OUTPATIENT
Start: 2025-04-02

## 2025-04-02 NOTE — PROGRESS NOTES
[facility] Urology  Follow-Up Visit    HPI: Rolly Sanchez is a 70 year old male presents for a follow up visit. Patient was last seen on 01/10/2025.     INTERVAL HISTORY: Patient previously seen regarding history of microscopic hematuria in light of end-stage renal disease on peritoneal dialysis. CT urogram 2022 revealed bilateral upper tract filling defects, concerning for blood clots or urothelial neoplasms.    Patient underwent cystoscopy with bilateral retrograde pyelogram, left nephroureteroscopy with biopsy and bilateral ureteral stent insertion.  Findings were consistent with a free-floating blood clot in the left kidney.  Fungal cultures were negative.    Second look cystoscopy with bilateral retrograde pyelogram, bilateral nephrouteroscopy with manipulation and removal of clots in the bilateral kidneys, and bilateral stent removal.  Clinically, no evidence of tumors.  Free-floating blood clots/proteinaceous debris were noted within the bilateral collecting systems.    Again pathology results consistent with proteinaceous material with peripheral blood elements.    He underwent  donor kidney transplant at [facility] on 12/15/2022.  Post kidney transplant allograft and bladder ultrasounds have been revealing an elevated PVR volume in the 140 to 170 mL range.     Patient was on Flomax for BPH.  Maximized medical therapy with the addition of finasteride 3/27/2023. Tamsulosin was increased to 0.8 mg at bedtime at his previous visit. He states his urinary symptoms are intermittent.     Current IPSS Score of 10 (////) with QoL Index of 1. PVR >166 mL after voiding an hour ago.    His IPSS score at last visit was 18 (////) with QoL Index Score of 3,  mL.. Previous IPSS score was down to 9 (///3/) with a quality-of-life score of 1.     States he has had two UTIs in the last year, had a UTI in September that he was hospitalized for. No UTIs since his previous visit. He denies gross  hematuria or dysuria.      PSA Screen   Latest Ref Rng <=4.00    2021 1.80    2024 0.73        1. Microscopic Hematuria --> Bilateral upper tract filling defects on CT-Urogram  2. BPH with LUTS  Patient with history of BPH with LUTS, previously seen by FRANCIS Glover in 2018 and started on Flomax with improvement in symptoms. Current IPSS score of 15 (0/3/3/2/5//) with a quality-of-life score of 3.     Recently hospitalized 10/2021 for fluid retention. Urinalysis revealed microscopic hematuria with >10 RBCs per hpf. Repeat urinalysis 2021 persistent microscopic hematuria revealing 6-10 RBCs per hpf. No UTI as culture was negative.     Urine cytology 2021 was benign.     CT urogram completed 2022 revealing \"filling defects in the right renal pelvis, left upper pole and interpolar renal collecting system may represent blood clots or urothelial neoplasms\".     Patient denies gross hematuria or dysuria. No history of nephrolithiasis or recurrent UTIs. No flank pain.     No family history of prostate cancer. Screening PSA level 2021 normal at 1.1 ng/mL.     Of note he has ESRD and was on peritoneal dialysis since 2018. His nephrologist is Dr. Dexter.  He underwent  donor kidney transplant at [facility] on 12/15/2022.      Patient underwent cystoscopy with bilateral retrograde pyelogram, left nephroureteroscopy with biopsy and bilateral ureteral stent insertion.  Findings were consistent with a free-floating blood clot in the left kidney.  Fungal cultures were negative.    Patient underwent second look cystoscopy with bilateral retrograde pyelogram, bilateral nephrouteroscopy with manipulation and removal of clots in the bilateral kidneys, and bilateral stent removal.  Clinically, no evidence of tumors.  Free-floating blood clots/proteinaceous debris were noted within the bilateral collecting systems.    Again pathology results consistent with proteinaceous material with peripheral blood  elements.    - 1/2023 F/U: Patient underwent kidney transplant 12/2022.  Post transplant allograft ultrasound revealing elevated PVR in the 100-200 mL range.  IPSS score 16 (2/4/1/1/4/1/3) with a quality-of-life score of 3.  On Flomax.  .  Monitor and continue Flomax.  Follow-up in 6 to 8 weeks for uroflow.    - 3/2023 F/U: Uroflow + PVR: Voided 61 mL, Qmax 7.1 mL/s, Qave 4.2 mL/s, flow time 14.4 sec, voiding time 27.6 sec, voiding curve shape flattened with multiple low peaks,  mL.  Elected maximizing medical therapy with the addition of finasteride.    - 7/2023 F/U: On finasteride and tamsulosin.  Improved LUTS.  IPSS down to 12 (1/2/2/1/3/1/2) with a quality-of-life score of 2.  PVR 0 ml.  [facility].    - 1/2024 F/U: On Flomax and Proscar.  Improved LUTS.  IPSS 9 (1/1/0/1/3/1/2) with a quality-of-life score of 1.  PVR 97 mL.  [facility].    -01/2025 F/U: On Flomax and Proscar.  Worsened LUTS with 2 UTIs in the past year.  IPSS 18 (2/4/1/5/2/1/2) with a quality-of-life score of 3.   mL.  Increase Flomax to 0.8 mg.    -04/2025 F/U: On Flomax 0.8 and Proscar. IPSS 10 with QoL 1. PVR >166 mL after voiding 1 hour ago. [facility]      PAST MEDICAL HISTORY: ESRD on peritoneal dialysis since 7/2018, HTN, HLD, DM since 1982, AMELIE, BPH, nonobstructive CAD, colon cancer.     PAST SURGICAL HISTORY: Open right hemicolectomy and ventral hernia repair 2010 Dr. Gamble, PD catheter insertion 6/2018 Dr. Croft, appendectomy, tonsillectomy, renal transplant surgery 12/2022.     SOCIAL HISTORY:  and has 2 children. Former smoker 1 pack/week and quit 1982. He drinks social alcohol. Retired  however currently works as an [employer] .     Reviewed past medical, surgical, family, and social history.  Reviewed med list and allergies.      REVIEW OF SYSTEMS:  Pertinent positives and negatives per HPI. A 10-point ROS was performed and is otherwise negative.       EXAM:  There were no vitals taken for this visit.      Physical Exam  Constitutional:       Appearance: He is well-developed. He is obese.   HENT:      Head: Normocephalic.   Eyes:      General: No scleral icterus.  Cardiovascular:      Rate and Rhythm: Normal rate.   Pulmonary:      Effort: Pulmonary effort is normal.   Skin:     General: Skin is warm and dry.   Neurological:      Mental Status: He is alert and oriented to person, place, and time.   Psychiatric:         Mood and Affect: Mood normal.         Behavior: Behavior normal.       PATHOLOGY:    Non-Gynecologic Cytology                          Case: [id number]      Provider:  Panda Garcia MD       Collected:           03/04/2022    A.  Right renal pelvis; barbotage:   Adequacy: Satisfactory for evaluation  General Category: Negative for high-grade urothelial carcinoma ([facility])  Diagnosis:  Reactive urothelial cells present  Numerous neutrophils and neutrophilic debris present     B.  Left renal pelvis; barbotage:  Adequacy: Satisfactory for evaluation  General Category: Negative for high-grade urothelial carcinoma ([facility])  Diagnosis:  Reactive urothelial cells present  Numerous neutrophils and neutrophilic debris present     C.  Left renal pelvis; barbotage:  Adequacy: Satisfactory for evaluation  General Category: Negative for high-grade urothelial carcinoma ([facility])  Diagnosis:  Rare degenerated urothelial cells present   Numerous neutrophils and neutrophilic debris present      Pathology                                Case: [id number]   Provider:  Panda Garcia MD       Collected:           04/22/2022    A. Blood clot right pelvis:  Aggregate of proteinaceous debris with rare peripheral blood elements.     B. Left kidney stone:   Calculus fragments (see comment).     C. Right kidney stone:   Calculus fragments (see comment).     D. Blood clot left pelvis:  Aggregate of proteinaceous debris with rare peripheral blood elements.      LABS:  See HPI for details.      IMAGING:    CT-UROGRAM  (1/17/2022):  1. Filling defects in the right renal pelvis, left upper pole and interpolar renal collecting system may represent blood clots or urothelial neoplasms.  Urology consultation recommended. 2. Prostate enlargement. 3. Mild splenomegaly. 4. Peritoneal dialysis catheter with tip in the left lower quadrant. 5. Post right hemicolectomy.  Colonic diverticulosis. 6. Generalized atherosclerotic vascular calcification including coronary artery calcification. 7. Mitral annular calcification.       IMPRESSION:  70 year old comorbid male with past medical history as above. Microscopic hematuria evaluation revealing filling defects in the bilateral renal collecting systems, concerning for blood clots or urothelial neoplasms. Voided cytology benign.     Patient underwent cystoscopy with bilateral staged nephro ureteroscopy, biopsy, and washings from the bilateral collecting systems.  Clinical findings consistent with free-floating blood clots/proteinaceous material in the collecting system.  No gross evidence of tumor.    Cytologies have been benign.    Patient status post kidney transplant.  He has mildly elevated PVR volumes on ultrasound following transplant.      Currently on maximal medical therapy with finasteride and tamsulosin.  Worsening LUTS with 2 UTIs in the past year.  Postvoid residual continues to be acceptable.    Patient verbalized understanding and agrees to the plan of care.  All questions answered.       PLAN:  1. Continue Tamsulosin to 0.8 mg every day     2.  Continue Finasteride 5 mg daily.     3. Send UA with Reflex culture    Kidney transplant management per medical and surgical teams at [facility].    RTC for follow-up in 1 year for an updated IPSS score and bladder scan/PVR.      Iqra Prado, SHOSHANA  04/10/2025

## 2025-04-02 NOTE — TELEPHONE ENCOUNTER
Routed to Dr. Hastings-- patient is requesting a prescription for Fenofibrate. This medication has not been previously filled by you. Please advise on pending Rx. Thanks!

## 2025-04-10 ENCOUNTER — OFFICE VISIT (OUTPATIENT)
Dept: SURGERY | Facility: CLINIC | Age: 70
End: 2025-04-10
Payer: MEDICARE

## 2025-04-10 DIAGNOSIS — N40.1 BENIGN PROSTATIC HYPERPLASIA WITH LOWER URINARY TRACT SYMPTOMS, SYMPTOM DETAILS UNSPECIFIED: Primary | ICD-10-CM

## 2025-04-10 LAB
BILIRUB UR QL: NEGATIVE
GLUCOSE UR-MCNC: NORMAL MG/DL
KETONES UR-MCNC: NEGATIVE MG/DL
LEUKOCYTE ESTERASE UR QL STRIP.AUTO: 500
PH UR: 6.5 [PH] (ref 5–8)
SP GR UR STRIP: 1.01 (ref 1–1.03)
UROBILINOGEN UR STRIP-ACNC: NORMAL
WBC #/AREA URNS AUTO: >50 /HPF
WBC CLUMPS UR QL AUTO: PRESENT /HPF

## 2025-04-10 PROCEDURE — 99214 OFFICE O/P EST MOD 30 MIN: CPT

## 2025-04-14 ENCOUNTER — TELEPHONE (OUTPATIENT)
Dept: SURGERY | Facility: CLINIC | Age: 70
End: 2025-04-14

## 2025-04-14 NOTE — TELEPHONE ENCOUNTER
Patient states that he is returning a call from this office  He thought it might be about results.  Please call.

## 2025-04-21 ENCOUNTER — TELEPHONE (OUTPATIENT)
Dept: INTERNAL MEDICINE CLINIC | Facility: CLINIC | Age: 70
End: 2025-04-21

## 2025-04-21 ENCOUNTER — LAB ENCOUNTER (OUTPATIENT)
Dept: LAB | Facility: HOSPITAL | Age: 70
End: 2025-04-21
Attending: INTERNAL MEDICINE
Payer: MEDICARE

## 2025-04-21 DIAGNOSIS — E55.9 VITAMIN D DEFICIENCY DISEASE: ICD-10-CM

## 2025-04-21 DIAGNOSIS — Z94.0 KIDNEY TRANSPLANTED (HCC): ICD-10-CM

## 2025-04-21 DIAGNOSIS — E11.22 TYPE 2 DIABETES MELLITUS WITH CHRONIC KIDNEY DISEASE, WITH LONG-TERM CURRENT USE OF INSULIN, UNSPECIFIED CKD STAGE (HCC): ICD-10-CM

## 2025-04-21 DIAGNOSIS — N39.0 URINARY TRACT INFECTION, SITE NOT SPECIFIED: ICD-10-CM

## 2025-04-21 DIAGNOSIS — E53.8 VITAMIN B12 DEFICIENCY: ICD-10-CM

## 2025-04-21 DIAGNOSIS — R80.9 PROTEINURIA: ICD-10-CM

## 2025-04-21 DIAGNOSIS — B25.9 CYTOMEGALOVIRAL DISEASE (HCC): ICD-10-CM

## 2025-04-21 DIAGNOSIS — E78.5 HYPERLIPIDEMIA: ICD-10-CM

## 2025-04-21 DIAGNOSIS — E83.42 HYPOMAGNESEMIA: ICD-10-CM

## 2025-04-21 DIAGNOSIS — D70.8 OTHER NEUTROPENIA: ICD-10-CM

## 2025-04-21 DIAGNOSIS — Z12.5 SCREENING FOR MALIGNANT NEOPLASM OF PROSTATE: ICD-10-CM

## 2025-04-21 DIAGNOSIS — Z79.4 TYPE 2 DIABETES MELLITUS WITH CHRONIC KIDNEY DISEASE, WITH LONG-TERM CURRENT USE OF INSULIN, UNSPECIFIED CKD STAGE (HCC): ICD-10-CM

## 2025-04-21 DIAGNOSIS — R78.81 BACTEREMIA: ICD-10-CM

## 2025-04-21 LAB
ANION GAP SERPL CALC-SCNC: 8 MMOL/L (ref 0–18)
BASOPHILS # BLD AUTO: 0.02 X10(3) UL (ref 0–0.2)
BASOPHILS NFR BLD AUTO: 0.3 %
BILIRUB UR QL: NEGATIVE
BUN BLD-MCNC: 50 MG/DL (ref 9–23)
BUN/CREAT SERPL: 32.5 (ref 10–20)
CALCIUM BLD-MCNC: 9.4 MG/DL (ref 8.7–10.4)
CHLORIDE SERPL-SCNC: 110 MMOL/L (ref 98–112)
CO2 SERPL-SCNC: 24 MMOL/L (ref 21–32)
COMPLEXED PSA SERPL-MCNC: 0.59 NG/ML (ref ?–4)
CREAT BLD-MCNC: 1.54 MG/DL (ref 0.7–1.3)
CREAT UR-SCNC: 73.3 MG/DL
DEPRECATED RDW RBC AUTO: 52.6 FL (ref 35.1–46.3)
EGFRCR SERPLBLD CKD-EPI 2021: 48 ML/MIN/1.73M2 (ref 60–?)
EOSINOPHIL # BLD AUTO: 0.07 X10(3) UL (ref 0–0.7)
EOSINOPHIL NFR BLD AUTO: 1.2 %
ERYTHROCYTE [DISTWIDTH] IN BLOOD BY AUTOMATED COUNT: 15.6 % (ref 11–15)
FASTING STATUS PATIENT QL REPORTED: YES
GLUCOSE BLD-MCNC: 96 MG/DL (ref 70–99)
GLUCOSE UR-MCNC: NORMAL MG/DL
HCT VFR BLD AUTO: 35.8 % (ref 39–53)
HGB BLD-MCNC: 11.7 G/DL (ref 13–17.5)
HGB UR QL STRIP.AUTO: NEGATIVE
IMM GRANULOCYTES # BLD AUTO: 0.09 X10(3) UL (ref 0–1)
IMM GRANULOCYTES NFR BLD: 1.6 %
KETONES UR-MCNC: NEGATIVE MG/DL
LEUKOCYTE ESTERASE UR QL STRIP.AUTO: 500
LYMPHOCYTES # BLD AUTO: 1.83 X10(3) UL (ref 1–4)
LYMPHOCYTES NFR BLD AUTO: 32 %
MAGNESIUM SERPL-MCNC: 1.8 MG/DL (ref 1.6–2.6)
MCH RBC QN AUTO: 30.1 PG (ref 26–34)
MCHC RBC AUTO-ENTMCNC: 32.7 G/DL (ref 31–37)
MCV RBC AUTO: 92 FL (ref 80–100)
MICROALBUMIN UR-MCNC: 4 MG/DL
MICROALBUMIN/CREAT 24H UR-RTO: 54.6 UG/MG (ref ?–30)
MONOCYTES # BLD AUTO: 0.53 X10(3) UL (ref 0.1–1)
MONOCYTES NFR BLD AUTO: 9.3 %
NEUTROPHILS # BLD AUTO: 3.18 X10 (3) UL (ref 1.5–7.7)
NEUTROPHILS # BLD AUTO: 3.18 X10(3) UL (ref 1.5–7.7)
NEUTROPHILS NFR BLD AUTO: 55.6 %
OSMOLALITY SERPL CALC.SUM OF ELEC: 307 MOSM/KG (ref 275–295)
PH UR: 6.5 [PH] (ref 5–8)
PHOSPHATE SERPL-MCNC: 3.8 MG/DL (ref 2.4–5.1)
PLATELET # BLD AUTO: 119 10(3)UL (ref 150–450)
POTASSIUM SERPL-SCNC: 4.5 MMOL/L (ref 3.5–5.1)
PROT UR-MCNC: 19.7 MG/DL (ref ?–14)
PROT UR-MCNC: NEGATIVE MG/DL
RBC # BLD AUTO: 3.89 X10(6)UL (ref 3.8–5.8)
SODIUM SERPL-SCNC: 142 MMOL/L (ref 136–145)
SP GR UR STRIP: 1.02 (ref 1–1.03)
UROBILINOGEN UR STRIP-ACNC: NORMAL
WBC # BLD AUTO: 5.7 X10(3) UL (ref 4–11)
WBC #/AREA URNS AUTO: >50 /HPF
WBC CLUMPS UR QL AUTO: PRESENT /HPF

## 2025-04-21 PROCEDURE — 82570 ASSAY OF URINE CREATININE: CPT

## 2025-04-21 PROCEDURE — 87077 CULTURE AEROBIC IDENTIFY: CPT

## 2025-04-21 PROCEDURE — 80197 ASSAY OF TACROLIMUS: CPT

## 2025-04-21 PROCEDURE — 81001 URINALYSIS AUTO W/SCOPE: CPT

## 2025-04-21 PROCEDURE — 82043 UR ALBUMIN QUANTITATIVE: CPT

## 2025-04-21 PROCEDURE — 80048 BASIC METABOLIC PNL TOTAL CA: CPT

## 2025-04-21 PROCEDURE — 36415 COLL VENOUS BLD VENIPUNCTURE: CPT

## 2025-04-21 PROCEDURE — 87186 SC STD MICRODIL/AGAR DIL: CPT

## 2025-04-21 PROCEDURE — 83735 ASSAY OF MAGNESIUM: CPT

## 2025-04-21 PROCEDURE — 84100 ASSAY OF PHOSPHORUS: CPT

## 2025-04-21 PROCEDURE — 84156 ASSAY OF PROTEIN URINE: CPT

## 2025-04-21 PROCEDURE — 87086 URINE CULTURE/COLONY COUNT: CPT

## 2025-04-21 PROCEDURE — 85025 COMPLETE CBC W/AUTO DIFF WBC: CPT

## 2025-04-23 LAB
CMV DNA DETECT, QN LOG: NOT DETECTED {LOG_IU}/ML
CMV DNA DETECT, QN: NOT DETECTED [IU]/ML

## 2025-04-24 LAB — TACROLIMUS LVL: 4.9 NG/ML

## 2025-05-30 ENCOUNTER — LAB ENCOUNTER (OUTPATIENT)
Dept: LAB | Facility: HOSPITAL | Age: 70
End: 2025-05-30
Attending: INTERNAL MEDICINE
Payer: MEDICARE

## 2025-05-30 DIAGNOSIS — N18.6 TYPE 2 DIABETES MELLITUS WITH ESRD (END-STAGE RENAL DISEASE) (HCC): Primary | ICD-10-CM

## 2025-05-30 DIAGNOSIS — Z79.4 ENCOUNTER FOR LONG-TERM (CURRENT) USE OF INSULIN (HCC): ICD-10-CM

## 2025-05-30 DIAGNOSIS — E11.22 TYPE 2 DIABETES MELLITUS WITH ESRD (END-STAGE RENAL DISEASE) (HCC): Primary | ICD-10-CM

## 2025-05-30 LAB
ALBUMIN SERPL-MCNC: 4.1 G/DL (ref 3.2–4.8)
ALBUMIN/GLOB SERPL: 2.3 {RATIO} (ref 1–2)
ALP LIVER SERPL-CCNC: 38 U/L (ref 45–117)
ALT SERPL-CCNC: 18 U/L (ref 10–49)
ANION GAP SERPL CALC-SCNC: 9 MMOL/L (ref 0–18)
AST SERPL-CCNC: 17 U/L (ref ?–34)
BILIRUB SERPL-MCNC: 0.5 MG/DL (ref 0.2–1.1)
BUN BLD-MCNC: 34 MG/DL (ref 9–23)
BUN/CREAT SERPL: 19.4 (ref 10–20)
CALCIUM BLD-MCNC: 9.1 MG/DL (ref 8.7–10.4)
CHLORIDE SERPL-SCNC: 112 MMOL/L (ref 98–112)
CHOLEST SERPL-MCNC: 81 MG/DL (ref ?–200)
CO2 SERPL-SCNC: 23 MMOL/L (ref 21–32)
CREAT BLD-MCNC: 1.75 MG/DL (ref 0.7–1.3)
EGFRCR SERPLBLD CKD-EPI 2021: 41 ML/MIN/1.73M2 (ref 60–?)
EST. AVERAGE GLUCOSE BLD GHB EST-MCNC: 126 MG/DL (ref 68–126)
FASTING PATIENT LIPID ANSWER: YES
FASTING STATUS PATIENT QL REPORTED: YES
GLOBULIN PLAS-MCNC: 1.8 G/DL (ref 2–3.5)
GLUCOSE BLD-MCNC: 71 MG/DL (ref 70–99)
HBA1C MFR BLD: 6 % (ref ?–5.7)
HDLC SERPL-MCNC: 32 MG/DL (ref 40–59)
LDLC SERPL CALC-MCNC: 24 MG/DL (ref ?–100)
NONHDLC SERPL-MCNC: 49 MG/DL (ref ?–130)
OSMOLALITY SERPL CALC.SUM OF ELEC: 304 MOSM/KG (ref 275–295)
POTASSIUM SERPL-SCNC: 4 MMOL/L (ref 3.5–5.1)
PROT SERPL-MCNC: 5.9 G/DL (ref 5.7–8.2)
SODIUM SERPL-SCNC: 144 MMOL/L (ref 136–145)
TRIGL SERPL-MCNC: 146 MG/DL (ref 30–149)
VLDLC SERPL CALC-MCNC: 19 MG/DL (ref 0–30)

## 2025-05-30 PROCEDURE — 36415 COLL VENOUS BLD VENIPUNCTURE: CPT

## 2025-05-30 PROCEDURE — 80061 LIPID PANEL: CPT

## 2025-05-30 PROCEDURE — 80053 COMPREHEN METABOLIC PANEL: CPT

## 2025-05-30 PROCEDURE — 83036 HEMOGLOBIN GLYCOSYLATED A1C: CPT

## 2025-06-24 ENCOUNTER — LAB ENCOUNTER (OUTPATIENT)
Dept: LAB | Facility: HOSPITAL | Age: 70
End: 2025-06-24
Attending: INTERNAL MEDICINE
Payer: MEDICARE

## 2025-06-24 DIAGNOSIS — D70.8 OTHER NEUTROPENIA: ICD-10-CM

## 2025-06-24 DIAGNOSIS — R73.9 BLOOD GLUCOSE ELEVATED: ICD-10-CM

## 2025-06-24 DIAGNOSIS — R78.81 BACTEREMIA: ICD-10-CM

## 2025-06-24 DIAGNOSIS — E83.42 HYPOMAGNESEMIA: ICD-10-CM

## 2025-06-24 DIAGNOSIS — R80.9 PROTEINURIA: ICD-10-CM

## 2025-06-24 DIAGNOSIS — Z94.0 KIDNEY REPLACED BY TRANSPLANT (HCC): ICD-10-CM

## 2025-06-24 DIAGNOSIS — B25.9 CYTOMEGALOVIRAL DISEASE (HCC): ICD-10-CM

## 2025-06-24 DIAGNOSIS — E78.5 HYPERLIPEMIA: Primary | ICD-10-CM

## 2025-06-24 DIAGNOSIS — Z51.81 ENCOUNTER FOR THERAPEUTIC DRUG MONITORING: ICD-10-CM

## 2025-06-24 DIAGNOSIS — E55.9 VITAMIN D DEFICIENCY: ICD-10-CM

## 2025-06-24 DIAGNOSIS — N39.0 URINARY TRACT INFECTION, SITE NOT SPECIFIED: ICD-10-CM

## 2025-06-24 LAB
ANION GAP SERPL CALC-SCNC: 8 MMOL/L (ref 0–18)
BASOPHILS # BLD AUTO: 0.02 X10(3) UL (ref 0–0.2)
BASOPHILS NFR BLD AUTO: 0.4 %
BILIRUB UR QL: NEGATIVE
BUN BLD-MCNC: 26 MG/DL (ref 9–23)
BUN/CREAT SERPL: 16 (ref 10–20)
CALCIUM BLD-MCNC: 9 MG/DL (ref 8.7–10.4)
CHLORIDE SERPL-SCNC: 109 MMOL/L (ref 98–112)
CLARITY UR: CLEAR
CO2 SERPL-SCNC: 23 MMOL/L (ref 21–32)
COLOR UR: YELLOW
CREAT BLD-MCNC: 1.62 MG/DL (ref 0.7–1.3)
CREAT UR-SCNC: 86.2 MG/DL
CREAT UR-SCNC: 86.2 MG/DL
DEPRECATED RDW RBC AUTO: 49.1 FL (ref 35.1–46.3)
EGFRCR SERPLBLD CKD-EPI 2021: 45 ML/MIN/1.73M2 (ref 60–?)
EOSINOPHIL # BLD AUTO: 0.05 X10(3) UL (ref 0–0.7)
EOSINOPHIL NFR BLD AUTO: 1 %
ERYTHROCYTE [DISTWIDTH] IN BLOOD BY AUTOMATED COUNT: 14.7 % (ref 11–15)
FASTING STATUS PATIENT QL REPORTED: YES
GLUCOSE BLD-MCNC: 84 MG/DL (ref 70–99)
GLUCOSE UR-MCNC: NEGATIVE MG/DL
HCT VFR BLD AUTO: 34.7 % (ref 39–53)
HGB BLD-MCNC: 11.6 G/DL (ref 13–17.5)
IMM GRANULOCYTES # BLD AUTO: 0.07 X10(3) UL (ref 0–1)
IMM GRANULOCYTES NFR BLD: 1.4 %
KETONES UR-MCNC: NEGATIVE MG/DL
LYMPHOCYTES # BLD AUTO: 1.46 X10(3) UL (ref 1–4)
LYMPHOCYTES NFR BLD AUTO: 30.1 %
MAGNESIUM SERPL-MCNC: 1.8 MG/DL (ref 1.6–2.6)
MCH RBC QN AUTO: 30.4 PG (ref 26–34)
MCHC RBC AUTO-ENTMCNC: 33.4 G/DL (ref 31–37)
MCV RBC AUTO: 90.8 FL (ref 80–100)
MICROALBUMIN UR-MCNC: 11.3 MG/DL
MICROALBUMIN/CREAT 24H UR-RTO: 131.1 UG/MG (ref ?–30)
MONOCYTES # BLD AUTO: 0.48 X10(3) UL (ref 0.1–1)
MONOCYTES NFR BLD AUTO: 9.9 %
NEUTROPHILS # BLD AUTO: 2.77 X10 (3) UL (ref 1.5–7.7)
NEUTROPHILS # BLD AUTO: 2.77 X10(3) UL (ref 1.5–7.7)
NEUTROPHILS NFR BLD AUTO: 57.2 %
NITRITE UR QL STRIP.AUTO: POSITIVE
OSMOLALITY SERPL CALC.SUM OF ELEC: 294 MOSM/KG (ref 275–295)
PH UR: 6.5 [PH] (ref 5–8)
PHOSPHATE SERPL-MCNC: 4.3 MG/DL (ref 2.4–5.1)
PLATELET # BLD AUTO: 124 10(3)UL (ref 150–450)
POTASSIUM SERPL-SCNC: 4.2 MMOL/L (ref 3.5–5.1)
PROT UR-MCNC: 39.2 MG/DL (ref ?–14)
PROT/CREAT UR-RTO: 0.45
RBC # BLD AUTO: 3.82 X10(6)UL (ref 3.8–5.8)
RBC #/AREA URNS AUTO: >10 /HPF
SODIUM SERPL-SCNC: 140 MMOL/L (ref 136–145)
SP GR UR STRIP: 1.01 (ref 1–1.03)
UROBILINOGEN UR STRIP-ACNC: 0.2
WBC # BLD AUTO: 4.9 X10(3) UL (ref 4–11)
WBC #/AREA URNS AUTO: >50 /HPF
WBC CLUMPS UR QL AUTO: PRESENT /HPF

## 2025-06-24 PROCEDURE — 83735 ASSAY OF MAGNESIUM: CPT

## 2025-06-24 PROCEDURE — 81001 URINALYSIS AUTO W/SCOPE: CPT

## 2025-06-24 PROCEDURE — 86769 SARS-COV-2 COVID-19 ANTIBODY: CPT

## 2025-06-24 PROCEDURE — 85025 COMPLETE CBC W/AUTO DIFF WBC: CPT

## 2025-06-24 PROCEDURE — 80197 ASSAY OF TACROLIMUS: CPT

## 2025-06-24 PROCEDURE — 82570 ASSAY OF URINE CREATININE: CPT

## 2025-06-24 PROCEDURE — 87799 DETECT AGENT NOS DNA QUANT: CPT

## 2025-06-24 PROCEDURE — 82043 UR ALBUMIN QUANTITATIVE: CPT

## 2025-06-24 PROCEDURE — 84156 ASSAY OF PROTEIN URINE: CPT

## 2025-06-24 PROCEDURE — 87077 CULTURE AEROBIC IDENTIFY: CPT

## 2025-06-24 PROCEDURE — 36415 COLL VENOUS BLD VENIPUNCTURE: CPT

## 2025-06-24 PROCEDURE — 84100 ASSAY OF PHOSPHORUS: CPT

## 2025-06-24 PROCEDURE — 81015 MICROSCOPIC EXAM OF URINE: CPT

## 2025-06-24 PROCEDURE — 80048 BASIC METABOLIC PNL TOTAL CA: CPT

## 2025-06-24 PROCEDURE — 87086 URINE CULTURE/COLONY COUNT: CPT

## 2025-06-24 PROCEDURE — 87186 SC STD MICRODIL/AGAR DIL: CPT

## 2025-06-25 LAB
BK VIRUS DETECT, PL, QN LOG: NOT DETECTED {LOG_IU}/ML
BK VIRUS DETECT, PL, QN: NOT DETECTED [IU]/ML
CMV DNA DETECT, QN LOG: NOT DETECTED {LOG_IU}/ML
CMV DNA DETECT, QN: NOT DETECTED [IU]/ML

## 2025-06-25 RX ORDER — MONTELUKAST SODIUM 10 MG/1
10 TABLET ORAL NIGHTLY
Qty: 90 TABLET | Refills: 0 | Status: SHIPPED | OUTPATIENT
Start: 2025-06-25

## 2025-06-26 LAB — TACROLIMUS LVL: 4.9 NG/ML

## 2025-07-24 ENCOUNTER — OFFICE VISIT (OUTPATIENT)
Facility: CLINIC | Age: 70
End: 2025-07-24
Payer: MEDICARE

## 2025-07-24 VITALS — BODY MASS INDEX: 38 KG/M2 | SYSTOLIC BLOOD PRESSURE: 138 MMHG | DIASTOLIC BLOOD PRESSURE: 83 MMHG | WEIGHT: 282 LBS

## 2025-07-24 DIAGNOSIS — E11.21 DIABETIC GLOMERULOPATHY (HCC): ICD-10-CM

## 2025-07-24 DIAGNOSIS — N18.32 HYPERTENSIVE KIDNEY DISEASE WITH STAGE 3B CHRONIC KIDNEY DISEASE (HCC): ICD-10-CM

## 2025-07-24 DIAGNOSIS — N18.32 STAGE 3B CHRONIC KIDNEY DISEASE (HCC): Primary | ICD-10-CM

## 2025-07-24 DIAGNOSIS — Z94.0 TRANSPLANTED KIDNEY (HCC): ICD-10-CM

## 2025-07-24 DIAGNOSIS — I12.9 HYPERTENSIVE KIDNEY DISEASE WITH STAGE 3B CHRONIC KIDNEY DISEASE (HCC): ICD-10-CM

## 2025-07-24 PROCEDURE — 99214 OFFICE O/P EST MOD 30 MIN: CPT | Performed by: INTERNAL MEDICINE

## 2025-07-24 NOTE — PROGRESS NOTES
Progress Note     Rolly GERBER Suits    Here for follow up    Medications (Active prior to today's visit):  Current Outpatient Medications   Medication Sig Dispense Refill    montelukast 10 MG Oral Tab TAKE 1 TABLET NIGHTLY 90 tablet 0    fenofibrate 48 MG Oral Tab Take 1 tablet (48 mg total) by mouth daily. 90 tablet 3    fexofenadine 180 MG Oral Tab Take 1 tablet (180 mg total) by mouth daily.      tamsulosin 0.4 MG Oral Cap Take 2 capsules (0.8 mg total) by mouth daily. Take 1/2 hour following the same meal each day 180 capsule 3    finasteride 5 MG Oral Tab Take 1 tablet (5 mg total) by mouth daily. 90 tablet 3    glycopyrrolate 1 MG Oral Tab Take 1 tablet (1 mg total) by mouth 2 (two) times daily.      lisinopril 10 MG Oral Tab Take 1 tablet (10 mg total) by mouth daily.      sodium bicarbonate 650 MG Oral Tab Take 2 tablets (1,300 mg total) by mouth 4 (four) times daily. Takes at 0900 and 2200      Tacrolimus ER (ENVARSUS XR) 1 MG Oral Tablet 24 Hr Take 2 tablets (2 mg total) by mouth daily.      cyanocobalamin 1000 MCG Oral Tab Take 1 tablet (1,000 mcg total) by mouth daily.      NON FORMULARY Take 266 mg by mouth in the morning, at noon, in the evening, and at bedtime. MAGNESIUM + PROTEIN 1 daily    Patient takes 4 times a day at 0900, 1200, 1500, 2200      semaglutide (OZEMPIC, 0.25 OR 0.5 MG/DOSE,) 2 MG/1.5ML Subcutaneous Solution Pen-injector Inject 1 mg into the skin once a week.      Probiotic Product (PROBIOTIC-10 OR) Take 1 capsule by mouth daily.      fluticasone propionate 50 MCG/ACT Nasal Suspension by Each Nare route as needed for Rhinitis.      metoprolol tartrate 100 MG Oral Tab Take 1 tablet (100 mg total) by mouth 2 (two) times daily.      predniSONE 10 MG Oral Tab Take 1 tablet (10 mg total) by mouth daily.      ELIQUIS 5 MG Oral Tab Take 1 tablet (5 mg total) by mouth 2 (two) times daily.      ergocalciferol 1.25 MG (32972 UT) Oral Cap Take 1 capsule (50,000 Units total) by mouth every 7 days.       sulfamethoxazole-trimethoprim -160 MG Oral Tab per tablet Takes Mondays and Thursdays per transplant clinic  0    atorvastatin 40 MG Oral Tab Take 1 tablet (40 mg total) by mouth nightly.      HUMULIN R U-500, CONCENTRATED, 500 UNIT/ML Subcutaneous Solution 250 units/day via Insulin pump      Syringe/Needle, Disp, (BD LUER-PILY SYRINGE) 23G X 1\" 3 ML Does not apply Misc USE FOR B12 INJECTIONS AS DIRECTED 12 each 0    PORFIRIO CONTOUR NEXT TEST In Vitro Strip              ROS:     Constitutional:  Negative for decreased activity, fever, irritability and lethargy  ENMT:  Negative for ear drainage, hearing loss and nasal drainage  Eyes:  Negative for eye discharge and vision loss  Cardiovascular:  Negative for chest pain, sob  Respiratory:  Negative for cough, dyspnea and wheezing  Gastrointestinal:  Negative for abdominal pain, constipation  Genitourinary:  Negative for dysuria and hematuria  Endocrine:  Negative for abnormal sleep patterns  Hema/Lymph:  Negative for easy bleeding and easy bruising  Integumentary:  Negative for pruritus and rash  Musculoskeletal:  Negative for bone/joint symptoms  Neurological:  Negative for gait disturbance  Psychiatric:  Negative for inappropriate interaction and psychiatric symptoms      Vitals:    07/24/25 1114   BP: 138/83       PHYSICAL EXAM:   Constitutional: appears well hydrated alert and responsive   Head/Face: normocephalic  Eyes/Vision: normal extraocular motion is intact  Nose/Mouth/Throat:mucous membranes are moist   Neck/Thyroid: neck is supple without adenopathy  Lymphatic: no abnormal cervical, supraclavicular adenopathy is noted  Respiratory:  lungs are clear to auscultation bilaterally  Cardiovascular: regular rate and rhythm   Abdomen: soft, non-tender, non-distended, BS normal  Skin/Hair: no unusual rashes present, no abnormal bruising noted  Musculoskeletal: no deformities  Extremities: no edema  Neurological:  Grossly normal       Lab Results   Component  Value Date    GLU 84 06/24/2025     06/24/2025    K 4.2 06/24/2025     06/24/2025    CO2 23.0 06/24/2025    ANIONGAP 8 06/24/2025    BUN 26 (H) 06/24/2025    CREATSERUM 1.62 (H) 06/24/2025    CA 9.0 06/24/2025    OSMOCALC 294 06/24/2025    EGFRCR 45 (L) 06/24/2025    ALB 4.1 05/30/2025    PHOS 4.3 06/24/2025         ASSESSMENT/PLAN:   Assessment   1. Stage 3b chronic kidney disease (HCC)  GFR is great at 45    2. Transplanted kidney (HCC)  Continues on prednisone, and tacrolimus     3. Diabetic glomerulopathy (HCC)  Follows with Dr. Lainez, on insulin and Ozempic     4. Hypertensive kidney disease with stage 3b chronic kidney disease (HCC)  Pressure stable on metoprolol and lisinopril              Orders This Visit:  Orders Placed This Encounter   Procedures    Vitamin D    Renal Function Panel    PTH, Intact    Microalb/Creat Ratio, Random Urine    Tacrolimus (Immunoassay)       Meds This Visit:  Requested Prescriptions      No prescriptions requested or ordered in this encounter       Imaging & Referrals:  None     7/24/2025   HELLEN DE LUNA MD    Return in about 6 months (around 1/24/2026).

## 2025-08-21 ENCOUNTER — OFFICE VISIT (OUTPATIENT)
Dept: OTOLARYNGOLOGY | Facility: CLINIC | Age: 70
End: 2025-08-21

## 2025-08-21 DIAGNOSIS — R09.82 POSTNASAL DISCHARGE: Primary | ICD-10-CM

## 2025-08-21 PROCEDURE — 99213 OFFICE O/P EST LOW 20 MIN: CPT | Performed by: OTOLARYNGOLOGY

## 2025-08-28 ENCOUNTER — OFFICE VISIT (OUTPATIENT)
Dept: INTERNAL MEDICINE CLINIC | Facility: CLINIC | Age: 70
End: 2025-08-28

## 2025-08-28 VITALS
SYSTOLIC BLOOD PRESSURE: 118 MMHG | DIASTOLIC BLOOD PRESSURE: 71 MMHG | OXYGEN SATURATION: 94 % | WEIGHT: 281 LBS | HEART RATE: 107 BPM | BODY MASS INDEX: 38.06 KG/M2 | HEIGHT: 72 IN

## 2025-08-28 DIAGNOSIS — Z12.83 SKIN CANCER SCREENING: ICD-10-CM

## 2025-08-28 DIAGNOSIS — D69.6 THROMBOCYTOPENIA: ICD-10-CM

## 2025-08-28 DIAGNOSIS — E11.9 DIABETES MELLITUS TYPE 2, INSULIN DEPENDENT (HCC): ICD-10-CM

## 2025-08-28 DIAGNOSIS — Z79.4 DIABETES MELLITUS TYPE 2, INSULIN DEPENDENT (HCC): ICD-10-CM

## 2025-08-28 DIAGNOSIS — R23.3 EASY BRUISING: Primary | ICD-10-CM

## 2025-08-28 DIAGNOSIS — Z12.11 COLON CANCER SCREENING: ICD-10-CM

## 2025-08-28 PROCEDURE — G2211 COMPLEX E/M VISIT ADD ON: HCPCS | Performed by: INTERNAL MEDICINE

## 2025-08-28 PROCEDURE — 99215 OFFICE O/P EST HI 40 MIN: CPT | Performed by: INTERNAL MEDICINE

## (undated) DIAGNOSIS — N28.89 RENAL MASS: ICD-10-CM

## (undated) DIAGNOSIS — R93.429 ABNORMAL CT SCAN, KIDNEY: ICD-10-CM

## (undated) DIAGNOSIS — R31.29 MICROHEMATURIA: Primary | ICD-10-CM

## (undated) DIAGNOSIS — C65.1 RENAL PELVIS TRANSITIONAL CELL MALIGNANT NEOPLASM, RIGHT (HCC): ICD-10-CM

## (undated) DIAGNOSIS — Z51.81 MONITORING FOR ANTICOAGULANT USE: ICD-10-CM

## (undated) DIAGNOSIS — Z79.01 MONITORING FOR ANTICOAGULANT USE: ICD-10-CM

## (undated) DIAGNOSIS — C65.9 MALIGNANT NEOPLASM OF RENAL PELVIS, UNSPECIFIED LATERALITY (HCC): ICD-10-CM

## (undated) DIAGNOSIS — N40.1 BENIGN PROSTATIC HYPERPLASIA WITH LOWER URINARY TRACT SYMPTOMS, SYMPTOM DETAILS UNSPECIFIED: Primary | ICD-10-CM

## (undated) DEVICE — DILATOR/SHEATH SET: Brand: 8/10 DILATOR/SHEATH SET

## (undated) DEVICE — CYSTO PACK: Brand: MEDLINE INDUSTRIES, INC.

## (undated) DEVICE — Device: Brand: DEFENDO AIR/WATER/SUCTION AND BIOPSY VALVE

## (undated) DEVICE — STAY.SAFE® CATHETER EXTENSION SET WITH LUER-LOCK, 18 INCH: Brand: STAY.SAFE®

## (undated) DEVICE — 6 ML SYRINGE LUER-LOCK TIP: Brand: MONOJECT

## (undated) DEVICE — SOLO FLEX HYBRID GUIDEWIRE .03

## (undated) DEVICE — TUBING CYSTO TUR DUAL

## (undated) DEVICE — MEDI-VAC NON-CONDUCTIVE SUCTION TUBING 6MM X 1.8M (6FT.) L: Brand: CARDINAL HEALTH

## (undated) DEVICE — STERILE LATEX POWDER-FREE SURGICAL GLOVESWITH NITRILE COATING: Brand: PROTEXIS

## (undated) DEVICE — UNDYED BRAIDED (POLYGLACTIN 910), SYNTHETIC ABSORBABLE SUTURE: Brand: COATED VICRYL

## (undated) DEVICE — SOL  .9 1000ML BTL

## (undated) DEVICE — SOL  .9 3000ML

## (undated) DEVICE — SOL H2O 1000ML BTL

## (undated) DEVICE — 12 ML SYRINGE LUER-LOCK TIP: Brand: MONOJECT

## (undated) DEVICE — GAMMEX® PI HYBRID SIZE 7.5, STERILE POWDER-FREE SURGICAL GLOVE, POLYISOPRENE AND NEOPRENE BLEND: Brand: GAMMEX

## (undated) DEVICE — INSUFFLATION NEEDLE TO ESTABLISH PNEUMOPERITONEUM.: Brand: INSUFFLATION NEEDLE

## (undated) DEVICE — DRAPE C-ARM UNIVERSAL

## (undated) DEVICE — SUTURE PROLENE 3-0 SH

## (undated) DEVICE — DEVICE PORT SITE CLOSURE

## (undated) DEVICE — MEDI-VAC NON-CONDUCTIVE SUCTION TUBING: Brand: CARDINAL HEALTH

## (undated) DEVICE — SUTURE VICRYL 3-0 SH

## (undated) DEVICE — URINE DRAINAGE BAG,NEEDLE SAMPLING, ANTI-REFLUX DEVICE, DRAIN TUBE: Brand: DOVER

## (undated) DEVICE — UROLOGY DRAIN BAG

## (undated) DEVICE — URETERAL ACCESS SHEATH SET: Brand: NAVIGATOR HD

## (undated) DEVICE — 9534HP TRANSPARENT DRSG W/FRAME: Brand: 3M™ TEGADERM™

## (undated) DEVICE — 35 ML SYRINGE REGULAR TIP: Brand: MONOJECT

## (undated) DEVICE — 3M™ STERI-STRIP™ COMPOUND BENZOIN TINCTURE 40 BAGS/CARTON 4 CARTONS/CASE C1544: Brand: 3M™ STERI-STRIP™

## (undated) DEVICE — ISOVUE 300 10X100ML VIAL

## (undated) DEVICE — NITINOL STONE RETRIEVAL BASKET: Brand: ZERO TIP

## (undated) DEVICE — TOWEL SURG OR 17X30IN BLUE

## (undated) DEVICE — [HIGH FLOW INSUFFLATOR,  DO NOT USE IF PACKAGE IS DAMAGED,  KEEP DRY,  KEEP AWAY FROM SUNLIGHT,  PROTECT FROM HEAT AND RADIOACTIVE SOURCES.]: Brand: PNEUMOSURE

## (undated) DEVICE — TRAY SRGPRP PVP IOD WT SCRB SM

## (undated) DEVICE — DECANTER SPIKE TRANSFLOW

## (undated) DEVICE — OPEN-END URETERAL CATHETER SOF-FLEX: Brand: SOF-FLEX

## (undated) DEVICE — SCD SLEEVE KNEE HI BLEND

## (undated) DEVICE — TIGERTAIL 5F FLXTIP 70CM

## (undated) DEVICE — SUTURE PROLENE 2-0 SH

## (undated) DEVICE — 3M(TM) TEGADERM(TM) TRANSPARENT FILM DRESSING FRAME STYLE 1628: Brand: 3M™ TEGADERM™

## (undated) DEVICE — STERILE (10.2 X 147CM) TELESCOPICALLY-FOLDED COVER: Brand: CIV-FLEX™ TRANSDUCER COVER

## (undated) DEVICE — ZIPWIRE GUIDEWIRE .035X150 STR

## (undated) DEVICE — DUAL LUMEN URETERAL CATHETER

## (undated) DEVICE — Device

## (undated) DEVICE — LAP CHOLE: Brand: MEDLINE INDUSTRIES, INC.

## (undated) DEVICE — SUTURE VICRYL 0 UR-6

## (undated) DEVICE — POUCH: SSEAL TYVEK 2000/CS: Brand: MEDICAL ACTION INDUSTRIES

## (undated) DEVICE — REM POLYHESIVE ADULT PATIENT RETURN ELECTRODE: Brand: VALLEYLAB

## (undated) DEVICE — TROCAR: Brand: KII FIOS FIRST ENTRY

## (undated) DEVICE — NON-ADHERENT PADS PREPACK: Brand: TELFA

## (undated) DEVICE — SUTURE SILK 0

## (undated) DEVICE — 3 ML SYRINGE LUER-LOCK TIP: Brand: MONOJECT

## (undated) DEVICE — CATH PACK Y-TEC

## (undated) DEVICE — 3M™ TEGADERM™ TRANSPARENT FILM DRESSING, 1626W, 4 IN X 4-3/4 IN (10 CM X 12 CM), 50 EACH/CARTON, 4 CARTON/CASE: Brand: 3M™ TEGADERM™

## (undated) DEVICE — LINE MNTR ADLT SET O2 INTMD

## (undated) DEVICE — DRAPE SHEET TRANSVERSE LAP

## (undated) DEVICE — CATH URTH BDX IC 20FR FL 3

## (undated) DEVICE — GAUZE SPONGES,12 PLY: Brand: CURITY

## (undated) DEVICE — STAY.SAFE® CAP: Brand: STAY.SAFE®

## (undated) DEVICE — CHLORAPREP 26ML APPLICATOR

## (undated) DEVICE — CONTAINER SPEC STR 4OZ GRY LID

## (undated) DEVICE — Device: Brand: CUSTOM PROCEDURE KIT

## (undated) DEVICE — ADHESIVE MASTISOL 2/3CC VL

## (undated) DEVICE — TROCAR: Brand: KII® SLEEVE

## (undated) DEVICE — SYRINGE MNJCT 35ML LF STRL LL

## (undated) NOTE — LETTER
Hospital Discharge Documentation    From: [facility] Hospitalist's Office  Phone: [phone]    Patient discharged time/date: 2024  7:29 PM  Patient discharge disposition:  Home Health Care Services[facility]       Discharge Summary - D/C Summary        Discharge Summary signed by Carolyn Bocanegra MD at 10/1/2024  7:10 AM  Version 1 of 1      Author: Carolyn Bocanegra MD Service: Hospitalist Author Type: Physician    Filed: 10/1/2024  7:10 AM Date of Service: 2024  1:03 PM Status: Signed    : Carolyn Bocanegra MD (Physician)           [facility]     Rolly Sanchez Patient Status:  Inpatient    1955 MRN [id number]   Location [facility] 5SW/SE Attending No att. providers found   Hosp Day # 3 PCP Macey Hastings DO     Date of Admission: 2024  Date of Discharge: 2024  Discharge Disposition: Home Health Care Services    Admitting Chief Complaint:   Acute cystitis without hematuria [N30.00]    PCP: Macey Hastings DO    Discharge Diagnosis:   UTI in a transplant kidney:   Ucx positive for Pseudomonas aeruginosa     JAZZMINE:   H/o renal transplant      HTN:   A-fib:   DM2  AMELIE:           History of Present Illness:   Per Dr. Crystal  Rolly Sanchez is a 69 year old male with past past medical history of T2DM, HTN, ESRD status post DDKT 12/15/2022 at [facility] (Dr Dexter), A-fib on Eliquis, psoriasis, AMELIE on CPAP, and obesity, who was asked to come to the ED for IV antibiotics after urine culture resulted.  He reports he had symptoms of dysuria, frequency,  and urgency for 2 days.  Then he had a UA with urine culture on .  He was started on cephalexin, and he had improvement in symptoms.  However he was called today and asked was asked to come to the ED because his urine culture was positive for Pseudomonas aeruginosa with intermediate susceptibility to fluoroquinolones. Patient's PCP discussed with [facility] transplant surgeon, who  recommended against quinolones given intermediate sensitivity and also risk for QT prolongation given tacrolimus use.  He complains of fatigue, decreased appetite, fevers and chills.          Brief Synopsis:   UTI in a transplant kidney:   Ucx positive for Pseudomonas aeruginosa  Previous cx +staph not aureus (may), staph epidermidis (April)  -ID consult, d/w Dr. Combs  -initially on zosyn, changed to cefepime per ID-->home on cefepime to complete two week course on 10/11/24. Midline to be placed.          JAZZMINE:   Baseline Cr 1.5-1.7.   Creatinine 1.94 on admission.  Likely due to relative hypotension. Back to baseline now  - hold BP meds initially-->restart gradually.  - s/p IVF per nephrology  - check ultrasound of the transplanted kidney.   - per Nephrology--> BP has improved. Can restart lisinopril. Told patient if blood pressure starts to increase then can restart amlodipine as well       S/p DDRT, 12/15/22 at [facility]  :   He takes Envarsus 3 mg daily and prednisone 10 mg daily.  Will continue.   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   Plus prophylactic bactrim     HTN:   BP on the lower side initially.  Will hold meds (lisinopril 10 mg, amlodipine 10 mg. Pt was already NOT taking  chlorthalidone 25 mg since previous admission in May ).  -restarted gradually as above     A-fib:   Cont Eliquis and metoprolol     DM2  Home: ozempic, insulin pump  -endocrinology consulted     AMELIE:   Continue CPAP        DVT prophylaxis: eliquis     Dispo: home, f/u Dr. Dexter           Discharge Medication List:     Discharge Medications        START taking these medications        Instructions Prescription details   ceFEPIme 1 g in sodium chloride 100 mL      Inject 1 g into the vein every 12 (twelve) hours for 11 days. R70 Weekly CBC/diff, CMP Fax labs to Dr. Cali [facility]   Stop taking on: October 11, 2024  Quantity: 1 each  Refills: 0            CHANGE how you take these medications        Instructions  Prescription details   Fexofenadine HCl 30 MG Tbdp  Commonly known as: ALLEGRA ODT  What changed: Another medication with the same name was removed. Continue taking this medication, and follow the directions you see here.      Take 6 tablets (180 mg total) by mouth daily.   Refills: 0     lisinopril 10 MG Tabs  Commonly known as: Zestril  What changed: Another medication with the same name was removed. Continue taking this medication, and follow the directions you see here.      Take 1 tablet (10 mg total) by mouth daily.   Refills: 0            CONTINUE taking these medications        Instructions Prescription details   atorvastatin 40 MG Tabs  Commonly known as: Lipitor      Take 1 tablet (40 mg total) by mouth nightly.   Refills: 0     Ehsan Contour Next Test Strp       Refills: 0     BD Luer-Nayan Syringe 23G X 1\" 3 ML Misc  Generic drug: Syringe/Needle (Disp)      USE FOR B12 INJECTIONS AS DIRECTED   Quantity: 12 each  Refills: 0     cyanocobalamin 1000 MCG Tabs  Commonly known as: Vitamin B12      Take 1 tablet (1,000 mcg total) by mouth daily.   Refills: 0     Eliquis 5 MG Tabs  Generic drug: apixaban      Take 1 tablet (5 mg total) by mouth 2 (two) times daily.   Refills: 0     Envarsus XR 1 MG Tb24  Generic drug: Tacrolimus ER      Take 3 tablets (3 mg total) by mouth daily.   Refills: 0     ergocalciferol 1.25 MG (48600 UT) Caps  Commonly known as: Vitamin D2      Take 1 capsule (50,000 Units total) by mouth every 7 days.   Refills: 0     fenofibrate 48 MG Tabs  Commonly known as: Tricor       Refills: 0     finasteride 5 MG Tabs  Commonly known as: Proscar      Take 1 tablet (5 mg total) by mouth daily.   Quantity: 90 tablet  Refills: 3     fluticasone propionate 50 MCG/ACT Susp  Commonly known as: Flonase      by Each Nare route as needed for Rhinitis.   Refills: 0     glycopyrrolate 1 MG Tabs  Commonly known as: Robinul      Take 1 tablet (1 mg total) by mouth 2 (two) times daily.   Refills: 0     HumuLIN  R U-500 (CONCENTRATED) 500 UNIT/ML Soln  Generic drug: insulin regular human (conc)      250 units/day via Insulin pump   Refills: 0     metoprolol tartrate 100 MG Tabs  Commonly known as: Lopressor      Take 1 tablet (100 mg total) by mouth 2 (two) times daily.   Refills: 0     montelukast 10 MG Tabs  Commonly known as: Singulair      TAKE 1 TABLET BY MOUTH NIGHTLY   Quantity: 90 tablet  Refills: 3     NON FORMULARY      Take 266 mg by mouth in the morning, at noon, in the evening, and at bedtime. MAGNESIUM + PROTEIN 1 daily    Patient takes 4 times a day at 0900, 1200, 1500, 2200   Refills: 0     Ozempic (0.25 or 0.5 MG/DOSE) 2 MG/1.5ML Sopn  Generic drug: semaglutide      Inject 1 mg into the skin once a week.   Refills: 0     predniSONE 10 MG Tabs  Commonly known as: Deltasone      Take 1 tablet (10 mg total) by mouth daily.   Refills: 0     PROBIOTIC-10 OR      Take 1 capsule by mouth daily.   Refills: 0     sodium bicarbonate 650 MG Tabs      Take 1 tablet (650 mg total) by mouth 2 (two) times daily. Takes at 0900 and 2200   Refills: 0     sulfamethoxazole-trimethoprim -160 MG Tabs per tablet  Commonly known as: Bactrim DS      Takes Mondays and Thursdays per transplant clinic   Refills: 0     tamsulosin 0.4 MG Caps  Commonly known as: Flomax      Take 1 capsule (0.4 mg total) by mouth daily.   Quantity: 90 capsule  Refills: 3            STOP taking these medications      amLODIPine 10 MG Tabs  Commonly known as: Norvasc        cephALEXin 500 MG Caps  Commonly known as: Keflex        chlorthalidone 25 MG Tabs  Commonly known as: [place]                  Where to Get Your Medications        Please  your prescriptions at the location directed by your doctor or nurse    Bring a paper prescription for each of these medications  ceFEPIme 1 g in sodium chloride 100 mL         Follow-up appointment:   Samantha Dexter  [address] [place] [address] [id number] [phone]    Schedule an  appointment as soon as possible for a visit in 2 week(s)      Macey Hastings DO  [address] [facility] [address]  [phone]    Schedule an appointment as soon as possible for a visit in 1 week(s)  post hospitalization check up (tele visit ok)    Yung Combs MD  [address]  [facility] [address]  [phone]    Follow up      Kiko Strange MD  [address] [facility] [address]  [place] [address]  [phone]    Follow up        Vital signs:  Temp:  [98 °F (36.7 °C)] 98 °F (36.7 °C)  Pulse:  [] 114  Resp:  [18] 18  BP: (112)/(77) 112/77  SpO2:  [96 %] 96 %    Physical Exam:    General: No acute distress.   Respiratory: Clear to auscultation bilaterally. No wheezes. No rhonchi.  Cardiovascular: S1, S2. Regular rate and rhythm. No murmurs, rubs or gallops.   Abdomen: Soft, nontender, nondistended.  Positive bowel sounds. No rebound or guarding.  Neurologic: No focal neurological deficits.   Musculoskeletal: Moves all extremities.  Extremities: No edema.  -----------------------------------------------------------------------------------------------  PATIENT DISCHARGE INSTRUCTIONS: See electronic chart    I d/w pt  the available results, management plan, medications changes, and discharge instructions including follow ups as outlined above.   Scripts sent to pharmacy.      Hospital Discharge Diagnoses:  UTI    Lace+ Score: 49  59-90 High Risk  29-58 Medium Risk  0-28   Low Risk.    TCM Follow-Up Recommendation:  LACE 29-58: Moderate Risk of readmission after discharge from the hospital.        CAROLYN STAFFORD MD 10/1/2024    Time spent:  > 30 minutes      Electronically signed by Carolyn Stafford MD on 10/1/2024  7:10 AM

## (undated) NOTE — MR AVS SNAPSHOT
After Visit Summary   9/12/2018    Daisy Guajardo    MRN: [id number]           Visit Information     Date & Time  9/12/2018  9:00 AM Provider  [facility] Department  [facility], [address], [place] Dept.  Phone  3 Ascorbic Acid (VITAMIN C) 1000 MG Oral Tab Take 1 tablet by mouth daily. aspirin (ASPIR-81) 81 MG Oral Tab EC Take 1 tablet by mouth daily. cholecalciferol ( VITAMIN D) 1000 UNITS Oral Cap Take 1 capsule by mouth daily.     Cetirizine HCl 10 MG Ora Treatment for mild  illness or injury that does  not require immediate attention.           Average cost  $70*       VIDEO VISITS  Visit face-to-face with a [facility] physician or FREDIS  using your mobile device or computer   using [employer]

## (undated) NOTE — MR AVS SNAPSHOT
[facility]  [address] [place] [id number] [phone]               Thank you for choosing us for your health care visit with Gonsalo Khan MD.  We are glad to serve you and happy to provide you with this summary of your visit.   Jeffery Generic drug:  Syringe/Needle (Disp)   USE FOR B12 INJECTIONS AS DIRECTED           Cetirizine HCl 10 MG Caps   Take  by mouth. take 1 tablet (10MG)  by oral route  every day           CoQ-10 100 MG Caps   Take 1 capsule by mouth daily.            cyanocoba [email or web address]. If you've recently had a stay at the Hospital you can access your discharge instructions in [employer] by going to Visits < Admission Summaries.  If you've been to the Emergency Department or your doctor's office, you can view yo

## (undated) NOTE — MR AVS SNAPSHOT
[facility]  [address] [place] [address], [address] [place] [address]               Thank you for choosing us for your health care visit with Mary Noble MD.  We are glad to serve you and happy to provide you with this summary of gemfibrozil 600 MG Tabs   Take 1 tablet (600 mg total) by mouth 2 (two) times daily before meals. Commonly known as:  LOPID           HUMALOG 100 UNIT/ML Soln   Generic drug:  Insulin Lispro   Use in insulin pump as recommended by Dr. Roberto .            h Enjoy your food, but eat less. Fully enjoy your food when eating. Don’t eat while distracted and slow down. Avoid over sized portions. Don’t eat while when you’re bored.      EAT THESE FOODS MORE OFTEN: EAT THESE FOODS LESS OFTEN:   Make half your pl

## (undated) NOTE — LETTER
Hospital Discharge Documentation  Please phone to schedule a hospital follow up appointment.     From: [address] Hospitalist's Office  Phone: [phone]    Patient discharged time/date: 10/15/2021  1:26 PM  Patient discharge disposition:  Home or Ada Resp: 21   Temp:    GENERAL:  The patient appeared to be in no distress and was comfortable.   SKIN:  Warm and hydrated  PSYCHIATRIC: Calm and cooperative    HEENT:  Head was atraumatic and normocephalic.  Eyes: Sclera was anicteric.  Pupils were equal.  chest x-ray consistent with pulmonary vascular congestion and cardiomegaly. His EKG revealed a sinus rhythm with low voltage. His labs revealed a BNP of 4773.   Glucose was 74, sodium 144, potassium 5.1, chloride 115, carbon dioxide was 17, with a BUN of schedule     Consultations: Nephrology, endocrinology    Procedures: none    Complications: none    Discharge Condition: Stable    Discharge Medications:      Discharge Medications      CHANGE how you take these medications      Instructions Prescription d pump   Refills: 0     LASIX OR      Take 80 mg by mouth daily. Refills: 0     omega-3 fatty acids 1000 MG Caps  Commonly known as: FISH OIL      Take 1,000 mg by mouth nightly.  Take 4 capsules daily   Refills: 0     PEG 3350 17 g Pack  Commonly known as: